# Patient Record
Sex: MALE | Race: WHITE | NOT HISPANIC OR LATINO | Employment: OTHER | URBAN - METROPOLITAN AREA
[De-identification: names, ages, dates, MRNs, and addresses within clinical notes are randomized per-mention and may not be internally consistent; named-entity substitution may affect disease eponyms.]

---

## 2018-02-07 ENCOUNTER — OFFICE VISIT (OUTPATIENT)
Dept: DERMATOLOGY | Facility: CLINIC | Age: 73
End: 2018-02-07
Payer: MEDICARE

## 2018-02-07 DIAGNOSIS — L01.00 IMPETIGO: Primary | ICD-10-CM

## 2018-02-07 DIAGNOSIS — L30.9 ECZEMA, UNSPECIFIED TYPE: ICD-10-CM

## 2018-02-07 PROCEDURE — 99999 PR PBB SHADOW E&M-NEW PATIENT-LVL II: CPT | Mod: PBBFAC,,, | Performed by: DERMATOLOGY

## 2018-02-07 PROCEDURE — 87070 CULTURE OTHR SPECIMN AEROBIC: CPT

## 2018-02-07 PROCEDURE — 1159F MED LIST DOCD IN RCRD: CPT | Mod: ,,, | Performed by: DERMATOLOGY

## 2018-02-07 PROCEDURE — 99202 OFFICE O/P NEW SF 15 MIN: CPT | Mod: PBBFAC | Performed by: DERMATOLOGY

## 2018-02-07 PROCEDURE — 99202 OFFICE O/P NEW SF 15 MIN: CPT | Mod: S$PBB,,, | Performed by: DERMATOLOGY

## 2018-02-07 PROCEDURE — 1126F AMNT PAIN NOTED NONE PRSNT: CPT | Mod: ,,, | Performed by: DERMATOLOGY

## 2018-02-07 RX ORDER — MUPIROCIN 20 MG/G
OINTMENT TOPICAL
Qty: 30 G | Refills: 2 | Status: SHIPPED | OUTPATIENT
Start: 2018-02-07 | End: 2019-09-09

## 2018-02-07 RX ORDER — ATORVASTATIN CALCIUM 10 MG/1
10 TABLET, FILM COATED ORAL DAILY
COMMUNITY
End: 2019-04-08

## 2018-02-07 NOTE — PATIENT INSTRUCTIONS

## 2018-02-07 NOTE — PROGRESS NOTES
Subjective:       Patient ID:  Ravinder Sanz is a 73 y.o. male who presents for   Chief Complaint   Patient presents with    Rash     All over     Rash  - Initial  Affected locations: left lower leg and right buttock  Duration: 2 weeks  Signs / symptoms: itching and redness (Visual Redness)  Severity: severe  Timing: constant  Aggravated by: heat (Hot Water)  Treatments tried: Dial soap, Clotrimazole, and Tolnaftate.  Improvement on treatment: no relief        Review of Systems   Constitutional: Negative for fever, chills, weight loss, weight gain, fatigue, night sweats and malaise.   Skin: Positive for rash. Negative for daily sunscreen use and recent sunburn.   Hematologic/Lymphatic: Does not bruise/bleed easily.        Objective:    Physical Exam   Constitutional: He appears well-developed and well-nourished. No distress.   Neurological: He is alert and oriented to person, place, and time. He is not disoriented.   Psychiatric: He has a normal mood and affect.   Skin:   Areas Examined (abnormalities noted in diagram):   Head / Face Inspection Performed  Neck Inspection Performed  RUE Inspected  LUE Inspection Performed  RLE Inspected  LLE Inspection Performed              Diagram Legend     Erythematous scaling macule/papule c/w actinic keratosis       Vascular papule c/w angioma      Pigmented verrucoid papule/plaque c/w seborrheic keratosis      Yellow umbilicated papule c/w sebaceous hyperplasia      Irregularly shaped tan macule c/w lentigo     1-2 mm smooth white papules consistent with Milia      Movable subcutaneous cyst with punctum c/w epidermal inclusion cyst      Subcutaneous movable cyst c/w pilar cyst      Firm pink to brown papule c/w dermatofibroma      Pedunculated fleshy papule(s) c/w skin tag(s)      Evenly pigmented macule c/w junctional nevus     Mildly variegated pigmented, slightly irregular-bordered macule c/w mildly atypical nevus      Flesh colored to evenly pigmented papule c/w  "intradermal nevus       Pink pearly papule/plaque c/w basal cell carcinoma      Erythematous hyperkeratotic cursted plaque c/w SCC      Surgical scar with no sign of skin cancer recurrence      Open and closed comedones      Inflammatory papules and pustules      Verrucoid papule consistent consistent with wart     Erythematous eczematous patches and plaques     Dystrophic onycholytic nail with subungual debris c/w onychomycosis     Umbilicated papule    Erythematous-base heme-crusted tan verrucoid plaque consistent with inflamed seborrheic keratosis     Erythematous Silvery Scaling Plaque c/w Psoriasis     See annotation      Assessment / Plan:        Impetigo  -     mupirocin (BACTROBAN) 2 % ointment; AAA BID  Dispense: 30 g; Refill: 2  -     Aerobic culture    Eczema, unspecified type  -     fluocinonide (LIDEX) 0.05 % ointment; AAA bid  Dispense: 60 g; Refill: 3  Good skin care regimen discussed including limiting to one bath or shower/day, using lukewarm water with mild soap and moisturizing cream to skin 1 - 2x/day. Brochure was provided and reviewed with patient.  Recommended CeraVe moisturizing cream, Dove sensitive skin soap, and "free and clear" detergents.      Follow-up if symptoms worsen or fail to improve.  "

## 2018-02-09 ENCOUNTER — TELEPHONE (OUTPATIENT)
Dept: DERMATOLOGY | Facility: CLINIC | Age: 73
End: 2018-02-09

## 2018-02-09 LAB — BACTERIA SPEC AEROBE CULT: NORMAL

## 2018-02-09 RX ORDER — FLUOCINONIDE 0.5 MG/G
OINTMENT TOPICAL
Qty: 60 G | Refills: 3 | Status: SHIPPED | OUTPATIENT
Start: 2018-02-09 | End: 2019-11-20

## 2018-02-09 NOTE — TELEPHONE ENCOUNTER
Spoke with patient and went over biopsy results with him  ----- Message from Suzy Washington sent at 2/9/2018  2:23 PM CST -----  Contact: pt   [facility]-pt- pt is returning the nurses phone call can you please call pt at [phone]    KELL

## 2018-02-09 NOTE — TELEPHONE ENCOUNTER
----- Message from Carmel Ruano MD sent at 2/9/2018 12:39 PM CST -----  Please call Mr. Sanz and let him know that the culture did not grow anything infectious, so I have sent a strong steroid cream for him to use instead on all of the affected areas. He can discontinue the antibiotic ointment once he starts the steroid. Let me know if he has any questions.

## 2018-03-20 ENCOUNTER — TELEPHONE (OUTPATIENT)
Dept: DERMATOLOGY | Facility: CLINIC | Age: 73
End: 2018-03-20

## 2018-03-20 NOTE — TELEPHONE ENCOUNTER
Spoke with patient and coming in on Friday 3/23 at 2pm  ----- Message from Maria G Aldridge sent at 3/20/2018 11:35 AM CDT -----  Contact: pt at [phone]  Alden pt -Pt needs appt asap for the same thing that he saw her for back in February.

## 2018-03-23 ENCOUNTER — OFFICE VISIT (OUTPATIENT)
Dept: DERMATOLOGY | Facility: CLINIC | Age: 73
End: 2018-03-23
Payer: MEDICARE

## 2018-03-23 DIAGNOSIS — L30.0 NUMMULAR ECZEMA: Primary | ICD-10-CM

## 2018-03-23 PROCEDURE — 99999 PR PBB SHADOW E&M-EST. PATIENT-LVL II: CPT | Mod: PBBFAC,,, | Performed by: DERMATOLOGY

## 2018-03-23 PROCEDURE — 99212 OFFICE O/P EST SF 10 MIN: CPT | Mod: PBBFAC | Performed by: DERMATOLOGY

## 2018-03-23 PROCEDURE — 99213 OFFICE O/P EST LOW 20 MIN: CPT | Mod: S$PBB,,, | Performed by: DERMATOLOGY

## 2018-03-23 NOTE — PATIENT INSTRUCTIONS
XEROSIS (DRY SKIN)        1. Definition    Xerosis is the term for dry skin.  We all have a natural oil coating over our skin produced by the skin oil glands.  If this oil is removed, the skin becomes dry which can lead to cracking, which can lead to inflammation.  Xerosis is usually a long-term problem that recurs often, especially in the winter.    2. Cause     Long hot baths or showers can remove our natural oil and lead to xerosis.  One should never take more than one bath or shower a day and for no longer than ten minutes.   Use of harsh soaps such as Zest, Dial, and Ivory can worsen and cause xerosis.   Cold winter weather worsens xerosis because the amount of moisture contained in cold air is much less than the amount of moisture in warm air.    3. Treatment     Treatment is intended to restore the natural oil to your skin.  Keep the skin lubricated.     Do not take more than one bath or shower a day.  Use lukewarm water, not hot.  Hot water dries out the skin.     Use a gentle moisturizing soap such as Cetaphil soap, Oil of Olay, Dove, Basis, Ivory moisture care, Restoraderm cleanser.     When toweling dry, dont rub.  Blot the skin so there is still some water left on the skin.  You should apply a moisturizing cream to all of the skin such as Cerave cream, Cetaphil cream, Restoraderm or Eucerin Original Formula cream.   Alpha hydroxyacid lotions, i.e., AmLactin, also work very well for preventing dry skin, but may burn when used on inflamed or reddened skin.     If you like to swim during the winter months, you should not use soap when getting out of the pool.  When you have finished swimming, rinse off the chlorine with cool to warm water.  If this will be the only shower of the day, then you may use Cetaphil or another mild soap to cleanse your skin.  After the shower, apply a moisturizing cream to all of the skin as above.        [address] [place] [address]/ [phone]  [phone] FAX/ [email or web address]

## 2018-03-23 NOTE — PROGRESS NOTES
Subjective:       Patient ID:  Ravinder Sanz is a 73 y.o. male who presents for   Chief Complaint   Patient presents with    Eczema     HPI  Pt here today for f/u eczema. States the lesions were almost all clear after using the lidex x 2 wks but then he stopped it and they have slowly recurred, but not as bad.  Using a shea butter moisturizer and dove soap.    Review of Systems   Constitutional: Negative for fever and chills.   Skin: Positive for itching and rash.        Objective:    Physical Exam   Constitutional: He appears well-developed and well-nourished. No distress.   Neurological: He is alert and oriented to person, place, and time. He is not disoriented.   Psychiatric: He has a normal mood and affect.   Skin:   Areas Examined (abnormalities noted in diagram):   Head / Face Inspection Performed  Neck Inspection Performed  Genitals / Buttocks / Groin Inspection Performed  RUE Inspected  LUE Inspection Performed  RLE Inspected  LLE Inspection Performed              Diagram Legend     Erythematous scaling macule/papule c/w actinic keratosis       Vascular papule c/w angioma      Pigmented verrucoid papule/plaque c/w seborrheic keratosis      Yellow umbilicated papule c/w sebaceous hyperplasia      Irregularly shaped tan macule c/w lentigo     1-2 mm smooth white papules consistent with Milia      Movable subcutaneous cyst with punctum c/w epidermal inclusion cyst      Subcutaneous movable cyst c/w pilar cyst      Firm pink to brown papule c/w dermatofibroma      Pedunculated fleshy papule(s) c/w skin tag(s)      Evenly pigmented macule c/w junctional nevus     Mildly variegated pigmented, slightly irregular-bordered macule c/w mildly atypical nevus      Flesh colored to evenly pigmented papule c/w intradermal nevus       Pink pearly papule/plaque c/w basal cell carcinoma      Erythematous hyperkeratotic cursted plaque c/w SCC      Surgical scar with no sign of skin cancer recurrence      Open and closed  "comedones      Inflammatory papules and pustules      Verrucoid papule consistent consistent with wart     Erythematous eczematous patches and plaques     Dystrophic onycholytic nail with subungual debris c/w onychomycosis     Umbilicated papule    Erythematous-base heme-crusted tan verrucoid plaque consistent with inflamed seborrheic keratosis     Erythematous Silvery Scaling Plaque c/w Psoriasis     See annotation      Assessment / Plan:        Nummular eczema  Recommended continuing lidex BID x 1-2 wks then prn flares. Pt states it does work when he uses it.  Good skin care regimen discussed including limiting to one bath or shower/day, using lukewarm water with mild soap and moisturizing cream to skin 1 - 2x/day. Brochure was provided and reviewed with patient.  Recommended CeraVe moisturizing cream, Dove sensitive skin soap, and "free and clear" detergents.    Follow-up if symptoms worsen or fail to improve.  "

## 2019-02-15 ENCOUNTER — TELEPHONE (OUTPATIENT)
Dept: DERMATOLOGY | Facility: CLINIC | Age: 74
End: 2019-02-15

## 2019-02-15 NOTE — TELEPHONE ENCOUNTER
----- Message from Arielle Diaz MA sent at 2/15/2019 12:44 PM CST -----  Contact: patient  Pt would like for you to recommend a good PCP and a good endorcrinologist for him to see. He would also like referrals put in. Please advise.     Thanks!  ----- Message -----  From: Masha Miles  Sent: 2/15/2019  11:43 AM  To: [facility] Burt S Staff    Please call above patient need to be referred over to a endocrinology doctor  Wants  to give him a name of a doctor call patient at [phone] waiting on a call from the nurse thanks

## 2019-02-15 NOTE — TELEPHONE ENCOUNTER
----- Message from Masha Miles sent at 2/15/2019 11:43 AM CST -----  Contact: patient  Please call above patient need to be referred over to a endocrinology doctor  Wants  to give him a name of a doctor call patient at [phone] waiting on a call from the nurse thanks

## 2019-02-15 NOTE — TELEPHONE ENCOUNTER
Spoke with pt and let him know that I will give the message to Dr. Ruano that he will need recommendations for a PCP and a endocrinologist as well as referrals to those providers.

## 2019-02-15 NOTE — TELEPHONE ENCOUNTER
Please let pt know I recommend Dr. Keshia Eng in Endocrine and Dr. Yrn Govea in internal medicine at [facility]. I tried putting in referrals, but the computer won't let me since I don't have a diagnosis. He should be able to just call them to make the appointment without the referral.

## 2019-02-18 ENCOUNTER — PATIENT MESSAGE (OUTPATIENT)
Dept: DERMATOLOGY | Facility: CLINIC | Age: 74
End: 2019-02-18

## 2019-02-20 ENCOUNTER — TELEPHONE (OUTPATIENT)
Dept: DERMATOLOGY | Facility: CLINIC | Age: 74
End: 2019-02-20

## 2019-02-20 NOTE — TELEPHONE ENCOUNTER
----- Message from Rebeca Hernández sent at 2/20/2019  1:58 PM CST -----  Contact: Pt  Patient Returning Call from [facility]    Who Left Message for Patient: Serafin    Communication Preference: # [phone]  Additional Information:

## 2019-02-25 ENCOUNTER — TELEPHONE (OUTPATIENT)
Dept: ENDOCRINOLOGY | Facility: CLINIC | Age: 74
End: 2019-02-25

## 2019-02-25 NOTE — TELEPHONE ENCOUNTER
Left voicemail message for the to return my call regarding an appointment.  When they call back I will notify them that I have scheduled the visit with Dr Sandra Ochoa our pituitary specialist.

## 2019-02-25 NOTE — TELEPHONE ENCOUNTER
----- Message from Betzy Chaparro sent at 2/25/2019 11:07 AM CST -----  Contact: jermaine  Pt is calling in regards to scheduling an appt. Per pt, he is needing to schedule an appt for pituitary issues.  Books aren't currently open to schedule this appt. Pt would like a call back to do so.    He can be reached at [phone].    Thank you

## 2019-04-08 ENCOUNTER — OFFICE VISIT (OUTPATIENT)
Dept: ENDOCRINOLOGY | Facility: CLINIC | Age: 74
End: 2019-04-08
Payer: MEDICARE

## 2019-04-08 VITALS
BODY MASS INDEX: 28.7 KG/M2 | WEIGHT: 178.56 LBS | SYSTOLIC BLOOD PRESSURE: 124 MMHG | DIASTOLIC BLOOD PRESSURE: 90 MMHG | HEART RATE: 80 BPM | HEIGHT: 66 IN

## 2019-04-08 DIAGNOSIS — E23.0 PANHYPOPITUITARISM: ICD-10-CM

## 2019-04-08 DIAGNOSIS — E29.1 HYPOGONADISM IN MALE: ICD-10-CM

## 2019-04-08 DIAGNOSIS — E03.8 OTHER SPECIFIED HYPOTHYROIDISM: ICD-10-CM

## 2019-04-08 DIAGNOSIS — R25.1 TREMOR: Primary | ICD-10-CM

## 2019-04-08 DIAGNOSIS — E27.40 ADRENAL INSUFFICIENCY: ICD-10-CM

## 2019-04-08 PROCEDURE — 99999 PR PBB SHADOW E&M-EST. PATIENT-LVL V: CPT | Mod: PBBFAC,,, | Performed by: INTERNAL MEDICINE

## 2019-04-08 PROCEDURE — 99204 PR OFFICE/OUTPT VISIT, NEW, LEVL IV, 45-59 MIN: ICD-10-PCS | Mod: S$PBB,,, | Performed by: INTERNAL MEDICINE

## 2019-04-08 PROCEDURE — 99999 PR PBB SHADOW E&M-EST. PATIENT-LVL V: ICD-10-PCS | Mod: PBBFAC,,, | Performed by: INTERNAL MEDICINE

## 2019-04-08 PROCEDURE — 99215 OFFICE O/P EST HI 40 MIN: CPT | Mod: PBBFAC | Performed by: INTERNAL MEDICINE

## 2019-04-08 PROCEDURE — 99204 OFFICE O/P NEW MOD 45 MIN: CPT | Mod: S$PBB,,, | Performed by: INTERNAL MEDICINE

## 2019-04-08 RX ORDER — LEVOTHYROXINE SODIUM 100 UG/1
100 TABLET ORAL DAILY
Qty: 90 TABLET | Refills: 3 | Status: SHIPPED | OUTPATIENT
Start: 2019-04-08 | End: 2020-05-19

## 2019-04-08 RX ORDER — HYDROCORTISONE 10 MG/1
20 TABLET ORAL DAILY
COMMUNITY
End: 2019-04-08

## 2019-04-08 RX ORDER — TESTOSTERONE CYPIONATE 200 MG/ML
150 INJECTION, SOLUTION INTRAMUSCULAR
Qty: 10 ML | Refills: 5 | Status: SHIPPED | OUTPATIENT
Start: 2019-04-08 | End: 2019-12-09 | Stop reason: SDUPTHER

## 2019-04-08 RX ORDER — HYDROCORTISONE 10 MG/1
10 TABLET ORAL 2 TIMES DAILY
Qty: 200 TABLET | Refills: 3 | Status: SHIPPED | OUTPATIENT
Start: 2019-04-08 | End: 2020-05-19

## 2019-04-08 NOTE — PROGRESS NOTES
Subjective:      Patient ID: Ravinder Sanz is a 74 y.o. male.     Chief Complaint:  Pituitary adenoma; Hypopituitarism; Hypothyroidism; and Hypogonadism    History of Present Illness    With regards to the panhypopit  Spinal fusion surgery at age 16; proceeded with pituitary adenoma - nonfunctional. Received radiation treatment at [facility].   Followed by endocrinologist out of [place].     With regards to the Secondary adrenal insufficiency-   current dose: Hydrocortisone 10 mg PO BID  Knows sick day precautions    Does not medical alert tag   Never been hospitalized for AI      With regards to the Secondary hypothyroidism -    Current dose: Levothyroxine 100 mcg PO once daily - decreased from 125 mcg 6 months ago   Takes medication properly    Symptoms:   - weight gain  + hot/cold intolerance  - cp/palpitations/sob  - fatigue  - hair loss  - brittle nails  - skin changes  +anxiety      With regards to theSecondary hypogonadism-   Since he was 20 years old   on Testosterone replacement therapy - 150 mg q 2 weeks   Satisfied with Libido and sexual performance        With regards to theSecondary AGHD - was on GH (for 2 weeks in 2018 )  currently Off of it due to expenses    No dx of DI     Denies polyuria, polydipsia, nocturia     Review of Systems   Constitutional: Negative for unexpected weight change.   Eyes: Negative for visual disturbance.   Respiratory: Negative for shortness of breath.    Cardiovascular: Negative for chest pain.   Gastrointestinal: Negative for abdominal pain.   Musculoskeletal: Negative for myalgias.   Skin: Negative for wound.   Neurological: Negative for headaches.   Hematological: Does not bruise/bleed easily.   Psychiatric/Behavioral: Negative for sleep disturbance.       Objective:   Physical Exam   Constitutional: He appears well-developed.   HENT:   Right Ear: External ear normal.   Left Ear: External ear normal.   Nose: Nose normal.   Hearing normal    Neck: No tracheal deviation  present. No thyromegaly present.   Cardiovascular: Normal rate.   No murmur heard.  Pulmonary/Chest: Effort normal and breath sounds normal.   Abdominal: Soft. He exhibits no mass.   No hernia noted   Musculoskeletal: He exhibits no edema.   Neurological: He is alert. No cranial nerve deficit or sensory deficit. Coordination and gait normal.        Skin: No rash noted.   No nodules   Psychiatric: He has a normal mood and affect. Judgment normal.   Vitals reviewed.  +resting tremor     Body mass index is 28.82 kg/m².    Lab Review:   No results found for: HGBA1C  No results found for: CHOL, HDL, LDLCALC, TRIG, CHOLHDL  Lab Results   Component Value Date     04/08/2019    K 4.3 04/08/2019     04/08/2019    CO2 30 (H) 04/08/2019    GLU 92 04/08/2019    BUN 15 04/08/2019    CREATININE 1.1 04/08/2019    CALCIUM 9.6 04/08/2019    PROT 7.6 04/08/2019    ALBUMIN 4.5 04/08/2019    BILITOT 0.6 04/08/2019    ALKPHOS 48 (L) 04/08/2019    AST 28 04/08/2019    ALT 23 04/08/2019    ANIONGAP 7 (L) 04/08/2019    ESTGFRAFRICA >60.0 04/08/2019    EGFRNONAA >60.0 04/08/2019       Assessment and Plan     Problem List Items Addressed This Visit        Neuro    Tremor - Primary    Current Assessment & Plan     Referral to Neurology          Relevant Orders    Ambulatory consult to Neurology       Endocrine    Panhypopituitarism    Current Assessment & Plan     --Check free t4,t3, IGF, and prolactin today         Relevant Orders    CBC auto differential (Completed)    Comprehensive metabolic panel (Completed)    T4, free (Completed)    T3, free (Completed)    DXA Bone Density Spine And Hip    Ambulatory referral to Optometry    Insulin-like growth factor    Prolactin (Completed)    Ambulatory referral to Internal Medicine    Ambulatory referral to Podiatry    Other specified hypothyroidism    Current Assessment & Plan     -- Clinically and biochemically euthyroid  -- Goal is a normal TSH  -- Check TFTs today and adjust dosage  accordingly   -- Avoid exogenous hyperthyroidism as this can accelerate bone loss and increase risk of CV complications.  -- Advised to take LT4 on an empty stomach with water and to wait 30-45 minutes before eating or taking other medications   -- Reviewed usual  times of thyroid hormone  changes- call if start/ stop ocps, weight changes, attempting conception and during pregnancy   -- Reviewed that symptoms of hypothyroidism may not correlate with tsh, and a normal TSH is the goal of therapy.....  symptoms are not a justification for over treatment          Hypogonadism in male    Current Assessment & Plan     --Continue testosterone replacement therapy         Adrenal insufficiency    Current Assessment & Plan     Secondary adrenal insufficiency    * best time to take medication is 3-4 am to mimic normal diurnal patterns, if not feasible please take before bed. If it causes insomnia- take this medication first thing in the morning     Continue Hydrocortisone regimen  If you have surgery please notify your MD of you adrenal condition.  Will get medical alert tag    Referral to optometry for routine eye exam  Schedule bone density                 I, Keshia Eng MD,  have personally taken the history and examined the patient and agree with the note as stated above.    Follow ft4 levels   Reviewed sick day precautions  Needs medic alert tag   Declined GH   Pleased wit trt therapy

## 2019-04-08 NOTE — LETTER
April 8, 2019      Carmel Ruano MD  [address]  [facility] [address]           [facility] - Endocrinology  [address]  [facility] [address]  Phone: [phone]          Patient: Ravinder Sanz   MR Number: [id number]   YOB: 1945   Date of Visit: 4/8/2019       Dear Dr. Carmel Ruano:    Thank you for referring Ravinder Sanz to me for evaluation. Attached you will find relevant portions of my assessment and plan of care.    If you have questions, please do not hesitate to call me. I look forward to following Ravinder Sanz along with you.    Sincerely,    Keshia Eng MD    Enclosure  CC:  No Recipients    If you would like to receive this communication electronically, please contact [email or web address] or [phone] to request more information on [employer] access.    For providers and/or their staff who would like to refer a patient to [facility], please contact us through our one-stop-shop provider referral line, [facility], at [phone].    If you feel you have received this communication in error or would no longer like to receive these types of communications, please e-mail [email or web address]

## 2019-04-08 NOTE — ASSESSMENT & PLAN NOTE
-- Clinically and biochemically euthyroid  -- Goal is a normal TSH  -- Check TFTs today and adjust dosage accordingly   -- Avoid exogenous hyperthyroidism as this can accelerate bone loss and increase risk of CV complications.  -- Advised to take LT4 on an empty stomach with water and to wait 30-45 minutes before eating or taking other medications   -- Reviewed usual  times of thyroid hormone  changes- call if start/ stop ocps, weight changes, attempting conception and during pregnancy   -- Reviewed that symptoms of hypothyroidism may not correlate with tsh, and a normal TSH is the goal of therapy.....  symptoms are not a justification for over treatment

## 2019-04-09 ENCOUNTER — OFFICE VISIT (OUTPATIENT)
Dept: PODIATRY | Facility: CLINIC | Age: 74
End: 2019-04-09
Payer: MEDICARE

## 2019-04-09 VITALS
DIASTOLIC BLOOD PRESSURE: 90 MMHG | HEIGHT: 66 IN | BODY MASS INDEX: 28.61 KG/M2 | HEART RATE: 80 BPM | WEIGHT: 178 LBS | SYSTOLIC BLOOD PRESSURE: 120 MMHG

## 2019-04-09 DIAGNOSIS — Q82.8 POROKERATOSIS: Primary | ICD-10-CM

## 2019-04-09 DIAGNOSIS — M21.40 PES PLANUS, UNSPECIFIED LATERALITY: ICD-10-CM

## 2019-04-09 PROCEDURE — 17110 DESTRUCTION B9 LES UP TO 14: CPT | Mod: S$PBB,RT,, | Performed by: PODIATRIST

## 2019-04-09 PROCEDURE — 17110 PR DESTRUCTION BENIGN LESIONS UP TO 14: ICD-10-PCS | Mod: S$PBB,RT,, | Performed by: PODIATRIST

## 2019-04-09 PROCEDURE — 99213 OFFICE O/P EST LOW 20 MIN: CPT | Mod: PBBFAC,25 | Performed by: PODIATRIST

## 2019-04-09 PROCEDURE — 99999 PR PBB SHADOW E&M-EST. PATIENT-LVL III: CPT | Mod: PBBFAC,,, | Performed by: PODIATRIST

## 2019-04-09 PROCEDURE — 17110 DESTRUCTION B9 LES UP TO 14: CPT | Mod: PBBFAC,RT | Performed by: PODIATRIST

## 2019-04-09 PROCEDURE — 99999 PR PBB SHADOW E&M-EST. PATIENT-LVL III: ICD-10-PCS | Mod: PBBFAC,,, | Performed by: PODIATRIST

## 2019-04-09 PROCEDURE — 99203 OFFICE O/P NEW LOW 30 MIN: CPT | Mod: 25,S$PBB,, | Performed by: PODIATRIST

## 2019-04-09 PROCEDURE — 99203 PR OFFICE/OUTPT VISIT, NEW, LEVL III, 30-44 MIN: ICD-10-PCS | Mod: 25,S$PBB,, | Performed by: PODIATRIST

## 2019-04-09 NOTE — LETTER
April 10, 2019      Sarah Morales, NP  [address]  [facility] [address]           [facility] - Podiatry  [address]  [facility] [address]  Phone: [phone]          Patient: Ravinder Sanz   MR Number: [id number]   YOB: 1945   Date of Visit: 4/9/2019       Dear Sarah Morales:    Thank you for referring Ravinder Sanz to me for evaluation. Attached you will find relevant portions of my assessment and plan of care.    If you have questions, please do not hesitate to call me. I look forward to following Ravinder Sanz along with you.    Sincerely,    Jorge Dietz, DPDEUCE    Enclosure  CC:  No Recipients    If you would like to receive this communication electronically, please contact [email or web address] or [phone] to request more information on [employer] Link access.    For providers and/or their staff who would like to refer a patient to [facility], please contact us through our one-stop-shop provider referral line, [facility], at [phone].    If you feel you have received this communication in error or would no longer like to receive these types of communications, please e-mail [email or web address]

## 2019-04-10 ENCOUNTER — PATIENT MESSAGE (OUTPATIENT)
Dept: ENDOCRINOLOGY | Facility: CLINIC | Age: 74
End: 2019-04-10

## 2019-04-10 NOTE — PROGRESS NOTES
Subjective:      Patient ID: Ravinder Sanz is a 74 y.o. male.    Chief Complaint: Callouses (right foot)    Raivnder is a 74 y.o. male who presents to the podiatry clinic  with complaint of  right foot pain. Onset of the symptoms was several weeks ago. Precipitating event: increased activity. Current symptoms include: worsening symptoms after a period of activity. Aggravating factors: walking. Symptoms have gradually worsened. Patient has had no prior foot problems. Evaluation to date: none. Treatment to date: none. Patients rates pain 5/10 on pain scale.        Review of Systems   Constitution: Negative for chills and fever.   HENT: Negative for congestion and tinnitus.    Eyes: Negative for double vision and visual disturbance.   Cardiovascular: Negative for chest pain and claudication.   Respiratory: Negative for hemoptysis and shortness of breath.    Endocrine: Negative for cold intolerance and heat intolerance.   Hematologic/Lymphatic: Negative for adenopathy and bleeding problem.   Skin: Positive for suspicious lesions. Negative for color change and nail changes.   Musculoskeletal: Negative for myalgias and stiffness.   Gastrointestinal: Negative for nausea and vomiting.   Genitourinary: Negative for dysuria and hematuria.   Neurological: Negative for numbness and paresthesias.   Psychiatric/Behavioral: Negative for altered mental status and suicidal ideas.   Allergic/Immunologic: Negative for environmental allergies and persistent infections.           Objective:      Physical Exam   Constitutional: He is oriented to person, place, and time. Vital signs are normal. He appears well-developed and well-nourished.   Cardiovascular:   Pulses:       Dorsalis pedis pulses are 2+ on the right side, and 2+ on the left side.        Posterior tibial pulses are 2+ on the right side, and 2+ on the left side.   Musculoskeletal:        Right foot: There is normal range of motion and no deformity.        Left foot: There is  normal range of motion and no deformity.   Inspection and palpation of the muscles joints and bones of both lower extremities reveal that muscle strength for the anterior, lateral, and posterior muscle groups and intrinsic muscle groups of the foot are all 5 over 5 symmetrical. Ankle, subtalar, midtarsal, and digital joint range of motion  are within normal limits, nonpainful, without crepitus or effusion. Patient exhibits a normal angle and base of gait. Palpation of the tendons reveal no defects.     Feet:   Right Foot:   Skin Integrity: Negative for skin breakdown or erythema.   Left Foot:   Skin Integrity: Negative for skin breakdown or erythema.   Neurological: He is oriented to person, place, and time. He has normal strength. No sensory deficit.   Reflex Scores:       Patellar reflexes are 2+ on the right side and 2+ on the left side.       Achilles reflexes are 2+ on the right side and 2+ on the left side.  Sharp, dull, light touch, vibratory, and proprioceptive sensation are intact bilaterally. Deep tendon reflexes to patellar and Achilles tendon are symmetrical, 2/4 bilaterally. No ankle clonus or Babinski reflexes noted bilaterally. Coordination is normal to both feet and lower extremities.   Skin: Skin is warm, dry and intact. No cyanosis. Nails show no clubbing.   Skin turgor is normal bilaterally. Skin texture is well hydrated to both lower extremities. No lesions or rashes or wounds appreciated bilaterally. Nail plates 1 through 5 bilaterally are within normal limits for length and thickness. No nail clubbing or incurvation noted. Porokeratosis noted with central core and tenderness with direct palpation noted to the right sub 2nd metatarsophalangeal joint.             Assessment:       Encounter Diagnoses   Name Primary?    Porokeratosis Yes    Pes planus, unspecified laterality          Plan:       [facility]av[facility]    Diagnoses and all orders for this  visit:    Porokeratosis    Pes planus, unspecified laterality      I counseled the patient on his conditions, their implications and medical management.      Lesion Excision     Performed by: Jorge Dietz DPM  Authorized by: Patient     Consent Done?:  Yes (Verbal)     Care Type:  Debride/Excise  Location(s):  right plantar foot sub 2nd metatarsophalangeal joint  Patient tolerance:  Patient tolerated the procedure well with no immediate complications      With patient's permission, the lesion(s) mentioned above were aggressively reduced and debrided using a 15 blade, tissue nipper, and curette to remove all lesion and associated debris. Topical trichloroacetic acid applied with a sterile cover. The patient will continue to monitor the areas daily, inspect the feet, wear protective shoe gear when ambulatory, and moisturizer to maintain skin integrity.      .  Appropriate shoe gear and inserts were discussed, appropriate treatment for the lesion was discussed in detail.  Follow-up as needed.

## 2019-04-15 ENCOUNTER — PATIENT MESSAGE (OUTPATIENT)
Dept: ENDOCRINOLOGY | Facility: CLINIC | Age: 74
End: 2019-04-15

## 2019-04-30 ENCOUNTER — HOSPITAL ENCOUNTER (OUTPATIENT)
Dept: RADIOLOGY | Facility: CLINIC | Age: 74
Discharge: HOME | End: 2019-04-30
Attending: INTERNAL MEDICINE
Payer: MEDICARE

## 2019-04-30 ENCOUNTER — OFFICE VISIT (OUTPATIENT)
Dept: OPTOMETRY | Facility: CLINIC | Age: 74
End: 2019-04-30
Payer: MEDICARE

## 2019-04-30 DIAGNOSIS — H25.13 NUCLEAR SCLEROSIS OF BOTH EYES: Primary | ICD-10-CM

## 2019-04-30 DIAGNOSIS — H52.203 MYOPIA WITH ASTIGMATISM AND PRESBYOPIA, BILATERAL: ICD-10-CM

## 2019-04-30 DIAGNOSIS — H43.391 VITREOUS FLOATERS OF RIGHT EYE: ICD-10-CM

## 2019-04-30 DIAGNOSIS — E23.0 PANHYPOPITUITARISM: ICD-10-CM

## 2019-04-30 DIAGNOSIS — H25.041 POSTERIOR SUBCAPSULAR POLAR AGE-RELATED CATARACT OF RIGHT EYE: ICD-10-CM

## 2019-04-30 DIAGNOSIS — H52.13 MYOPIA WITH ASTIGMATISM AND PRESBYOPIA, BILATERAL: ICD-10-CM

## 2019-04-30 DIAGNOSIS — H52.4 MYOPIA WITH ASTIGMATISM AND PRESBYOPIA, BILATERAL: ICD-10-CM

## 2019-04-30 PROCEDURE — 99999 PR PBB SHADOW E&M-EST. PATIENT-LVL III: CPT | Mod: PBBFAC,,, | Performed by: OPTOMETRIST

## 2019-04-30 PROCEDURE — 99999 PR PBB SHADOW E&M-EST. PATIENT-LVL III: ICD-10-PCS | Mod: PBBFAC,,, | Performed by: OPTOMETRIST

## 2019-04-30 PROCEDURE — 77080 DXA BONE DENSITY AXIAL: CPT | Mod: 26,,, | Performed by: INTERNAL MEDICINE

## 2019-04-30 PROCEDURE — 77080 DEXA BONE DENSITY SPINE HIP: ICD-10-PCS | Mod: 26,,, | Performed by: INTERNAL MEDICINE

## 2019-04-30 PROCEDURE — 99213 OFFICE O/P EST LOW 20 MIN: CPT | Mod: PBBFAC,25 | Performed by: OPTOMETRIST

## 2019-04-30 PROCEDURE — 92015 DETERMINE REFRACTIVE STATE: CPT | Mod: ,,, | Performed by: OPTOMETRIST

## 2019-04-30 PROCEDURE — 92004 COMPRE OPH EXAM NEW PT 1/>: CPT | Mod: S$PBB,,, | Performed by: OPTOMETRIST

## 2019-04-30 PROCEDURE — 92004 PR EYE EXAM, NEW PATIENT,COMPREHESV: ICD-10-PCS | Mod: S$PBB,,, | Performed by: OPTOMETRIST

## 2019-04-30 PROCEDURE — 77080 DXA BONE DENSITY AXIAL: CPT | Mod: TC

## 2019-04-30 PROCEDURE — 92015 PR REFRACTION: ICD-10-PCS | Mod: ,,, | Performed by: OPTOMETRIST

## 2019-04-30 NOTE — PROGRESS NOTES
HPI     TONJA: 17yrs   Glasses? Yes  Contacts? No  H/o eye surgery, injections or laser: No  H/o eye injury: No  Known eye conditions? No  Family h/o eye conditions?No   Eye gtts? No    (-) Flashes (+) Floaters OD one floater, new about a month    (-) Mucous   (-) Tearing (-) Itching (-) Burning   (-) Headaches (-) Eye Pain/discomfort (-) Irritation   (-) Redness (-) Double vision (+) Blurry vision near va has decreased over   the years   Diabetic? No  A1c n/a       Last edited by Peg Abraham on 4/30/2019 10:23 AM. (History)        ROS     Negative for: Constitutional, Gastrointestinal, Neurological, Skin,   Genitourinary, Musculoskeletal, HENT, Endocrine, Cardiovascular, Eyes,   Respiratory, Psychiatric, Allergic/Imm, Heme/Lymph    Last edited by Sedrick Chavez, OD on 4/30/2019 10:52 AM. (History)        Assessment /Plan     For exam results, see Encounter Report.    Nuclear sclerosis of both eyes  -     Ambulatory consult to Ophthalmology    Posterior subcapsular polar age-related cataract of right eye  -     Ambulatory consult to Ophthalmology    Vitreous floaters of right eye    Myopia with astigmatism and presbyopia, bilateral      1-2. Visually significant OD>OS. Educated pt on potential for better vision w/cataract surgery. Pt shows understanding. Pt going out of town in upcoming weeks and would like to postpone referral until June. Refer to Dr Steinberg.   3. No e/o h/b/t 360 degrees OU. Monitor for worsening of symptoms or S/Sx of RD.   4. Hold SRx.

## 2019-05-13 ENCOUNTER — OFFICE VISIT (OUTPATIENT)
Dept: INTERNAL MEDICINE | Facility: CLINIC | Age: 74
End: 2019-05-13
Payer: MEDICARE

## 2019-05-13 VITALS
HEIGHT: 66 IN | WEIGHT: 179.44 LBS | BODY MASS INDEX: 28.84 KG/M2 | DIASTOLIC BLOOD PRESSURE: 76 MMHG | HEART RATE: 80 BPM | SYSTOLIC BLOOD PRESSURE: 126 MMHG | OXYGEN SATURATION: 98 %

## 2019-05-13 DIAGNOSIS — Z13.6 ENCOUNTER FOR SCREENING FOR CARDIOVASCULAR DISORDERS: ICD-10-CM

## 2019-05-13 DIAGNOSIS — Z13.89 SCREENING FOR MULTIPLE CONDITIONS: Primary | ICD-10-CM

## 2019-05-13 DIAGNOSIS — Z12.11 ENCOUNTER FOR FIT (FECAL IMMUNOCHEMICAL TEST) SCREENING: ICD-10-CM

## 2019-05-13 DIAGNOSIS — Z12.5 ENCOUNTER FOR SCREENING FOR MALIGNANT NEOPLASM OF PROSTATE: ICD-10-CM

## 2019-05-13 DIAGNOSIS — F41.9 ANXIETY: ICD-10-CM

## 2019-05-13 PROCEDURE — 99202 PR OFFICE/OUTPT VISIT, NEW, LEVL II, 15-29 MIN: ICD-10-PCS | Mod: S$PBB,,, | Performed by: INTERNAL MEDICINE

## 2019-05-13 PROCEDURE — 99999 PR PBB SHADOW E&M-EST. PATIENT-LVL IV: CPT | Mod: PBBFAC,,, | Performed by: INTERNAL MEDICINE

## 2019-05-13 PROCEDURE — 99999 PR PBB SHADOW E&M-EST. PATIENT-LVL IV: ICD-10-PCS | Mod: PBBFAC,,, | Performed by: INTERNAL MEDICINE

## 2019-05-13 PROCEDURE — 99202 OFFICE O/P NEW SF 15 MIN: CPT | Mod: S$PBB,,, | Performed by: INTERNAL MEDICINE

## 2019-05-13 PROCEDURE — 99214 OFFICE O/P EST MOD 30 MIN: CPT | Mod: PBBFAC | Performed by: INTERNAL MEDICINE

## 2019-05-13 NOTE — LETTER
May 19, 2019      Sarah Morales, NP  [address]  [place] [facility] [address]           [facility] - Internal Medicine  [address]  [facility] [address]  Phone: [phone]  Fax: [phone]          Patient: Ravinder Sanz   MR Number: [id number]   YOB: 1945   Date of Visit: 5/13/2019       Dear Sarah Morales:    Thank you for referring Ravinder Sanz to me for evaluation. Attached you will find relevant portions of my assessment and plan of care.    If you have questions, please do not hesitate to call me. I look forward to following Ravinder Sanz along with you.    Sincerely,    Brooke Dean MD    Enclosure  CC:  No Recipients    If you would like to receive this communication electronically, please contact [email or web address] or [phone] to request more information on [employer] Link access.    For providers and/or their staff who would like to refer a patient to [facility], please contact us through our one-stop-shop provider referral line, [facility], at [phone].    If you feel you have received this communication in error or would no longer like to receive these types of communications, please e-mail [email or web address]

## 2019-05-17 ENCOUNTER — OFFICE VISIT (OUTPATIENT)
Dept: NEUROLOGY | Facility: CLINIC | Age: 74
End: 2019-05-17
Payer: MEDICARE

## 2019-05-17 VITALS
SYSTOLIC BLOOD PRESSURE: 167 MMHG | BODY MASS INDEX: 29.09 KG/M2 | WEIGHT: 181 LBS | HEART RATE: 78 BPM | HEIGHT: 66 IN | DIASTOLIC BLOOD PRESSURE: 98 MMHG

## 2019-05-17 DIAGNOSIS — F41.9 ANXIETY DISORDER: ICD-10-CM

## 2019-05-17 DIAGNOSIS — E23.0 PANHYPOPITUITARISM: ICD-10-CM

## 2019-05-17 DIAGNOSIS — E83.51 HYPOCALCEMIA: ICD-10-CM

## 2019-05-17 DIAGNOSIS — R25.1 TREMOR: ICD-10-CM

## 2019-05-17 DIAGNOSIS — G20.C PRIMARY PARKINSONISM: Primary | ICD-10-CM

## 2019-05-17 DIAGNOSIS — G25.9 EXTRAPYRAMIDAL AND MOVEMENT DISORDER: ICD-10-CM

## 2019-05-17 DIAGNOSIS — F40.240 CLAUSTROPHOBIA: ICD-10-CM

## 2019-05-17 DIAGNOSIS — F22 PARANOIA: ICD-10-CM

## 2019-05-17 PROCEDURE — 99999 PR PBB SHADOW E&M-EST. PATIENT-LVL III: CPT | Mod: PBBFAC,,, | Performed by: PSYCHIATRY & NEUROLOGY

## 2019-05-17 PROCEDURE — 99213 OFFICE O/P EST LOW 20 MIN: CPT | Mod: PBBFAC | Performed by: PSYCHIATRY & NEUROLOGY

## 2019-05-17 PROCEDURE — 99204 PR OFFICE/OUTPT VISIT, NEW, LEVL IV, 45-59 MIN: ICD-10-PCS | Mod: S$PBB,,, | Performed by: PSYCHIATRY & NEUROLOGY

## 2019-05-17 PROCEDURE — 99999 PR PBB SHADOW E&M-EST. PATIENT-LVL III: ICD-10-PCS | Mod: PBBFAC,,, | Performed by: PSYCHIATRY & NEUROLOGY

## 2019-05-17 PROCEDURE — 99204 OFFICE O/P NEW MOD 45 MIN: CPT | Mod: S$PBB,,, | Performed by: PSYCHIATRY & NEUROLOGY

## 2019-05-17 RX ORDER — CLONAZEPAM 0.5 MG/1
0.5 TABLET ORAL 2 TIMES DAILY PRN
Qty: 60 TABLET | Refills: 4 | Status: SHIPPED | OUTPATIENT
Start: 2019-05-17 | End: 2019-09-09

## 2019-05-17 RX ORDER — BUPROPION HYDROCHLORIDE 75 MG/1
75 TABLET ORAL 2 TIMES DAILY
Qty: 60 TABLET | Refills: 11 | Status: SHIPPED | OUTPATIENT
Start: 2019-05-17 | End: 2019-05-21 | Stop reason: SINTOL

## 2019-05-17 RX ORDER — ASCORBIC ACID 500 MG
500 TABLET ORAL DAILY
COMMUNITY
End: 2019-06-18

## 2019-05-17 RX ORDER — CHOLECALCIFEROL (VITAMIN D3) 25 MCG
1000 TABLET ORAL DAILY
COMMUNITY
End: 2019-06-18

## 2019-05-17 NOTE — LETTER
May 20, 2019      Keshia Eng MD  [address]  [facility] [address]           [facility]  Neurology  [address]  [facility] [address]  Phone: [phone]  Fax: [phone]          Patient: Ravinder Sanz   MR Number: [id number]   YOB: 1945   Date of Visit: 5/17/2019       Dear Dr. Keshia Eng:    Thank you for referring Ravinder Sanz to me for evaluation. Attached you will find relevant portions of my assessment and plan of care.    If you have questions, please do not hesitate to call me. I look forward to following Ravinder Sanz along with you.    Sincerely,    Janice Baker MD    Enclosure  CC:  No Recipients    If you would like to receive this communication electronically, please contact [email or web address] or [phone] to request more information on [employer] Link access.    For providers and/or their staff who would like to refer a patient to [facility], please contact us through our one-stop-shop provider referral line, [facility], at [phone].    If you feel you have received this communication in error or would no longer like to receive these types of communications, please e-mail [email or web address]

## 2019-05-17 NOTE — PATIENT INSTRUCTIONS
PLEASE try WELLBUTRIN.  IF this drug causes MORE paranoia, stop taking IMMEDIATELY and let me know.

## 2019-05-17 NOTE — PROGRESS NOTES
"Ravinder Sanz I. Chief Complaints during this visit:  New Patient visit for tremor    Keshia Eng MD  [address] [place] [address]    Primary Care Physician  Brooke Dean MD  [address]  [place] [address]        History of present illness:   74 y.o.  male seen in consultation at the request of  Dr. Eng for evaluation of tremor.  Accompanied by partner of 9 years, Jong.    Main concerns:  Claustrophobia, insomnia (fear of bedtime), bilateral tremors (x1 year), drool  Balance is unchanged.  Cognition is unchanged.  Had had active dreaming up until about a year ago.  Anxiety, claustrophobia, can no long camp!  Clonazepam made this worse.  Diplopia, [id number], intermittent.    He wonders if his anxiety is due to allowing fantasies, particularly worried about being put in [facility].  Jong says he is not as social, less motivated as in past.    Jong says he had "twiddle" in fingers when driving up to 9 years ago.    Active artist, fine art (oil), but tremor does not effect this.      From alejandro note:  With regards to the panhypopit  Spinal fusion surgery at age 16; proceeded with pituitary adenoma - nonfunctional. Received radiation treatment at [facility].   Followed by endocrinologist out of [place].      With regards to the Secondary adrenal insufficiency-   current dose: Hydrocortisone 10 mg PO BID  Knows sick day precautions    Does not medical alert tag   Never been hospitalized for AI                 With regards to the Secondary hypothyroidism -    Current dose: Levothyroxine 100 mcg PO once daily - decreased from 125 mcg 6 months ago   Takes medication properly      II.  Review of systems:  As in HPI,  otherwise, balance 10 systems reviewed and are negative.    III.  Past Medical History:   Diagnosis Date    Anxiety     Eczema     Hyperlipidemia     Hypogonadism in male     Hypopituitarism     Hypothyroidism      Family History   Problem Relation Age of Onset    " "Heart disease Father     Diabetes Sister     Melanoma Neg Hx      Social history:  Lives with partner, Jong; owns home in [place] and [place].  .      Current Outpatient Medications on File Prior to Visit   Medication Sig Dispense Refill    ascorbic acid, vitamin C, (VITAMIN C) 500 MG tablet Take 500 mg by mouth once daily.      hydrocortisone (CORTEF) 10 MG Tab Take 1 tablet (10 mg total) by mouth 2 (two) times daily. 200 tablet 3    levothyroxine (SYNTHROID) 100 MCG tablet Take 1 tablet (100 mcg total) by mouth once daily. 90 tablet 3    MAGNESIUM CITRATE ORAL Take 750 mg by mouth once daily.      testosterone cypionate (DEPOTESTOTERONE CYPIONATE) 200 mg/mL injection Inject 0.75 mLs (150 mg total) into the muscle every 14 (fourteen) days. 3 ml syringe with 18 g needle  to draw  X 10   25 g 1 inch needle to inject x 10 10 mL 5    vitamin D (VITAMIN D3) 1000 units Tab Take 1,000 Units by mouth once daily.      fluocinonide (LIDEX) 0.05 % ointment AAA bid 60 g 3    mupirocin (BACTROBAN) 2 % ointment AAA BID 30 g 2    needle, disp, 21 G 21 gauge x 1" Ndle To use once weekly with testosterone injections 4 each 11     No current facility-administered medications on file prior to visit.        PRIOR problem-specific medications tried:  none    Review of patient's allergies indicates:  No Known Allergies    IV. Physical Exam    Vitals:    05/17/19 1007   BP: (!) 167/98   Pulse: 78   Weight: 82.1 kg (181 lb)   Height: 5' 6" (1.676 m)     General appearance: Well nourished, well developed, no acute distress.         Cardiovascular:  pedal pulses 2, no edema or cyanosis, heart regular rate and rhythym, no carotid bruits.         -------------------------------------------------------------  Facial Expression: 2: Mild: In addition to decreased eye-blink frequency, Masked facies present in the lower face as well, namely fewer movements around the mouth, such as less spontaneous smiling, but lips not parted.      "   Affect: full       Orientation to time & place:  Oriented to time, place, person and situation       Attention & concentration:  Normal attention span and concentration       Memory:  Recent and remote memory intact  Language: Spontaneous, fluent; able to repeat and name objects        Fund of knowledge:  Aware of current events        Speech:  2: Mild: Loss of modulation, diction, or volume, with a few words unclear, but the overall sentences easy to follow   -------------------------------------------------------  Cranial nerves: wears glasses, visual fields full, optic discs not visualized, pupils equal round and reactive, extraocular movements intact but with slow saccades,       facial sensation intact, face symmetrical, hearing intact to whisper, palate raises midline, shoulder shrug strength normal, tongue protrudes midline.        -------------------------------------------------------  Musculoskeletal  Muscle tone: mild cogwheel all 4 extremities       Muscle Bulk: all 4 extremities normal        Muscle strength:  5/5 in all 4 extremities        No pronator drift  Sensation: Intact to light touch in all extremities        Deep tendon Reflexes: 2+ bilateral biceps, triceps, patella and ankles        --------------------------------------------------------------  Cerebellar and Coordination  Gait:  normal, able to tandem without difficulty        Finger-nose: no dysmetria       Rapid Alternating Movements (pronation/supination):  R slowed; L slowed  --------------------------------------------------------------  Abnormality of movement (bradykinesia, hyperkinesia) present? Yes, 2: Mild: Mild global slowness and poverty of spontaneous movements.   Tremor present?   Yes, left resting tremor > right resting tremor; mild postural tremor; no action tremor   Posture: 1: Slight: Not quite erect, but posture could be normal for older person.   Postural stability:  no Rhomberg    V.  Laboratory/ Radiological Data:           Lab Visit on 04/08/2019   Component Date Value Ref Range Status    WBC 04/08/2019 6.29  3.90 - 12.70 K/uL Final    RBC 04/08/2019 5.20  4.60 - 6.20 M/uL Final    Hemoglobin 04/08/2019 15.4  14.0 - 18.0 g/dL Final    Hematocrit 04/08/2019 46.1  40.0 - 54.0 % Final    Mean Corpuscular Volume 04/08/2019 89  82 - 98 fL Final    Mean Corpuscular Hemoglobin 04/08/2019 29.6  27.0 - 31.0 pg Final    Mean Corpuscular Hemoglobin Conc 04/08/2019 33.4  32.0 - 36.0 g/dL Final    RDW 04/08/2019 14.5  11.5 - 14.5 % Final    Platelets 04/08/2019 181  150 - 350 K/uL Final    MPV 04/08/2019 11.8  9.2 - 12.9 fL Final    Immature Granulocytes 04/08/2019 0.3  0.0 - 0.5 % Final    Gran # (ANC) 04/08/2019 4.8  1.8 - 7.7 K/uL Final    Immature Grans (Abs) 04/08/2019 0.02  0.00 - 0.04 K/uL Final    Comment: Mild elevation in immature granulocytes is non specific and   can be seen in a variety of conditions including stress response,   acute inflammation, trauma and pregnancy. Correlation with other   laboratory and clinical findings is essential.      Lymph # 04/08/2019 1.1  1.0 - 4.8 K/uL Final    Mono # 04/08/2019 0.3  0.3 - 1.0 K/uL Final    Eos # 04/08/2019 0.0  0.0 - 0.5 K/uL Final    Baso # 04/08/2019 0.04  0.00 - 0.20 K/uL Final    nRBC 04/08/2019 0  0 /100 WBC Final    Gran% 04/08/2019 75.6* 38.0 - 73.0 % Final    Lymph% 04/08/2019 18.1  18.0 - 48.0 % Final    Mono% 04/08/2019 5.1  4.0 - 15.0 % Final    Eosinophil% 04/08/2019 0.3  0.0 - 8.0 % Final    Basophil% 04/08/2019 0.6  0.0 - 1.9 % Final    Differential Method 04/08/2019 Automated   Final    Sodium 04/08/2019 139  136 - 145 mmol/L Final    Potassium 04/08/2019 4.3  3.5 - 5.1 mmol/L Final    Chloride 04/08/2019 102  95 - 110 mmol/L Final    CO2 04/08/2019 30* 23 - 29 mmol/L Final    Glucose 04/08/2019 92  70 - 110 mg/dL Final    BUN, Bld 04/08/2019 15  8 - 23 mg/dL Final    Creatinine 04/08/2019 1.1  0.5 - 1.4 mg/dL Final    Calcium  04/08/2019 9.6  8.7 - 10.5 mg/dL Final    Total Protein 04/08/2019 7.6  6.0 - 8.4 g/dL Final    Albumin 04/08/2019 4.5  3.5 - 5.2 g/dL Final    Total Bilirubin 04/08/2019 0.6  0.1 - 1.0 mg/dL Final    Comment: For infants and newborns, interpretation of results should be based  on gestational age, weight and in agreement with clinical  observations.  Premature Infant recommended reference ranges:  Up to 24 hours.............<8.0 mg/dL  Up to 48 hours............<12.0 mg/dL  3-5 days..................<15.0 mg/dL  6-29 days.................<15.0 mg/dL      Alkaline Phosphatase 04/08/2019 48* 55 - 135 U/L Final    AST 04/08/2019 28  10 - 40 U/L Final    ALT 04/08/2019 23  10 - 44 U/L Final    Anion Gap 04/08/2019 7* 8 - 16 mmol/L Final    eGFR if African American 04/08/2019 >60.0  >60 mL/min/1.73 m^2 Final    eGFR if non African American 04/08/2019 >60.0  >60 mL/min/1.73 m^2 Final    Comment: Calculation used to obtain the estimated glomerular filtration  rate (eGFR) is the CKD-EPI equation.       Free T4 04/08/2019 1.21  0.71 - 1.51 ng/dL Final    T3, Free 04/08/2019 2.4  2.3 - 4.2 pg/mL Final    Somatomedin (IGF-I) 04/08/2019 26* 32 - 200 ng/mL Final    Z Score 04/08/2019 -2.33  -2.0 - 2.0 SD Final    Comment: -------------------ADDITIONAL INFORMATION-------------------  This test was developed and its performance characteristics   determined by [facility] in a manner consistent with CLIA   requirements. This test has not been cleared or approved by   the U.S. Food and Drug Administration.  Test Performed by:  [facility]  [address], [place] [address]      Prolactin 04/08/2019 1.1* 3.5 - 19.4 ng/mL Final         VI. Assessment and Plan            Problem List Items Addressed This Visit        1 - High    Primary Parkinsonism - Primary    Overview     Anxiety, active dreaming x 5-9 years, tremor since 2018         Current Assessment & Plan      Phenotypically parkinsonian.  His symmetry along with the anxiety, paranoia suggest a parkinsonism other than idiopathic PD, such as LBD.   -> trial of wellbutrin (can make paranoia worse, but I'm hoping we get more of the anxiolytic effect)   -> labs, imaging to look for mimickers   -> neuropsych test         Relevant Orders    MRI Brain Without Contrast    Vitamin B12    Vitamin B1    TSH    PTH, intact    Calcium    Vitamin D    Ambulatory referral to Neuropsychology    Tremor       2     Paranoia    Relevant Orders    Ambulatory referral to Neuropsychology    Claustrophobia    Relevant Medications    clonazePAM (KLONOPIN) 0.5 MG tablet    buPROPion (WELLBUTRIN) 75 MG tablet    Other Relevant Orders    Ambulatory referral to Neuropsychology       3     Panhypopituitarism    Overview     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             ___________________________________________________________    I appreciate the opportunity to participate in the care of this patient and will communicate my assessment and plan back to the referring physician via copy of this note.

## 2019-05-19 NOTE — PROGRESS NOTES
Subjective:       Patient ID: Ravinder Sanz is a 74 y.o. male.    Chief Complaint: Establish Care    HPI  He is here for an initial visit.  Currently without complaint    Past medical history:  Pituitary adenoma, pan hypopituitarism    Medications:  Cortef, Synthroid 0.1 mg daily, testosterone    No known drug allergies      Review of Systems   Constitutional: Negative for chills, fatigue, fever and unexpected weight change.   Respiratory: Negative for chest tightness and shortness of breath.    Cardiovascular: Negative for chest pain and palpitations.   Gastrointestinal: Negative for abdominal pain and blood in stool.   Neurological: Negative for dizziness, syncope, numbness and headaches.       Objective:      Physical Exam   HENT:   Right Ear: External ear normal.   Left Ear: External ear normal.   Nose: Nose normal.   Mouth/Throat: Oropharynx is clear and moist.   Eyes: Pupils are equal, round, and reactive to light.   Neck: Normal range of motion.   Cardiovascular: Normal rate and regular rhythm.   No murmur heard.  Pulmonary/Chest: Breath sounds normal.   Abdominal: He exhibits no distension. There is no hepatomegaly. There is no tenderness.   Lymphadenopathy:     He has no cervical adenopathy.     He has no axillary adenopathy.   Neurological: He has normal strength and normal reflexes. No cranial nerve deficit or sensory deficit.       Assessment/Plan       Assessment and plan:  Panhypopituitarism:  Follow up with endocrinology.  Check CMP, PSA.  Discussed colonoscopy, he declined.  Fit kit given

## 2019-05-20 ENCOUNTER — PATIENT MESSAGE (OUTPATIENT)
Dept: OPTOMETRY | Facility: CLINIC | Age: 74
End: 2019-05-20

## 2019-05-20 ENCOUNTER — PATIENT MESSAGE (OUTPATIENT)
Dept: NEUROLOGY | Facility: CLINIC | Age: 74
End: 2019-05-20

## 2019-05-20 ENCOUNTER — TELEPHONE (OUTPATIENT)
Dept: OPTOMETRY | Facility: CLINIC | Age: 74
End: 2019-05-20

## 2019-05-20 NOTE — ASSESSMENT & PLAN NOTE
Phenotypically parkinsonian.  His symmetry along with the anxiety, paranoia suggest a parkinsonism other than idiopathic PD, such as LBD.   -> trial of wellbutrin (can make paranoia worse, but I'm hoping we get more of the anxiolytic effect)   -> labs, imaging to look for mimickers   -> neuropsych test

## 2019-05-20 NOTE — TELEPHONE ENCOUNTER
Pt is wanting to hold off on cat sx and getting new glasses. Pt is aware that va will only improve wiith cat sx. He will call back when he is ready to schedule for consultation

## 2019-05-21 ENCOUNTER — PATIENT MESSAGE (OUTPATIENT)
Dept: NEUROLOGY | Facility: CLINIC | Age: 74
End: 2019-05-21

## 2019-05-21 DIAGNOSIS — F22 PARANOIA: Primary | ICD-10-CM

## 2019-05-21 RX ORDER — CITALOPRAM 10 MG/1
10 TABLET ORAL DAILY
Qty: 30 TABLET | Refills: 11 | Status: SHIPPED | OUTPATIENT
Start: 2019-05-21 | End: 2019-05-28 | Stop reason: SINTOL

## 2019-05-21 NOTE — TELEPHONE ENCOUNTER
Extreme slobbering, urinary incontinence, thoughts of suicide, all has ended now.  ----- Message -----  From: Nurse Aaron SANTIAGO  Sent: 5/20/2019  3:06 PM CDT  To: Ravinder Sanz  Subject: RE: Non-Urgent Medical  Can you provide the reaction details? Was it a rash? Difficulty breathing? Change in heart rate? Thank you.     ----- Message -----     From: Ravinder Sanz     Sent: 5/20/2019  9:31 AM CDT       To: Janice Baker MD  Subject: Non-Urgent Medical    Would someone be able to contact us in regard to reaction to the Bupropion that was prescribed recently and stopped using immediately after taking the first pill.  Please call [phone]. Thank you.

## 2019-05-26 ENCOUNTER — PATIENT MESSAGE (OUTPATIENT)
Dept: NEUROLOGY | Facility: CLINIC | Age: 74
End: 2019-05-26

## 2019-06-10 ENCOUNTER — TELEPHONE (OUTPATIENT)
Dept: NEUROLOGY | Facility: CLINIC | Age: 74
End: 2019-06-10

## 2019-06-10 ENCOUNTER — HOSPITAL ENCOUNTER (OUTPATIENT)
Dept: RADIOLOGY | Facility: HOSPITAL | Age: 74
Discharge: HOME | End: 2019-06-10
Attending: PSYCHIATRY & NEUROLOGY
Payer: MEDICARE

## 2019-06-10 DIAGNOSIS — G93.6 CEREBRAL EDEMA: ICD-10-CM

## 2019-06-10 DIAGNOSIS — G20.C PRIMARY PARKINSONISM: ICD-10-CM

## 2019-06-10 DIAGNOSIS — E23.0 PANHYPOPITUITARISM: ICD-10-CM

## 2019-06-10 DIAGNOSIS — D32.9 MENINGIOMA: ICD-10-CM

## 2019-06-10 PROCEDURE — 70553 MRI BRAIN STEM W/O & W/DYE: CPT | Mod: 26,,, | Performed by: RADIOLOGY

## 2019-06-10 PROCEDURE — 70553 MRI BRAIN STEM W/O & W/DYE: CPT | Mod: TC

## 2019-06-10 PROCEDURE — A9585 GADOBUTROL INJECTION: HCPCS | Performed by: PSYCHIATRY & NEUROLOGY

## 2019-06-10 PROCEDURE — 25500020 PHARM REV CODE 255: Performed by: PSYCHIATRY & NEUROLOGY

## 2019-06-10 PROCEDURE — 70553 MRI BRAIN W WO CONTRAST: ICD-10-PCS | Mod: 26,,, | Performed by: RADIOLOGY

## 2019-06-10 RX ORDER — GADOBUTROL 604.72 MG/ML
8 INJECTION INTRAVENOUS
Status: COMPLETED | OUTPATIENT
Start: 2019-06-10 | End: 2019-06-10

## 2019-06-10 RX ADMIN — GADOBUTROL 8 ML: 604.72 INJECTION INTRAVENOUS at 11:06

## 2019-06-10 NOTE — TELEPHONE ENCOUNTER
MRI with large meningioma, mass effect, left temporal lobe.  Possibly contributing to his mood changes, but incidental to his resting tremors (left >right hand).      MRI brain 6/10/19:  3.7 cm homogeneously enhancing extra-axial mass in the left middle cranial fossa with significant mass effect on left temporal lobe as well as moderate amount of subjacent parenchymal edema.  Imaging characteristics highly suggestive of a meningioma.  Neurosurgical consultation is advised.    Limited assessment of sella and pituitary gland on these dedicated whole brain images.  However, pituitary tissue is heterogeneously enhancing and lobulated.  This is likely related to reported history of adenoma in prior radiation therapy.  There is mild suprasellar extension with apparent without apparent mass effect on the optic chiasm.    1.7 cm peripherally enhancing lesion in the squamous portion of left temporal bone.  Etiology is uncertain, comparison with prior studies would be helpful to assess stability.  Consider correlation with CT for further characterization if unavailable.

## 2019-06-10 NOTE — TELEPHONE ENCOUNTER
----- Message from Danielito Spears sent at 6/10/2019  1:41 PM CDT -----  Contact: Dr Bean garza Radiology @ [phone]  Caller requesting a return call, [facility] call

## 2019-06-11 ENCOUNTER — PATIENT MESSAGE (OUTPATIENT)
Dept: NEUROLOGY | Facility: CLINIC | Age: 74
End: 2019-06-11

## 2019-06-13 ENCOUNTER — TELEPHONE (OUTPATIENT)
Dept: NEUROSURGERY | Facility: CLINIC | Age: 74
End: 2019-06-13

## 2019-06-13 ENCOUNTER — TELEPHONE (OUTPATIENT)
Dept: INTERNAL MEDICINE | Facility: CLINIC | Age: 74
End: 2019-06-13

## 2019-06-13 ENCOUNTER — OFFICE VISIT (OUTPATIENT)
Dept: NEUROSURGERY | Facility: CLINIC | Age: 74
End: 2019-06-13
Payer: MEDICARE

## 2019-06-13 VITALS
HEART RATE: 75 BPM | TEMPERATURE: 98 F | WEIGHT: 174.19 LBS | DIASTOLIC BLOOD PRESSURE: 76 MMHG | HEIGHT: 66 IN | BODY MASS INDEX: 27.99 KG/M2 | SYSTOLIC BLOOD PRESSURE: 137 MMHG

## 2019-06-13 DIAGNOSIS — D32.9 MENINGIOMA: Primary | ICD-10-CM

## 2019-06-13 PROCEDURE — 99999 PR PBB SHADOW E&M-EST. PATIENT-LVL III: CPT | Mod: PBBFAC,,, | Performed by: NEUROLOGICAL SURGERY

## 2019-06-13 PROCEDURE — 99204 PR OFFICE/OUTPT VISIT, NEW, LEVL IV, 45-59 MIN: ICD-10-PCS | Mod: S$PBB,,, | Performed by: NEUROLOGICAL SURGERY

## 2019-06-13 PROCEDURE — 99999 PR PBB SHADOW E&M-EST. PATIENT-LVL III: ICD-10-PCS | Mod: PBBFAC,,, | Performed by: NEUROLOGICAL SURGERY

## 2019-06-13 PROCEDURE — 99213 OFFICE O/P EST LOW 20 MIN: CPT | Mod: PBBFAC | Performed by: NEUROLOGICAL SURGERY

## 2019-06-13 PROCEDURE — 99204 OFFICE O/P NEW MOD 45 MIN: CPT | Mod: S$PBB,,, | Performed by: NEUROLOGICAL SURGERY

## 2019-06-13 NOTE — TELEPHONE ENCOUNTER
Spoke with Ravinder's Relative Jong and they confirmed willam for 6/14/19 at 10:20 am. Scheduled by Patrick Woodward. Per Dr. Dean.

## 2019-06-13 NOTE — PROGRESS NOTES
"This office note has been dictated.  Ravinder Sanz (Bill) was seen in neurosurgical consultation at the office this   morning.  He is a 74-year-old man who underwent a spinal fusion at age 16   apparently following  trauma and then was found to have a pituitary adenoma,   which was treated with radiation therapy.  He has been maintained on cortisone,   thyroid and testosterone replacement.  In the past year or two, he has developed   an anxiety disorder.  He used to enjoy going camping, but he has developed   feelings of claustrophobia when he is in a small tent or in a confined space.    About a year ago, he began to note some tremor of the right arm.  This was   primarily noted at rest and it has not interfered with active use of his upper   extremities.  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  [facility] by Dr. Baker to evaluate the tremor and as part of the   workup, MRI of the brain was ordered.  This showed a left frontotemporal   extraaxial brain tumor and he was referred for neurosurgical evaluation.  He has   not experienced headaches.  He does say, however, that sometimes he has double   vision.  He can correct this with concentration.  He is not sure if it is   horizontal or vertical.  He has noted no loss of hearing, no difficulty speaking   or swallowing.  No focal weakness or numbness to the extremities.  No   particular difficulties with gait or balance. Past medical history includes the   hormonal replacement.  He is not diabetic or hypertensive. Medications include   hydrocortisone, Synthroid and Depo-Testosterone.  He is taking Klonopin for   anxiety and tremor. His father apparently developed some tremor at an advanced   age.  Review of systems is otherwise negative.    On physician examination he is a well-developed, well-nourished white male who   is alert and cooperative.  Examination of the head shows no tenderness over the   scalp with attention to the " left frontotemporal area.  There is no prominence of   the bone.  Eyes show full extraocular movements without nystagmus.  Pupils are   equal and reactive to light and fundi show clear disk margins.  Hearing is good   to finger rubbing bilaterally.  He is moving the neck freely.  On neurological   examination, he is speaking clearly, somewhat soft spoken.  Memory seems good   and affect normal.  There is a slow resting tremor bilaterally, but more   apparent on the right.  This was brought out more when he was holding a paper in   his right hand.  Tremor stops when he is using the arm.  Finger-to-nose is   normal.  Tone today seems fairly normal in the extremities.  Gait is   unremarkable.  The remainder of the cranial nerve examination is unremarkable.    He has normal facial sensation and movement.  The tongue protrudes in the   midline.  He shows good strength in extremities, normal sensation.  Quite brisk   and symmetrical deep tendon reflexes.    MRI of the brain was done at [facility] on 06/10/19.  There is an enhancing   dural based mass arising from the lateral left sphenoid wing.  This is   associated with some edema and compression on the temporal horn of the   ventricle.  The tumor is below the sylvian fissure and the middle cerebral   branches are above the tumor.    IMPRESSION:  1.  Left sphenoid wing meningioma.  2.  Possible early Parkinson disease.  3.  Panhypopituitarism, status post radiation therapy for pituitary adenoma.    He would like to proceed with excision of the tumor.  I will arrange this for   him and plan to get this done in the next week or two.      NELSON/BHAVESH  dd: 06/13/2019 09:29:20 (CDT)  td: 06/13/2019 23:03:41 (CDT)  Doc ID   #[id number]  Job ID #[id number]    CC: Ravinder Sanz

## 2019-06-13 NOTE — TELEPHONE ENCOUNTER
----- Message from Denisse Schroeder sent at 6/13/2019  9:19 AM CDT -----  Contact: [phone]/ Jong/ relative  Patient's relative is requesting a call from the office regarding a pre-op clearance appt.asap.     Patient will being having a brain tumor removed.  They are at the doctor's office now making arrangements for his surgery.      Please advise, thank you

## 2019-06-13 NOTE — TELEPHONE ENCOUNTER
Spoke with Jong and he stated that Mr. Castellon  Needs a pre-op willam before 6/19/19 that is the date of his surgery for his Brain Tumor. And look like we have some left for Friday, Monday,  along with Tuesday.     Message from Denisse Schroeder sent at 6/13/2019  9:19 AM CDT -----  Contact: [phone]/ Jong/ relative  Patient's relative is requesting a call from the office regarding a pre-op clearance appt.asap.      Patient will being having a brain tumor removed.  They are at the doctor's office now making arrangements for his surgery.       Please advise, thank you     Please advise!

## 2019-06-14 ENCOUNTER — TELEPHONE (OUTPATIENT)
Dept: INTERNAL MEDICINE | Facility: CLINIC | Age: 74
End: 2019-06-14

## 2019-06-14 ENCOUNTER — HOSPITAL ENCOUNTER (OUTPATIENT)
Dept: RADIOLOGY | Facility: HOSPITAL | Age: 74
Discharge: HOME | End: 2019-06-14
Attending: INTERNAL MEDICINE
Payer: MEDICARE

## 2019-06-14 ENCOUNTER — OFFICE VISIT (OUTPATIENT)
Dept: INTERNAL MEDICINE | Facility: CLINIC | Age: 74
End: 2019-06-14
Payer: MEDICARE

## 2019-06-14 ENCOUNTER — HOSPITAL ENCOUNTER (OUTPATIENT)
Dept: CARDIOLOGY | Facility: CLINIC | Age: 74
Discharge: HOME | End: 2019-06-14
Payer: MEDICARE

## 2019-06-14 ENCOUNTER — TELEPHONE (OUTPATIENT)
Dept: NEUROSURGERY | Facility: CLINIC | Age: 74
End: 2019-06-14

## 2019-06-14 DIAGNOSIS — E23.0 PANHYPOPITUITARISM: ICD-10-CM

## 2019-06-14 DIAGNOSIS — Z12.5 ENCOUNTER FOR SCREENING FOR MALIGNANT NEOPLASM OF PROSTATE: ICD-10-CM

## 2019-06-14 DIAGNOSIS — Z01.818 PREOP EXAMINATION: Primary | ICD-10-CM

## 2019-06-14 DIAGNOSIS — Z86.39 PERSONAL HISTORY OF OTHER ENDOCRINE, NUTRITIONAL AND METABOLIC DISEASE: ICD-10-CM

## 2019-06-14 DIAGNOSIS — Z01.818 PREOPERATIVE EXAMINATION: ICD-10-CM

## 2019-06-14 DIAGNOSIS — Z01.818 PREOPERATIVE EXAMINATION: Primary | ICD-10-CM

## 2019-06-14 DIAGNOSIS — Z53.39 OTHER SPECIFIED PROCEDURE CONVERTED TO OPEN PROCEDURE: ICD-10-CM

## 2019-06-14 PROCEDURE — 99213 PR OFFICE/OUTPT VISIT, EST, LEVL III, 20-29 MIN: ICD-10-PCS | Mod: S$PBB,,, | Performed by: INTERNAL MEDICINE

## 2019-06-14 PROCEDURE — 93010 ELECTROCARDIOGRAM REPORT: CPT | Mod: S$PBB,,, | Performed by: INTERNAL MEDICINE

## 2019-06-14 PROCEDURE — 71046 X-RAY EXAM CHEST 2 VIEWS: CPT | Mod: 26,,, | Performed by: RADIOLOGY

## 2019-06-14 PROCEDURE — 99999 PR PBB SHADOW E&M-EST. PATIENT-LVL V: CPT | Mod: PBBFAC,,, | Performed by: INTERNAL MEDICINE

## 2019-06-14 PROCEDURE — 93005 ELECTROCARDIOGRAM TRACING: CPT | Mod: PBBFAC | Performed by: INTERNAL MEDICINE

## 2019-06-14 PROCEDURE — 99213 OFFICE O/P EST LOW 20 MIN: CPT | Mod: S$PBB,,, | Performed by: INTERNAL MEDICINE

## 2019-06-14 PROCEDURE — 71046 XR CHEST PA AND LATERAL: ICD-10-PCS | Mod: 26,,, | Performed by: RADIOLOGY

## 2019-06-14 PROCEDURE — 99999 PR PBB SHADOW E&M-EST. PATIENT-LVL V: ICD-10-PCS | Mod: PBBFAC,,, | Performed by: INTERNAL MEDICINE

## 2019-06-14 PROCEDURE — 93010 EKG 12-LEAD: ICD-10-PCS | Mod: S$PBB,,, | Performed by: INTERNAL MEDICINE

## 2019-06-14 PROCEDURE — 71046 X-RAY EXAM CHEST 2 VIEWS: CPT | Mod: TC

## 2019-06-14 PROCEDURE — 99215 OFFICE O/P EST HI 40 MIN: CPT | Mod: PBBFAC,25 | Performed by: INTERNAL MEDICINE

## 2019-06-14 NOTE — TELEPHONE ENCOUNTER
----- Message from Sherri Hernandez sent at 6/14/2019  1:33 PM CDT -----  Contact: Jong Roque (friend) @ [phone]  Calling to inform that the patient is have a crainomy next week, was told that it's important that   Dr. Pedraza speak with the patient's Endocrinologist. Please call.

## 2019-06-14 NOTE — TELEPHONE ENCOUNTER
TIFFANY SHAH PT HAS SEEN DR OCHOA AND HAS HAD PRE-OP DONE. GOOD TO GO FROM THAT PERSPECTIVE. ANSWERED ALL QUESTIONS AND REVIEWED NEURO-ICU STAY.

## 2019-06-14 NOTE — TELEPHONE ENCOUNTER
"Please use the "escalation protocol" to schedule his endocrinology appt. He is scheduled for surgery 6-19-19 and his endocrinologist needs to advise the surgeon regarding management of his hypopituitarism during the perioperative period  "

## 2019-06-15 ENCOUNTER — TELEPHONE (OUTPATIENT)
Dept: INTERNAL MEDICINE | Facility: CLINIC | Age: 74
End: 2019-06-15

## 2019-06-15 NOTE — TELEPHONE ENCOUNTER
Please contact patient and inform him that his cholesterol is elevated.  Please advise him to follow a low-fat

## 2019-06-17 ENCOUNTER — TELEPHONE (OUTPATIENT)
Dept: ENDOCRINOLOGY | Facility: CLINIC | Age: 74
End: 2019-06-17

## 2019-06-17 ENCOUNTER — TELEPHONE (OUTPATIENT)
Dept: INTERNAL MEDICINE | Facility: CLINIC | Age: 74
End: 2019-06-17

## 2019-06-17 VITALS
BODY MASS INDEX: 28.16 KG/M2 | SYSTOLIC BLOOD PRESSURE: 136 MMHG | HEIGHT: 66 IN | HEART RATE: 72 BPM | OXYGEN SATURATION: 98 % | DIASTOLIC BLOOD PRESSURE: 88 MMHG | WEIGHT: 175.25 LBS

## 2019-06-17 NOTE — TELEPHONE ENCOUNTER
----- Message from Makenzie Duffy sent at 6/17/2019 10:26 AM CDT -----  Contact: Patient [phone]  Patient is due to have surgery on Wednesday and would like to discuss findings of EKG before patient goes to surgery. Patient would like a call back today.    Please call and advise  Thank you

## 2019-06-17 NOTE — TELEPHONE ENCOUNTER
This is the information I received  back from Maricruz Bowman:     I will send Dr Eng a message regarding his dosing of his hydrocortisone and will notify patient.    On 6/17/19 at 7:14 am.

## 2019-06-17 NOTE — TELEPHONE ENCOUNTER
Received wmail from [facility]. Dr Otis Pedraza doing surgery on Wednesday June 19. Patient needs instructions on dosing of hydrocortisone pre and post surgery.

## 2019-06-17 NOTE — TELEPHONE ENCOUNTER
He will need 100 mg iv hydrocortisone on call to OR and then stress doses after (100 mg iv q 8)   NS to provide initial stress doses zahida-op ... then consult endocrinology for inpt mgmt

## 2019-06-17 NOTE — TELEPHONE ENCOUNTER
----- Message from Jana Hernandez sent at 6/17/2019  9:23 AM CDT -----  Contact: Self [phone]   PT returned call.

## 2019-06-17 NOTE — TELEPHONE ENCOUNTER
Spoke with patient and friend regarding orders for pre and post surgery per Dr Eng,    Pateint and his friend were on speaker, Both informed    Per Dr Eng: patient will need 100mg IV Hydrocortisone on call to OR  And then stress doses after (100mg IV Hydrocortisone every 8 hours and consult Endocrine to manage inpatient.    Informed patient and friend I will let Dr Dean and Dr Pedraza know regarding orders per DR Eng.    Patient's friend stated his EKG was abnormal. Informed them to reach out to Dr Dean regarding EKG.    Patient and friend verbalized understanding.

## 2019-06-18 ENCOUNTER — ANESTHESIA EVENT (OUTPATIENT)
Dept: SURGERY | Facility: HOSPITAL | Age: 74
DRG: 025 | End: 2019-06-18
Payer: MEDICARE

## 2019-06-18 RX ORDER — SENNOSIDES 8.6 MG/1
1 TABLET ORAL DAILY PRN
COMMUNITY

## 2019-06-18 RX ORDER — DOCUSATE SODIUM 100 MG/1
100 CAPSULE, LIQUID FILLED ORAL DAILY PRN
COMMUNITY

## 2019-06-18 NOTE — PROGRESS NOTES
Subjective:       Patient ID: Ravinder Sanz is a 74 y.o. male.    Chief Complaint: Pre-op Exam    HPI  He is scheduled for surgery June 19th and is in need of a preop clearance.  Denies chest pain, no shortness of breath, no palpitations  Review of Systems   Constitutional: Negative for chills, fatigue, fever and unexpected weight change.   Respiratory: Negative for chest tightness and shortness of breath.    Cardiovascular: Negative for chest pain and palpitations.   Gastrointestinal: Negative for abdominal pain and blood in stool.   Neurological: Negative for dizziness, syncope, numbness and headaches.       Objective:      Physical Exam   HENT:   Right Ear: External ear normal.   Left Ear: External ear normal.   Nose: Nose normal.   Mouth/Throat: Oropharynx is clear and moist.   Eyes: Pupils are equal, round, and reactive to light.   Neck: Normal range of motion.   Cardiovascular: Normal rate and regular rhythm.   No murmur heard.  Pulmonary/Chest: Breath sounds normal.   Abdominal: He exhibits no distension. There is no hepatomegaly. There is no tenderness.   Lymphadenopathy:     He has no cervical adenopathy.     He has no axillary adenopathy.   Neurological: He has normal strength and normal reflexes. No cranial nerve deficit or sensory deficit.       Assessment/Plan       Assessment and plan:  Preop clearance:  Check CMP, CBC, PT, PTT, EKG, chest x-ray.  Will request clearance from endocrinology in regard to his hypopituitarism

## 2019-06-18 NOTE — PRE-PROCEDURE INSTRUCTIONS
Preop instructions: NPO solids/ milk products after midnight and clears up to 4am (clear liquids are: water, apple juice, Gatorade & Jell-O, black coffee/no milk, cream or creamer), shower instructions, directions, leave all valuables at home, medication instructions for PM prior & am of procedure explained. Patient stated an understanding.     Patient denies any side effects or issues with anesthesia or sedation.

## 2019-06-19 ENCOUNTER — ANESTHESIA (OUTPATIENT)
Dept: SURGERY | Facility: HOSPITAL | Age: 74
DRG: 025 | End: 2019-06-19
Payer: MEDICARE

## 2019-06-19 ENCOUNTER — HOSPITAL ENCOUNTER (INPATIENT)
Facility: HOSPITAL | Age: 74
LOS: 7 days | Discharge: HOME | DRG: 025 | End: 2019-06-26
Attending: NEUROLOGICAL SURGERY | Admitting: NEUROLOGICAL SURGERY
Payer: MEDICARE

## 2019-06-19 DIAGNOSIS — G93.6 CEREBRAL EDEMA: ICD-10-CM

## 2019-06-19 DIAGNOSIS — D32.0 MENINGIOMA, CEREBRAL: Primary | ICD-10-CM

## 2019-06-19 DIAGNOSIS — Z29.89 SEIZURE PROPHYLAXIS: ICD-10-CM

## 2019-06-19 DIAGNOSIS — E27.40 ADRENAL INSUFFICIENCY: ICD-10-CM

## 2019-06-19 LAB
ABO + RH BLD: NORMAL
BLD GP AB SCN CELLS X3 SERPL QL: NORMAL

## 2019-06-19 PROCEDURE — D9220A PRA ANESTHESIA: ICD-10-PCS | Mod: CRNA,,, | Performed by: NURSE ANESTHETIST, CERTIFIED REGISTERED

## 2019-06-19 PROCEDURE — 27201423 OPTIME MED/SURG SUP & DEVICES STERILE SUPPLY: Performed by: NEUROLOGICAL SURGERY

## 2019-06-19 PROCEDURE — 99222 1ST HOSP IP/OBS MODERATE 55: CPT | Mod: GC,,, | Performed by: INTERNAL MEDICINE

## 2019-06-19 PROCEDURE — 36620 INSERTION CATHETER ARTERY: CPT | Mod: 59,,, | Performed by: ANESTHESIOLOGY

## 2019-06-19 PROCEDURE — D9220A PRA ANESTHESIA: Mod: CRNA,,, | Performed by: NURSE ANESTHETIST, CERTIFIED REGISTERED

## 2019-06-19 PROCEDURE — D9220A PRA ANESTHESIA: Mod: ANES,,, | Performed by: ANESTHESIOLOGY

## 2019-06-19 PROCEDURE — 27100025 HC TUBING, SET FLUID WARMER: Performed by: NURSE ANESTHETIST, CERTIFIED REGISTERED

## 2019-06-19 PROCEDURE — 27800903 OPTIME MED/SURG SUP & DEVICES OTHER IMPLANTS: Performed by: NEUROLOGICAL SURGERY

## 2019-06-19 PROCEDURE — 88305 TISSUE SPECIMEN TO PATHOLOGY - SURGERY: ICD-10-PCS | Mod: 26,,, | Performed by: PATHOLOGY

## 2019-06-19 PROCEDURE — 37000008 HC ANESTHESIA 1ST 15 MINUTES: Performed by: NEUROLOGICAL SURGERY

## 2019-06-19 PROCEDURE — 88305 TISSUE EXAM BY PATHOLOGIST: CPT | Performed by: PATHOLOGY

## 2019-06-19 PROCEDURE — 25000003 PHARM REV CODE 250: Performed by: NEUROLOGICAL SURGERY

## 2019-06-19 PROCEDURE — 27201037 HC PRESSURE MONITORING SET UP

## 2019-06-19 PROCEDURE — C1713 ANCHOR/SCREW BN/BN,TIS/BN: HCPCS | Performed by: NEUROLOGICAL SURGERY

## 2019-06-19 PROCEDURE — 88307 TISSUE SPECIMEN TO PATHOLOGY - SURGERY: ICD-10-PCS | Mod: 26,,, | Performed by: PATHOLOGY

## 2019-06-19 PROCEDURE — 63600175 PHARM REV CODE 636 W HCPCS: Performed by: PHYSICIAN ASSISTANT

## 2019-06-19 PROCEDURE — 63600175 PHARM REV CODE 636 W HCPCS: Performed by: NURSE ANESTHETIST, CERTIFIED REGISTERED

## 2019-06-19 PROCEDURE — 25000003 PHARM REV CODE 250: Performed by: NURSE ANESTHETIST, CERTIFIED REGISTERED

## 2019-06-19 PROCEDURE — 63600175 PHARM REV CODE 636 W HCPCS: Performed by: NEUROLOGICAL SURGERY

## 2019-06-19 PROCEDURE — 61512 PR EXCIS SUPRATENT MENINGIOMA: ICD-10-PCS | Mod: GC,,, | Performed by: NEUROLOGICAL SURGERY

## 2019-06-19 PROCEDURE — 88307 TISSUE EXAM BY PATHOLOGIST: CPT | Mod: 26,,, | Performed by: PATHOLOGY

## 2019-06-19 PROCEDURE — 61781 PR STEREOTACTIC COMP ASSIST PROC,CRANIAL,INTRADURAL: ICD-10-PCS | Mod: GC,,, | Performed by: NEUROLOGICAL SURGERY

## 2019-06-19 PROCEDURE — 61512 CRNEC TREPH EXC MNGIOMA STTL: CPT | Mod: GC,,, | Performed by: NEUROLOGICAL SURGERY

## 2019-06-19 PROCEDURE — 69990 PR MICROSURG TECHNIQUES,REQ OPER MICROSCOPE: ICD-10-PCS | Mod: 59,GC,, | Performed by: NEUROLOGICAL SURGERY

## 2019-06-19 PROCEDURE — 86850 RBC ANTIBODY SCREEN: CPT

## 2019-06-19 PROCEDURE — D9220A PRA ANESTHESIA: ICD-10-PCS | Mod: ANES,,, | Performed by: ANESTHESIOLOGY

## 2019-06-19 PROCEDURE — 99223 1ST HOSP IP/OBS HIGH 75: CPT | Mod: ,,, | Performed by: PHYSICIAN ASSISTANT

## 2019-06-19 PROCEDURE — 88331 TISSUE SPECIMEN TO PATHOLOGY - SURGERY: ICD-10-PCS | Mod: 26,,, | Performed by: PATHOLOGY

## 2019-06-19 PROCEDURE — 88305 TISSUE EXAM BY PATHOLOGIST: CPT | Mod: 26,,, | Performed by: PATHOLOGY

## 2019-06-19 PROCEDURE — 36620 ARTERIAL: ICD-10-PCS | Mod: 59,,, | Performed by: ANESTHESIOLOGY

## 2019-06-19 PROCEDURE — 61781 SCAN PROC CRANIAL INTRA: CPT | Mod: GC,,, | Performed by: NEUROLOGICAL SURGERY

## 2019-06-19 PROCEDURE — 88331 PATH CONSLTJ SURG 1 BLK 1SPC: CPT | Mod: 26,,, | Performed by: PATHOLOGY

## 2019-06-19 PROCEDURE — 86920 COMPATIBILITY TEST SPIN: CPT

## 2019-06-19 PROCEDURE — 36000712 HC OR TIME LEV V 1ST 15 MIN: Performed by: NEUROLOGICAL SURGERY

## 2019-06-19 PROCEDURE — C1729 CATH, DRAINAGE: HCPCS | Performed by: NEUROLOGICAL SURGERY

## 2019-06-19 PROCEDURE — 63600175 PHARM REV CODE 636 W HCPCS: Performed by: STUDENT IN AN ORGANIZED HEALTH CARE EDUCATION/TRAINING PROGRAM

## 2019-06-19 PROCEDURE — 94761 N-INVAS EAR/PLS OXIMETRY MLT: CPT

## 2019-06-19 PROCEDURE — 99222 PR INITIAL HOSPITAL CARE,LEVL II: ICD-10-PCS | Mod: GC,,, | Performed by: INTERNAL MEDICINE

## 2019-06-19 PROCEDURE — 99223 PR INITIAL HOSPITAL CARE,LEVL III: ICD-10-PCS | Mod: ,,, | Performed by: PHYSICIAN ASSISTANT

## 2019-06-19 PROCEDURE — 20000000 HC ICU ROOM

## 2019-06-19 PROCEDURE — 37000009 HC ANESTHESIA EA ADD 15 MINS: Performed by: NEUROLOGICAL SURGERY

## 2019-06-19 PROCEDURE — 25000003 PHARM REV CODE 250: Performed by: STUDENT IN AN ORGANIZED HEALTH CARE EDUCATION/TRAINING PROGRAM

## 2019-06-19 PROCEDURE — 36000713 HC OR TIME LEV V EA ADD 15 MIN: Performed by: NEUROLOGICAL SURGERY

## 2019-06-19 PROCEDURE — 69990 MICROSURGERY ADD-ON: CPT | Mod: 59,GC,, | Performed by: NEUROLOGICAL SURGERY

## 2019-06-19 PROCEDURE — C1751 CATH, INF, PER/CENT/MIDLINE: HCPCS | Performed by: NURSE ANESTHETIST, CERTIFIED REGISTERED

## 2019-06-19 DEVICE — SCREW UN3 AXS SD 1.5X4MM: Type: IMPLANTABLE DEVICE | Site: CRANIAL | Status: FUNCTIONAL

## 2019-06-19 DEVICE — PLATE BONE 2X2 HOLE SM BOX: Type: IMPLANTABLE DEVICE | Site: CRANIAL | Status: FUNCTIONAL

## 2019-06-19 DEVICE — GRAFT IMPLANT DURAFORM 3 X 3: Type: IMPLANTABLE DEVICE | Site: CRANIAL | Status: FUNCTIONAL

## 2019-06-19 RX ORDER — ONDANSETRON 2 MG/ML
INJECTION INTRAMUSCULAR; INTRAVENOUS
Status: DISCONTINUED | OUTPATIENT
Start: 2019-06-19 | End: 2019-06-19

## 2019-06-19 RX ORDER — DOCUSATE SODIUM 100 MG/1
100 CAPSULE, LIQUID FILLED ORAL DAILY
Status: DISCONTINUED | OUTPATIENT
Start: 2019-06-19 | End: 2019-06-26 | Stop reason: HOSPADM

## 2019-06-19 RX ORDER — MIDAZOLAM HYDROCHLORIDE 1 MG/ML
INJECTION, SOLUTION INTRAMUSCULAR; INTRAVENOUS
Status: DISCONTINUED | OUTPATIENT
Start: 2019-06-19 | End: 2019-06-19

## 2019-06-19 RX ORDER — LEVETIRACETAM 500 MG/1
500 TABLET ORAL 2 TIMES DAILY
Status: DISCONTINUED | OUTPATIENT
Start: 2019-06-19 | End: 2019-06-21

## 2019-06-19 RX ORDER — MUPIROCIN 20 MG/G
1 OINTMENT TOPICAL
Status: COMPLETED | OUTPATIENT
Start: 2019-06-19 | End: 2019-06-19

## 2019-06-19 RX ORDER — CLONAZEPAM 0.5 MG/1
0.5 TABLET ORAL 2 TIMES DAILY PRN
Status: DISCONTINUED | OUTPATIENT
Start: 2019-06-19 | End: 2019-06-20

## 2019-06-19 RX ORDER — HYDROCODONE BITARTRATE AND ACETAMINOPHEN 5; 325 MG/1; MG/1
1 TABLET ORAL EVERY 6 HOURS PRN
Status: DISCONTINUED | OUTPATIENT
Start: 2019-06-19 | End: 2019-06-21 | Stop reason: SDUPTHER

## 2019-06-19 RX ORDER — ROCURONIUM BROMIDE 10 MG/ML
INJECTION, SOLUTION INTRAVENOUS
Status: DISCONTINUED | OUTPATIENT
Start: 2019-06-19 | End: 2019-06-19

## 2019-06-19 RX ORDER — PHENYLEPHRINE HYDROCHLORIDE 10 MG/ML
INJECTION INTRAVENOUS
Status: DISCONTINUED | OUTPATIENT
Start: 2019-06-19 | End: 2019-06-19

## 2019-06-19 RX ORDER — LIDOCAINE HYDROCHLORIDE 10 MG/ML
1 INJECTION, SOLUTION EPIDURAL; INFILTRATION; INTRACAUDAL; PERINEURAL ONCE
Status: COMPLETED | OUTPATIENT
Start: 2019-06-19 | End: 2019-06-19

## 2019-06-19 RX ORDER — BACITRACIN 50000 [IU]/1
INJECTION, POWDER, FOR SOLUTION INTRAMUSCULAR
Status: DISCONTINUED | OUTPATIENT
Start: 2019-06-19 | End: 2019-06-19 | Stop reason: HOSPADM

## 2019-06-19 RX ORDER — PHENYTOIN SODIUM 50 MG/ML
INJECTION INTRAMUSCULAR; INTRAVENOUS
Status: DISCONTINUED | OUTPATIENT
Start: 2019-06-19 | End: 2019-06-19

## 2019-06-19 RX ORDER — SODIUM CHLORIDE 9 MG/ML
INJECTION, SOLUTION INTRAVENOUS CONTINUOUS
Status: DISCONTINUED | OUTPATIENT
Start: 2019-06-19 | End: 2019-06-24

## 2019-06-19 RX ORDER — LEVOTHYROXINE SODIUM 100 UG/1
100 TABLET ORAL
Status: DISCONTINUED | OUTPATIENT
Start: 2019-06-20 | End: 2019-06-22

## 2019-06-19 RX ORDER — FAMOTIDINE 20 MG/1
20 TABLET, FILM COATED ORAL DAILY
Status: DISCONTINUED | OUTPATIENT
Start: 2019-06-20 | End: 2019-06-21 | Stop reason: SDUPTHER

## 2019-06-19 RX ORDER — LEVOTHYROXINE SODIUM 25 UG/1
100 TABLET ORAL DAILY
Status: DISCONTINUED | OUTPATIENT
Start: 2019-06-19 | End: 2019-06-19

## 2019-06-19 RX ORDER — NEOSTIGMINE METHYLSULFATE 1 MG/ML
INJECTION, SOLUTION INTRAVENOUS
Status: DISCONTINUED | OUTPATIENT
Start: 2019-06-19 | End: 2019-06-19

## 2019-06-19 RX ORDER — FENTANYL CITRATE 50 UG/ML
INJECTION, SOLUTION INTRAMUSCULAR; INTRAVENOUS
Status: DISCONTINUED | OUTPATIENT
Start: 2019-06-19 | End: 2019-06-19

## 2019-06-19 RX ORDER — BACITRACIN ZINC 500 UNIT/G
OINTMENT (GRAM) TOPICAL
Status: DISCONTINUED | OUTPATIENT
Start: 2019-06-19 | End: 2019-06-19 | Stop reason: HOSPADM

## 2019-06-19 RX ORDER — LIDOCAINE HCL/PF 100 MG/5ML
SYRINGE (ML) INTRAVENOUS
Status: DISCONTINUED | OUTPATIENT
Start: 2019-06-19 | End: 2019-06-19

## 2019-06-19 RX ORDER — PROPOFOL 10 MG/ML
VIAL (ML) INTRAVENOUS
Status: DISCONTINUED | OUTPATIENT
Start: 2019-06-19 | End: 2019-06-19

## 2019-06-19 RX ORDER — GLYCOPYRROLATE 0.2 MG/ML
INJECTION INTRAMUSCULAR; INTRAVENOUS
Status: DISCONTINUED | OUTPATIENT
Start: 2019-06-19 | End: 2019-06-19

## 2019-06-19 RX ORDER — EPHEDRINE SULFATE 50 MG/ML
INJECTION, SOLUTION INTRAVENOUS
Status: DISCONTINUED | OUTPATIENT
Start: 2019-06-19 | End: 2019-06-19

## 2019-06-19 RX ORDER — FENTANYL CITRATE 50 UG/ML
25 INJECTION, SOLUTION INTRAMUSCULAR; INTRAVENOUS
Status: DISCONTINUED | OUTPATIENT
Start: 2019-06-19 | End: 2019-06-21

## 2019-06-19 RX ORDER — LIDOCAINE HYDROCHLORIDE AND EPINEPHRINE 10; 10 MG/ML; UG/ML
INJECTION, SOLUTION INFILTRATION; PERINEURAL
Status: DISCONTINUED | OUTPATIENT
Start: 2019-06-19 | End: 2019-06-19 | Stop reason: HOSPADM

## 2019-06-19 RX ORDER — CEFAZOLIN SODIUM 1 G/3ML
1 INJECTION, POWDER, FOR SOLUTION INTRAMUSCULAR; INTRAVENOUS
Status: DISCONTINUED | OUTPATIENT
Start: 2019-06-19 | End: 2019-06-24

## 2019-06-19 RX ORDER — MUPIROCIN 20 MG/G
OINTMENT TOPICAL
Status: DISCONTINUED | OUTPATIENT
Start: 2019-06-19 | End: 2019-06-19

## 2019-06-19 RX ORDER — HYDROCORTISONE 10 MG/1
10 TABLET ORAL 2 TIMES DAILY
Status: DISCONTINUED | OUTPATIENT
Start: 2019-06-19 | End: 2019-06-19

## 2019-06-19 RX ORDER — HYDRALAZINE HYDROCHLORIDE 20 MG/ML
10 INJECTION INTRAMUSCULAR; INTRAVENOUS EVERY 6 HOURS PRN
Status: DISCONTINUED | OUTPATIENT
Start: 2019-06-19 | End: 2019-06-25

## 2019-06-19 RX ORDER — KETAMINE HCL IN 0.9 % NACL 50 MG/5 ML
SYRINGE (ML) INTRAVENOUS
Status: DISCONTINUED | OUTPATIENT
Start: 2019-06-19 | End: 2019-06-19

## 2019-06-19 RX ADMIN — ROCURONIUM BROMIDE 10 MG: 10 INJECTION, SOLUTION INTRAVENOUS at 11:06

## 2019-06-19 RX ADMIN — ROCURONIUM BROMIDE 10 MG: 10 INJECTION, SOLUTION INTRAVENOUS at 09:06

## 2019-06-19 RX ADMIN — CEFAZOLIN 1 G: 1 INJECTION, POWDER, FOR SOLUTION INTRAMUSCULAR; INTRAVENOUS at 10:06

## 2019-06-19 RX ADMIN — SODIUM CHLORIDE: 0.9 INJECTION, SOLUTION INTRAVENOUS at 07:06

## 2019-06-19 RX ADMIN — CLONAZEPAM 0.5 MG: 0.5 TABLET ORAL at 08:06

## 2019-06-19 RX ADMIN — HYDROCORTISONE SODIUM SUCCINATE 100 MG: 100 INJECTION, POWDER, FOR SOLUTION INTRAMUSCULAR; INTRAVENOUS at 09:06

## 2019-06-19 RX ADMIN — GLYCOPYRROLATE 0.4 MG: 0.2 INJECTION, SOLUTION INTRAMUSCULAR; INTRAVENOUS at 01:06

## 2019-06-19 RX ADMIN — EPHEDRINE SULFATE 5 MG: 50 INJECTION, SOLUTION INTRAMUSCULAR; INTRAVENOUS; SUBCUTANEOUS at 11:06

## 2019-06-19 RX ADMIN — FENTANYL CITRATE 50 MCG: 50 INJECTION, SOLUTION INTRAMUSCULAR; INTRAVENOUS at 08:06

## 2019-06-19 RX ADMIN — HYDROCORTISONE SODIUM SUCCINATE 100 MG: 100 INJECTION, POWDER, FOR SOLUTION INTRAMUSCULAR; INTRAVENOUS at 08:06

## 2019-06-19 RX ADMIN — SODIUM CHLORIDE, SODIUM GLUCONATE, SODIUM ACETATE, POTASSIUM CHLORIDE, MAGNESIUM CHLORIDE, SODIUM PHOSPHATE, DIBASIC, AND POTASSIUM PHOSPHATE: .53; .5; .37; .037; .03; .012; .00082 INJECTION, SOLUTION INTRAVENOUS at 09:06

## 2019-06-19 RX ADMIN — FENTANYL CITRATE 50 MCG: 50 INJECTION, SOLUTION INTRAMUSCULAR; INTRAVENOUS at 12:06

## 2019-06-19 RX ADMIN — PROPOFOL 50 MG: 10 INJECTION, EMULSION INTRAVENOUS at 08:06

## 2019-06-19 RX ADMIN — HYDRALAZINE HYDROCHLORIDE 10 MG: 20 INJECTION INTRAMUSCULAR; INTRAVENOUS at 04:06

## 2019-06-19 RX ADMIN — LIDOCAINE HYDROCHLORIDE 100 MG: 20 INJECTION, SOLUTION INTRAVENOUS at 08:06

## 2019-06-19 RX ADMIN — LEVETIRACETAM 500 MG: 500 TABLET ORAL at 09:06

## 2019-06-19 RX ADMIN — PROPOFOL 50 MG: 10 INJECTION, EMULSION INTRAVENOUS at 10:06

## 2019-06-19 RX ADMIN — LIDOCAINE HYDROCHLORIDE: 10 INJECTION, SOLUTION EPIDURAL; INFILTRATION; INTRACAUDAL; PERINEURAL at 07:06

## 2019-06-19 RX ADMIN — FENTANYL CITRATE 25 MCG: 50 INJECTION INTRAMUSCULAR; INTRAVENOUS at 03:06

## 2019-06-19 RX ADMIN — Medication 10 MG: at 10:06

## 2019-06-19 RX ADMIN — PHENYLEPHRINE HYDROCHLORIDE 0.5 MCG/KG/MIN: 10 INJECTION INTRAVENOUS at 10:06

## 2019-06-19 RX ADMIN — PROPOFOL 150 MG: 10 INJECTION, EMULSION INTRAVENOUS at 08:06

## 2019-06-19 RX ADMIN — ROCURONIUM BROMIDE 10 MG: 10 INJECTION, SOLUTION INTRAVENOUS at 12:06

## 2019-06-19 RX ADMIN — NEOSTIGMINE METHYLSULFATE 5 MG: 1 INJECTION INTRAVENOUS at 01:06

## 2019-06-19 RX ADMIN — FENTANYL CITRATE 25 MCG: 50 INJECTION INTRAMUSCULAR; INTRAVENOUS at 07:06

## 2019-06-19 RX ADMIN — PHENYLEPHRINE HYDROCHLORIDE 100 MCG: 10 INJECTION INTRAVENOUS at 10:06

## 2019-06-19 RX ADMIN — ROCURONIUM BROMIDE 10 MG: 10 INJECTION, SOLUTION INTRAVENOUS at 10:06

## 2019-06-19 RX ADMIN — ROCURONIUM BROMIDE 50 MG: 10 INJECTION, SOLUTION INTRAVENOUS at 08:06

## 2019-06-19 RX ADMIN — CEFAZOLIN 1 G: 1 INJECTION, POWDER, FOR SOLUTION INTRAMUSCULAR; INTRAVENOUS at 03:06

## 2019-06-19 RX ADMIN — Medication 10 MG: at 09:06

## 2019-06-19 RX ADMIN — HYDROCORTISONE SODIUM SUCCINATE 100 MG: 100 INJECTION, POWDER, FOR SOLUTION INTRAMUSCULAR; INTRAVENOUS at 02:06

## 2019-06-19 RX ADMIN — MIDAZOLAM HYDROCHLORIDE 1 MG: 1 INJECTION, SOLUTION INTRAMUSCULAR; INTRAVENOUS at 08:06

## 2019-06-19 RX ADMIN — ONDANSETRON 4 MG: 2 INJECTION INTRAMUSCULAR; INTRAVENOUS at 01:06

## 2019-06-19 RX ADMIN — ROCURONIUM BROMIDE 20 MG: 10 INJECTION, SOLUTION INTRAVENOUS at 08:06

## 2019-06-19 RX ADMIN — EPHEDRINE SULFATE 10 MG: 50 INJECTION, SOLUTION INTRAMUSCULAR; INTRAVENOUS; SUBCUTANEOUS at 08:06

## 2019-06-19 RX ADMIN — PHENYLEPHRINE HYDROCHLORIDE 100 MCG: 10 INJECTION INTRAVENOUS at 08:06

## 2019-06-19 RX ADMIN — CEFTRIAXONE 2 G: 1 INJECTION, SOLUTION INTRAVENOUS at 09:06

## 2019-06-19 RX ADMIN — PROPOFOL 30 MG: 10 INJECTION, EMULSION INTRAVENOUS at 12:06

## 2019-06-19 RX ADMIN — MUPIROCIN 1 G: 20 OINTMENT TOPICAL at 07:06

## 2019-06-19 RX ADMIN — PHENYTOIN SODIUM 500 MG: 50 INJECTION INTRAMUSCULAR; INTRAVENOUS at 09:06

## 2019-06-19 RX ADMIN — HYDROCODONE BITARTRATE AND ACETAMINOPHEN 1 TABLET: 5; 325 TABLET ORAL at 05:06

## 2019-06-19 RX ADMIN — Medication 30 MG: at 08:06

## 2019-06-19 NOTE — H&P
[facility]  Neurocritical Care  History & Physical    Admit Date: 6/19/2019  Service Date: 06/19/2019  Length of Stay: 0    Subjective:     Chief Complaint: Meningioma, cerebral    History of Present Illness: Pt is 74 y.o. Male with PMHx of anxiety and panhypopituitarism who presented for elective L temporal meningioma resection with NSGY. Mass was discovered incidentally during MRI workup for parkinson's. Pt has resting tremor at baseline. 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 Endocrinology is consulted and following patient He is admitted to [facility] for post op monitoring.      Past Medical History:   Diagnosis Date    Anxiety     Eczema     Hyperlipidemia     Hypogonadism in male     Hypopituitarism     Hypothyroidism      Past Surgical History:   Procedure Laterality Date    PITUITARY SURGERY      SPINAL FUSION        No current facility-administered medications on file prior to encounter.      Current Outpatient Medications on File Prior to Encounter   Medication Sig Dispense Refill    clonazePAM (KLONOPIN) 0.5 MG tablet Take 1 tablet (0.5 mg total) by mouth 2 (two) times daily as needed for Anxiety. 60 tablet 4    docusate sodium (COLACE) 100 MG capsule Take 100 mg by mouth once daily.      hydrocortisone (CORTEF) 10 MG Tab Take 1 tablet (10 mg total) by mouth 2 (two) times daily. 200 tablet 3    levothyroxine (SYNTHROID) 100 MCG tablet Take 1 tablet (100 mcg total) by mouth once daily. 90 tablet 3    MAGNESIUM CITRATE ORAL Take 750 mg by mouth once daily.      senna (SENOKOT) 8.6 mg tablet Take 1 tablet by mouth once daily.      testosterone cypionate (DEPOTESTOTERONE CYPIONATE) 200 mg/mL injection Inject 0.75 mLs (150 mg total) into the muscle every 14 (fourteen) days. 3 ml syringe with 18 g needle  to draw  X 10   25 g 1 inch needle to inject x 10 10 mL 5    fluocinonide (LIDEX) 0.05 % ointment AAA bid 60 g 3     "mupirocin (BACTROBAN) 2 % ointment AAA BID 30 g 2    needle, disp, 21 G 21 gauge x 1" Ndle To use once weekly with testosterone injections 4 each 11      Allergies: Bupropion    Family History   Problem Relation Age of Onset    Heart disease Father     Diabetes Sister     Melanoma Neg Hx      Social History     Tobacco Use    Smoking status: Former Smoker    Smokeless tobacco: Never Used   Substance Use Topics    Alcohol use: Yes     Alcohol/week: 8.4 oz     Types: 14 Shots of liquor per week     Frequency: 4 or more times a week     Drinks per session: 1 or 2     Binge frequency: Less than monthly    Drug use: Never     Review of Systems   Constitutional: Positive for chills. Negative for fever.   HENT: Negative for congestion, hearing loss and voice change.    Eyes: Negative for photophobia and visual disturbance.   Respiratory: Negative for chest tightness and shortness of breath.    Cardiovascular: Negative for chest pain, palpitations and leg swelling.   Gastrointestinal: Negative for abdominal distention, abdominal pain, nausea and vomiting.   Musculoskeletal: Negative for back pain and neck pain.   Skin: Negative for pallor and wound.   Neurological: Positive for headaches. Negative for dizziness, seizures and weakness.   Psychiatric/Behavioral: Negative for behavioral problems.     Objective:     Vitals:    Temp: 99 °F (37.2 °C)  Pulse: 103  Rhythm: normal sinus rhythm  BP: 138/83  Resp: 15  SpO2: 96 %  O2 Device (Oxygen Therapy): room air    Temp  Min: 97.9 °F (36.6 °C)  Max: 99 °F (37.2 °C)  Pulse  Min: 69  Max: 111  BP  Min: 138/83  Max: 180/80  Resp  Min: 13  Max: 42  SpO2  Min: 94 %  Max: 99 %    No intake/output data recorded.           Physical Exam   Constitutional: He appears well-developed and well-nourished. No distress.   HENT:   Head: Normocephalic.   L temporal dressing and subgaleal drain in place    Eyes: Pupils are equal, round, and reactive to light. EOM are normal. "   Cardiovascular: Normal rate, regular rhythm and normal heart sounds.   Pulmonary/Chest: Effort normal and breath sounds normal. No respiratory distress.   Abdominal: Soft. Bowel sounds are normal. He exhibits no distension.   Musculoskeletal: He exhibits no edema or deformity.   Skin: Skin is warm and dry.     Neuro:  --sedation: post -surgery, still lethargic  --GCS:  E4 V5 M6  --Mental Status: AAO x 3  --CN II-XII grossly intact, minor L nasolabial flattening   --PERRL - 4 mm  --Motor: 5/5 strength throughout  --sensory: intact to light touch     Today I personally reviewed pertinent medications, lines/drains/airways, imaging, cardiology results, laboratory results, microbiology results,       Assessment/Plan:     Neuro  Tremor  Tremor at baseline   -NSGY following      Endocrine  Adrenal insufficiency  Continue hydrocortisone    Secondary hypothyroidism  Continue home levothyroxine    Panhypopituitarism  Pt with Hx of panhypopituitarism from adenoma as a child  -continue hydrocortisone  -endocrine following    Other  * Meningioma, cerebral  Pt found to have L temporal meningioma on CT workup for parkinsons  - s/p elective resection 6/19  -admit to [facility]  -NSGY following   -post op scan in AM  -subgaleal drain   -q 1 hour neuro checks  -q 1 hour vital signs  -SBP goal <160  -daily mag, phos, CBC, CMP  -PT/OT/SLP            The patient is being Prophylaxed for:  Venous Thromboembolism with: Mechanical  Stress Ulcer with: H2B  Ventilator Pneumonia with: not applicable    Activity Orders          Diet Adult Regular (IDDSI Level 7): Regular starting at 06/19 1320        Full Code    Siri Fan PA-C  Neurocritical Care  [facility]

## 2019-06-19 NOTE — ASSESSMENT & PLAN NOTE
Pt with Hx of panhypopituitarism from adenoma as a child  -continue hydrocortisone  -endocrine following

## 2019-06-19 NOTE — PLAN OF CARE
Problem: Adult Inpatient Plan of Care  Goal: Plan of Care Review  Outcome: Ongoing (interventions implemented as appropriate)  Patient arrived from OR restless and agitated. He was crying and trying to get out of bed. Reoriented patient to surrounds multiple times. Once patient was settled, family arrived at the bedside. Hemovac to full suction. Pain and blood pressure control. Patient is slowly awakening and coming to. Significant other at the bedside attending to patients needs. POC reviewed with patient and family at 1500. Patient and family verbalized understanding. Questions and concerns addressed. No acute events today. Progressing toward goals. Will continue to monitor. See flowsheets for full assessment and VS info.

## 2019-06-19 NOTE — BRIEF OP NOTE
[facility]  Brief Operative Note    SUMMARY     Surgery Date: 6/19/2019     Surgeon(s) and Role:     * Otis Pedraza MD - Primary    Assisting Surgeon: MITCH Finn MD Resident I neurosurgery    Pre-op Diagnosis:  Meningioma [D32.9] Left sphenoid wing meningioma    Post-op Diagnosis:  Post-Op Diagnosis Codes:     * Meningioma [D32.9] left sphenoid wing meningioma    Procedure:  Left frontotemporal craniotomy, excision of meningioma with neuronavigation and microsurgery    Anesthesia: General    Description of Procedure: Standard craniotomy, microsurgical resection of meningioma    Description of the findings of the procedure: Large meningioma--Sonopet ultrasonic aspirator, Aquamantys bipolar, complete gross excision of tumor    Estimated Blood Loss: 400 mL         Specimens:   Specimen (12h ago, onward)    Start     Ordered    06/19/19 1143  Specimen to Pathology - Surgery  Once     Comments:  Pre-op Diagnosis: Meningioma [D32.9]Procedure(s):CRANIOTOMY, USING FRAMELESS STEREOTAXY Number of specimens: 3Name of specimens:1. Left temporal brain tumor - sent for FROZEN at 1050 am with ORA Ledet2. Left temporal brain tumor - Permanent3. Dura - Permanent     Start Status     06/19/19 1143 Collected (06/19/19 1234) Order ID: [id number]       06/19/19 1234

## 2019-06-19 NOTE — NURSING
Patient arrived to [facility] from OR      Type of stroke/diagnosis:  L frontotemporal crani meningioma resection     TPA start and end time n/a    Thrombectomy start and end time n/a    Current symptoms: crying, waking up from anesthesia      Skin assessment done: yes  Wounds noted:L head incision, L subgaleal drain    NCC notified: MARCELA Horner notified and at bedside

## 2019-06-19 NOTE — ASSESSMENT & PLAN NOTE
Mr Sanz, with history of panhypopituitarism and Adrenal insufficiency is having surgery for removal of meningioma.   No signs of acute adrenal crisis - vitals normal    Recommendation  Start hydrocortisone 100 mg IV q8 hours  Will wean tomorrow based on clinical improvement and continue to wean until back to oral dosing (HC 10/10)

## 2019-06-19 NOTE — ANESTHESIA PREPROCEDURE EVALUATION
06/19/2019  Ravinder Sanz is a 74 y.o., male.    Anesthesia Evaluation    I have reviewed the Patient Summary Reports.    I have reviewed the Nursing Notes.   I have reviewed the Medications.     Review of Systems  Anesthesia Hx:  No problems with previous Anesthesia  History of prior surgery of interest to airway management or planning: Previous anesthesia: General  Denies Personal Hx of Anesthesia complications.   Cardiovascular:   hyperlipidemia    Pulmonary:  Pulmonary Normal    Renal/:  Renal/ Normal     Hepatic/GI:  Hepatic/GI Normal    Neurological:  Neurology Normal    Endocrine:   Hypothyroidism Panhypopituitarism  Adrenal insufficiency   Psych:   Psychiatric History anxiety Claustrophobia  paranoia       Patient Active Problem List   Diagnosis    Panhypopituitarism    Tremor    Other specified hypothyroidism    Hypogonadism in male    Adrenal insufficiency    Primary Parkinsonism    Claustrophobia    Paranoia    Meningioma    Cerebral edema    Meningioma, cerebral     Past Medical History:   Diagnosis Date    Anxiety     Eczema     Hyperlipidemia     Hypogonadism in male     Hypopituitarism     Hypothyroidism      Past Surgical History:   Procedure Laterality Date    PITUITARY SURGERY      SPINAL FUSION           Physical Exam  General:  Well nourished    Airway/Jaw/Neck:  Airway Findings: Mouth Opening: Normal Tongue: Normal  General Airway Assessment: Adult  Mallampati: II  TM Distance: Normal, at least 6 cm  Jaw/Neck Findings:  Neck ROM: Normal ROM      Dental:  Dental Findings: In tact   Chest/Lungs:  Chest/Lungs Findings: Clear to auscultation     Heart/Vascular:  Heart Findings: Rate: Normal  Rhythm: Regular Rhythm  Sounds: Normal        Mental Status:  Mental Status Findings:  Cooperative, Alert and Oriented         Anesthesia Plan  Type of Anesthesia, risks & benefits  discussed:  Anesthesia Type:  general  Patient's Preference:   Intra-op Monitoring Plan: standard ASA monitors  Intra-op Monitoring Plan Comments:   Post Op Pain Control Plan: per primary service following discharge from PACU  Post Op Pain Control Plan Comments:   Induction:   IV  Beta Blocker:  Patient is not currently on a Beta-Blocker (No further documentation required).       Informed Consent: Patient understands risks and agrees with Anesthesia plan.  Questions answered. Anesthesia consent signed with patient.  ASA Score: 2     Day of Surgery Review of History & Physical:    H&P update referred to the surgeon.         Ready For Surgery From Anesthesia Perspective.

## 2019-06-19 NOTE — ASSESSMENT & PLAN NOTE
Pt found to have L temporal meningioma on CT workup for parkinsons  - s/p elective resection 6/19  -admit to [facility]  -NSGY following   -post op scan in AM  -subgaleal drain   -q 1 hour neuro checks  -q 1 hour vital signs  -SBP goal <160  -daily mag, phos, CBC, CMP  -PT/OT/SLP

## 2019-06-19 NOTE — H&P
"[facility]  Neurosurgery  History & Physical    Patient Name: Ravinder Sanz  MRN: [id number]  Admission Date: 6/19/2019  Attending Physician: Otis Pedraza MD   Primary Care Provider: Brooke Dean MD      Subjective:     History of Present Illness: Patient with L sided intracranial mass concerning for meningioma presenting for elective craniotomy for resection. It was discovered during brain MRI workup for Parkingson's. He denies any complaints at this time. He denies use of anticoagulation.    PTA Medications   Medication Sig    docusate sodium (COLACE) 100 MG capsule Take 100 mg by mouth once daily.    hydrocortisone (CORTEF) 10 MG Tab Take 1 tablet (10 mg total) by mouth 2 (two) times daily.    levothyroxine (SYNTHROID) 100 MCG tablet Take 1 tablet (100 mcg total) by mouth once daily.    MAGNESIUM CITRATE ORAL Take 750 mg by mouth once daily.    senna (SENOKOT) 8.6 mg tablet Take 1 tablet by mouth once daily.    clonazePAM (KLONOPIN) 0.5 MG tablet Take 1 tablet (0.5 mg total) by mouth 2 (two) times daily as needed for Anxiety.    fluocinonide (LIDEX) 0.05 % ointment AAA bid    mupirocin (BACTROBAN) 2 % ointment AAA BID    needle, disp, 21 G 21 gauge x 1" Ndle To use once weekly with testosterone injections    testosterone cypionate (DEPOTESTOTERONE CYPIONATE) 200 mg/mL injection Inject 0.75 mLs (150 mg total) into the muscle every 14 (fourteen) days. 3 ml syringe with 18 g needle  to draw  X 10   25 g 1 inch needle to inject x 10       Review of patient's allergies indicates:   Allergen Reactions    Bupropion      urinary incontinence, suicidal thoughts,drooling       Past Medical History:   Diagnosis Date    Anxiety     Eczema     Hyperlipidemia     Hypogonadism in male     Hypopituitarism     Hypothyroidism      Past Surgical History:   Procedure Laterality Date    PITUITARY SURGERY      SPINAL FUSION       Family History     Problem Relation (Age of Onset)    Diabetes " Sister    Heart disease Father        Tobacco Use    Smoking status: Former Smoker    Smokeless tobacco: Former User   Substance and Sexual Activity    Alcohol use: Yes     Frequency: 4 or more times a week     Drinks per session: 1 or 2     Binge frequency: Less than monthly    Drug use: Never    Sexual activity: Yes     Partners: Male     Review of Systems  Objective:        There is no height or weight on file to calculate BMI.  Vital Signs (Most Recent):    Vital Signs (24h Range):        Neurosurgery Physical Exam    General: well developed, well nourished, no distress.   Head: normocephalic, atraumatic  Neurologic: Alert and oriented. Thought content appropriate.  GCS: Motor: 6/Verbal: 5/Eyes: 4 GCS Total: 15  Mental Status: Awake, Alert, Oriented x 4  Language: No aphasia  Speech: No dysarthria  Cranial nerves: face symmetric, tongue midline, CN II-XII grossly intact.   Eyes: pupils equal, round, reactive to light with accomodation, EOMI.  Pulmonary: normal respirations, no signs of respiratory distress  Abdomen: soft, non-distended  Sensory: intact to light touch throughout  Motor Strength: Moves all extremities spontaneously with good tone.  Full strength upper and lower extremities. No abnormal movements seen.     Strength  Deltoids Triceps Biceps Wrist Extension Wrist Flexion Hand    Upper: R 5/5 5/5 5/5 5/5 5/5 5/5    L 5/5 5/5 5/5 5/5 5/5 5/5     Iliopsoas Quadriceps Knee  Flexion Tibialis  anterior Gastro- cnemius EHL   Lower: R 5/5 5/5 5/5 5/5 5/5 5/5    L 5/5 5/5 5/5 5/5 5/5 5/5     Pronator Drift: no drift noted  Pulses: 2+ and symmetric radial and dorsalis pedis.  Skin: Skin is warm, dry and intact.        Assessment/Plan:     To OR for surgery as planned.    Ravinder Finn MD  Neurosurgery  [facility]

## 2019-06-19 NOTE — HPI
"Pt is 74 y.o. Male with PMHx of anxiety and panhypopituitarism who presented for elective L temporal meningioma resection with NSGY. Mass was discovered incidentally during MRI workup for parkinson's. Pt has resting tremor at baseline. 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 Endocrinology is consulted and following patient He is admitted to [facility] for post op monitoring.        6/21 pt has been readmitted to [facility] for monitoring and observation due to concerns for AMS and aggressive behavior overnight. Pt became agitated overnight requiring 1x dose Zyprexa. Pt family and partner were at bedside and confirmed pt "was not and his baseline the day prior" and that pt becomes aggressive due to confusion and wanting to get out of bed and out of restraints. Pt was evaluated this morning and re-admitted to [facility] for monitoring and evaluation.   "

## 2019-06-19 NOTE — CONSULTS
[facility]  Endocrinology  Consult Note    Consult Requested by: Otis Pedraza MD   Reason for admit: Meningioma, cerebral    HISTORY OF PRESENT ILLNESS:  Reason for Consult: Panhypopituitarism; history of transphenoidal pituitary adenoma resection    HPI:   Patient is a 74 y.o. 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 Patient is immediate post-surgery and still confused due to effects of anesthesia. History was obtained per chart review. Patient was seen by Dr. Eng in April for treatment of panhypopituitarism.    Per Dr. Eng's note:  With regards to the panhypopit  Spinal fusion surgery at age 16; proceeded with pituitary adenoma - nonfunctional. Received radiation treatment at [facility]. Followed by endocrinologist out of [place].     With regards to the Secondary adrenal insufficiency-   current dose: Hydrocortisone 10 mg PO BID. Knows sick day precautions. Never been hospitalized for AI     With regards to the Secondary hypothyroidism -    Current dose: Levothyroxine 100 mcg PO once daily. Takes medication properly. Symptoms:  Postive for Hot/cold intolerance and Anxiety.    With regards to theSecondary hypogonadism-   Since he was 20 years old on Testosterone replacement therapy - 150 mg q 2 weeks. Satisfied with Libido and sexual performance     With regards to theSecondary AGHD -  was on GH (for 2 weeks in 2018 ) currently off of it due to expenses      Plan was for patient to receive IV hydrocortisone pre-op and intra-op. However, it doesn't appear he's received any steroids up until now.    Medications and/or Treatments Impacting Glycemic Control:  Immunotherapy:    Immunosuppressants     None        Steroids:   Hormones (From admission, onward)    Start     Stop Route  "Frequency Ordered    06/19/19 1430  hydrocortisone sodium succinate injection 100 mg      -- IV Every 8 hours 06/19/19 1319        Pressors:    Autonomic Drugs (From admission, onward)    None          Medications Prior to Admission   Medication Sig Dispense Refill Last Dose    clonazePAM (KLONOPIN) 0.5 MG tablet Take 1 tablet (0.5 mg total) by mouth 2 (two) times daily as needed for Anxiety. 60 tablet 4 6/18/2019 at 2100    docusate sodium (COLACE) 100 MG capsule Take 100 mg by mouth once daily.   6/18/2019 at 0800    hydrocortisone (CORTEF) 10 MG Tab Take 1 tablet (10 mg total) by mouth 2 (two) times daily. 200 tablet 3 6/19/2019 at 0300    levothyroxine (SYNTHROID) 100 MCG tablet Take 1 tablet (100 mcg total) by mouth once daily. 90 tablet 3 6/19/2019 at 0300    MAGNESIUM CITRATE ORAL Take 750 mg by mouth once daily.   6/18/2019 at 0800    senna (SENOKOT) 8.6 mg tablet Take 1 tablet by mouth once daily.   6/18/2019 at 0800    testosterone cypionate (DEPOTESTOTERONE CYPIONATE) 200 mg/mL injection Inject 0.75 mLs (150 mg total) into the muscle every 14 (fourteen) days. 3 ml syringe with 18 g needle  to draw  X 10   25 g 1 inch needle to inject x 10 10 mL 5 Past Month at Unknown time    fluocinonide (LIDEX) 0.05 % ointment AAA bid 60 g 3 More than a month at Unknown time    mupirocin (BACTROBAN) 2 % ointment AAA BID 30 g 2 Unknown at Unknown time    needle, disp, 21 G 21 gauge x 1" Ndle To use once weekly with testosterone injections 4 each 11 Taking       Current Facility-Administered Medications   Medication Dose Route Frequency Provider Last Rate Last Dose    0.9%  NaCl infusion   Intravenous Continuous Otis Pedraza MD   Stopped at 06/19/19 0929    ceFAZolin injection 1 g  1 g Intravenous Q8H Ravindre Finn MD        clonazePAM tablet 0.5 mg  0.5 mg Oral BID PRN Ravinder Finn MD        docusate sodium capsule 100 mg  100 mg Oral Daily Ravinder Finn MD        fentaNYL injection 25 mcg  25 mcg " Intravenous Q1H PRN Ravinder Finn MD        HYDROcodone-acetaminophen 5-325 mg per tablet 1 tablet  1 tablet Oral Q6H PRN Ravinder Finn MD        hydrocortisone sodium succinate injection 100 mg  100 mg Intravenous Q8H Ravinder Finn MD   100 mg at 06/19/19 1418    levETIRAcetam tablet 500 mg  500 mg Oral BID Ravinder Finn MD        [START ON 6/20/2019] levothyroxine tablet 100 mcg  100 mcg Oral Before breakfast Juan Ramon Arriaga MD             PMH, PSH, , SH updated and reviewed     Review of Systems   Unable to perform ROS: Mental status change     Current Medications and/or Treatments Impacting Glycemic Control  Immunotherapy:    Immunosuppressants     None        Steroids:   Hormones (From admission, onward)    None        Pressors:    Autonomic Drugs (From admission, onward)    None        Hyperglycemia/Diabetes Medications:   Antihyperglycemics (From admission, onward)    None             PHYSICAL EXAMINATION:  Vitals:    06/19/19 1415   BP: 138/83   Pulse: 97   Resp: 18   SaO2: 92% room air     Body mass index is 28.25 kg/m².    Physical Exam   Constitutional: He appears well-developed.   Confused, tearful.   HENT:   Right Ear: External ear normal.   Left Ear: External ear normal.   Nose: Nose normal.   Dressing over left craniotomy   Eyes:   Eyes closed, would not open them for exam.   Neck: No tracheal deviation present. No thyromegaly present.   Cardiovascular: Normal rate and regular rhythm.   No murmur heard.  Pulmonary/Chest: Effort normal and breath sounds normal. No respiratory distress.   Abdominal: Soft. He exhibits no mass. There is no tenderness.   Musculoskeletal: He exhibits no edema.   No digital clubbing or extremity cyanosis   Neurological:   Confused.  Moving all extremities   Skin: Skin is warm and dry. No rash noted.   No subcutaneous nodules noted.   Psychiatric:   Confused.  Judgment impaired   Nursing note and vitals reviewed.    .     ASSESSMENT and PLAN:    * Meningioma,  cerebral  S/p craniotomy and resection.  Per primary team.  Stress steroids ordered due to significant surgery.      Adrenal insufficiency  Mr Sanz, with history of panhypopituitarism and Adrenal insufficiency is having surgery for removal of meningioma.   No signs of acute adrenal crisis - vitals normal    Recommendation  Start hydrocortisone 100 mg IV q8 hours  Will wean tomorrow based on clinical improvement and continue to wean until back to oral dosing ( 10/10)    Hypogonadism in male  On testosterone replacement chronically.      Secondary hypothyroidism  Resume home dose of LT4 - 100 mcg daily.      Panhypopituitarism  See individual hormonal assessments.          DISCHARGE NEEDS: will assess daily    Juan Ramon Arriaga MD  Endocrinology  [facility]

## 2019-06-19 NOTE — ANESTHESIA PROCEDURE NOTES
Arterial    Diagnosis: craniotomy  Doctor requesting consult: milan    Patient location during procedure: done in OR  Procedure start time: 6/19/2019 8:32 AM  Timeout: 6/19/2019 8:30 AM  Procedure end time: 6/19/2019 8:35 AM  Staffing  Resident/CRNA: Flory Hernandez CRNA  Performed: resident/CRNA   Anesthesiologist was present at the time of the procedure.  Preanesthetic Checklist  Completed: patient identified, site marked, surgical consent, pre-op evaluation, timeout performed, IV checked, risks and benefits discussed, monitors and equipment checked and anesthesia consent givenArterial  Skin Prep: chlorhexidine gluconate  Local Infiltration: none  Orientation: right  Location: radial  Catheter Size: 20 G  Catheter placement by Anatomical landmarks. Heme positive aspiration all ports.Insertion Attempts: 1  Assessment  Dressing: secured with tape and tegaderm  Patient: Tolerated well

## 2019-06-19 NOTE — HPI
Reason for Consult: Panhypopituitarism; history of transphenoidal pituitary adenoma resection    HPI:   Patient is a 74 y.o. 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 Patient is immediate post-surgery and still confused due to effects of anesthesia. History was obtained per chart review. Patient was seen by Dr. Eng in April for treatment of panhypopituitarism.    Per Dr. Eng's note:  With regards to the panhypopit  Spinal fusion surgery at age 16; proceeded with pituitary adenoma - nonfunctional. Received radiation treatment at [facility]. Followed by endocrinologist out of [place].     With regards to the Secondary adrenal insufficiency-   current dose: Hydrocortisone 10 mg PO BID. Knows sick day precautions. Never been hospitalized for AI     With regards to the Secondary hypothyroidism -    Current dose: Levothyroxine 100 mcg PO once daily. Takes medication properly. Symptoms: Postive for Hot/cold intolerance and Anxiety.    With regards to theSecondary hypogonadism-   Since he was 20 years old on Testosterone replacement therapy - 150 mg q 2 weeks. Satisfied with Libido and sexual performance     With regards to theSecondary AGHD -  was on GH (for 2 weeks in 2018 ) currently off of it due to expenses      Plan was for patient to receive IV hydrocortisone pre-op and intra-op. However, it doesn't appear he's received any steroids up until now.

## 2019-06-19 NOTE — SUBJECTIVE & OBJECTIVE
PMH, PSH, FH, SH updated and reviewed     Review of Systems   Unable to perform ROS: Mental status change     Current Medications and/or Treatments Impacting Glycemic Control  Immunotherapy:    Immunosuppressants     None        Steroids:   Hormones (From admission, onward)    None        Pressors:    Autonomic Drugs (From admission, onward)    None        Hyperglycemia/Diabetes Medications:   Antihyperglycemics (From admission, onward)    None             PHYSICAL EXAMINATION:  Vitals:    06/19/19 1415   BP: 138/83   Pulse: 97   Resp: 18   SaO2: 92% room air     Body mass index is 28.25 kg/m².    Physical Exam   Constitutional: He appears well-developed.   Confused, tearful.   HENT:   Right Ear: External ear normal.   Left Ear: External ear normal.   Nose: Nose normal.   Dressing over left craniotomy   Eyes:   Eyes closed, would not open them for exam.   Neck: No tracheal deviation present. No thyromegaly present.   Cardiovascular: Normal rate and regular rhythm.   No murmur heard.  Pulmonary/Chest: Effort normal and breath sounds normal. No respiratory distress.   Abdominal: Soft. He exhibits no mass. There is no tenderness.   Musculoskeletal: He exhibits no edema.   No digital clubbing or extremity cyanosis   Neurological:   Confused.  Moving all extremities   Skin: Skin is warm and dry. No rash noted.   No subcutaneous nodules noted.   Psychiatric:   Confused.  Judgment impaired   Nursing note and vitals reviewed.

## 2019-06-19 NOTE — PLAN OF CARE
Received consult and reviewed chart.    Patient is in the OR, so I was unable to see him.    Ordered stress dose steroids and levothyroxine. We will follow along to adjust his steroid doses. Full consult to follow after patient is out of surgery.

## 2019-06-19 NOTE — SUBJECTIVE & OBJECTIVE
"Past Medical History:   Diagnosis Date    Anxiety     Eczema     Hyperlipidemia     Hypogonadism in male     Hypopituitarism     Hypothyroidism      Past Surgical History:   Procedure Laterality Date    PITUITARY SURGERY      SPINAL FUSION        No current facility-administered medications on file prior to encounter.      Current Outpatient Medications on File Prior to Encounter   Medication Sig Dispense Refill    clonazePAM (KLONOPIN) 0.5 MG tablet Take 1 tablet (0.5 mg total) by mouth 2 (two) times daily as needed for Anxiety. 60 tablet 4    docusate sodium (COLACE) 100 MG capsule Take 100 mg by mouth once daily.      hydrocortisone (CORTEF) 10 MG Tab Take 1 tablet (10 mg total) by mouth 2 (two) times daily. 200 tablet 3    levothyroxine (SYNTHROID) 100 MCG tablet Take 1 tablet (100 mcg total) by mouth once daily. 90 tablet 3    MAGNESIUM CITRATE ORAL Take 750 mg by mouth once daily.      senna (SENOKOT) 8.6 mg tablet Take 1 tablet by mouth once daily.      testosterone cypionate (DEPOTESTOTERONE CYPIONATE) 200 mg/mL injection Inject 0.75 mLs (150 mg total) into the muscle every 14 (fourteen) days. 3 ml syringe with 18 g needle  to draw  X 10   25 g 1 inch needle to inject x 10 10 mL 5    fluocinonide (LIDEX) 0.05 % ointment AAA bid 60 g 3    mupirocin (BACTROBAN) 2 % ointment AAA BID 30 g 2    needle, disp, 21 G 21 gauge x 1" Ndle To use once weekly with testosterone injections 4 each 11      Allergies: Bupropion    Family History   Problem Relation Age of Onset    Heart disease Father     Diabetes Sister     Melanoma Neg Hx      Social History     Tobacco Use    Smoking status: Former Smoker    Smokeless tobacco: Never Used   Substance Use Topics    Alcohol use: Yes     Alcohol/week: 8.4 oz     Types: 14 Shots of liquor per week     Frequency: 4 or more times a week     Drinks per session: 1 or 2     Binge frequency: Less than monthly    Drug use: Never     Review of Systems "   Constitutional: Positive for chills. Negative for fever.   HENT: Negative for congestion, hearing loss and voice change.    Eyes: Negative for photophobia and visual disturbance.   Respiratory: Negative for chest tightness and shortness of breath.    Cardiovascular: Negative for chest pain, palpitations and leg swelling.   Gastrointestinal: Negative for abdominal distention, abdominal pain, nausea and vomiting.   Musculoskeletal: Negative for back pain and neck pain.   Skin: Negative for pallor and wound.   Neurological: Positive for headaches. Negative for dizziness, seizures and weakness.   Psychiatric/Behavioral: Negative for behavioral problems.     Objective:     Vitals:    Temp: 99 °F (37.2 °C)  Pulse: 103  Rhythm: normal sinus rhythm  BP: 138/83  Resp: 15  SpO2: 96 %  O2 Device (Oxygen Therapy): room air    Temp  Min: 97.9 °F (36.6 °C)  Max: 99 °F (37.2 °C)  Pulse  Min: 69  Max: 111  BP  Min: 138/83  Max: 180/80  Resp  Min: 13  Max: 42  SpO2  Min: 94 %  Max: 99 %    No intake/output data recorded.           Physical Exam   Constitutional: He appears well-developed and well-nourished. No distress.   HENT:   Head: Normocephalic.   L temporal dressing and subgaleal drain in place    Eyes: Pupils are equal, round, and reactive to light. EOM are normal.   Cardiovascular: Normal rate, regular rhythm and normal heart sounds.   Pulmonary/Chest: Effort normal and breath sounds normal. No respiratory distress.   Abdominal: Soft. Bowel sounds are normal. He exhibits no distension.   Musculoskeletal: He exhibits no edema or deformity.   Skin: Skin is warm and dry.     Neuro:  --sedation: post -surgery, still lethargic  --GCS:  E4 V5 M6  --Mental Status: AAO x 3  --CN II-XII grossly intact, minor L nasolabial flattening   --PERRL - 4 mm  --Motor: 5/5 strength throughout  --sensory: intact to light touch     Today I personally reviewed pertinent medications, lines/drains/airways, imaging, cardiology results, laboratory  results, microbiology results,

## 2019-06-19 NOTE — ASSESSMENT & PLAN NOTE
S/p craniotomy and resection.  Per primary team.  Stress steroids ordered due to significant surgery.

## 2019-06-19 NOTE — HOSPITAL COURSE
6/19/2019 Admit to [facility] s/p L temporal meningioma resection  6/19: stable for floor, probably step down to NSGY, Steroid taper on floor  6/20 stepping down pt to NSGY discussed with PAMELA MEDRANO  6/21: pt readmitted to [facility] for monitoring and evaluation   6/22: PT/INR, US BLE  6/23: pending SLP, wean hydrocortisone per endocrine  6/24 Stepdown to NSGY. Discussed with GREGORIA MEDRANO.  SBP > 160 scheduled hydralazine. Drain dcd ABX dcd.  6/25 No significant events overnight. Pending step down to neurosurgery, waiting for bed.

## 2019-06-19 NOTE — TRANSFER OF CARE
"Anesthesia Transfer of Care Note    Patient: Ravinder Sanz    Procedure(s) Performed: Procedure(s) (LRB):  CRANIOTOMY, USING FRAMELESS STEREOTAXY (Left)    Patient location: ICU    Anesthesia Type: general    Transport from OR: Transported from OR on 6-10 L/min O2 by face mask with adequate spontaneous ventilation. Continuous ECG monitoring in transport. Continuous SpO2 monitoring in transport. Continuos invasive BP monitoring in transport    Post pain: adequate analgesia    Post assessment: no apparent anesthetic complications and tolerated procedure well    Post vital signs: stable    Level of consciousness: alert and awake    Nausea/Vomiting: no nausea/vomiting    Complications: none    Transfer of care protocol was followed      Last vitals:   Visit Vitals  BP (!) 180/80   Pulse 69   Temp 36.6 °C (97.9 °F) (Oral)   Resp 19   Ht 5' 6" (1.676 m)   Wt 79.4 kg (175 lb)   SpO2 99%   BMI 28.25 kg/m²     "

## 2019-06-20 LAB
ALBUMIN SERPL BCP-MCNC: 3.9 G/DL (ref 3.5–5.2)
ALP SERPL-CCNC: 42 U/L (ref 55–135)
ALT SERPL W/O P-5'-P-CCNC: 20 U/L (ref 10–44)
ANION GAP SERPL CALC-SCNC: 11 MMOL/L (ref 8–16)
AST SERPL-CCNC: 27 U/L (ref 10–40)
BASOPHILS # BLD AUTO: 0.01 K/UL (ref 0–0.2)
BASOPHILS NFR BLD: 0.1 % (ref 0–1.9)
BILIRUB SERPL-MCNC: 0.7 MG/DL (ref 0.1–1)
BUN SERPL-MCNC: 13 MG/DL (ref 8–23)
CALCIUM SERPL-MCNC: 8.8 MG/DL (ref 8.7–10.5)
CHLORIDE SERPL-SCNC: 106 MMOL/L (ref 95–110)
CO2 SERPL-SCNC: 22 MMOL/L (ref 23–29)
CREAT SERPL-MCNC: 1 MG/DL (ref 0.5–1.4)
DIFFERENTIAL METHOD: ABNORMAL
EOSINOPHIL # BLD AUTO: 0 K/UL (ref 0–0.5)
EOSINOPHIL NFR BLD: 0 % (ref 0–8)
ERYTHROCYTE [DISTWIDTH] IN BLOOD BY AUTOMATED COUNT: 14.6 % (ref 11.5–14.5)
EST. GFR  (AFRICAN AMERICAN): >60 ML/MIN/1.73 M^2
EST. GFR  (NON AFRICAN AMERICAN): >60 ML/MIN/1.73 M^2
GLUCOSE SERPL-MCNC: 137 MG/DL (ref 70–110)
HCT VFR BLD AUTO: 43.5 % (ref 40–54)
HGB BLD-MCNC: 14.8 G/DL (ref 14–18)
IMM GRANULOCYTES # BLD AUTO: 0.06 K/UL (ref 0–0.04)
IMM GRANULOCYTES NFR BLD AUTO: 0.4 % (ref 0–0.5)
LYMPHOCYTES # BLD AUTO: 0.7 K/UL (ref 1–4.8)
LYMPHOCYTES NFR BLD: 4.5 % (ref 18–48)
MAGNESIUM SERPL-MCNC: 2.3 MG/DL (ref 1.6–2.6)
MCH RBC QN AUTO: 30 PG (ref 27–31)
MCHC RBC AUTO-ENTMCNC: 34 G/DL (ref 32–36)
MCV RBC AUTO: 88 FL (ref 82–98)
MONOCYTES # BLD AUTO: 0.7 K/UL (ref 0.3–1)
MONOCYTES NFR BLD: 4.2 % (ref 4–15)
NEUTROPHILS # BLD AUTO: 14.8 K/UL (ref 1.8–7.7)
NEUTROPHILS NFR BLD: 90.8 % (ref 38–73)
NRBC BLD-RTO: 0 /100 WBC
PHOSPHATE SERPL-MCNC: 2 MG/DL (ref 2.7–4.5)
PLATELET # BLD AUTO: 187 K/UL (ref 150–350)
PMV BLD AUTO: 11.6 FL (ref 9.2–12.9)
POTASSIUM SERPL-SCNC: 3.7 MMOL/L (ref 3.5–5.1)
PROT SERPL-MCNC: 7 G/DL (ref 6–8.4)
RBC # BLD AUTO: 4.94 M/UL (ref 4.6–6.2)
SODIUM SERPL-SCNC: 139 MMOL/L (ref 136–145)
WBC # BLD AUTO: 16.32 K/UL (ref 3.9–12.7)

## 2019-06-20 PROCEDURE — 92610 EVALUATE SWALLOWING FUNCTION: CPT

## 2019-06-20 PROCEDURE — 25000003 PHARM REV CODE 250: Performed by: PHYSICIAN ASSISTANT

## 2019-06-20 PROCEDURE — 99232 SBSQ HOSP IP/OBS MODERATE 35: CPT | Mod: GC,,, | Performed by: INTERNAL MEDICINE

## 2019-06-20 PROCEDURE — 63600175 PHARM REV CODE 636 W HCPCS: Performed by: STUDENT IN AN ORGANIZED HEALTH CARE EDUCATION/TRAINING PROGRAM

## 2019-06-20 PROCEDURE — 25000003 PHARM REV CODE 250: Performed by: STUDENT IN AN ORGANIZED HEALTH CARE EDUCATION/TRAINING PROGRAM

## 2019-06-20 PROCEDURE — 85025 COMPLETE CBC W/AUTO DIFF WBC: CPT

## 2019-06-20 PROCEDURE — 83735 ASSAY OF MAGNESIUM: CPT

## 2019-06-20 PROCEDURE — 94761 N-INVAS EAR/PLS OXIMETRY MLT: CPT

## 2019-06-20 PROCEDURE — 20600001 HC STEP DOWN PRIVATE ROOM

## 2019-06-20 PROCEDURE — 25000003 PHARM REV CODE 250: Performed by: INTERNAL MEDICINE

## 2019-06-20 PROCEDURE — 84100 ASSAY OF PHOSPHORUS: CPT

## 2019-06-20 PROCEDURE — 25000003 PHARM REV CODE 250: Performed by: NURSE PRACTITIONER

## 2019-06-20 PROCEDURE — 99232 PR SUBSEQUENT HOSPITAL CARE,LEVL II: ICD-10-PCS | Mod: GC,,, | Performed by: INTERNAL MEDICINE

## 2019-06-20 PROCEDURE — 99233 PR SUBSEQUENT HOSPITAL CARE,LEVL III: ICD-10-PCS | Mod: GC,,, | Performed by: PSYCHIATRY & NEUROLOGY

## 2019-06-20 PROCEDURE — 80053 COMPREHEN METABOLIC PANEL: CPT

## 2019-06-20 PROCEDURE — 99233 SBSQ HOSP IP/OBS HIGH 50: CPT | Mod: GC,,, | Performed by: PSYCHIATRY & NEUROLOGY

## 2019-06-20 RX ORDER — CLONAZEPAM 1 MG/1
1 TABLET ORAL 2 TIMES DAILY PRN
Status: DISCONTINUED | OUTPATIENT
Start: 2019-06-20 | End: 2019-06-21

## 2019-06-20 RX ORDER — CLONAZEPAM 0.5 MG/1
0.5 TABLET ORAL ONCE
Status: COMPLETED | OUTPATIENT
Start: 2019-06-20 | End: 2019-06-20

## 2019-06-20 RX ORDER — CLONAZEPAM 1 MG/1
1 TABLET ORAL 2 TIMES DAILY
Status: DISCONTINUED | OUTPATIENT
Start: 2019-06-20 | End: 2019-06-20

## 2019-06-20 RX ADMIN — LEVETIRACETAM 500 MG: 500 TABLET ORAL at 09:06

## 2019-06-20 RX ADMIN — LEVETIRACETAM 500 MG: 500 TABLET ORAL at 10:06

## 2019-06-20 RX ADMIN — CEFAZOLIN 1 G: 1 INJECTION, POWDER, FOR SOLUTION INTRAMUSCULAR; INTRAVENOUS at 06:06

## 2019-06-20 RX ADMIN — LEVOTHYROXINE SODIUM 100 MCG: 100 TABLET ORAL at 06:06

## 2019-06-20 RX ADMIN — CLONAZEPAM 1 MG: 1 TABLET ORAL at 05:06

## 2019-06-20 RX ADMIN — CLONAZEPAM 0.5 MG: 0.5 TABLET ORAL at 12:06

## 2019-06-20 RX ADMIN — FAMOTIDINE 20 MG: 20 TABLET, FILM COATED ORAL at 10:06

## 2019-06-20 RX ADMIN — HYDROCORTISONE SODIUM SUCCINATE 100 MG: 100 INJECTION, POWDER, FOR SOLUTION INTRAMUSCULAR; INTRAVENOUS at 02:06

## 2019-06-20 RX ADMIN — FENTANYL CITRATE 25 MCG: 50 INJECTION INTRAMUSCULAR; INTRAVENOUS at 06:06

## 2019-06-20 RX ADMIN — CEFAZOLIN 1 G: 1 INJECTION, POWDER, FOR SOLUTION INTRAMUSCULAR; INTRAVENOUS at 10:06

## 2019-06-20 RX ADMIN — HYDROCORTISONE SODIUM SUCCINATE 100 MG: 100 INJECTION, POWDER, FOR SOLUTION INTRAMUSCULAR; INTRAVENOUS at 06:06

## 2019-06-20 RX ADMIN — CEFAZOLIN 1 G: 1 INJECTION, POWDER, FOR SOLUTION INTRAMUSCULAR; INTRAVENOUS at 02:06

## 2019-06-20 RX ADMIN — CLONAZEPAM 0.5 MG: 0.5 TABLET ORAL at 07:06

## 2019-06-20 RX ADMIN — FENTANYL CITRATE 25 MCG: 50 INJECTION INTRAMUSCULAR; INTRAVENOUS at 01:06

## 2019-06-20 RX ADMIN — HYDROCORTISONE SODIUM SUCCINATE 100 MG: 100 INJECTION, POWDER, FOR SOLUTION INTRAMUSCULAR; INTRAVENOUS at 10:06

## 2019-06-20 RX ADMIN — CLONAZEPAM 1 MG: 1 TABLET ORAL at 09:06

## 2019-06-20 NOTE — PROGRESS NOTES
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     Post-Op Info:  Procedure(s) (LRB):  CRANIOTOMY, USING FRAMELESS STEREOTAXY (Left)   1 Day Post-Op     Interval History: 6/20: POD 1. Drinking thin liquids and eating small amounts of food, drain distally was disconnected overnight with low output. Pain is minimal, patient feeling slightly agitated as home dose of clonazepam has been subtherapeutic per family.       Medications:  Continuous Infusions:   sodium chloride 0.9% Stopped (06/19/19 0929)     Scheduled Meds:   ceFAZolin (ANCEF) IVPB  1 g Intravenous Q8H    docusate sodium  100 mg Oral Daily    famotidine  20 mg Oral Daily    hydrocortisone sodium succinate  100 mg Intravenous Q8H    levETIRAcetam  500 mg Oral BID    levothyroxine  100 mcg Oral Before breakfast     PRN Meds:clonazePAM, fentaNYL, hydrALAZINE, HYDROcodone-acetaminophen     Review of Systems  Objective:     Weight: 76.1 kg (167 lb 12.3 oz)  Body mass index is 27.08 kg/m².  Vital Signs (Most Recent):  Temp: 98.2 °F (36.8 °C) (06/20/19 0305)  Pulse: 97 (06/20/19 1300)  Resp: 16 (06/20/19 1300)  BP: (!) 158/89 (06/20/19 1300)  SpO2: 96 % (06/20/19 1300) Vital Signs (24h Range):  Temp:  [97.7 °F (36.5 °C)-98.2 °F (36.8 °C)] 98.2 °F (36.8 °C)  Pulse:  [] 97  Resp:  [15-21] 16  SpO2:  [92 %-100 %] 96 %  BP: (118-158)/(61-96) 158/89  Arterial Line BP: (107-157)/(61-89) 154/82                          Closed/Suction Drain 06/19/19 1240 Left Scalp Accordion 10 Fr. (Active)   Site Description Unable to view 6/20/2019  3:05 AM   Dressing Type Critical access hospital 6/20/2019  3:05 AM   Dressing Status Clean;Dry;Intact 6/20/2019  3:05 AM   Dressing Intervention  Dressing reinforced 6/19/2019  2:00 PM   Drainage Bloody 6/20/2019  3:05 AM   Status Other (Comment) 6/20/2019  3:05 AM       Neurosurgery Physical Exam  E4V4M6, A+Ox2, not to year. Knows name and year. Awake  PERRL, EOMI, no facial droop, tongue midline, shrugs shoulders evenly, Vision present to all visual fields,   Following commands in all four extremities, symmetric, antigravity  Sensation intact to light touch      Significant Labs:  Recent Labs   Lab 06/20/19 0227   *      K 3.7      CO2 22*   BUN 13   CREATININE 1.0   CALCIUM 8.8   MG 2.3     Recent Labs   Lab 06/20/19 0227   WBC 16.32*   HGB 14.8   HCT 43.5        No results for input(s): LABPT, INR, APTT in the last 48 hours.  Microbiology Results (last 7 days)     ** No results found for the last 168 hours. **        CMP:   Recent Labs   Lab 06/20/19 0227   *   CALCIUM 8.8   ALBUMIN 3.9   PROT 7.0      K 3.7   CO2 22*      BUN 13   CREATININE 1.0   ALKPHOS 42*   ALT 20   AST 27   BILITOT 0.7     CRP: No results for input(s): CRP in the last 48 hours.  ESR: No results for input(s): POCESR, ERYTHROCYTES in the last 48 hours.  LFTs:   Recent Labs   Lab 06/20/19 0227   ALT 20   AST 27   ALKPHOS 42*   BILITOT 0.7   PROT 7.0   ALBUMIN 3.9       Significant Diagnostics:  CT: Ct Head Without Contrast    Result Date: 6/20/2019  Findings consistent with recent left temporal meningioma resection, with intracranial appearance consistent with the recent surgical procedure as discussed above. Left temporal bone calvarial lesion again noted for which clinical and historical correlation is needed to determine need for further evaluation, workup or follow-up as discussed above. Electronically signed by: Jorge Ramirez Date:    06/20/2019 Time:    02:47  Echoencephalography: No results found in the last 24 hours.  MRI: No results found in the last 24 hours.    Assessment/Plan:     Left temporal Meningioma  Patient is a 75 yo  male with PMH panhypopituitarism from pituitary adenoma, parkinsonism presenting for elective L temporal meningioma resection (6/20).     POD 1. No acute events overnight    Neuro: Admitted to neuro ICU for q1 hour neurochecks  Exam stable, mild disorientation, remains awake and alert to self and year.  CT head reviewed and appropriate post operatively  Drain with minimal output, will continue to monitor.   Keppra for post surgical seizure ppx   Clonazepam increased home dose for agitation  H/ID: Continue abx ppx while drain in place  CV: SBP < 140. Vitals stable last 24 hours  Pulm: No resp distress, wean O2 to room air  GI: ADAT, speech recs for thin liquids  PT/OT; currently in restraints due to confusion   Dispo: remain in ICU for monitoring        Derick Rodriguez MD  Neurosurgery  [facility]

## 2019-06-20 NOTE — OP NOTE
DATE OF PROCEDURE:  06/19/2019.    PREOPERATIVE DIAGNOSIS:  Left sphenoid wing meningioma.    POSTOPERATIVE DIAGNOSIS:  Left sphenoid wing meningioma.    PROCEDURES:  Left frontotemporal craniotomy, excision of meningioma with   neuronavigation and microsurgery.    SURGEON:  Otis Pedraza M.D.    ASSISTANT:  LEANDER Finn M.D. ([facility] Resident Neurosurgery).    ANESTHESIA:  General endotracheal.    ESTIMATED BLOOD LOSS:  400 mL.    DRAINS:  Hemovac subgaleal.    CONDITION AT THE END OF PROCEDURE:  Satisfactory.    BRIEF HISTORY:  This 74-year-old man has developed a tremor over the past year   affecting his upper extremities.  He was seen in the [facility].    As part of his evaluation, MRI of the brain was obtained showing a large left   sphenoid wing temporal meningioma.  After discussion with him, he wished to   proceed with excision of the tumor.    PROCEDURE IN DETAIL:  The patient had a navigation MRI scan done.  He was   brought to the Operating Room in supine position under premedication, intubated   and induced with general anesthesia.  A cannula was placed in the right radial   artery for continuous blood pressure monitoring, a Frankel catheter inserted,   sequential compression devices applied to legs and various intravenous lines   started.  The head was turned somewhat to the right and immobilized with the   Mccurdy 3-point skeletal fixation device.  The navigation arc was attached to   the headrest and the head registered to the system with surface recognition.    The extent of tumor, sphenoid ridge orbit and other landmarks were marked on the   scalp.  The hair in the left temporal area was shaved and this portion of the   scalp prepped with Betadine and draped in a sterile fashion.  A curvilinear   incision beginning in front of the ear coming somewhat back and then above the   temporal line to the hairline was outlined and this was injected with 1%   Xylocaine and epinephrine.   Incision was carried through the skin and galea,   bleeding controlled in the skin margin and skin flap with Abiodun clips.  The   scalp was dissected in the subgaleal plane and retracted with small fishhook   retractors.  The temporalis muscle and fascia were cut as a unit just along the   temporal line and down to the zygomatic process of the frontal bone and temporal   squama and the muscle retracted inferiorly with fishhook retractors.  [place]   holes were made just above the zygomatic process of the frontal bone and on the   temporal squama and a frontotemporal craniotomy favoring the temporal bone was   cut with a high-speed drill, elevated and removed from the operative field.    Bleeding from the middle meningeal artery was controlled with bone wax and Bovie   coagulation.  Drill holes were made in the bone margin.  The dura tacked up to   these with 4-0 Nurolon sutures.  The Whitmore retractor was affixed to the   headrest, clean towels placed around the craniotomy opening, and the operating   microscope brought into the field.  The dura was opened with a curvilinear   incision, parallel to the craniotomy after about 1 cm of the tumor was   encountered with the dura attached to it.  The dura was gently retracted and the   arachnoid of the temporal lobes  from the tumor so that some exposure   of the tumor could be achieved.  A biopsy was taken of the tumor and consistent   with meningioma.  The Sonopet ultrasonic aspirator was then used to begin   removing the tumor and bleeding controlled with Aquamantys bipolar coagulator.    This proceeded with gradual internal decompression of the tumor and then as it   became more collapsed, gradually  it from the brain and rolling it   inferiorly without needing to retract the brain.  The arachnoid planes were   carefully preserved around this large tumor.  Gradually, the tumor could be   removed away from the temporal lobe and back toward the dura,  partially   piecemeal and some larger pieces of tumor were removed with scissors.  When the   tumor was down close to the dura, the dura was then cut with scissors along the   edge of the temporal bone to remove the remaining mass of tumor; however, some   tumor was still infiltrating in the dura along the floor of the middle fossa and   this was coagulated and incised with a #15 blade and dissected off of the bone   with a curette.  Initially, it seemed a gross total resection of the tumor could   be achieved.  With the tumor completely removed, Surgicel was placed over the   exposed brain and also over the floor of the middle fossa and sphenoid ridge and   Surgiflo used to help with hemostasis.  A 10 mL of thrombin was used to fill   the tumor excision cavity.  As there was no dura to resew, a piece of Duragen   was placed over this and the bone flap returned in place using the Hordspot   microplate system with three four-sided boxes.  The temporalis muscle and fascia   were reapproximated with interrupted 3-0 Vicryl sutures.  A Hemovac drain was   placed in the subgaleal space and brought through a separate stab incision   posterior to the primary incision.  The galea was closed with inverted   interrupted 3-0 Vicryl sutures and the skin closed with skin staples.  A Telfa   bacitracin dressing was placed over the incision and held in place with a tape.    The patient's head was released from 3-point skeletal fixation device.  He was   returned to full supine position, allowed to awaken from anesthesia, extubated   and left the Operating Room in satisfactory condition.  He received 100 mg of   cortisone as he had a history of hypopituitarism, 2 g of Rocephin and 500 mg of   Dilantin at the beginning of the procedure and bacitracin containing antibiotic   irrigating solutions were used throughout.        RDS/HN  dd: 06/20/2019 08:23:28 (CDT)  td: 06/20/2019 09:39:15 (CDT)  Doc ID   #[id number]  Job ID #[id number]    CC:

## 2019-06-20 NOTE — PROGRESS NOTES
[facility]  Neurocritical Care  Progress Note    Admit Date: 6/19/2019  Service Date: 06/20/2019  Length of Stay: 1    Subjective:     Chief Complaint: Meningioma, cerebral    History of Present Illness: Pt is 74 y.o. Male with PMHx of anxiety and panhypopituitarism who presented for elective L temporal meningioma resection with NSGY. Mass was discovered incidentally during MRI workup for parkinson's. Pt has resting tremor at baseline. 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 Endocrinology is consulted and following patient He is admitted to [facility] for post op monitoring.      Hospital Course: 6/19/2019 Admit to [facility] s/p L temporal meningioma resection  6/19: stable for floor, probably step down to NSGY, Steroid taper on floor    Review of Systems  Constitutional: Positive for chills. Negative for fever.   HENT: Negative for congestion, hearing loss and voice change.    Eyes: Negative for photophobia and visual disturbance.   Respiratory: Negative for chest tightness and shortness of breath.    Cardiovascular: Negative for chest pain, palpitations and leg swelling.   Gastrointestinal: Negative for abdominal distention, abdominal pain, nausea and vomiting.   Musculoskeletal: Negative for back pain and neck pain.   Skin: Negative for pallor and wound.   Neurological: Positive for headaches. Negative for dizziness, seizures and weakness.   Psychiatric/Behavioral: Negative for behavioral problems.     Objective:     Vitals:  Temp: 98.2 °F (36.8 °C)  Pulse: 105  Rhythm: normal sinus rhythm  BP: 118/61  MAP (mmHg): 84  Resp: 16  SpO2: (!) 92 %  O2 Device (Oxygen Therapy): room air    Temp  Min: 97.7 °F (36.5 °C)  Max: 99 °F (37.2 °C)  Pulse  Min: 94  Max: 122  BP  Min: 118/61  Max: 153/88  MAP (mmHg)  Min: 84  Max: 108  Resp  Min: 13  Max: 42  SpO2  Min: 92 %  Max: 100 %    06/19 0701 - 06/20 0700  In: 2000 [I.V.:2000]  Out: 1935 [Urine:1735]    Unmeasured Output  Stool Occurrence: 0       Physical Exam  Constitutional: He appears well-developed and well-nourished. No distress.   HENT:   Head: Normocephalic.   L temporal dressing and subgaleal drain in place    Eyes: Pupils are equal, round, and reactive to light. EOM are normal.   Cardiovascular: Normal rate, regular rhythm and normal heart sounds.   Pulmonary/Chest: Effort normal and breath sounds normal. No respiratory distress.   Abdominal: Soft. Bowel sounds are normal. He exhibits no distension.   Musculoskeletal: He exhibits no edema or deformity.   Skin: Skin is warm and dry.      Neuro:  --sedation: post -surgery, still lethargic  --GCS:  E4 V5 M6  --Mental Status: AAO x 3  --CN II-XII grossly intact, minor L nasolabial flattening   --PERRL - 4 mm  --Motor: 5/5 strength throughout  --sensory: intact to light touch      Today I personally reviewed pertinent medications, lines/drains/airways, imaging, cardiology results, laboratory results, microbiology results,     Medications:  Continuous  sodium chloride 0.9% Last Rate: Stopped (06/19/19 0929)   Scheduled  ceFAZolin (ANCEF) IVPB 1 g Q8H   docusate sodium 100 mg Daily   famotidine 20 mg Daily   hydrocortisone sodium succinate 100 mg Q8H   levETIRAcetam 500 mg BID   levothyroxine 100 mcg Before breakfast   PRN  clonazePAM 0.5 mg BID PRN   fentaNYL 25 mcg Q1H PRN   hydrALAZINE 10 mg Q6H PRN   HYDROcodone-acetaminophen 1 tablet Q6H PRN     Today I personally reviewed pertinent medications, lines/drains/airways, imaging, cardiology results, laboratory results, microbiology results, notably:    Diet  Diet NPO  Diet NPO      Assessment/Plan:     Neuro    * Meningioma, cerebral  Pt found to have L temporal meningioma on CT workup for parkinsons  - s/p elective resection 6/19  -NSGY following   -post op scan stable  -subgaleal drain with minimal output  -q 1 hour neuro checks  -q 1 hour vital signs  -SBP goal <160  -daily mag, phos, CBC,  CMP  -PT/OT/SLP    Tremor  Tremor at baseline     Endocrine  Adrenal insufficiency  Continue hydrocortisone  Can start taper on floor     Secondary hypothyroidism  Continue home levothyroxine     Panhypopituitarism  Pt with Hx of panhypopituitarism from adenoma as a child  -continue hydrocortisone  -endocrine following      The patient is being Prophylaxed for:  Venous Thromboembolism with: Mechanical  Stress Ulcer with: H2B  Ventilator Pneumonia with: not applicable    Activity Orders          Diet NPO: NPO starting at 06/20 0918        Full Code    Isauro Minor MD  Neurocritical Care  [facility]

## 2019-06-20 NOTE — PLAN OF CARE
Problem: SLP Goal  Goal: SLP Goal  Swallow eval completed. Pt should remain npo. Meds can be given crushed in pureed bolus.    MATT Roman, CCC-SLP  6/20/2019

## 2019-06-20 NOTE — PLAN OF CARE
06/20/19 1404   Discharge Assessment   Assessment Type Discharge Planning Assessment   Confirmed/corrected address and phone number on facesheet? Yes   Assessment information obtained from? Patient;Caregiver  (sister)   Expected Length of Stay (days) 5   Communicated expected length of stay with patient/caregiver yes   Prior to hospitilization cognitive status: Alert/Oriented   Prior to hospitalization functional status: Independent   Current cognitive status: Alert/Oriented   Current Functional Status: Needs Assistance   Lives With friend(s)  (Jong Roque)   Able to Return to Prior Arrangements yes   Is patient able to care for self after discharge? Unable to determine at this time (comments)   Who are your caregiver(s) and their phone number(s)? Jong Roque (friend)  [phone]   Patient's perception of discharge disposition home or selfcare;rehab facility;home health   Readmission Within the Last 30 Days no previous admission in last 30 days   Patient currently being followed by outpatient case management? No   Patient currently receives any other outside agency services? No   Equipment Currently Used at Home none   Do you have any problems affording any of your prescribed medications? No   Is the patient taking medications as prescribed? yes   Does the patient have transportation home? Yes   Transportation Anticipated family or friend will provide   Does the patient receive services at the Coumadin Clinic? No   Discharge Plan A Home with family;Home Health   Discharge Plan B Rehab   DME Needed Upon Discharge  other (see comments)  (tbd)   Patient/Family in Agreement with Plan yes         Discharge/ My Health Packet Folder Given to patient/family:      yes      PCP:  Brooke Dean MD        Pharmacy:    [id number] [place], [place] - [address] [employer] [facility]  [address] [address]  Phone: [phone] Fax: [phone]        Emergency Contacts:  Extended Emergency  Contact Information  Primary Emergency Contact: SANDRA Roque   [facility]  Home Phone: [phone]  Mobile Phone: [phone]  Relation: Friend    Insurance:  Payor: MEDICARE / Plan: MEDICARE PART A & B / Product Type: Government /     Jana Palafox RN, CCRN-K, [facility]  Neuro-Critical Care   X [phone]

## 2019-06-20 NOTE — PT/OT/SLP EVAL
"Speech Language Pathology Evaluation  Bedside Swallow    Patient Name:  Ravinder Sanz   MRN:  [id number]  Admitting Diagnosis: Meningioma, cerebral    Recommendations:                 General Recommendations:  Dysphagia therapy and Speech language evaluation  Diet recommendations:  NPO, NPO   Aspiration Precautions: Meds crushed in puree and Strict aspiration precautions   General Precautions: Standard, aspiration  Communication strategies:  none    History:     Past Medical History:   Diagnosis Date    Anxiety     Eczema     Hyperlipidemia     Hypogonadism in male     Hypopituitarism     Hypothyroidism        Past Surgical History:   Procedure Laterality Date    CRANIOTOMY, USING FRAMELESS STEREOTAXY Left 6/19/2019    Performed by Otis Pedraza MD at [facility] [place] [address] [place] [address]    PITUITARY SURGERY      SPINAL FUSION         Prior diet: regular/thin      Subjective   "Little rough" when asked about swallowing  Patient goals: to get better    Pain/Comfort:  · Pain Rating 1: 0/10  · Pain Rating Post-Intervention 1: 0/10    Objective:     Oral Musculature Evaluation  · Oral Musculature: WFL  · Dentition: present and adequate  · Mandibular Strength and Mobility: WFL  · Oral Labial Strength and Mobility: WFL  · Buccal Strength and Mobility: WFL  · Volitional Cough: adequate  · Voice Prior to PO Intake: clear    Bedside Swallow Eval:   Consistencies Assessed:  · Thin liquids teaspoon x1  · Nectar thick liquids cup sips x2  · Honey thick liquids cup sips x3  · Puree teaspoon x3     Oral Phase:   · WFL    Pharyngeal Phase:   · coughing/choking  · multiple spontaneous swallows  · throat clearing    Compensatory Strategies  · None    Treatment: Pt reported that he was having difficulty with swallowing and it was " little rough" and " slower". Pt with immediate cough response following thin liquids. With nectar thick liquids, no initial cough response but a delayed cough response noted later. Pt with a delayed throat clear noted " after 1st pudding tsp; however, feel this was delayed throat clear from nectar. No other throat clears noted following pureed bolus. Pt with a delayed throat clear noted following honey thick liquids. No significant change in vocal quality noted. Pt and family reported that pt was drooling with throat clearing prior to admission to the hospital. Pt's sister reported pt with frequent throat clears today. Brother reported that pt's drooling has improved since surgery and no drooling noted at bedside. Pt was extubated after surgery less than 24 hours ago. Discussed that it would be safest for pt to remain npo except meds and re assess swallowing tomorrow. Reviewed s/s of aspiration and role of slp. Pt and family expressed good understanding. White board updated.     Assessment:     Ravinder Sanz is a 74 y.o. male with an SLP diagnosis of Dysphagia.  He presents with pharyngeal dysphagia.    Goals:   Multidisciplinary Problems     SLP Goals        Problem: SLP Goal    Goal Priority Disciplines Outcome   SLP Goal     SLP    Description:  Goals to be met 6/27  1 pt will participate in ongoing swallow assessment to determine safe po diet  2 Pt will participate in sle                    Plan:     · Patient to be seen:  4 x/week   · Plan of Care expires:     · Plan of Care reviewed with:  patient, family   · SLP Follow-Up:  Yes       Discharge recommendations:  other (see comments)(tbd)     Time Tracking:     SLP Treatment Date:   06/20/19  Speech Start Time:  0838  Speech Stop Time:  0852     Speech Total Time (min):  14 min    Billable Minutes: Eval Swallow and Oral Function 14    MATT Roman, CCC-SLP  06/20/2019

## 2019-06-20 NOTE — SUBJECTIVE & OBJECTIVE
"Interval HPI:   Overnight events: No acute event overnight patient. According to the family he is more alert. Patient doesn't seem to be oriented place and time. However he asks appropriate questions and able to understand the situation.   Eating:   NPO  Nausea: No  Hypoglycemia and intervention: No  Fever: No  TPN and/or TF: No      /61 (Patient Position: Lying)   Pulse 105   Temp 98.2 °F (36.8 °C) (Oral)   Resp 16   Ht 5' 6" (1.676 m)   Wt 76.1 kg (167 lb 12.3 oz)   SpO2 (!) 92%   BMI 27.08 kg/m²     Labs Reviewed and Include    Recent Labs   Lab 06/20/19  0227   *   CALCIUM 8.8   ALBUMIN 3.9   PROT 7.0      K 3.7   CO2 22*      BUN 13   CREATININE 1.0   ALKPHOS 42*   ALT 20   AST 27   BILITOT 0.7     Lab Results   Component Value Date    WBC 16.32 (H) 06/20/2019    HGB 14.8 06/20/2019    HCT 43.5 06/20/2019    MCV 88 06/20/2019     06/20/2019     No results for input(s): TSH, FREET4 in the last 168 hours.  No results found for: HGBA1C    Nutritional status:   Body mass index is 27.08 kg/m².  Lab Results   Component Value Date    ALBUMIN 3.9 06/20/2019    ALBUMIN 4.4 06/14/2019    ALBUMIN 4.5 04/08/2019     No results found for: PREALBUMIN    Estimated Creatinine Clearance: 60.3 mL/min (based on SCr of 1 mg/dL).    Accu-Checks  No results for input(s): POCTGLUCOSE in the last 72 hours.    Current Medications and/or Treatments Impacting Glycemic Control  Immunotherapy:    Immunosuppressants     None        Steroids:   Hormones (From admission, onward)    Start     Stop Route Frequency Ordered    06/19/19 1430  hydrocortisone sodium succinate injection 100 mg      -- IV Every 8 hours 06/19/19 1319        Pressors:    Autonomic Drugs (From admission, onward)    None        Hyperglycemia/Diabetes Medications:   Antihyperglycemics (From admission, onward)    None        "

## 2019-06-20 NOTE — PROGRESS NOTES
[facility]  Endocrinology  Progress Note    Admit Date: 6/19/2019     Reason for Consult: Panhypopituitarism; history of transphenoidal pituitary adenoma resection    HPI:   Patient is a 74 y.o. 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 Patient is immediate post-surgery and still confused due to effects of anesthesia. History was obtained per chart review. Patient was seen by Dr. Eng in April for treatment of panhypopituitarism.    Per Dr. Eng's note:  With regards to the panhypopit  Spinal fusion surgery at age 16; proceeded with pituitary adenoma - nonfunctional. Received radiation treatment at [facility]. Followed by endocrinologist out of [place].     With regards to the Secondary adrenal insufficiency-   current dose: Hydrocortisone 10 mg PO BID. Knows sick day precautions. Never been hospitalized for AI     With regards to the Secondary hypothyroidism -    Current dose: Levothyroxine 100 mcg PO once daily. Takes medication properly. Symptoms:  Postive for Hot/cold intolerance and Anxiety.    With regards to theSecondary hypogonadism-   Since he was 20 years old on Testosterone replacement therapy - 150 mg q 2 weeks. Satisfied with Libido and sexual performance     With regards to theSecondary AGHD -  was on GH (for 2 weeks in 2018 ) currently off of it due to expenses      Plan was for patient to receive IV hydrocortisone pre-op and intra-op. However, it doesn't appear he's received any steroids up until now.    Interval HPI:   Overnight events: No acute event overnight patient. According to the family he is more alert. Patient doesn't seem to be oriented place and time. However he asks appropriate questions and able to understand the situation.   Eating:   NPO  Nausea:  "No  Hypoglycemia and intervention: No  Fever: No  TPN and/or TF: No      /61 (Patient Position: Lying)   Pulse 105   Temp 98.2 °F (36.8 °C) (Oral)   Resp 16   Ht 5' 6" (1.676 m)   Wt 76.1 kg (167 lb 12.3 oz)   SpO2 (!) 92%   BMI 27.08 kg/m²      Labs Reviewed and Include    Recent Labs   Lab 06/20/19  0227   *   CALCIUM 8.8   ALBUMIN 3.9   PROT 7.0      K 3.7   CO2 22*      BUN 13   CREATININE 1.0   ALKPHOS 42*   ALT 20   AST 27   BILITOT 0.7     Lab Results   Component Value Date    WBC 16.32 (H) 06/20/2019    HGB 14.8 06/20/2019    HCT 43.5 06/20/2019    MCV 88 06/20/2019     06/20/2019     No results for input(s): TSH, FREET4 in the last 168 hours.  No results found for: HGBA1C    Nutritional status:   Body mass index is 27.08 kg/m².  Lab Results   Component Value Date    ALBUMIN 3.9 06/20/2019    ALBUMIN 4.4 06/14/2019    ALBUMIN 4.5 04/08/2019     No results found for: PREALBUMIN    Estimated Creatinine Clearance: 60.3 mL/min (based on SCr of 1 mg/dL).    Accu-Checks  No results for input(s): POCTGLUCOSE in the last 72 hours.    Current Medications and/or Treatments Impacting Glycemic Control  Immunotherapy:    Immunosuppressants     None        Steroids:   Hormones (From admission, onward)    Start     Stop Route Frequency Ordered    06/19/19 1430  hydrocortisone sodium succinate injection 100 mg      -- IV Every 8 hours 06/19/19 1319        Pressors:    Autonomic Drugs (From admission, onward)    None        Hyperglycemia/Diabetes Medications:   Antihyperglycemics (From admission, onward)    None          ASSESSMENT and PLAN    Adrenal insufficiency  Mr Sanz, with history of panhypopituitarism and Adrenal insufficiency is having surgery for removal of meningioma.   No signs of acute adrenal crisis - vitals normal    Recommendation  - Patient showed clinical improvement. Switch hydrocortisone to 50 mg IV 8q hours.   - Will wean based on clinical improvement and continue " to wean until back to his home oral dosing (HC 10/10)        JOHN Brooke MD  Endocrinology  [facility]

## 2019-06-20 NOTE — HOSPITAL COURSE
6/20: POD 1. Drinking thin liquids and eating small amounts of food, drain distally was disconnected overnight with low output. Pain is minimal, patient feeling slightly agitated as home dose of clonazepam has been subtherapeutic per family.   6/21: POD2. Significant other at bedside reports sleeplessness and agitation overnight. Patient required 1 dose of Zyprexa. His significant other states he was able to sleep for a few hours after Zyprexa given. Awake on exam, reports feeling tired and anxious. Denies headache, weakness, numbness, paresthesias. Drain with 0 output documented overnight. No drainage from incision. SBP < 160.    6/24: Stable for step down today. Drowsy, remains stable. Incision c/d/i.   6/25: Remains stable for step down. C/d/i

## 2019-06-20 NOTE — ASSESSMENT & PLAN NOTE
Mr Sanz, with history of panhypopituitarism and Adrenal insufficiency is having surgery for removal of meningioma.   No signs of acute adrenal crisis - vitals normal    Recommendation  - Patient showed clinical improvement. Switch hydrocortisone to 50 mg IV 8q hours.   - Will wean based on clinical improvement and continue to wean until back to his home oral dosing (HC 10/10)

## 2019-06-20 NOTE — PLAN OF CARE
Problem: Adult Inpatient Plan of Care  Goal: Plan of Care Review  POC reviewed with pt and significant other at 2300. Significant other verbalized understanding. Questions and concerns addressed. No acute events overnight. Pt progressing toward goals. CT head completed at 0230. Frankel discontinued. Significant other helpful and supportive throughout shift. Will continue to monitor. See flowsheets for full assessment and VS info.

## 2019-06-20 NOTE — PLAN OF CARE
06/20/19 1408   Post-Acute Status   Post-Acute Authorization Placement   Post-Acute Placement Status Awaiting Internal Medical Clearance   Discharge Delays None known at this time       Jana Palafox RN, CCRN-K, [facility]  Neuro-Critical Care   X [phone]

## 2019-06-20 NOTE — SUBJECTIVE & OBJECTIVE
Interval History: 6/20: POD 1. Drinking thin liquids and eating small amounts of food, drain distally was disconnected overnight with low output. Pain is minimal, patient feeling slightly agitated as home dose of clonazepam has been subtherapeutic per family.       Medications:  Continuous Infusions:   sodium chloride 0.9% Stopped (06/19/19 0929)     Scheduled Meds:   ceFAZolin (ANCEF) IVPB  1 g Intravenous Q8H    docusate sodium  100 mg Oral Daily    famotidine  20 mg Oral Daily    hydrocortisone sodium succinate  100 mg Intravenous Q8H    levETIRAcetam  500 mg Oral BID    levothyroxine  100 mcg Oral Before breakfast     PRN Meds:clonazePAM, fentaNYL, hydrALAZINE, HYDROcodone-acetaminophen     Review of Systems  Objective:     Weight: 76.1 kg (167 lb 12.3 oz)  Body mass index is 27.08 kg/m².  Vital Signs (Most Recent):  Temp: 98.2 °F (36.8 °C) (06/20/19 0305)  Pulse: 97 (06/20/19 1300)  Resp: 16 (06/20/19 1300)  BP: (!) 158/89 (06/20/19 1300)  SpO2: 96 % (06/20/19 1300) Vital Signs (24h Range):  Temp:  [97.7 °F (36.5 °C)-98.2 °F (36.8 °C)] 98.2 °F (36.8 °C)  Pulse:  [] 97  Resp:  [15-21] 16  SpO2:  [92 %-100 %] 96 %  BP: (118-158)/(61-96) 158/89  Arterial Line BP: (107-157)/(61-89) 154/82                          Closed/Suction Drain 06/19/19 1240 Left Scalp Accordion 10 Fr. (Active)   Site Description Unable to view 6/20/2019  3:05 AM   Dressing Type [place] 6/20/2019  3:05 AM   Dressing Status Clean;Dry;Intact 6/20/2019  3:05 AM   Dressing Intervention Dressing reinforced 6/19/2019  2:00 PM   Drainage Bloody 6/20/2019  3:05 AM   Status Other (Comment) 6/20/2019  3:05 AM       Neurosurgery Physical Exam  E4V4M6, A+Ox2, not to year. Knows name and year. Awake  PERRL, EOMI, no facial droop, tongue midline, shrugs shoulders evenly, Vision present to all visual fields,   Following commands in all four extremities, symmetric, antigravity  Sensation intact to light touch      Significant Labs:  Recent  Labs   Lab 06/20/19 0227   *      K 3.7      CO2 22*   BUN 13   CREATININE 1.0   CALCIUM 8.8   MG 2.3     Recent Labs   Lab 06/20/19 0227   WBC 16.32*   HGB 14.8   HCT 43.5        No results for input(s): LABPT, INR, APTT in the last 48 hours.  Microbiology Results (last 7 days)     ** No results found for the last 168 hours. **        CMP:   Recent Labs   Lab 06/20/19 0227   *   CALCIUM 8.8   ALBUMIN 3.9   PROT 7.0      K 3.7   CO2 22*      BUN 13   CREATININE 1.0   ALKPHOS 42*   ALT 20   AST 27   BILITOT 0.7     CRP: No results for input(s): CRP in the last 48 hours.  ESR: No results for input(s): POCESR, ERYTHROCYTES in the last 48 hours.  LFTs:   Recent Labs   Lab 06/20/19 0227   ALT 20   AST 27   ALKPHOS 42*   BILITOT 0.7   PROT 7.0   ALBUMIN 3.9       Significant Diagnostics:  CT: Ct Head Without Contrast    Result Date: 6/20/2019  Findings consistent with recent left temporal meningioma resection, with intracranial appearance consistent with the recent surgical procedure as discussed above. Left temporal bone calvarial lesion again noted for which clinical and historical correlation is needed to determine need for further evaluation, workup or follow-up as discussed above. Electronically signed by: Jorge Ramirez Date:    06/20/2019 Time:    02:47  Echoencephalography: No results found in the last 24 hours.  MRI: No results found in the last 24 hours.

## 2019-06-20 NOTE — ASSESSMENT & PLAN NOTE
Patient is a 73 yo male with PMH panhypopituitarism from pituitary adenoma, parkinsonism presenting for elective L temporal meningioma resection (6/20).     POD 1. No acute events overnight    Neuro: Admitted to neuro ICU for q1 hour neurochecks  Exam stable, mild disorientation, remains awake and alert to self and year.  CT head reviewed and appropriate post operatively  Drain with minimal output, will continue to monitor.   Keppra for post surgical seizure ppx   H/ID: Continue abx ppx while drain in place  CV: SBP < 140. Vitals stable last 24 hours  Pulm: No resp distress, wean O2 to room air  GI: ADAT, speech recs for thin liquids  PT/OT/OOB  Dispo: remain in ICU for monitoring

## 2019-06-20 NOTE — SUBJECTIVE & OBJECTIVE
Review of Systems  Constitutional: Positive for chills. Negative for fever.   HENT: Negative for congestion, hearing loss and voice change.    Eyes: Negative for photophobia and visual disturbance.   Respiratory: Negative for chest tightness and shortness of breath.    Cardiovascular: Negative for chest pain, palpitations and leg swelling.   Gastrointestinal: Negative for abdominal distention, abdominal pain, nausea and vomiting.   Musculoskeletal: Negative for back pain and neck pain.   Skin: Negative for pallor and wound.   Neurological: Positive for headaches. Negative for dizziness, seizures and weakness.   Psychiatric/Behavioral: Negative for behavioral problems.     Objective:     Vitals:  Temp: 98.2 °F (36.8 °C)  Pulse: 105  Rhythm: normal sinus rhythm  BP: 118/61  MAP (mmHg): 84  Resp: 16  SpO2: (!) 92 %  O2 Device (Oxygen Therapy): room air    Temp  Min: 97.7 °F (36.5 °C)  Max: 99 °F (37.2 °C)  Pulse  Min: 94  Max: 122  BP  Min: 118/61  Max: 153/88  MAP (mmHg)  Min: 84  Max: 108  Resp  Min: 13  Max: 42  SpO2  Min: 92 %  Max: 100 %    06/19 0701 - 06/20 0700  In: 2000 [I.V.:2000]  Out: 1935 [Urine:1735]   Unmeasured Output  Stool Occurrence: 0       Physical Exam  Constitutional: He appears well-developed and well-nourished. No distress.   HENT:   Head: Normocephalic.   L temporal dressing and subgaleal drain in place    Eyes: Pupils are equal, round, and reactive to light. EOM are normal.   Cardiovascular: Normal rate, regular rhythm and normal heart sounds.   Pulmonary/Chest: Effort normal and breath sounds normal. No respiratory distress.   Abdominal: Soft. Bowel sounds are normal. He exhibits no distension.   Musculoskeletal: He exhibits no edema or deformity.   Skin: Skin is warm and dry.      Neuro:  --sedation: post -surgery, still lethargic  --GCS:  E4 V5 M6  --Mental Status: AAO x 3  --CN II-XII grossly intact, minor L nasolabial flattening   --PERRL - 4 mm  --Motor: 5/5 strength  throughout  --sensory: intact to light touch      Today I personally reviewed pertinent medications, lines/drains/airways, imaging, cardiology results, laboratory results, microbiology results,     Medications:  Continuous  sodium chloride 0.9% Last Rate: Stopped (06/19/19 0929)   Scheduled  ceFAZolin (ANCEF) IVPB 1 g Q8H   docusate sodium 100 mg Daily   famotidine 20 mg Daily   hydrocortisone sodium succinate 100 mg Q8H   levETIRAcetam 500 mg BID   levothyroxine 100 mcg Before breakfast   PRN  clonazePAM 0.5 mg BID PRN   fentaNYL 25 mcg Q1H PRN   hydrALAZINE 10 mg Q6H PRN   HYDROcodone-acetaminophen 1 tablet Q6H PRN     Today I personally reviewed pertinent medications, lines/drains/airways, imaging, cardiology results, laboratory results, microbiology results, notably:    Diet  Diet NPO  Diet NPO

## 2019-06-20 NOTE — HPI
Patient is a 74 year old male with a PMH of panhypopituitarism from a previous pituitary adenoma, symptoms concerning for parkinsonism found to have left sided temporal meningioma, presenting for elective resection of mass (6/20).

## 2019-06-21 LAB
ANION GAP SERPL CALC-SCNC: 10 MMOL/L (ref 8–16)
BASOPHILS # BLD AUTO: 0.02 K/UL (ref 0–0.2)
BASOPHILS NFR BLD: 0.1 % (ref 0–1.9)
BUN SERPL-MCNC: 16 MG/DL (ref 8–23)
CALCIUM SERPL-MCNC: 9.2 MG/DL (ref 8.7–10.5)
CHLORIDE SERPL-SCNC: 104 MMOL/L (ref 95–110)
CO2 SERPL-SCNC: 26 MMOL/L (ref 23–29)
CREAT SERPL-MCNC: 0.9 MG/DL (ref 0.5–1.4)
DIFFERENTIAL METHOD: ABNORMAL
EOSINOPHIL # BLD AUTO: 0 K/UL (ref 0–0.5)
EOSINOPHIL NFR BLD: 0.1 % (ref 0–8)
ERYTHROCYTE [DISTWIDTH] IN BLOOD BY AUTOMATED COUNT: 14.8 % (ref 11.5–14.5)
EST. GFR  (AFRICAN AMERICAN): >60 ML/MIN/1.73 M^2
EST. GFR  (NON AFRICAN AMERICAN): >60 ML/MIN/1.73 M^2
GLUCOSE SERPL-MCNC: 100 MG/DL (ref 70–110)
HCT VFR BLD AUTO: 43 % (ref 40–54)
HGB BLD-MCNC: 14.4 G/DL (ref 14–18)
IMM GRANULOCYTES # BLD AUTO: 0.07 K/UL (ref 0–0.04)
IMM GRANULOCYTES NFR BLD AUTO: 0.5 % (ref 0–0.5)
LYMPHOCYTES # BLD AUTO: 0.7 K/UL (ref 1–4.8)
LYMPHOCYTES NFR BLD: 5.2 % (ref 18–48)
MCH RBC QN AUTO: 29.4 PG (ref 27–31)
MCHC RBC AUTO-ENTMCNC: 33.5 G/DL (ref 32–36)
MCV RBC AUTO: 88 FL (ref 82–98)
MONOCYTES # BLD AUTO: 0.6 K/UL (ref 0.3–1)
MONOCYTES NFR BLD: 4.5 % (ref 4–15)
NEUTROPHILS # BLD AUTO: 12.7 K/UL (ref 1.8–7.7)
NEUTROPHILS NFR BLD: 89.6 % (ref 38–73)
NRBC BLD-RTO: 0 /100 WBC
PLATELET # BLD AUTO: 169 K/UL (ref 150–350)
PMV BLD AUTO: 11.8 FL (ref 9.2–12.9)
POCT GLUCOSE: 102 MG/DL (ref 70–110)
POTASSIUM SERPL-SCNC: 3.7 MMOL/L (ref 3.5–5.1)
RBC # BLD AUTO: 4.89 M/UL (ref 4.6–6.2)
SODIUM SERPL-SCNC: 140 MMOL/L (ref 136–145)
T3 SERPL-MCNC: 50 NG/DL (ref 60–180)
T4 FREE SERPL-MCNC: 1.48 NG/DL (ref 0.71–1.51)
TSH SERPL DL<=0.005 MIU/L-ACNC: 0.02 UIU/ML (ref 0.4–4)
WBC # BLD AUTO: 14.15 K/UL (ref 3.9–12.7)

## 2019-06-21 PROCEDURE — 25000003 PHARM REV CODE 250: Performed by: STUDENT IN AN ORGANIZED HEALTH CARE EDUCATION/TRAINING PROGRAM

## 2019-06-21 PROCEDURE — 97535 SELF CARE MNGMENT TRAINING: CPT

## 2019-06-21 PROCEDURE — C9254 INJECTION, LACOSAMIDE: HCPCS | Performed by: PSYCHIATRY & NEUROLOGY

## 2019-06-21 PROCEDURE — 95951 PR EEG MONITORING/VIDEORECORD: ICD-10-PCS | Mod: 26,,, | Performed by: PSYCHIATRY & NEUROLOGY

## 2019-06-21 PROCEDURE — S0028 INJECTION, FAMOTIDINE, 20 MG: HCPCS | Performed by: STUDENT IN AN ORGANIZED HEALTH CARE EDUCATION/TRAINING PROGRAM

## 2019-06-21 PROCEDURE — 63600175 PHARM REV CODE 636 W HCPCS: Performed by: PSYCHIATRY & NEUROLOGY

## 2019-06-21 PROCEDURE — 99024 PR POST-OP FOLLOW-UP VISIT: ICD-10-PCS | Mod: POP,,, | Performed by: PHYSICIAN ASSISTANT

## 2019-06-21 PROCEDURE — 20000000 HC ICU ROOM

## 2019-06-21 PROCEDURE — S0166 INJ OLANZAPINE 2.5MG: HCPCS | Performed by: STUDENT IN AN ORGANIZED HEALTH CARE EDUCATION/TRAINING PROGRAM

## 2019-06-21 PROCEDURE — 99291 CRITICAL CARE FIRST HOUR: CPT | Mod: GC,,, | Performed by: PSYCHIATRY & NEUROLOGY

## 2019-06-21 PROCEDURE — 84439 ASSAY OF FREE THYROXINE: CPT

## 2019-06-21 PROCEDURE — 99024 POSTOP FOLLOW-UP VISIT: CPT | Mod: POP,,, | Performed by: PHYSICIAN ASSISTANT

## 2019-06-21 PROCEDURE — 99232 SBSQ HOSP IP/OBS MODERATE 35: CPT | Mod: GC,,, | Performed by: INTERNAL MEDICINE

## 2019-06-21 PROCEDURE — 63600175 PHARM REV CODE 636 W HCPCS: Performed by: STUDENT IN AN ORGANIZED HEALTH CARE EDUCATION/TRAINING PROGRAM

## 2019-06-21 PROCEDURE — 25000003 PHARM REV CODE 250: Performed by: PSYCHIATRY & NEUROLOGY

## 2019-06-21 PROCEDURE — 99232 PR SUBSEQUENT HOSPITAL CARE,LEVL II: ICD-10-PCS | Mod: GC,,, | Performed by: INTERNAL MEDICINE

## 2019-06-21 PROCEDURE — 95951 PR EEG MONITORING/VIDEORECORD: CPT | Mod: 26,,, | Performed by: PSYCHIATRY & NEUROLOGY

## 2019-06-21 PROCEDURE — 25000003 PHARM REV CODE 250: Performed by: INTERNAL MEDICINE

## 2019-06-21 PROCEDURE — 80048 BASIC METABOLIC PNL TOTAL CA: CPT

## 2019-06-21 PROCEDURE — 84443 ASSAY THYROID STIM HORMONE: CPT

## 2019-06-21 PROCEDURE — 85025 COMPLETE CBC W/AUTO DIFF WBC: CPT

## 2019-06-21 PROCEDURE — 92526 ORAL FUNCTION THERAPY: CPT

## 2019-06-21 PROCEDURE — 99291 PR CRITICAL CARE, E/M 30-74 MINUTES: ICD-10-PCS | Mod: GC,,, | Performed by: PSYCHIATRY & NEUROLOGY

## 2019-06-21 PROCEDURE — 84480 ASSAY TRIIODOTHYRONINE (T3): CPT

## 2019-06-21 PROCEDURE — 94761 N-INVAS EAR/PLS OXIMETRY MLT: CPT

## 2019-06-21 RX ORDER — ACETAMINOPHEN 650 MG/1
650 SUPPOSITORY RECTAL EVERY 6 HOURS PRN
Status: DISCONTINUED | OUTPATIENT
Start: 2019-06-21 | End: 2019-06-26 | Stop reason: HOSPADM

## 2019-06-21 RX ORDER — ACETAMINOPHEN 325 MG/1
650 TABLET ORAL EVERY 6 HOURS PRN
Status: DISCONTINUED | OUTPATIENT
Start: 2019-06-21 | End: 2019-06-22

## 2019-06-21 RX ORDER — PANTOPRAZOLE SODIUM 40 MG/10ML
40 INJECTION, POWDER, LYOPHILIZED, FOR SOLUTION INTRAVENOUS DAILY
Status: DISCONTINUED | OUTPATIENT
Start: 2019-06-21 | End: 2019-06-21

## 2019-06-21 RX ORDER — OLANZAPINE 10 MG/2ML
2 INJECTION, POWDER, FOR SOLUTION INTRAMUSCULAR ONCE AS NEEDED
Status: COMPLETED | OUTPATIENT
Start: 2019-06-21 | End: 2019-06-21

## 2019-06-21 RX ORDER — HYDROCODONE BITARTRATE AND ACETAMINOPHEN 5; 325 MG/1; MG/1
1 TABLET ORAL EVERY 6 HOURS PRN
Status: DISCONTINUED | OUTPATIENT
Start: 2019-06-21 | End: 2019-06-26 | Stop reason: HOSPADM

## 2019-06-21 RX ORDER — HYDROCODONE BITARTRATE AND ACETAMINOPHEN 5; 325 MG/1; MG/1
1 TABLET ORAL EVERY 4 HOURS PRN
Status: DISCONTINUED | OUTPATIENT
Start: 2019-06-21 | End: 2019-06-21

## 2019-06-21 RX ORDER — FAMOTIDINE 10 MG/ML
20 INJECTION INTRAVENOUS 2 TIMES DAILY
Status: DISCONTINUED | OUTPATIENT
Start: 2019-06-21 | End: 2019-06-25

## 2019-06-21 RX ORDER — MUPIROCIN 20 MG/G
1 OINTMENT TOPICAL 2 TIMES DAILY
Status: DISPENSED | OUTPATIENT
Start: 2019-06-21 | End: 2019-06-26

## 2019-06-21 RX ADMIN — LEVOTHYROXINE SODIUM 100 MCG: 100 TABLET ORAL at 06:06

## 2019-06-21 RX ADMIN — SODIUM CHLORIDE 100 MG: 9 INJECTION, SOLUTION INTRAVENOUS at 09:06

## 2019-06-21 RX ADMIN — HYDROCORTISONE SODIUM SUCCINATE 100 MG: 100 INJECTION, POWDER, FOR SOLUTION INTRAMUSCULAR; INTRAVENOUS at 06:06

## 2019-06-21 RX ADMIN — LEVETIRACETAM 500 MG: 500 TABLET ORAL at 11:06

## 2019-06-21 RX ADMIN — HYDROCORTISONE SODIUM SUCCINATE 50 MG: 100 INJECTION, POWDER, FOR SOLUTION INTRAMUSCULAR; INTRAVENOUS at 02:06

## 2019-06-21 RX ADMIN — MUPIROCIN 1 G: 20 OINTMENT TOPICAL at 09:06

## 2019-06-21 RX ADMIN — CEFAZOLIN 1 G: 1 INJECTION, POWDER, FOR SOLUTION INTRAMUSCULAR; INTRAVENOUS at 02:06

## 2019-06-21 RX ADMIN — CEFAZOLIN 1 G: 1 INJECTION, POWDER, FOR SOLUTION INTRAMUSCULAR; INTRAVENOUS at 06:06

## 2019-06-21 RX ADMIN — OLANZAPINE 2 MG: 10 INJECTION, POWDER, FOR SOLUTION INTRAMUSCULAR at 01:06

## 2019-06-21 RX ADMIN — CEFAZOLIN 1 G: 1 INJECTION, POWDER, FOR SOLUTION INTRAMUSCULAR; INTRAVENOUS at 11:06

## 2019-06-21 RX ADMIN — FAMOTIDINE 20 MG: 10 INJECTION INTRAVENOUS at 02:06

## 2019-06-21 RX ADMIN — FAMOTIDINE 20 MG: 10 INJECTION INTRAVENOUS at 09:06

## 2019-06-21 RX ADMIN — HYDROCORTISONE SODIUM SUCCINATE 50 MG: 100 INJECTION, POWDER, FOR SOLUTION INTRAMUSCULAR; INTRAVENOUS at 06:06

## 2019-06-21 NOTE — PROGRESS NOTES
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     Post-Op Info:  Procedure(s) (LRB):  CRANIOTOMY, USING FRAMELESS STEREOTAXY (Left)   2 Days Post-Op     Interval History: POD2. Significant other at bedside reports sleeplessness and agitation overnight. Patient required 1 dose of Zyprexa. His significant other states he was able to sleep for a few hours after Zyprexa given. Awake on exam, reports feeling tired and anxious. Takes clonazepam BID prn at home for anxiety. Denies headache, weakness, numbness, paresthesias. Drain with 0 output documented overnight. No drainage from incision. SBP < 160. Restraints in place.     Medications:  Continuous Infusions:   sodium chloride 0.9% 100 mL/hr at 06/21/19 1107     Scheduled Meds:   ceFAZolin (ANCEF) IVPB  1 g Intravenous Q8H    docusate sodium  100 mg Oral Daily    famotidine (PF)  20 mg Intravenous BID    hydrocortisone sodium succinate  50 mg Intravenous Q6H    levETIRAcetam  500 mg Oral BID    levothyroxine  100 mcg Oral Before breakfast    mupirocin  1 g Nasal BID     PRN Meds:acetaminophen, acetaminophen, hydrALAZINE, HYDROcodone-acetaminophen       Objective:     Weight: 76.1 kg (167 lb 12.3 oz)  Body mass index is 27.08 kg/m².  Vital Signs (Most Recent):  Temp: 97.5 °F (36.4 °C) (06/21/19 1130)  Pulse: 83 (06/21/19 1130)  Resp: 16 (06/21/19 1130)  BP: 119/75 (06/21/19 1130)  SpO2: (!) 94 % (06/21/19 1130) Vital Signs (24h Range):  Temp:  [97.5 °F (36.4 °C)-99 °F (37.2 °C)] 97.5 °F (36.4 °C)  Pulse:  [] 83  Resp:  [13-18] 16  SpO2:  [93 %-97 %] 94 %  BP: (119-181)/(75-91) 119/75  Arterial Line BP: ()/(68-78) 96/71     Date 06/21/19 0700 - 06/22/19 0659   Shift  0236-3259 2161-7954 3520-9304 24 Hour Total   INTAKE   Shift Total(mL/kg)       OUTPUT   Drains 10   10   Shift Total(mL/kg) 10(0.1)   10(0.1)   Weight (kg) 76.1 76.1 76.1 76.1                        Closed/Suction Drain 06/19/19 1240 Left Scalp Accordion 10 Fr. (Active)   Site Description Unable to view 6/20/2019  5:05 PM   Dressing Type [place] 6/20/2019  8:00 PM   Dressing Status Clean;Dry;Intact 6/20/2019  8:00 PM   Dressing Intervention Dressing reinforced 6/20/2019  5:05 PM   Drainage Bloody 6/20/2019  5:05 PM   Status Other (Comment) 6/20/2019  5:05 PM   Output (mL) 10 mL 6/21/2019 11:07 AM       Neurosurgery Physical Exam    General: well developed, well nourished, no distress.   Head: normocephalic, Left crani incision noted  Neurologic: Lethargic, opens eyes to verbal stimuli. Answers all questions appropriately. Thought content appropriate.  GCS: Motor: 6/Verbal: 5/Eyes: 3 GCS Total: 14  Mental Status: Lethargic, Oriented x 4  Language: No aphasia  Speech: Dysarthric  Cranial nerves: face symmetric, tongue midline, CN II-XII grossly intact.   Eyes: pupils equal, round, reactive to light with accomodation, EOMI.  Pulmonary: normal respirations, no signs of respiratory distress  Abdomen: soft, non-distended, not tender to palpation  Sensory: intact to light touch throughout  Motor Strength: Moves all extremities spontaneously with good tone. Full strength upper and lower extremities. No abnormal movements seen.   Pronator Drift: no drift noted  Finger-to-nose: Intact bilaterally  Clonus: absent  Babinski: absent  Pulses: 2+ and symmetric radial and dorsalis pedis.  Skin: Skin is warm, dry and intact.  Incision c/d/i with staples approximating skin edges. No surrounding erythema or edema. No drainage from incision. HV drain in place.       Significant Labs:  Recent Labs   Lab 06/20/19 0227 06/21/19  1026   * 100    140   K 3.7 3.7    104   CO2 22* 26   BUN 13 16   CREATININE 1.0 0.9    CALCIUM 8.8 9.2   MG 2.3  --      Recent Labs   Lab 06/20/19  0227 06/21/19  1026   WBC 16.32* 14.15*   HGB 14.8 14.4   HCT 43.5 43.0    169     No results for input(s): LABPT, INR, APTT in the last 48 hours.  Microbiology Results (last 7 days)     ** No results found for the last 168 hours. **        Recent Lab Results       06/21/19  1026        Anion Gap 10     Baso # 0.02     Basophil% 0.1     BUN, Bld 16     Calcium 9.2     Chloride 104     CO2 26     Creatinine 0.9     Differential Method Automated     eGFR if African American >60.0     eGFR if non  >60.0  Comment:  Calculation used to obtain the estimated glomerular filtration  rate (eGFR) is the CKD-EPI equation.        Eos # 0.0     Eosinophil% 0.1     Glucose 100     Gran # (ANC) 12.7     Gran% 89.6     Hematocrit 43.0     Hemoglobin 14.4     Immature Grans (Abs) 0.07  Comment:  Mild elevation in immature granulocytes is non specific and   can be seen in a variety of conditions including stress response,   acute inflammation, trauma and pregnancy. Correlation with other   laboratory and clinical findings is essential.       Immature Granulocytes 0.5     Lymph # 0.7     Lymph% 5.2     MCH 29.4     MCHC 33.5     MCV 88     Mono # 0.6     Mono% 4.5     MPV 11.8     nRBC 0     Platelets 169     Potassium 3.7     RBC 4.89     RDW 14.8     Sodium 140     WBC 14.15         All pertinent labs from the last 24 hours have been reviewed.    Significant Diagnostics:  All pertinent imaging reviewed.     Assessment/Plan:     * Meningioma, cerebral  Patient is a 73 yo male with PMH panhypopituitarism from pituitary adenoma, parkinsonism presenting for elective L temporal meningioma resection (6/19).     - Increased agitation overnight requiring 1x dose Zyprexa to be given. Stepped down from ICU yesterday, re-admitted for close monitoring and q1h neuro checks.   Neuro exam stable, lethargic, remains alert to self, year, place, and situation.  CT  head reviewed and appropriate post operatively. Repeat CT head today due to increased agitation.  Drain with minimal output, will continue to monitor.   Keppra for post surgical seizure ppx   H/ID: Continue abx ppx while drain in place  CV: SBP < 140. Vitals stable last 24 hours  Pulm: No resp distress, wean O2 to room air  Endocrine: stress steroids ordered due to significant surgery, no signs of acute adrenal crisis, hydrocortisone switched to 50mg IV BID, if continues to be stable recommend starting 25mg IV BID.  GI: ADAT, speech recs for pureed diet, nectar thick liquids  PT/OT/OOB  Dispo: re-admit to ICU for close monitoring.        Olive Shaikh PA-C  Neurosurgery  [facility]

## 2019-06-21 NOTE — PT/OT/SLP PROGRESS
"Speech Language Pathology Treatment    Patient Name:  Ravinder Sanz   MRN:  [id number]  Admitting Diagnosis: Meningioma, cerebral    Recommendations:                 General Recommendations:  Dysphagia therapy and Speech language evaluation  Diet recommendations:  Puree, Liquid Diet Level: Nectar Thick   Aspiration Precautions: Feed only when awake/alert, HOB to 90 degrees, Small bites/sips and Standard aspiration precautions   General Precautions: Standard, aspiration, fall  Communication strategies:  provide increased time to answer    Subjective     Awake yet lethargic  Family at bedside    Pain/Comfort:  · Pain Rating 1: 0/10  · Pain Rating Post-Intervention 1: 0/10    Objective:     Has the patient been evaluated by SLP for swallowing?   Yes  Keep patient NPO? No     Pt lethargic this date per family 2/2 "rough" night. Thin liquids tolerated x4 via cup with wet vocal quality x1, anterior loss and consistent delayed throat clear noted. He tolerated puree x8 and nectar thick liquids via cup x4 with adequate bolus control and timely swallow initiation. No overt clinical signs of airway compromise noted across puree trials. Delayed throat clear x1 noted post final sip of nectar thick liquids. Pt with significant dysarthria throughout session. SLP help SLC eval 2/2 pt with increased lethargy post swallow assessment. SLP provided education to pt and family on OME and dysarthria speech strategies. Both demonstrated and verbalized understanding. Recommend puree diet/nectar thick liquids at this time. Monitor for s/s of aspiration, and crush meds in puree at this time.     Assessment:     Ravinder Sanz is a 74 y.o. male with an SLP diagnosis of Dysphagia and Dysarthria.      Goals:   Multidisciplinary Problems     SLP Goals        Problem: SLP Goal    Goal Priority Disciplines Outcome   SLP Goal     SLP    Description:  Goals to be met 6/27  1 pt will participate in ongoing swallow assessment to determine safe po diet  2 " Pt will participate in sle   3. Pt will tolerate nectar thick liquids/puree diet without overt s/s of airway compromise                    Plan:     · Patient to be seen:  4 x/week   · Plan of Care reviewed with:  patient, family   · SLP Follow-Up:  Yes       Discharge recommendations:  other (see comments)(tbd)       Time Tracking:     SLP Treatment Date:   06/21/19  Speech Start Time:  1024  Speech Stop Time:  1040     Speech Total Time (min):  16 min    Billable Minutes: Treatment Swallowing Dysfunction 8 and Seld Care/Home Management Training 8    Tara Feliz CCC-SLP  06/21/2019

## 2019-06-21 NOTE — NURSING
Patient arrived to [facility] from 749  by bed    Type of stroke/diagnosis: s/p L temporal meningioma resection    TPA start and end time n/a    Thrombectomy start and end time n/a    Current symptoms: slurred speech, drowsy, confused    Skin assessment done: Y  Wounds noted: L temporal incision, BRIDGETTE drain    NCC notified: MARCELA Felder with NCC

## 2019-06-21 NOTE — ASSESSMENT & PLAN NOTE
Mr Sanz, with history of panhypopituitarism and Adrenal insufficiency is having surgery for removal of meningioma.   No signs of acute adrenal crisis - vitals normal    Recommendation  - Patient showed clinical improvement. Switch hydrocortisone to 50 mg IV BID. If continues to be stable tomorrow then will start 25mg IV BID.   - Will wean based on clinical improvement and continue to wean until back to his home oral dosing (HC 10/10)

## 2019-06-21 NOTE — CARE UPDATE
NCC PA at bedside. Pt c/o HA, but unable to stay awake between responses. PA states OK to give PO analgesics. Verbalized concern for administering PRN's due to pt being lethargic and difficult to arouse. PA requests for ERIKA to be performed. Sternal rubbed pt to perform ERIKA. Asked pt to cough. Weak cough noted. Pt unable to clear secretions. Pt failed ERIKA. PA aware. Will continue to monitor.

## 2019-06-21 NOTE — PHYSICIAN QUERY
PT Name: Ravinder Sanz  MR #: [id number]    Physician Query Form - Consultant Diagnosis Clarification     CDS: Florina Nixon RN, CCDS       Contact information: [email or web address]  This form is a permanent document in the medical record.     Query Date: June 21, 2019      By submitting this query, we are merely seeking further clarification of documentation.  Please utilize your independent clinical judgment when addressing the question(s) below.      The Medical record contains the following:   Diagnosis Supporting Clinical Information Location in Medical Record   Pharyngeal dysphagia 74 y.o. male with an SLP diagnosis of Dysphagia. He presents with pharyngeal dysphagia.    Pharyngeal Phase:   · coughing/choking  · multiple spontaneous swallows  · throat clearing     Discussed that it would be safest for pt to remain npo except meds and re assess swallowing tomorrow. 6/20-Speech Language Pathology Evaluation  Bedside Swallow         Do you agree with the Consultants diagnosis of _____Pharyngeal dysphagia______?    [ x ] Yes   [  ] No   [  ] Other/Clarification of findings:   [  ] Clinically undetermined     Patient is being evaluated for Parkinson's disease.  May relate to above.

## 2019-06-21 NOTE — PT/OT/SLP PROGRESS
Occupational Therapy      Patient Name:  Ravinder Sanz   MRN:  [id number]    Patient not seen today secondary to pt going to Stat CT, RN in room stating pt to be t/f to ICU. Will follow-up as able.    Lesly Saavedra OT  6/21/2019

## 2019-06-21 NOTE — ASSESSMENT & PLAN NOTE
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     - Increased agitation overnight requiring 1x dose Zyprexa to be given. Stepped down from ICU yesterday, re-admitted for close monitoring and q1h neuro checks.   Neuro exam stable, lethargic, remains alert to self, year, place, and situation.  CT head reviewed and appropriate post operatively. Repeat CT head today due to increased agitation.  Drain with minimal output, will continue to monitor.   Keppra for post surgical seizure ppx   H/ID: Continue abx ppx while drain in place  CV: SBP < 140. Vitals stable last 24 hours  Pulm: No resp distress, wean O2 to room air  GI: ADAT, speech recs for pureed diet, nectar thick liquids  PT/OT/OOB  Dispo: re-admit to ICU for close monitoring.

## 2019-06-21 NOTE — CARE UPDATE
NCC team aware of pt's transfer to [facility]. Per team, obtain EEG STAT. Attempted to reach EEG tech multiple times. Unable to notify EEG techs of pt needing STAT EEG. Per NCC team, ok to place EEG Cap. Cap placed. Notified epileptologist of cap placement. Will continue to monitor.

## 2019-06-21 NOTE — PLAN OF CARE
Problem: SLP Goal  Goal: SLP Goal  Goals to be met 6/27  1 pt will participate in ongoing swallow assessment to determine safe po diet  2 Pt will participate in sle   3. Pt will tolerate nectar thick liquids/puree diet without overt s/s of airway compromise  Recommend puree diet/nectar thick liquids at this time. Monitor for s/s of airway compromise.   Tara Feliz CCC-SLP  6/21/2019

## 2019-06-21 NOTE — SUBJECTIVE & OBJECTIVE
Interval History: POD2. Significant other at bedside reports sleeplessness and agitation overnight. Patient required 1 dose of Zyprexa. His significant other states he was able to sleep for a few hours after Zyprexa given. Awake on exam, reports feeling tired and anxious. Takes clonazepam BID prn at home for anxiety. Denies headache, weakness, numbness, paresthesias. Drain with 0 output documented overnight. No drainage from incision. SBP < 160. Restraints in place.     Medications:  Continuous Infusions:   sodium chloride 0.9% 100 mL/hr at 06/21/19 1107     Scheduled Meds:   ceFAZolin (ANCEF) IVPB  1 g Intravenous Q8H    docusate sodium  100 mg Oral Daily    famotidine (PF)  20 mg Intravenous BID    hydrocortisone sodium succinate  50 mg Intravenous Q6H    levETIRAcetam  500 mg Oral BID    levothyroxine  100 mcg Oral Before breakfast    mupirocin  1 g Nasal BID     PRN Meds:acetaminophen, acetaminophen, hydrALAZINE, HYDROcodone-acetaminophen       Objective:     Weight: 76.1 kg (167 lb 12.3 oz)  Body mass index is 27.08 kg/m².  Vital Signs (Most Recent):  Temp: 97.5 °F (36.4 °C) (06/21/19 1130)  Pulse: 83 (06/21/19 1130)  Resp: 16 (06/21/19 1130)  BP: 119/75 (06/21/19 1130)  SpO2: (!) 94 % (06/21/19 1130) Vital Signs (24h Range):  Temp:  [97.5 °F (36.4 °C)-99 °F (37.2 °C)] 97.5 °F (36.4 °C)  Pulse:  [] 83  Resp:  [13-18] 16  SpO2:  [93 %-97 %] 94 %  BP: (119-181)/(75-91) 119/75  Arterial Line BP: ()/(68-78) 96/71     Date 06/21/19 0700 - 06/22/19 0659   Shift 7243-8485 2543-2674 0644-4170 24 Hour Total   INTAKE   Shift Total(mL/kg)       OUTPUT   Drains 10   10   Shift Total(mL/kg) 10(0.1)   10(0.1)   Weight (kg) 76.1 76.1 76.1 76.1                        Closed/Suction Drain 06/19/19 1240 Left Scalp Accordion 10 Fr. (Active)   Site Description Unable to view 6/20/2019  5:05 PM   Dressing Type [place] 6/20/2019  8:00 PM   Dressing Status Clean;Dry;Intact 6/20/2019  8:00 PM   Dressing  Intervention Dressing reinforced 6/20/2019  5:05 PM   Drainage Bloody 6/20/2019  5:05 PM   Status Other (Comment) 6/20/2019  5:05 PM   Output (mL) 10 mL 6/21/2019 11:07 AM       Neurosurgery Physical Exam    General: well developed, well nourished, no distress.   Head: normocephalic, Left crani incision noted  Neurologic: Lethargic, opens eyes to verbal stimuli. Answers all questions appropriately. Thought content appropriate.  GCS: Motor: 6/Verbal: 5/Eyes: 3 GCS Total: 14  Mental Status: Lethargic, Oriented x 4  Language: No aphasia  Speech: Dysarthric  Cranial nerves: face symmetric, tongue midline, CN II-XII grossly intact.   Eyes: pupils equal, round, reactive to light with accomodation, EOMI.  Pulmonary: normal respirations, no signs of respiratory distress  Abdomen: soft, non-distended, not tender to palpation  Sensory: intact to light touch throughout  Motor Strength: Moves all extremities spontaneously with good tone. Full strength upper and lower extremities. No abnormal movements seen.   Pronator Drift: no drift noted  Finger-to-nose: Intact bilaterally  Clonus: absent  Babinski: absent  Pulses: 2+ and symmetric radial and dorsalis pedis.  Skin: Skin is warm, dry and intact.  Incision c/d/i with staples approximating skin edges. No surrounding erythema or edema. No drainage from incision. HV drain in place.       Significant Labs:  Recent Labs   Lab 06/20/19 0227 06/21/19  1026   * 100    140   K 3.7 3.7    104   CO2 22* 26   BUN 13 16   CREATININE 1.0 0.9   CALCIUM 8.8 9.2   MG 2.3  --      Recent Labs   Lab 06/20/19 0227 06/21/19  1026   WBC 16.32* 14.15*   HGB 14.8 14.4   HCT 43.5 43.0    169     No results for input(s): LABPT, INR, APTT in the last 48 hours.  Microbiology Results (last 7 days)     ** No results found for the last 168 hours. **        Recent Lab Results       06/21/19  1026        Anion Gap 10     Baso # 0.02     Basophil% 0.1     BUN, Bld 16     Calcium 9.2      Chloride 104     CO2 26     Creatinine 0.9     Differential Method Automated     eGFR if African American >60.0     eGFR if non  >60.0  Comment:  Calculation used to obtain the estimated glomerular filtration  rate (eGFR) is the CKD-EPI equation.        Eos # 0.0     Eosinophil% 0.1     Glucose 100     Gran # (ANC) 12.7     Gran% 89.6     Hematocrit 43.0     Hemoglobin 14.4     Immature Grans (Abs) 0.07  Comment:  Mild elevation in immature granulocytes is non specific and   can be seen in a variety of conditions including stress response,   acute inflammation, trauma and pregnancy. Correlation with other   laboratory and clinical findings is essential.       Immature Granulocytes 0.5     Lymph # 0.7     Lymph% 5.2     MCH 29.4     MCHC 33.5     MCV 88     Mono # 0.6     Mono% 4.5     MPV 11.8     nRBC 0     Platelets 169     Potassium 3.7     RBC 4.89     RDW 14.8     Sodium 140     WBC 14.15         All pertinent labs from the last 24 hours have been reviewed.    Significant Diagnostics:  All pertinent imaging reviewed.

## 2019-06-21 NOTE — PT/OT/SLP PROGRESS
Physical Therapy      Patient Name:  Ravinder Sanz   MRN:  [id number]    Patient not seen today secondary to Unavailable (Comment)(attempted to perform evaluation twice, 1st attempt patient sleeping RN requesting to let patient rest, 2nd attempt patient scheduled for stat CT and possible t/f to neuro ICU). Will follow-up 6/22/2019 as medically appropriate.    Marline Mcneill, PT

## 2019-06-21 NOTE — H&P
"[facility]  Neurocritical Care  History & Physical    Admit Date: 6/19/2019  Service Date: 06/21/2019  Length of Stay: 2    Subjective:     Chief Complaint: Meningioma, cerebral    History of Present Illness: Pt is 74 y.o. Male with PMHx of anxiety and panhypopituitarism who presented for elective L temporal meningioma resection with NSGY. Mass was discovered incidentally during MRI workup for parkinson's. Pt has resting tremor at baseline. 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 Endocrinology is consulted and following patient He is admitted to [facility] for post op monitoring.        6/21 pt has been readmitted to [facility] for monitoring and observation due to concerns for AMS and aggressive behavior overnight. Pt became agitated overnight requiring 1x dose Zyprexa. Pt family and partner were at bedside and confirmed pt "was not and his baseline the day prior" and that pt becomes aggressive due to confusion and wanting to get out of bed and out of restraints. Pt was evaluated this morning and re-admitted to [facility] for monitoring and evaluation.     Past Medical History:   Diagnosis Date    Anxiety     Eczema     Hyperlipidemia     Hypogonadism in male     Hypopituitarism     Hypothyroidism      Past Surgical History:   Procedure Laterality Date    CRANIOTOMY, USING FRAMELESS STEREOTAXY Left 6/19/2019    Performed by Otis Pedraza MD at [facility] OR [address] [place] [address]    PITUITARY SURGERY      SPINAL FUSION        No current facility-administered medications on file prior to encounter.      Current Outpatient Medications on File Prior to Encounter   Medication Sig Dispense Refill    clonazePAM (KLONOPIN) 0.5 MG tablet Take 1 tablet (0.5 mg total) by mouth 2 (two) times daily as needed for Anxiety. 60 tablet 4    docusate sodium (COLACE) 100 MG capsule Take 100 mg by mouth once daily.      hydrocortisone (CORTEF) 10 MG Tab Take 1 tablet (10 mg total) by " "mouth 2 (two) times daily. 200 tablet 3    levothyroxine (SYNTHROID) 100 MCG tablet Take 1 tablet (100 mcg total) by mouth once daily. 90 tablet 3    MAGNESIUM CITRATE ORAL Take 750 mg by mouth once daily.      senna (SENOKOT) 8.6 mg tablet Take 1 tablet by mouth once daily.      testosterone cypionate (DEPOTESTOTERONE CYPIONATE) 200 mg/mL injection Inject 0.75 mLs (150 mg total) into the muscle every 14 (fourteen) days. 3 ml syringe with 18 g needle  to draw  X 10   25 g 1 inch needle to inject x 10 10 mL 5    fluocinonide (LIDEX) 0.05 % ointment AAA bid 60 g 3    mupirocin (BACTROBAN) 2 % ointment AAA BID 30 g 2    needle, disp, 21 G 21 gauge x 1" Ndle To use once weekly with testosterone injections 4 each 11      Allergies: Bupropion    Family History   Problem Relation Age of Onset    Heart disease Father     Diabetes Sister     Melanoma Neg Hx      Social History     Tobacco Use    Smoking status: Former Smoker    Smokeless tobacco: Never Used   Substance Use Topics    Alcohol use: Yes     Alcohol/week: 8.4 oz     Types: 14 Shots of liquor per week     Frequency: 4 or more times a week     Drinks per session: 1 or 2     Binge frequency: Less than monthly    Drug use: Never     Review of Systems   Constitutional: Negative for fever.   HENT: Negative for voice change.    Eyes: Negative for photophobia and visual disturbance.   Respiratory: Negative for chest tightness and shortness of breath.    Cardiovascular: Negative for chest pain.   Gastrointestinal: Negative for diarrhea, nausea and vomiting.   Genitourinary: Negative for difficulty urinating.   Musculoskeletal: Negative for neck pain.   Neurological: Positive for tremors, speech difficulty and headaches. Negative for dizziness, facial asymmetry and weakness.   Psychiatric/Behavioral: Positive for agitation and confusion. Negative for behavioral problems.     Objective:     Vitals:    Temp: 97.8 °F (36.6 °C)  Pulse: 71  Rhythm: normal sinus " rhythm  BP: (!) 148/82  MAP (mmHg): 109  Resp: 16  SpO2: 96 %  O2 Device (Oxygen Therapy): room air    Temp  Min: 97.5 °F (36.4 °C)  Max: 99 °F (37.2 °C)  Pulse  Min: 70  Max: 114  BP  Min: 119/75  Max: 181/81  MAP (mmHg)  Min: 91  Max: 116  Resp  Min: 13  Max: 18  SpO2  Min: 93 %  Max: 97 %    06/20 0701 - 06/21 0700  In: -   Out: 400 [Urine:400]   Unmeasured Output  Urine Occurrence: 3  Stool Occurrence: 0  Emesis Occurrence: 7       Physical Exam   Constitutional: He appears well-developed and well-nourished.   HENT:   Head: Normocephalic.   Surgical incision with stables, approximated, intact, no edema, and no redness   Neck: Normal range of motion.   Cardiovascular: Normal rate and regular rhythm.   Pulmonary/Chest: Effort normal and breath sounds normal.   Abdominal: Soft. Bowel sounds are normal.   Neurological:   Alert and Oriented, follows commands   E4V4M6 GCS (14)   PERRLA   EOMi   Cough, gag, corneals intact   BUE - moves spontaneously and against gravity   BLE - moves spontaneously and against gravity   Sensation intact   No drift observed on exam          Today I personally reviewed pertinent medications, lines/drains/airways, imaging, cardiology results, laboratory results, microbiology results, notably:        Assessment/Plan:     Neuro  Tremor  Tremor at baseline   -NSGY following      Endocrine  Adrenal insufficiency  -Continue hydrocortisone  -Start hydrocortisone wean, 100 mg IV q8hrs   -endocrine following    Hypogonadism in male  -on testosterone placement chronically   -endocrine following     Secondary hypothyroidism  Continue home levothyroxine    Panhypopituitarism  Pt with Hx of panhypopituitarism from adenoma as a child  -continue hydrocortisone  -endocrine following    Other  * Meningioma, cerebral  Pt found to have L temporal meningioma on CT workup for parkinsons  Pt admitted to [facility] for aggression overnight, AMS, and concern for neuro exam change. Pt re-admitted to [facility] for observation  and monitoring.   - s/p elective resection 6/19  -admit to NCC  -NSGY following   -Stat CT scan 6/21  -subgaleal drain   -d/c zyprexa, consider changing AED, Keppra to another agent d/t concerns for agitation  - neuro checks q 1hr   -vital signs q 1hr   -SBP goal <160  -daily mag, phos, CBC, CMP  -Free T3, TSH labs pending  -PT/OT/SLP    Seizures PPX  -consider changing AED, Keppra for another agent to reduce incidence of agitation       The patient is being Prophylaxed for:  Venous Thromboembolism with: Mechanical  Stress Ulcer with: H2B  Ventilator Pneumonia with: not applicable    Activity Orders          Diet NPO: NPO starting at 06/21 0230        Full Code  Level 3  I have spent 35 min with this patient, with over 50% of this time spent coordinating care and speaking with the family    Bradford Ramsey PA-C  Neurocritical Care  [facility]

## 2019-06-21 NOTE — PROGRESS NOTES
[facility]  Endocrinology  Progress Note    Admit Date: 6/19/2019     Reason for Consult: Panhypopituitarism; history of transphenoidal pituitary adenoma resection    HPI:   Patient is a 74 y.o. 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 Patient is immediate post-surgery and still confused due to effects of anesthesia. History was obtained per chart review. Patient was seen by Dr. Eng in April for treatment of panhypopituitarism.    Per Dr. Eng's note:  With regards to the panhypopit  Spinal fusion surgery at age 16; proceeded with pituitary adenoma - nonfunctional. Received radiation treatment at [facility]. Followed by endocrinologist out of [place].     With regards to the Secondary adrenal insufficiency-   current dose: Hydrocortisone 10 mg PO BID. Knows sick day precautions. Never been hospitalized for AI     With regards to the Secondary hypothyroidism -    Current dose: Levothyroxine 100 mcg PO once daily. Takes medication properly. Symptoms:  Postive for Hot/cold intolerance and Anxiety.    With regards to theSecondary hypogonadism-   Since he was 20 years old on Testosterone replacement therapy - 150 mg q 2 weeks. Satisfied with Libido and sexual performance     With regards to theSecondary AGHD -  was on GH (for 2 weeks in 2018 ) currently off of it due to expenses      Plan was for patient to receive IV hydrocortisone pre-op and intra-op. However, it doesn't appear he's received any steroids up until now.    Interval HPI:   Overnight events: Patient was agitated overnight and received 2mg of Zyprexa. Family did not report any significant events. Family was notified that patient will be transfer back to ICU due to swelling in the brain after the surgery.  Patient's recorded vital  "were steady and stable.   Eating:   NPO  Nausea: No  Hypoglycemia and intervention: No  Fever: No  TPN and/or TF: No    BP (!) 142/91 (BP Location: Left arm, Patient Position: Lying)   Pulse 101   Temp 97.7 °F (36.5 °C) (Axillary)   Resp 16   Ht 5' 6" (1.676 m)   Wt 76.1 kg (167 lb 12.3 oz)   SpO2 97%   BMI 27.08 kg/m²      Labs Reviewed and Include    No results for input(s): GLU, CALCIUM, ALBUMIN, PROT, NA, K, CO2, CL, BUN, CREATININE, ALKPHOS, ALT, AST, BILITOT in the last 24 hours.  Lab Results   Component Value Date    WBC 14.15 (H) 06/21/2019    HGB 14.4 06/21/2019    HCT 43.0 06/21/2019    MCV 88 06/21/2019     06/21/2019     No results for input(s): TSH, FREET4 in the last 168 hours.  No results found for: HGBA1C    Nutritional status:   Body mass index is 27.08 kg/m².  Lab Results   Component Value Date    ALBUMIN 3.9 06/20/2019    ALBUMIN 4.4 06/14/2019    ALBUMIN 4.5 04/08/2019     No results found for: PREALBUMIN    Estimated Creatinine Clearance: 60.3 mL/min (based on SCr of 1 mg/dL).    Accu-Checks  No results for input(s): POCTGLUCOSE in the last 72 hours.    Current Medications and/or Treatments Impacting Glycemic Control  Immunotherapy:    Immunosuppressants     None        Steroids:   Hormones (From admission, onward)    Start     Stop Route Frequency Ordered    06/21/19 1200  hydrocortisone sodium succinate injection 50 mg      -- IV Every 6 hours 06/21/19 1059        Pressors:    Autonomic Drugs (From admission, onward)    None        Hyperglycemia/Diabetes Medications:   Antihyperglycemics (From admission, onward)    None          ASSESSMENT and PLAN    * Meningioma, cerebral  S/p craniotomy and resection.  Per primary team.  Stress steroids ordered due to significant surgery.       Adrenal insufficiency  Mr Sanz, with history of panhypopituitarism and Adrenal insufficiency is having surgery for removal of meningioma.   No signs of acute adrenal crisis - vitals " normal    Recommendation  - Patient showed clinical improvement. Switch hydrocortisone to 50 mg IV BID. If continues to be stable tomorrow then will start 25mg IV BID.   - Will wean based on clinical improvement and continue to wean until back to his home oral dosing ( 10/10)    Hypogonadism in male  On testosterone replacement chronically.   According to the family patient will get regular scheduled testosterone replacement tomorrow.   Okay to hold off until discharge.       Secondary hypothyroidism  Resume home dose of LT4 - 100 mcg daily.       Panhypopituitarism  See individual hormonal assessments.           JOHN Brooke MD  Endocrinology  [facility]

## 2019-06-21 NOTE — ASSESSMENT & PLAN NOTE
On testosterone replacement chronically.   According to the family patient will get regular scheduled testosterone replacement tomorrow.   Okay to hold off until discharge.

## 2019-06-21 NOTE — NURSING
Pt anxious and restless at 2130, pt attempted to get out of bed constantly. Pt refused medication with restraints. Nurse agreed to remove restraints if the pt behaves appropriately. Clonazepam 1mg given at 2151 - crushed in pudding. Pt slept for <10 minutes and began restless activity again but became combative, kicking spouse and nurses. Restraints replaced. Pt removed tele and clothing, with several attempts to get out of bed. On call MD notified, verbal order of Zyprexa 2mg. Pt slept for 1 hr and began anxious squirming in the bed. WCTM. Safety precautions maintained, spouse at bedside.

## 2019-06-21 NOTE — NURSING TRANSFER
Nursing Transfer Note      6/21/2019     Transfer To: [id number]    Transfer via bed    Transfer with cardiac monitoring    Transported by RN    Medicines sent: n/a    Chart send with patient: Yes    Notified: sister    Patient reassessed at: 6/21/19 1645 (date, time)    Upon arrival to floor: cardiac monitor applied and call bell in reach receiving RN at bedside

## 2019-06-21 NOTE — SUBJECTIVE & OBJECTIVE
"Past Medical History:   Diagnosis Date    Anxiety     Eczema     Hyperlipidemia     Hypogonadism in male     Hypopituitarism     Hypothyroidism      Past Surgical History:   Procedure Laterality Date    CRANIOTOMY, USING FRAMELESS STEREOTAXY Left 6/19/2019    Performed by Otis Pedraza MD at [facility] OR [address] [place] [address]    PITUITARY SURGERY      SPINAL FUSION        No current facility-administered medications on file prior to encounter.      Current Outpatient Medications on File Prior to Encounter   Medication Sig Dispense Refill    clonazePAM (KLONOPIN) 0.5 MG tablet Take 1 tablet (0.5 mg total) by mouth 2 (two) times daily as needed for Anxiety. 60 tablet 4    docusate sodium (COLACE) 100 MG capsule Take 100 mg by mouth once daily.      hydrocortisone (CORTEF) 10 MG Tab Take 1 tablet (10 mg total) by mouth 2 (two) times daily. 200 tablet 3    levothyroxine (SYNTHROID) 100 MCG tablet Take 1 tablet (100 mcg total) by mouth once daily. 90 tablet 3    MAGNESIUM CITRATE ORAL Take 750 mg by mouth once daily.      senna (SENOKOT) 8.6 mg tablet Take 1 tablet by mouth once daily.      testosterone cypionate (DEPOTESTOTERONE CYPIONATE) 200 mg/mL injection Inject 0.75 mLs (150 mg total) into the muscle every 14 (fourteen) days. 3 ml syringe with 18 g needle  to draw  X 10   25 g 1 inch needle to inject x 10 10 mL 5    fluocinonide (LIDEX) 0.05 % ointment AAA bid 60 g 3    mupirocin (BACTROBAN) 2 % ointment AAA BID 30 g 2    needle, disp, 21 G 21 gauge x 1" Ndle To use once weekly with testosterone injections 4 each 11      Allergies: Bupropion    Family History   Problem Relation Age of Onset    Heart disease Father     Diabetes Sister     Melanoma Neg Hx      Social History     Tobacco Use    Smoking status: Former Smoker    Smokeless tobacco: Never Used   Substance Use Topics    Alcohol use: Yes     Alcohol/week: 8.4 oz     Types: 14 Shots of liquor per week     Frequency: 4 or more times a week     Drinks " per session: 1 or 2     Binge frequency: Less than monthly    Drug use: Never     Review of Systems   Constitutional: Negative for fever.   HENT: Negative for voice change.    Eyes: Negative for photophobia and visual disturbance.   Respiratory: Negative for chest tightness and shortness of breath.    Cardiovascular: Negative for chest pain.   Gastrointestinal: Negative for diarrhea, nausea and vomiting.   Genitourinary: Negative for difficulty urinating.   Musculoskeletal: Negative for neck pain.   Neurological: Positive for tremors, speech difficulty and headaches. Negative for dizziness, facial asymmetry and weakness.   Psychiatric/Behavioral: Positive for agitation and confusion. Negative for behavioral problems.     Objective:     Vitals:    Temp: 97.8 °F (36.6 °C)  Pulse: 71  Rhythm: normal sinus rhythm  BP: (!) 148/82  MAP (mmHg): 109  Resp: 16  SpO2: 96 %  O2 Device (Oxygen Therapy): room air    Temp  Min: 97.5 °F (36.4 °C)  Max: 99 °F (37.2 °C)  Pulse  Min: 70  Max: 114  BP  Min: 119/75  Max: 181/81  MAP (mmHg)  Min: 91  Max: 116  Resp  Min: 13  Max: 18  SpO2  Min: 93 %  Max: 97 %    06/20 0701 - 06/21 0700  In: -   Out: 400 [Urine:400]   Unmeasured Output  Urine Occurrence: 3  Stool Occurrence: 0  Emesis Occurrence: 7       Physical Exam   Constitutional: He appears well-developed and well-nourished.   HENT:   Head: Normocephalic.   Surgical incision with stables, approximated, intact, no edema, and no redness   Neck: Normal range of motion.   Cardiovascular: Normal rate and regular rhythm.   Pulmonary/Chest: Effort normal and breath sounds normal.   Abdominal: Soft. Bowel sounds are normal.   Neurological:   Alert and Oriented, follows commands   E4V4M6 GCS (14)   PERRLA   EOMi   Cough, gag, corneals intact   BUE - moves spontaneously and against gravity   BLE - moves spontaneously and against gravity   Sensation intact   No drift observed on exam          Today I personally reviewed pertinent  medications, lines/drains/airways, imaging, cardiology results, laboratory results, microbiology results, notably:

## 2019-06-21 NOTE — ASSESSMENT & PLAN NOTE
Pt found to have L temporal meningioma on CT workup for parkinsons  Pt admitted to [facility] for aggression overnight, AMS, and concern for neuro exam change. Pt re-admitted to [facility] for observation and monitoring.   - s/p elective resection 6/19  -admit to NCC  -NSGY following   -Stat CT scan 6/21  -subgaleal drain   -d/c zyprexa, consider changing AED, Keppra to another agent d/t concerns for agitation  - neuro checks q 1hr   -vital signs q 1hr   -SBP goal <160  -daily mag, phos, CBC, CMP  -Free T3, TSH labs pending  -PT/OT/SLP

## 2019-06-21 NOTE — SUBJECTIVE & OBJECTIVE
"Interval HPI:   Overnight events: Patient was agitated overnight and received 2mg of Zyprexa. Family did not report any significant events. Family was notified that patient will be transfer back to ICU due to swelling in the brain after the surgery.  Patient's recorded vital were steady and stable.   Eating:   NPO  Nausea: No  Hypoglycemia and intervention: No  Fever: No  TPN and/or TF: No    BP (!) 142/91 (BP Location: Left arm, Patient Position: Lying)   Pulse 101   Temp 97.7 °F (36.5 °C) (Axillary)   Resp 16   Ht 5' 6" (1.676 m)   Wt 76.1 kg (167 lb 12.3 oz)   SpO2 97%   BMI 27.08 kg/m²     Labs Reviewed and Include    No results for input(s): GLU, CALCIUM, ALBUMIN, PROT, NA, K, CO2, CL, BUN, CREATININE, ALKPHOS, ALT, AST, BILITOT in the last 24 hours.  Lab Results   Component Value Date    WBC 14.15 (H) 06/21/2019    HGB 14.4 06/21/2019    HCT 43.0 06/21/2019    MCV 88 06/21/2019     06/21/2019     No results for input(s): TSH, FREET4 in the last 168 hours.  No results found for: HGBA1C    Nutritional status:   Body mass index is 27.08 kg/m².  Lab Results   Component Value Date    ALBUMIN 3.9 06/20/2019    ALBUMIN 4.4 06/14/2019    ALBUMIN 4.5 04/08/2019     No results found for: PREALBUMIN    Estimated Creatinine Clearance: 60.3 mL/min (based on SCr of 1 mg/dL).    Accu-Checks  No results for input(s): POCTGLUCOSE in the last 72 hours.    Current Medications and/or Treatments Impacting Glycemic Control  Immunotherapy:    Immunosuppressants     None        Steroids:   Hormones (From admission, onward)    Start     Stop Route Frequency Ordered    06/21/19 1200  hydrocortisone sodium succinate injection 50 mg      -- IV Every 6 hours 06/21/19 1059        Pressors:    Autonomic Drugs (From admission, onward)    None        Hyperglycemia/Diabetes Medications:   Antihyperglycemics (From admission, onward)    None        "

## 2019-06-21 NOTE — ASSESSMENT & PLAN NOTE
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     POD 1. No acute events overnight    Neuro: Admitted to neuro ICU for q1 hour neurochecks  Exam stable, mild disorientation, remains awake and alert to self and year.  CT head reviewed and appropriate post operatively  Drain with minimal output, will continue to monitor.   Keppra for post surgical seizure ppx   H/ID: Continue abx ppx while drain in place  CV: SBP < 140. Vitals stable last 24 hours  Pulm: No resp distress, wean O2 to room air  GI: ADAT, speech recs for thin liquids  PT/OT/OOB  Dispo: remain in ICU for monitoring

## 2019-06-22 PROBLEM — G93.6 VASOGENIC BRAIN EDEMA: Status: ACTIVE | Noted: 2019-06-22

## 2019-06-22 PROBLEM — G93.5 BRAIN COMPRESSION: Status: ACTIVE | Noted: 2019-06-22

## 2019-06-22 PROBLEM — E87.6 HYPOKALEMIA: Status: ACTIVE | Noted: 2019-06-22

## 2019-06-22 LAB
ANION GAP SERPL CALC-SCNC: 10 MMOL/L (ref 8–16)
BASOPHILS # BLD AUTO: 0.01 K/UL (ref 0–0.2)
BASOPHILS NFR BLD: 0.1 % (ref 0–1.9)
BUN SERPL-MCNC: 18 MG/DL (ref 8–23)
CALCIUM SERPL-MCNC: 7.9 MG/DL (ref 8.7–10.5)
CHLORIDE SERPL-SCNC: 109 MMOL/L (ref 95–110)
CO2 SERPL-SCNC: 21 MMOL/L (ref 23–29)
CREAT SERPL-MCNC: 0.8 MG/DL (ref 0.5–1.4)
DIFFERENTIAL METHOD: ABNORMAL
EOSINOPHIL # BLD AUTO: 0 K/UL (ref 0–0.5)
EOSINOPHIL NFR BLD: 0 % (ref 0–8)
ERYTHROCYTE [DISTWIDTH] IN BLOOD BY AUTOMATED COUNT: 14.7 % (ref 11.5–14.5)
EST. GFR  (AFRICAN AMERICAN): >60 ML/MIN/1.73 M^2
EST. GFR  (NON AFRICAN AMERICAN): >60 ML/MIN/1.73 M^2
GLUCOSE SERPL-MCNC: 96 MG/DL (ref 70–110)
HCT VFR BLD AUTO: 37.7 % (ref 40–54)
HGB BLD-MCNC: 12.9 G/DL (ref 14–18)
IMM GRANULOCYTES # BLD AUTO: 0.05 K/UL (ref 0–0.04)
IMM GRANULOCYTES NFR BLD AUTO: 0.4 % (ref 0–0.5)
INR PPP: 0.9 (ref 0.8–1.2)
LYMPHOCYTES # BLD AUTO: 0.7 K/UL (ref 1–4.8)
LYMPHOCYTES NFR BLD: 6 % (ref 18–48)
MAGNESIUM SERPL-MCNC: 2.1 MG/DL (ref 1.6–2.6)
MCH RBC QN AUTO: 29.7 PG (ref 27–31)
MCHC RBC AUTO-ENTMCNC: 34.2 G/DL (ref 32–36)
MCV RBC AUTO: 87 FL (ref 82–98)
MONOCYTES # BLD AUTO: 0.6 K/UL (ref 0.3–1)
MONOCYTES NFR BLD: 5.5 % (ref 4–15)
NEUTROPHILS # BLD AUTO: 10 K/UL (ref 1.8–7.7)
NEUTROPHILS NFR BLD: 88 % (ref 38–73)
NRBC BLD-RTO: 0 /100 WBC
PHOSPHATE SERPL-MCNC: 1.9 MG/DL (ref 2.7–4.5)
PLATELET # BLD AUTO: 142 K/UL (ref 150–350)
PMV BLD AUTO: 11.7 FL (ref 9.2–12.9)
POCT GLUCOSE: 97 MG/DL (ref 70–110)
POTASSIUM SERPL-SCNC: 3.3 MMOL/L (ref 3.5–5.1)
PROTHROMBIN TIME: 9.9 SEC (ref 9–12.5)
RBC # BLD AUTO: 4.34 M/UL (ref 4.6–6.2)
SODIUM SERPL-SCNC: 140 MMOL/L (ref 136–145)
WBC # BLD AUTO: 11.37 K/UL (ref 3.9–12.7)

## 2019-06-22 PROCEDURE — 63600175 PHARM REV CODE 636 W HCPCS: Performed by: NEUROLOGICAL SURGERY

## 2019-06-22 PROCEDURE — 20000000 HC ICU ROOM

## 2019-06-22 PROCEDURE — C9254 INJECTION, LACOSAMIDE: HCPCS | Performed by: PSYCHIATRY & NEUROLOGY

## 2019-06-22 PROCEDURE — 99233 SBSQ HOSP IP/OBS HIGH 50: CPT | Mod: ,,, | Performed by: NURSE PRACTITIONER

## 2019-06-22 PROCEDURE — 25000003 PHARM REV CODE 250: Performed by: PSYCHIATRY & NEUROLOGY

## 2019-06-22 PROCEDURE — 63600175 PHARM REV CODE 636 W HCPCS: Performed by: PHYSICIAN ASSISTANT

## 2019-06-22 PROCEDURE — 99233 PR SUBSEQUENT HOSPITAL CARE,LEVL III: ICD-10-PCS | Mod: ,,, | Performed by: NURSE PRACTITIONER

## 2019-06-22 PROCEDURE — 85025 COMPLETE CBC W/AUTO DIFF WBC: CPT

## 2019-06-22 PROCEDURE — 94761 N-INVAS EAR/PLS OXIMETRY MLT: CPT

## 2019-06-22 PROCEDURE — S0166 INJ OLANZAPINE 2.5MG: HCPCS | Performed by: STUDENT IN AN ORGANIZED HEALTH CARE EDUCATION/TRAINING PROGRAM

## 2019-06-22 PROCEDURE — 80048 BASIC METABOLIC PNL TOTAL CA: CPT

## 2019-06-22 PROCEDURE — 85610 PROTHROMBIN TIME: CPT

## 2019-06-22 PROCEDURE — 25000003 PHARM REV CODE 250: Performed by: STUDENT IN AN ORGANIZED HEALTH CARE EDUCATION/TRAINING PROGRAM

## 2019-06-22 PROCEDURE — 63600175 PHARM REV CODE 636 W HCPCS: Performed by: STUDENT IN AN ORGANIZED HEALTH CARE EDUCATION/TRAINING PROGRAM

## 2019-06-22 PROCEDURE — 92610 EVALUATE SWALLOWING FUNCTION: CPT

## 2019-06-22 PROCEDURE — 83735 ASSAY OF MAGNESIUM: CPT

## 2019-06-22 PROCEDURE — S0028 INJECTION, FAMOTIDINE, 20 MG: HCPCS | Performed by: STUDENT IN AN ORGANIZED HEALTH CARE EDUCATION/TRAINING PROGRAM

## 2019-06-22 PROCEDURE — 84100 ASSAY OF PHOSPHORUS: CPT

## 2019-06-22 PROCEDURE — 25000003 PHARM REV CODE 250: Performed by: NEUROLOGICAL SURGERY

## 2019-06-22 PROCEDURE — 63600175 PHARM REV CODE 636 W HCPCS: Performed by: PSYCHIATRY & NEUROLOGY

## 2019-06-22 RX ORDER — TESTOSTERONE CYPIONATE 200 MG/ML
150 INJECTION, SOLUTION INTRAMUSCULAR ONCE
Status: COMPLETED | OUTPATIENT
Start: 2019-06-22 | End: 2019-06-22

## 2019-06-22 RX ORDER — LEVOTHYROXINE SODIUM ANHYDROUS 100 UG/5ML
50 INJECTION, POWDER, LYOPHILIZED, FOR SOLUTION INTRAVENOUS DAILY
Status: DISCONTINUED | OUTPATIENT
Start: 2019-06-23 | End: 2019-06-24

## 2019-06-22 RX ORDER — OLANZAPINE 10 MG/2ML
1 INJECTION, POWDER, FOR SOLUTION INTRAMUSCULAR ONCE AS NEEDED
Status: COMPLETED | OUTPATIENT
Start: 2019-06-22 | End: 2019-06-22

## 2019-06-22 RX ADMIN — FAMOTIDINE 20 MG: 10 INJECTION INTRAVENOUS at 10:06

## 2019-06-22 RX ADMIN — FAMOTIDINE 20 MG: 10 INJECTION INTRAVENOUS at 08:06

## 2019-06-22 RX ADMIN — OLANZAPINE 1 MG: 10 INJECTION, POWDER, LYOPHILIZED, FOR SOLUTION INTRAMUSCULAR at 05:06

## 2019-06-22 RX ADMIN — SODIUM CHLORIDE 100 MG: 9 INJECTION, SOLUTION INTRAVENOUS at 10:06

## 2019-06-22 RX ADMIN — HYDROCORTISONE SODIUM SUCCINATE 50 MG: 100 INJECTION, POWDER, FOR SOLUTION INTRAMUSCULAR; INTRAVENOUS at 06:06

## 2019-06-22 RX ADMIN — HYDROCORTISONE SODIUM SUCCINATE 50 MG: 100 INJECTION, POWDER, FOR SOLUTION INTRAMUSCULAR; INTRAVENOUS at 11:06

## 2019-06-22 RX ADMIN — POTASSIUM PHOSPHATE, MONOBASIC AND POTASSIUM PHOSPHATE, DIBASIC 30 MMOL: 224; 236 INJECTION, SOLUTION, CONCENTRATE INTRAVENOUS at 09:06

## 2019-06-22 RX ADMIN — HYDRALAZINE HYDROCHLORIDE 10 MG: 20 INJECTION INTRAMUSCULAR; INTRAVENOUS at 01:06

## 2019-06-22 RX ADMIN — HYDRALAZINE HYDROCHLORIDE 10 MG: 20 INJECTION INTRAMUSCULAR; INTRAVENOUS at 03:06

## 2019-06-22 RX ADMIN — MUPIROCIN 1 G: 20 OINTMENT TOPICAL at 10:06

## 2019-06-22 RX ADMIN — TESTOSTERONE CYPIONATE 150 MG: 200 INJECTION, SOLUTION INTRAMUSCULAR at 11:06

## 2019-06-22 RX ADMIN — MUPIROCIN 1 G: 20 OINTMENT TOPICAL at 08:06

## 2019-06-22 RX ADMIN — CEFAZOLIN 1 G: 1 INJECTION, POWDER, FOR SOLUTION INTRAMUSCULAR; INTRAVENOUS at 06:06

## 2019-06-22 RX ADMIN — SODIUM CHLORIDE 100 MG: 9 INJECTION, SOLUTION INTRAVENOUS at 08:06

## 2019-06-22 RX ADMIN — CEFAZOLIN 1 G: 1 INJECTION, POWDER, FOR SOLUTION INTRAMUSCULAR; INTRAVENOUS at 03:06

## 2019-06-22 RX ADMIN — HYDROCORTISONE SODIUM SUCCINATE 50 MG: 100 INJECTION, POWDER, FOR SOLUTION INTRAMUSCULAR; INTRAVENOUS at 01:06

## 2019-06-22 NOTE — PT/OT/SLP EVAL
"Speech Language Pathology Evaluation  Bedside Swallow    Patient Name:  Ravinder Sanz   MRN:  [id number]  Admitting Diagnosis: Meningioma, cerebral    Recommendations:                 General Recommendations:  Dysphagia therapy, Speech language evaluation and Cognitive-linguistic evaluation  Diet recommendations:  NPO, NPO   Aspiration Precautions: HOB to 90 degrees, Ice chips sparingly and Strict aspiration precautions   General Precautions: Standard, aspiration, fall, NPO, seizure  Communication strategies:  provide increased time to answer and go to room if call light pushed    History:     Past Medical History:   Diagnosis Date    Anxiety     Eczema     Hyperlipidemia     Hypogonadism in male     Hypopituitarism     Hypothyroidism        Past Surgical History:   Procedure Laterality Date    CRANIOTOMY, USING FRAMELESS STEREOTAXY Left 6/19/2019    Performed by Otis Pedraza MD at [facility] OR [address] [place] [address]    PITUITARY SURGERY      SPINAL FUSION         Social History: Patient lives with significant other.  Difficulty with speech and swallow pta 2/2 tumor per significant other.  New orders placed 2/2 pt transferred to higher level of care.  Diet recs for puree with nectar thick liquids 6/21.     Subjective     "Thank you, I have learned so much."       Pain/Comfort:  · Pain Rating 1: 0/10  · Pain Rating Post-Intervention 1: 0/10    Objective:     Oral Musculature Evaluation  · Oral Musculature: WFL  · Dentition: present and adequate  · Secretion Management: (mild oral holding of saliva noted intermittently)  · Mucosal Quality: adequate  · Mandibular Strength and Mobility: WFL  · Oral Labial Strength and Mobility: impaired coordination(mild tremor noted)  · Lingual Strength and Mobility: impaired strength(mild tremor noted)  · Buccal Strength and Mobility: WFL  · Volitional Cough: decreased strength  · Volitional Swallow: delayed, decreased rise, verbal cues to initiate  · Voice Prior to PO Intake: clear, low " intensity, dysarthria     Bedside Swallow Eval:   Consistencies Assessed:  · Nectar thick liquids tsp x1, cup x5  · Puree tsp x3     Oral Phase:   · talking frequently with po in oral cavity despite cues  · Anterior loss  · Slow oral transit time    Pharyngeal Phase:   · Coughing/choking with 2/3 tsp of puree and 2/5 cup sips of nectar   · decreased hyolaryngeal excursion to palpation  · delayed swallow initiation-mild  · multiple spontaneous swallows    Compensatory Strategies  · Max cues for multiple swallows and refraining from talking prior to completion of multiple swallows without success.     Treatment: Education provided re: ongoing SLP POC, re-eval, recs for npo, s/s aspiration, risks of aspiration, and ice chips sparingly for pleasure. Family verbalized understanding.  Education to be ongoing. White board updated.  Pt's nurse alerted re: results and recs.       Assessment:     Ravinder Sanz is a 74 y.o. male with an SLP diagnosis of Dysphagia and Dysarthria.  He presents with overt s/s aspiration with puree and nectar thick liquid trials today.     Goals:   Multidisciplinary Problems     SLP Goals        Problem: SLP Goal    Goal Priority Disciplines Outcome   SLP Goal     SLP Ongoing (interventions implemented as appropriate)   Description:  Goals to be met 6/29  1 pt will participate in ongoing swallow assessment to determine safe po diet  2 Pt will participate in sle                       Plan:     · Patient to be seen:  4 x/week   · Plan of Care expires:  07/22/19  · Plan of Care reviewed with:  patient, sibling, significant other   · SLP Follow-Up:  Yes       Discharge recommendations:  (tbd)   Barriers to Discharge:  Level of Skilled Assistance Needed      Time Tracking:     SLP Treatment Date:   06/22/19  Speech Start Time:  0905  Speech Stop Time:  0925     Speech Total Time (min):  20 min    Billable Minutes: Eval Swallow and Oral Function 20    MATT Prather, CCC-SLP  06/22/2019

## 2019-06-22 NOTE — PLAN OF CARE
Problem: SLP Goal  Goal: SLP Goal  Goals to be met 6/29  1 pt will participate in ongoing swallow assessment to determine safe po diet  2 Pt will participate in sle     Outcome: Ongoing (interventions implemented as appropriate)  Bedside swallow assessment completed 2/2 transfer back to ICU with new orders placed.  Pt with overt s/s aspiration with puree and nectar thick liquids today.  Rec cont npo with strict aspiration precautions.  Okay for ice chips 3-5 an hour for pleasure when alert and upright. MATT Prather, CCC/SLP  6/22/2019

## 2019-06-22 NOTE — ASSESSMENT & PLAN NOTE
Pt found to have L temporal meningioma on CT workup for parkinsons  Pt admitted to [facility] for aggression overnight, AMS, and concern for neuro exam change. Pt re-admitted to [facility] for observation and monitoring.   - s/p elective resection 6/19  -admit to NCC  -NSGY following   -Stat CT scan 6/21  -subgaleal drain   -d/c zyprexa,  - neuro checks q 1hr   -lacosamide  -vital signs q 1hr   -SBP goal <160  -PT/OT/SLP

## 2019-06-22 NOTE — PLAN OF CARE
Problem: Adult Inpatient Plan of Care  Goal: Plan of Care Review  Interventions implemented as appropriate   pt became agitated prn med given   POC reviewed with pt at 0500. Pt verbalized understanding. Questions and concerns addressed. No acute events overnight. Pt progressing toward goals. Will continue to monitor. See flowsheets for full assessment and VS info   .

## 2019-06-22 NOTE — PROCEDURES
[facility]     PRELIMINARY Cap-EEG REPORT:  Review: 6/21/19, 17:58 (start) - 19:29     Background of theta > delta (4-7 Hz) activity noted with a possible posterior dominant rhythm of 7 Hz.  No epileptiform activity or electrographic seizures seen during the period of review.    Full report to follow.  Will continue to monitor.     Thank you for involving us in the care of this patient.    Matilda Vang MD, KEKE(), FACNS, FAVIVI.  Neurology-Epilepsy.  [facility].

## 2019-06-22 NOTE — PROGRESS NOTES
"[facility]  Neurocritical Care  Progress Note    Admit Date: 6/19/2019  Service Date: 06/22/2019  Length of Stay: 3    Subjective:     Chief Complaint: Meningioma, cerebral    History of Present Illness: Pt is 74 y.o. Male with PMHx of anxiety and panhypopituitarism who presented for elective L temporal meningioma resection with NSGY. Mass was discovered incidentally during MRI workup for parkinson's. Pt has resting tremor at baseline. 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 Endocrinology is consulted and following patient He is admitted to [facility] for post op monitoring.        6/21 pt has been readmitted to [facility] for monitoring and observation due to concerns for AMS and aggressive behavior overnight. Pt became agitated overnight requiring 1x dose Zyprexa. Pt family and partner were at bedside and confirmed pt "was not and his baseline the day prior" and that pt becomes aggressive due to confusion and wanting to get out of bed and out of restraints. Pt was evaluated this morning and re-admitted to [facility] for monitoring and evaluation.     Hospital Course: 6/19/2019 Admit to [facility] s/p L temporal meningioma resection  6/19: stable for floor, probably step down to NSGY, Steroid taper on floor  6/20 stepping down pt to NSGY discussed with PAMELA MEDRANO  6/21: pt readmitted to [facility] for monitoring and evaluation   6/22: PT/INR, US BLE    s    Review of Systems  Constitutional: Denies fevers, weight loss, chills, or weakness.  Eyes: Denies changes in vision.  ENT: Denies dysphagia, nasal discharge, ear pain or discharge.  Cardiovascular: Denies chest pain, palpitations, orthopnea, or claudication.  Respiratory: Denies shortness of breath, cough, hemoptysis, or wheezing.  GI: Denies nausea/vomitting, hematochezia, melena, abd pain, or changes in appetite.  : Denies dysuria, incontinence, or hematuria.  Musculoskeletal: Denies joint pain or " myalgias.  Skin/breast: Denies rashes, lumps, lesions, or discharge.  Neurologic: Denies headache, dizziness, vertigo, or paresthesias.  Psychiatric: Denies changes in mood or hallucinations.  Endocrine: Denies polyuria, polydipsia, heat/cold intolerance.  Hematologic/Lymph: Denies lymphadenopathy, easy bruising or easy bleeding.  Allergic/Immunologic: Denies rash, rhinitis.   Objective:     Vitals:  Temp: 97.9 °F (36.6 °C)  Pulse: 83  Rhythm: normal sinus rhythm  BP: (!) 174/96(PRN given )  MAP (mmHg): 129  Resp: (!) 23  SpO2: 100 %  O2 Device (Oxygen Therapy): room air    Temp  Min: 97.8 °F (36.6 °C)  Max: 98.6 °F (37 °C)  Pulse  Min: 55  Max: 90  BP  Min: 119/66  Max: 175/92  MAP (mmHg)  Min: 87  Max: 129  Resp  Min: 14  Max: 25  SpO2  Min: 95 %  Max: 100 %    06/21 0701 - 06/22 0700  In: 2148.3 [P.O.:60; I.V.:1988.3]  Out: 285 [Urine:275; Drains:10]   Unmeasured Output  Urine Occurrence: 1  Stool Occurrence: 0  Emesis Occurrence: 7  Pad Count: 2       Physical Exam  GA: Alert, comfortable, no acute distress.   HEENT: No scleral icterus or JVD.   Pulmonary: Clear to auscultation A/L.   Cardiac: RRR S1 & S2 w/o rubs/murmurs/gallops.   Abdominal: Bowel sounds present x 4. No appreciable hepatosplenomegaly.  Skin: No jaundice, rashes, or visible lesions.  Neuro:  --GCS: E4 V4 M  --Mental Status:  Awake, oriented to person only, delayed responses, follows simple commands  --Pupils 3->2mm, PERRL.   --Corneal reflex, gag, cough intact.  --MESA spont    Medications:  Continuous  sodium chloride 0.9% Last Rate: 100 mL/hr at 06/22/19 1300   Scheduled  ceFAZolin (ANCEF) IVPB 1 g Q8H   docusate sodium 100 mg Daily   famotidine (PF) 20 mg BID   hydrocortisone sodium succinate 50 mg Q6H   lacosamide (VIMPAT) IVPB 100 mg Q12H   levothyroxine 100 mcg Before breakfast   mupirocin 1 g BID   potassium phosphate IVPB 30 mmol Once   PRN  acetaminophen 650 mg Q6H PRN   hydrALAZINE 10 mg Q6H PRN   HYDROcodone-acetaminophen 1 tablet Q6H  PRN     Today I personally reviewed pertinent medications, lines/drains/airways, imaging, laboratory results,     Diet  Diet NPO      Assessment/Plan:     Neuro  Brain compression  --follow neuro exam  --follow brain imaging    Tremor  Tremor at baseline   -NSGY following      Renal/  Hypokalemia  --replace K PRN  -- follow cmp      Endocrine  Adrenal insufficiency     hydrocortisone wean, 50 mg IV q6hrs   -endocrine following-appreciate reccs      Secondary hypothyroidism  Continue home levothyroxine    Panhypopituitarism  Pt with Hx of panhypopituitarism from adenoma as a child  -continue hydrocortisone  -endocrine following and appreciate reccs    Other  * Meningioma, cerebral  Pt found to have L temporal meningioma on CT workup for parkinsons  Pt admitted to [facility] for aggression overnight, AMS, and concern for neuro exam change. Pt re-admitted to [facility] for observation and monitoring.   - s/p elective resection 6/19  -admit to [facility]  -NSGY following   -Stat CT scan 6/21  -subgaleal drain   -d/c zyprexa,  - neuro checks q 1hr   -lacosamide  -vital signs q 1hr   -SBP goal <160  -PT/OT/SLP            The patient is being Prophylaxed for:  Venous Thromboembolism with: None  Stress Ulcer with: H2B  Ventilator Pneumonia with: not applicable    Activity Orders          Diet NPO: NPO starting at 06/21 0230        Full Code    Claire Snyder NP  Neurocritical Care  [facility]

## 2019-06-22 NOTE — ASSESSMENT & PLAN NOTE
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       Neuro: continue neuro ICU for q1 hour neurochecks  CT head reviewed and appropriate post operatively  Drain with minimal output, will continue to monitor.   Keppra for post surgical seizure ppx   H/ID: Continue abx ppx while drain in place  CV: SBP < 140.  Pulm: No resp distress, wean O2 to room air  GI: ADAT, speech recs for thin liquids  PT/OT/OOB  Dispo: remain in ICU for monitoring

## 2019-06-22 NOTE — ASSESSMENT & PLAN NOTE
Pt with Hx of panhypopituitarism from adenoma as a child  -continue hydrocortisone  -endocrine following and appreciate reccs

## 2019-06-22 NOTE — SUBJECTIVE & OBJECTIVE
Interval History: Stepped up to ICU yesterday for agitation and lethargy. Doing much better this AM.    Medications:  Continuous Infusions:   sodium chloride 0.9% 100 mL/hr at 06/22/19 1300     Scheduled Meds:   ceFAZolin (ANCEF) IVPB  1 g Intravenous Q8H    docusate sodium  100 mg Oral Daily    famotidine (PF)  20 mg Intravenous BID    hydrocortisone sodium succinate  50 mg Intravenous Q6H    lacosamide (VIMPAT) IVPB  100 mg Intravenous Q12H    levothyroxine  100 mcg Oral Before breakfast    mupirocin  1 g Nasal BID    potassium phosphate IVPB  30 mmol Intravenous Once     PRN Meds:acetaminophen, hydrALAZINE, HYDROcodone-acetaminophen       Objective:     Weight: 76.1 kg (167 lb 12.3 oz)  Body mass index is 27.08 kg/m².  Vital Signs (Most Recent):  Temp: 97.9 °F (36.6 °C) (06/22/19 1100)  Pulse: 83 (06/22/19 1300)  Resp: (!) 23 (06/22/19 1300)  BP: (!) 174/96(PRN given ) (06/22/19 1300)  SpO2: 100 % (06/22/19 1300) Vital Signs (24h Range):  Temp:  [97.8 °F (36.6 °C)-98.6 °F (37 °C)] 97.9 °F (36.6 °C)  Pulse:  [55-90] 83  Resp:  [14-25] 23  SpO2:  [95 %-100 %] 100 %  BP: (119-175)/() 174/96     Date 06/22/19 0700 - 06/23/19 0659   Shift 2363-0343 1338-0703 7716-5567 24 Hour Total   INTAKE   I.V.(mL/kg) 700(9.2)   700(9.2)   IV Piggyback 600   600   Shift Total(mL/kg) 1300(17.1)   1300(17.1)   OUTPUT   Urine(mL/kg/hr) 485   485   Shift Total(mL/kg) 485(6.4)   485(6.4)   Weight (kg) 76.1 76.1 76.1 76.1              Closed/Suction Drain 06/19/19 1240 Left Scalp Accordion 10 Fr. (Active)   Site Description Unable to view 6/20/2019  5:05 PM   Dressing Type [place] 6/20/2019  8:00 PM   Dressing Status Clean;Dry;Intact 6/20/2019  8:00 PM   Dressing Intervention Dressing reinforced 6/20/2019  5:05 PM   Drainage Bloody 6/20/2019  5:05 PM   Status Other (Comment) 6/20/2019  5:05 PM   Output (mL) 10 mL 6/21/2019 11:07 AM       Neurosurgery Physical Exam      General: well developed, well nourished, no  distress.   Head: normocephalic, Left crani incision noted  Neurologic: Lethargic, opens eyes to verbal stimuli. Answers all questions appropriately. Thought content appropriate.  GCS: Motor: 6/Verbal: 5/Eyes: 4 GCS Total: 15  Mental Status: Oriented x 4  Language: No aphasia  Speech: Dysarthric  Cranial nerves: face symmetric, tongue midline, CN II-XII grossly intact.   Eyes: pupils equal, round, reactive to light with accomodation, EOMI.  Pulmonary: normal respirations, no signs of respiratory distress  Abdomen: soft, non-distended, not tender to palpation  Sensory: intact to light touch throughout  Motor Strength: Moves all extremities spontaneously with good tone. Full strength upper and lower extremities. No abnormal movements seen.   Incision c/d/i with staples approximating skin edges. No surrounding erythema or edema. No drainage from incision. HV drain in place.       Significant Labs:  Recent Labs   Lab 06/21/19  1026 06/22/19  0445    96    140   K 3.7 3.3*    109   CO2 26 21*   BUN 16 18   CREATININE 0.9 0.8   CALCIUM 9.2 7.9*   MG  --  2.1     Recent Labs   Lab 06/21/19  1026 06/22/19  0445   WBC 14.15* 11.37   HGB 14.4 12.9*   HCT 43.0 37.7*    142*     No results for input(s): LABPT, INR, APTT in the last 48 hours.  Microbiology Results (last 7 days)     ** No results found for the last 168 hours. **        Recent Lab Results       06/22/19  0445   06/21/19  1702        Anion Gap 10       Baso # 0.01       Basophil% 0.1       BUN, Bld 18       Calcium 7.9       Chloride 109       CO2 21       Creatinine 0.8       Differential Method Automated       eGFR if  >60.0       eGFR if non  >60.0  Comment:  Calculation used to obtain the estimated glomerular filtration  rate (eGFR) is the CKD-EPI equation.          Eos # 0.0       Eosinophil% 0.0       Glucose 96       Gran # (ANC) 10.0       Gran% 88.0       Hematocrit 37.7       Hemoglobin 12.9        Immature Grans (Abs) 0.05  Comment:  Mild elevation in immature granulocytes is non specific and   can be seen in a variety of conditions including stress response,   acute inflammation, trauma and pregnancy. Correlation with other   laboratory and clinical findings is essential.         Immature Granulocytes 0.4       Lymph # 0.7       Lymph% 6.0       Magnesium 2.1       MCH 29.7       MCHC 34.2       MCV 87       Mono # 0.6       Mono% 5.5       MPV 11.7       nRBC 0       Phosphorus 1.9       Platelets 142       POCT Glucose   102     Potassium 3.3       RBC 4.34       RDW 14.7       Sodium 140       WBC 11.37           All pertinent labs from the last 24 hours have been reviewed.    Significant Diagnostics:  All pertinent imaging reviewed.     Review of Systems

## 2019-06-22 NOTE — PT/OT/SLP PROGRESS
Physical Therapy      Patient Name:  Ravinder Sanz   MRN:  [id number]    Patient not seen today secondary to (transferred to ICU 6/21). PT orders discontinued. Will need new orders when pt appropriate for PT evaluation and treatment.     Bisi Parks, PT, DPT   6/22/2019  [phone]

## 2019-06-22 NOTE — NURSING
N[facility] notified of pt agitation and irrational thinking. NCC to consult psych to eval. Will continue to monitor.

## 2019-06-22 NOTE — PROCEDURES
ICU EEG/VIDEO MONITORING REPORT    Ravinder Sanz  [id number]  1945    DATE OF SERVICE: 6/21-22/19    DATE OF ADMISSION: 6/19/2019  5:56 AM    ADMITTING PROVIDER: Otis Pedraza MD    METHODOLOGY-Cap   Electroencephalographic (EEG) recording is with electrodes placed according to the International 10-20 placement system.  Thirty two (32) channels of digital signal are simultaneously recorded from the scalp and may include EKG, EMG, and/or eye monitors.   Recording band pass was 0.1 to 512 hz.  Digital video recording of the patient is simultaneously recorded with the EEG.  The nursing staff report clinical symptoms and may press an event button when the patient has symptoms of clinical interest to the treating physicians.  EEG and video recording is stored and archived in digital format.  The entire recording is visually reviewed and the times identified by computer analysis as being spikes or seizures are reviewed again.  Activation procedures which include photic stimulation, hyperventilation and instructing patients to perform simple task are done in selected patients.   Compresses spectral analysis (CSA) is also performed on the activity recorded from each individual channel.  This is displayed as a power display of frequencies from 0 to 30 Hz over time.   The CSA analysis is done and displayed continuously.  This is reviewed for asymmetries in power between homologous areas of the scalp and for presence of changes in power which canbe seen when seizures occur.  Sections of suspected abnormalities on the CSA is then compared with the original EEG recording.     [employer] software was also utilized in the review of this study.  This software suite analyzes the EEG recording in multiple domains.  Coherence and rhythmicity is computed to identify EEG sections which may contain organized seizures.  Each channel undergoes analysis to detect presence of spike and sharp waves which have special and morphological  characteristic of epileptic activity.  The routine EEG recording is converted from spacial into frequency domain.  This is then displayed comparing homologous areas to identify areas of significant asymmetry.  Algorithm to identify non-cortically generated artifact is used to separate eye movement, EMG and other artifact from the EEG.      Recording Times  Start on 6/21/19, 17:58  Stop on 6/22/19, 10:30    A total of 16 hours and 32 minutes of EEG was recorded.    EEG FINDINGS  Background activity:   The background rhythm was characterized by theta > delta (4-7 Hz) activity noted with a possible posterior dominant rhythm of 7 Hz.  Symmetry and continuity: the background was continuous and symmetric    Sleep:   Not seen.    Activation procedures:   Not done    Abnormal activity:   No epileptiform discharges, periodic discharges, lateralized rhythmic delta activity or electrographic seizures were seen.    IMPRESSION:   This is an abnormal Cap-EEG due to the moderate generalized slowing seen, suggestive of moderate diffuse or multifocal cerebral dysfunction.  No epileptiform activity or electrographic seizures seen.    CLINICAL CORRELATION IS RECOMMENDED.    Matilda Vang MD, KEKE(), GREG, MEETA.  Neurology-Epilepsy.  [facility].

## 2019-06-22 NOTE — SUBJECTIVE & OBJECTIVE
s    Review of Systems  Constitutional: Denies fevers, weight loss, chills, or weakness.  Eyes: Denies changes in vision.  ENT: Denies dysphagia, nasal discharge, ear pain or discharge.  Cardiovascular: Denies chest pain, palpitations, orthopnea, or claudication.  Respiratory: Denies shortness of breath, cough, hemoptysis, or wheezing.  GI: Denies nausea/vomitting, hematochezia, melena, abd pain, or changes in appetite.  : Denies dysuria, incontinence, or hematuria.  Musculoskeletal: Denies joint pain or myalgias.  Skin/breast: Denies rashes, lumps, lesions, or discharge.  Neurologic: Denies headache, dizziness, vertigo, or paresthesias.  Psychiatric: Denies changes in mood or hallucinations.  Endocrine: Denies polyuria, polydipsia, heat/cold intolerance.  Hematologic/Lymph: Denies lymphadenopathy, easy bruising or easy bleeding.  Allergic/Immunologic: Denies rash, rhinitis.   Objective:     Vitals:  Temp: 97.9 °F (36.6 °C)  Pulse: 83  Rhythm: normal sinus rhythm  BP: (!) 174/96(PRN given )  MAP (mmHg): 129  Resp: (!) 23  SpO2: 100 %  O2 Device (Oxygen Therapy): room air    Temp  Min: 97.8 °F (36.6 °C)  Max: 98.6 °F (37 °C)  Pulse  Min: 55  Max: 90  BP  Min: 119/66  Max: 175/92  MAP (mmHg)  Min: 87  Max: 129  Resp  Min: 14  Max: 25  SpO2  Min: 95 %  Max: 100 %    06/21 0701 - 06/22 0700  In: 2148.3 [P.O.:60; I.V.:1988.3]  Out: 285 [Urine:275; Drains:10]   Unmeasured Output  Urine Occurrence: 1  Stool Occurrence: 0  Emesis Occurrence: 7  Pad Count: 2       Physical Exam  GA: Alert, comfortable, no acute distress.   HEENT: No scleral icterus or JVD.   Pulmonary: Clear to auscultation A/L.   Cardiac: RRR S1 & S2 w/o rubs/murmurs/gallops.   Abdominal: Bowel sounds present x 4. No appreciable hepatosplenomegaly.  Skin: No jaundice, rashes, or visible lesions.  Neuro:  --GCS: E4 V4 M  --Mental Status:  Awake, oriented to person only, delayed responses, follows simple commands  --Pupils 3->2mm, PERRL.   --Corneal reflex,  gag, cough intact.  --MESA spont    Medications:  Continuous  sodium chloride 0.9% Last Rate: 100 mL/hr at 06/22/19 1300   Scheduled  ceFAZolin (ANCEF) IVPB 1 g Q8H   docusate sodium 100 mg Daily   famotidine (PF) 20 mg BID   hydrocortisone sodium succinate 50 mg Q6H   lacosamide (VIMPAT) IVPB 100 mg Q12H   levothyroxine 100 mcg Before breakfast   mupirocin 1 g BID   potassium phosphate IVPB 30 mmol Once   PRN  acetaminophen 650 mg Q6H PRN   hydrALAZINE 10 mg Q6H PRN   HYDROcodone-acetaminophen 1 tablet Q6H PRN     Today I personally reviewed pertinent medications, lines/drains/airways, imaging, laboratory results,     Diet  Diet NPO

## 2019-06-22 NOTE — PROGRESS NOTES
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     Post-Op Info:  Procedure(s) (LRB):  CRANIOTOMY, USING FRAMELESS STEREOTAXY (Left)   3 Days Post-Op     Interval History: Stepped up to ICU yesterday for agitation and lethargy. Doing much better this AM.    Medications:  Continuous Infusions:   sodium chloride 0.9% 100 mL/hr at 06/22/19 1300     Scheduled Meds:   ceFAZolin (ANCEF) IVPB  1 g Intravenous Q8H    docusate sodium  100 mg Oral Daily    famotidine (PF)  20 mg Intravenous BID    hydrocortisone sodium succinate  50 mg Intravenous Q6H    lacosamide (VIMPAT) IVPB  100 mg Intravenous Q12H    levothyroxine  100 mcg Oral Before breakfast    mupirocin  1 g Nasal BID    potassium phosphate IVPB  30 mmol Intravenous Once     PRN Meds:acetaminophen, hydrALAZINE, HYDROcodone-acetaminophen       Objective:     Weight: 76.1 kg (167 lb 12.3 oz)  Body mass index is 27.08 kg/m².  Vital Signs (Most Recent):  Temp: 97.9 °F (36.6 °C) (06/22/19 1100)  Pulse: 83 (06/22/19 1300)  Resp: (!) 23 (06/22/19 1300)  BP: (!) 174/96(PRN given ) (06/22/19 1300)  SpO2: 100 % (06/22/19 1300) Vital Signs (24h Range):  Temp:  [97.8 °F (36.6 °C)-98.6 °F (37 °C)] 97.9 °F (36.6 °C)  Pulse:  [55-90] 83  Resp:  [14-25] 23  SpO2:  [95 %-100 %] 100 %  BP: (119-175)/() 174/96     Date 06/22/19 0700 - 06/23/19 0659   Shift 1701-6864 4363-7595 0266-4911 24 Hour Total   INTAKE   I.V.(mL/kg) 700(9.2)   700(9.2)   IV Piggyback 600   600   Shift Total(mL/kg) 1300(17.1)   1300(17.1)   OUTPUT   Urine(mL/kg/hr) 485   485   Shift Total(mL/kg) 485(6.4)   485(6.4)   Weight (kg) 76.1 76.1 76.1 76.1              Closed/Suction Drain 06/19/19 1240 Left Scalp  Accordion 10 Fr. (Active)   Site Description Unable to view 6/20/2019  5:05 PM   Dressing Type [place] 6/20/2019  8:00 PM   Dressing Status Clean;Dry;Intact 6/20/2019  8:00 PM   Dressing Intervention Dressing reinforced 6/20/2019  5:05 PM   Drainage Bloody 6/20/2019  5:05 PM   Status Other (Comment) 6/20/2019  5:05 PM   Output (mL) 10 mL 6/21/2019 11:07 AM       Neurosurgery Physical Exam      General: well developed, well nourished, no distress.   Head: normocephalic, Left crani incision noted  Neurologic: Lethargic, opens eyes to verbal stimuli. Answers all questions appropriately. Thought content appropriate.  GCS: Motor: 6/Verbal: 5/Eyes: 4 GCS Total: 15  Mental Status: Oriented x 4  Language: No aphasia  Speech: Dysarthric  Cranial nerves: face symmetric, tongue midline, CN II-XII grossly intact.   Eyes: pupils equal, round, reactive to light with accomodation, EOMI.  Pulmonary: normal respirations, no signs of respiratory distress  Abdomen: soft, non-distended, not tender to palpation  Sensory: intact to light touch throughout  Motor Strength: Moves all extremities spontaneously with good tone. Full strength upper and lower extremities. No abnormal movements seen.   Incision c/d/i with staples approximating skin edges. No surrounding erythema or edema. No drainage from incision. HV drain in place.       Significant Labs:  Recent Labs   Lab 06/21/19  1026 06/22/19  0445    96    140   K 3.7 3.3*    109   CO2 26 21*   BUN 16 18   CREATININE 0.9 0.8   CALCIUM 9.2 7.9*   MG  --  2.1     Recent Labs   Lab 06/21/19  1026 06/22/19  0445   WBC 14.15* 11.37   HGB 14.4 12.9*   HCT 43.0 37.7*    142*     No results for input(s): LABPT, INR, APTT in the last 48 hours.  Microbiology Results (last 7 days)     ** No results found for the last 168 hours. **        Recent Lab Results       06/22/19  0445   06/21/19  1702        Anion Gap 10       Baso # 0.01       Basophil% 0.1       BUN, Bld 18        Calcium 7.9       Chloride 109       CO2 21       Creatinine 0.8       Differential Method Automated       eGFR if  >60.0       eGFR if non  >60.0  Comment:  Calculation used to obtain the estimated glomerular filtration  rate (eGFR) is the CKD-EPI equation.          Eos # 0.0       Eosinophil% 0.0       Glucose 96       Gran # (ANC) 10.0       Gran% 88.0       Hematocrit 37.7       Hemoglobin 12.9       Immature Grans (Abs) 0.05  Comment:  Mild elevation in immature granulocytes is non specific and   can be seen in a variety of conditions including stress response,   acute inflammation, trauma and pregnancy. Correlation with other   laboratory and clinical findings is essential.         Immature Granulocytes 0.4       Lymph # 0.7       Lymph% 6.0       Magnesium 2.1       MCH 29.7       MCHC 34.2       MCV 87       Mono # 0.6       Mono% 5.5       MPV 11.7       nRBC 0       Phosphorus 1.9       Platelets 142       POCT Glucose   102     Potassium 3.3       RBC 4.34       RDW 14.7       Sodium 140       WBC 11.37           All pertinent labs from the last 24 hours have been reviewed.    Significant Diagnostics:  All pertinent imaging reviewed.     Review of Systems        Assessment/Plan:     * Meningioma, cerebral  Patient is a 75 yo male with PMH panhypopituitarism from pituitary adenoma, parkinsonism presenting for elective L temporal meningioma resection (6/19).     - Increased agitation overnight requiring 1x dose Zyprexa to be given. Stepped down from ICU yesterday, re-admitted for close monitoring and q1h neuro checks.   Neuro exam stable, lethargic, remains alert to self, year, place, and situation.  CT head reviewed and appropriate post operatively. Repeat CT head today due to increased agitation.  Drain with minimal output, will continue to monitor.   Keppra for post surgical seizure ppx   H/ID: Continue abx ppx while drain in place  CV: SBP < 140. Vitals stable last  24 hours  Pulm: No resp distress, wean O2 to room air  GI: ADAT, speech recs for pureed diet, nectar thick liquids  PT/OT/OOB  Dispo: re-admit to ICU for close monitoring.    Panhypopituitarism  Patient is a 73 yo male with PMH panhypopituitarism from pituitary adenoma, parkinsonism presenting for elective L temporal meningioma resection (6/20).       Neuro: continue neuro ICU for q1 hour neurochecks  CT head reviewed and appropriate post operatively  Drain with minimal output, will continue to monitor.   Keppra for post surgical seizure ppx   H/ID: Continue abx ppx while drain in place  CV: SBP < 140.  Pulm: No resp distress, wean O2 to room air  GI: ADAT, speech recs for thin liquids  PT/OT/OOB  Dispo: remain in ICU for monitoring        Ravinder Finn MD  Neurosurgery  [facility]

## 2019-06-22 NOTE — PLAN OF CARE
Panhypopituitarism s/p transphenoidal pituitary adenoma resection    -would recommend tapering IV hydrocortisone to 50 mg Q12h, and if remains clinically stable would taper to 25 mg Q12h tomorrow.   -Continue Levothyroxine 100 mcg, daily  -Okay to hold testosterone replacement until discharge.    Will continue to follow

## 2019-06-23 PROBLEM — R41.0 DELIRIUM: Status: ACTIVE | Noted: 2019-06-23

## 2019-06-23 LAB
ABO + RH BLD: NORMAL
ANION GAP SERPL CALC-SCNC: 9 MMOL/L (ref 8–16)
BASOPHILS # BLD AUTO: 0.01 K/UL (ref 0–0.2)
BASOPHILS NFR BLD: 0.1 % (ref 0–1.9)
BLD GP AB SCN CELLS X3 SERPL QL: NORMAL
BLD PROD TYP BPU: NORMAL
BLD PROD TYP BPU: NORMAL
BLOOD UNIT EXPIRATION DATE: NORMAL
BLOOD UNIT EXPIRATION DATE: NORMAL
BLOOD UNIT TYPE CODE: 6200
BLOOD UNIT TYPE CODE: 6200
BLOOD UNIT TYPE: NORMAL
BLOOD UNIT TYPE: NORMAL
BUN SERPL-MCNC: 16 MG/DL (ref 8–23)
CALCIUM SERPL-MCNC: 7.6 MG/DL (ref 8.7–10.5)
CHLORIDE SERPL-SCNC: 110 MMOL/L (ref 95–110)
CO2 SERPL-SCNC: 22 MMOL/L (ref 23–29)
CODING SYSTEM: NORMAL
CODING SYSTEM: NORMAL
CREAT SERPL-MCNC: 0.7 MG/DL (ref 0.5–1.4)
DIFFERENTIAL METHOD: ABNORMAL
DISPENSE STATUS: NORMAL
DISPENSE STATUS: NORMAL
EOSINOPHIL # BLD AUTO: 0 K/UL (ref 0–0.5)
EOSINOPHIL NFR BLD: 0 % (ref 0–8)
ERYTHROCYTE [DISTWIDTH] IN BLOOD BY AUTOMATED COUNT: 14.8 % (ref 11.5–14.5)
EST. GFR  (AFRICAN AMERICAN): >60 ML/MIN/1.73 M^2
EST. GFR  (NON AFRICAN AMERICAN): >60 ML/MIN/1.73 M^2
GLUCOSE SERPL-MCNC: 104 MG/DL (ref 70–110)
HCT VFR BLD AUTO: 35.6 % (ref 40–54)
HGB BLD-MCNC: 11.8 G/DL (ref 14–18)
IMM GRANULOCYTES # BLD AUTO: 0.04 K/UL (ref 0–0.04)
IMM GRANULOCYTES NFR BLD AUTO: 0.4 % (ref 0–0.5)
INR PPP: 1 (ref 0.8–1.2)
LYMPHOCYTES # BLD AUTO: 0.7 K/UL (ref 1–4.8)
LYMPHOCYTES NFR BLD: 7.5 % (ref 18–48)
MAGNESIUM SERPL-MCNC: 2.2 MG/DL (ref 1.6–2.6)
MCH RBC QN AUTO: 29 PG (ref 27–31)
MCHC RBC AUTO-ENTMCNC: 33.1 G/DL (ref 32–36)
MCV RBC AUTO: 88 FL (ref 82–98)
MONOCYTES # BLD AUTO: 0.6 K/UL (ref 0.3–1)
MONOCYTES NFR BLD: 5.7 % (ref 4–15)
NEUTROPHILS # BLD AUTO: 8.6 K/UL (ref 1.8–7.7)
NEUTROPHILS NFR BLD: 86.3 % (ref 38–73)
NRBC BLD-RTO: 0 /100 WBC
PHOSPHATE SERPL-MCNC: 3 MG/DL (ref 2.7–4.5)
PLATELET # BLD AUTO: 164 K/UL (ref 150–350)
PMV BLD AUTO: 11.9 FL (ref 9.2–12.9)
POTASSIUM SERPL-SCNC: 3.1 MMOL/L (ref 3.5–5.1)
PROTHROMBIN TIME: 10.4 SEC (ref 9–12.5)
RBC # BLD AUTO: 4.07 M/UL (ref 4.6–6.2)
SODIUM SERPL-SCNC: 141 MMOL/L (ref 136–145)
TRANS ERYTHROCYTES VOL PATIENT: NORMAL ML
TRANS ERYTHROCYTES VOL PATIENT: NORMAL ML
WBC # BLD AUTO: 9.92 K/UL (ref 3.9–12.7)

## 2019-06-23 PROCEDURE — 99233 SBSQ HOSP IP/OBS HIGH 50: CPT | Mod: ,,, | Performed by: NURSE PRACTITIONER

## 2019-06-23 PROCEDURE — 63600175 PHARM REV CODE 636 W HCPCS: Performed by: PSYCHIATRY & NEUROLOGY

## 2019-06-23 PROCEDURE — S0028 INJECTION, FAMOTIDINE, 20 MG: HCPCS | Performed by: STUDENT IN AN ORGANIZED HEALTH CARE EDUCATION/TRAINING PROGRAM

## 2019-06-23 PROCEDURE — 84100 ASSAY OF PHOSPHORUS: CPT

## 2019-06-23 PROCEDURE — 85025 COMPLETE CBC W/AUTO DIFF WBC: CPT

## 2019-06-23 PROCEDURE — 20000000 HC ICU ROOM

## 2019-06-23 PROCEDURE — 25000003 PHARM REV CODE 250: Performed by: PSYCHIATRY & NEUROLOGY

## 2019-06-23 PROCEDURE — 83735 ASSAY OF MAGNESIUM: CPT

## 2019-06-23 PROCEDURE — 94761 N-INVAS EAR/PLS OXIMETRY MLT: CPT

## 2019-06-23 PROCEDURE — 63600175 PHARM REV CODE 636 W HCPCS: Performed by: STUDENT IN AN ORGANIZED HEALTH CARE EDUCATION/TRAINING PROGRAM

## 2019-06-23 PROCEDURE — 99233 PR SUBSEQUENT HOSPITAL CARE,LEVL III: ICD-10-PCS | Mod: ,,, | Performed by: NURSE PRACTITIONER

## 2019-06-23 PROCEDURE — 90792 PR PSYCHIATRIC DIAGNOSTIC EVALUATION W/MEDICAL SERVICES: ICD-10-PCS | Mod: GC,,, | Performed by: PSYCHIATRY & NEUROLOGY

## 2019-06-23 PROCEDURE — 85610 PROTHROMBIN TIME: CPT

## 2019-06-23 PROCEDURE — 63600175 PHARM REV CODE 636 W HCPCS: Performed by: NURSE PRACTITIONER

## 2019-06-23 PROCEDURE — 63600175 PHARM REV CODE 636 W HCPCS: Performed by: PHYSICIAN ASSISTANT

## 2019-06-23 PROCEDURE — 25000003 PHARM REV CODE 250: Performed by: NURSE PRACTITIONER

## 2019-06-23 PROCEDURE — 90792 PSYCH DIAG EVAL W/MED SRVCS: CPT | Mod: GC,,, | Performed by: PSYCHIATRY & NEUROLOGY

## 2019-06-23 PROCEDURE — 80048 BASIC METABOLIC PNL TOTAL CA: CPT

## 2019-06-23 PROCEDURE — 86901 BLOOD TYPING SEROLOGIC RH(D): CPT

## 2019-06-23 PROCEDURE — 25000003 PHARM REV CODE 250: Performed by: STUDENT IN AN ORGANIZED HEALTH CARE EDUCATION/TRAINING PROGRAM

## 2019-06-23 PROCEDURE — C9254 INJECTION, LACOSAMIDE: HCPCS | Performed by: PSYCHIATRY & NEUROLOGY

## 2019-06-23 RX ORDER — MAGNESIUM SULFATE HEPTAHYDRATE 40 MG/ML
2 INJECTION, SOLUTION INTRAVENOUS
Status: DISCONTINUED | OUTPATIENT
Start: 2019-06-23 | End: 2019-06-24

## 2019-06-23 RX ORDER — POTASSIUM CHLORIDE 7.45 MG/ML
60 INJECTION INTRAVENOUS
Status: DISCONTINUED | OUTPATIENT
Start: 2019-06-23 | End: 2019-06-24

## 2019-06-23 RX ORDER — POTASSIUM CHLORIDE 7.45 MG/ML
80 INJECTION INTRAVENOUS
Status: DISCONTINUED | OUTPATIENT
Start: 2019-06-23 | End: 2019-06-24

## 2019-06-23 RX ORDER — MAGNESIUM SULFATE HEPTAHYDRATE 40 MG/ML
4 INJECTION, SOLUTION INTRAVENOUS
Status: DISCONTINUED | OUTPATIENT
Start: 2019-06-23 | End: 2019-06-24

## 2019-06-23 RX ORDER — POTASSIUM CHLORIDE 7.45 MG/ML
40 INJECTION INTRAVENOUS
Status: DISCONTINUED | OUTPATIENT
Start: 2019-06-23 | End: 2019-06-24

## 2019-06-23 RX ADMIN — CEFAZOLIN 1 G: 1 INJECTION, POWDER, FOR SOLUTION INTRAMUSCULAR; INTRAVENOUS at 10:06

## 2019-06-23 RX ADMIN — CEFAZOLIN 1 G: 1 INJECTION, POWDER, FOR SOLUTION INTRAMUSCULAR; INTRAVENOUS at 03:06

## 2019-06-23 RX ADMIN — CEFAZOLIN 1 G: 1 INJECTION, POWDER, FOR SOLUTION INTRAMUSCULAR; INTRAVENOUS at 12:06

## 2019-06-23 RX ADMIN — HYDROCORTISONE SODIUM SUCCINATE 50 MG: 100 INJECTION, POWDER, FOR SOLUTION INTRAMUSCULAR; INTRAVENOUS at 12:06

## 2019-06-23 RX ADMIN — POTASSIUM CHLORIDE 60 MEQ: 10 INJECTION, SOLUTION INTRAVENOUS at 07:06

## 2019-06-23 RX ADMIN — HYDROCORTISONE SODIUM SUCCINATE 50 MG: 100 INJECTION, POWDER, FOR SOLUTION INTRAMUSCULAR; INTRAVENOUS at 06:06

## 2019-06-23 RX ADMIN — HYDRALAZINE HYDROCHLORIDE 10 MG: 20 INJECTION INTRAMUSCULAR; INTRAVENOUS at 08:06

## 2019-06-23 RX ADMIN — HYDROCORTISONE SODIUM SUCCINATE 50 MG: 100 INJECTION, POWDER, FOR SOLUTION INTRAMUSCULAR; INTRAVENOUS at 10:06

## 2019-06-23 RX ADMIN — LEVOTHYROXINE SODIUM ANHYDROUS 50 MCG: 100 INJECTION, POWDER, LYOPHILIZED, FOR SOLUTION INTRAVENOUS at 09:06

## 2019-06-23 RX ADMIN — SODIUM CHLORIDE 100 MG: 9 INJECTION, SOLUTION INTRAVENOUS at 08:06

## 2019-06-23 RX ADMIN — SODIUM CHLORIDE 100 MG: 9 INJECTION, SOLUTION INTRAVENOUS at 10:06

## 2019-06-23 RX ADMIN — MUPIROCIN 1 G: 20 OINTMENT TOPICAL at 09:06

## 2019-06-23 RX ADMIN — MUPIROCIN 1 G: 20 OINTMENT TOPICAL at 10:06

## 2019-06-23 RX ADMIN — CEFAZOLIN 1 G: 1 INJECTION, POWDER, FOR SOLUTION INTRAMUSCULAR; INTRAVENOUS at 06:06

## 2019-06-23 RX ADMIN — FAMOTIDINE 20 MG: 10 INJECTION INTRAVENOUS at 08:06

## 2019-06-23 RX ADMIN — FAMOTIDINE 20 MG: 10 INJECTION INTRAVENOUS at 10:06

## 2019-06-23 NOTE — CONSULTS
"[facility]  Psychiatry  Consult Note    Patient Name: Ravinder Sanz  MRN: [id number]   Code Status: Full Code  Admission Date: 6/19/2019  Hospital Length of Stay: 4 days  Attending Physician: Chandana Aldridge MD  Primary Care Provider: Brooke Dean MD    Current Legal Status: N/A    Patient information was obtained from patient, spouse/SO and past medical records.   Inpatient consult to Psychiatry  Consult performed by: Prashant Azar MD  Consult ordered by: Claire Snyder NP        Subjective:     Principal Problem:Meningioma, cerebral    Chief Complaint:  Agitation, paranoia     HPI: History of Present Illness:   Ravinder Sanz is a 74 y.o. male with no past psychiatric history, currently presenting with Meningioma, cerebral.  Psychiatry was originally consulted to address the patient's symptoms of agitation and paranoia.    Per Primary MD:  Pt is 74 y.o. Male with PMHx of anxiety and panhypopituitarism who presented for elective L temporal meningioma resection with NSGY. Mass was discovered incidentally during MRI workup for parkinson's. Pt has resting tremor at baseline. 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 Endocrinology is consulted and following patient He is admitted to [facility] for post op monitoring.          6/21 pt has been readmitted to [facility] for monitoring and observation due to concerns for AMS and aggressive behavior overnight. Pt became agitated overnight requiring 1x dose Zyprexa. Pt family and partner were at bedside and confirmed pt "was not and his baseline the day prior" and that pt becomes aggressive due to confusion and wanting to get out of bed and out of restraints. Pt was evaluated this morning and re-admitted to [facility] for monitoring and evaluation.     Per C-L MD:  Per chart review and  at bedside, patient initially started exhibiting a hand tremor and excessive drolling about 1 year prior, as well as " "significant new onset claustrophobia. Went to see outpatient neurologist for workup of Parkinson's disease 1 month prior. At that imaging was order, and L temporal mass, concerning for meningoma was found for which he underwent elective craniotomy for resection on 6/19. Was admitted to [facility] for monitoring, prior to step down. After step down, noted to have AMS, resulting in return to [facility] for continued monitoring.    When seen today patient calm and cooperative, mostly linear and appropriate for majority of interview. Oriented to self, but not to location, date or situation. States that he is currently in a library in [place], [place], and that it is July 2020. States that he is currently here to receive treatment for "Cerebral palsy", and asks if that is a sexual illness. When asked for further elaboration, states there is a lot of stigma associated with sexual illnesses. Currently states that he is doing well, recovering from his  "treatment". Denies any current changes to his mood or worsening confusion. When asked what makes him think he is in a library, gestures to wall, stating the books and posters. Endorses intermittent shadows out of the corner of his eye, for the past few days. Denies any specific time where these symptoms are worse. Denies any current or past auditory hallucinations. Denies feeling unsafe in the hospital. States his mood is good, he is optimistic about his future. Denies any past psychiatric history, admission or diagnosis. States he was only recently started on medications for depression (citalopram and wellbutrin) after neurologist started suspecting he had Parkinson, took one dose, but states he did not like it and stopped completely.     Collateral:   Yes -  at bedside provided additional collateral as consolidated above.     Psychiatric Review Of Systems - Is patient experiencing or having changes in:  sleep: yes, 5 hrs a night  appetite: no  weight: no  energy/anergy: " no  interest/pleasure/anhedonia: no  somatic symptoms: no  guilty/hopelessness: no  concentration: no  S.I.B.s/risky behavior: no  SI/SA:  no    anxiety/panic: no  Recurrent nightmares:  no  hyper startle response:  no  Avoidance: no  Recurrent thoughts:  no  Recurrent behaviors:  no    Irritability: no  Racing thoughts: no  Impulsive behaviors: no  Pressured speech:  no    Paranoia:no  Delusions: yes  AVH:yes    Psychiatric History:  Diagnose(s): No  Previous Medication Trials: Yes - citalopram, Wellbutrin and clonazepam   Previous Psychiatric Hospitalizations: No  Previous Suicide Attempts: No  History of Violence: No  Outpatient Psychiatrist: No    Social History:  Marital Status:   Children: 0   Employment Status: artist  Education: college graduate  Special Ed: no   History: no  Housing Status: Yes - with , [facility] and [place]  Developmental History: No  History of Abuse: No  Access to Gun: No    Substance Abuse History:  Recreational Drugs: Denies  Use of Alcohol: 2 drinks a night, last drink 2 weeks prior  Rehab History: No  Tobacco Use: No  Use of Caffeine: No  Use of OTC: No  Legal consequences of chemical use: No  Is the patient aware of the biomedical complications associated with substance abuse and mental illness? No    Legal History:  Past Charges/Incarcerations: No  Pending Charges: No    Family Psychiatric History:   No    Psychosocial Stressors: health.   Functioning Relationships: good support system      Hospital Course: No notes on file         Patient History           Medical as of 6/23/2019     Past Medical History     Diagnosis Date Comments Source    Anxiety -- -- Provider    Eczema -- -- Provider    Hyperlipidemia -- -- Provider    Hypogonadism in male -- -- Provider    Hypopituitarism -- -- Provider    Hypothyroidism -- -- Provider                  Surgical as of 6/23/2019     Past Surgical History     Procedure Laterality Date Comments Source    PITUITARY SURGERY -- -- --  Provider    SPINAL FUSION -- -- -- Provider    CRANIOTOMY USING FRAMELESS STEREOTAXY Left 6/19/2019 Procedure: CRANIOTOMY, USING FRAMELESS STEREOTAXY;  Surgeon: Otis Pedraza MD;  Location: [facility] OR [address] [place] [address];  Service: Neurosurgery;  Laterality: Left;  LEFT FRONTOTEMPORAL CRANIOTOMY , EXCISION OF MENINGIOMA WITH NEURONAVIGATION/ 2 UNITS RBC, TEDS & SCD, DRILL, STEALTH, SONOPET, AQUAMANTYS, MICROSCOPE. Provider                  Family as of 6/23/2019     Problem Relation Name Age of Onset Comments Source    Heart disease Father -- -- -- Provider    Diabetes Sister -- -- -- Provider    Melanoma Neg Hx -- -- -- Provider            Tobacco Use as of 6/23/2019     Smoking Status Smoking Start Date Smoking Quit Date Packs/Day Years Used    Former Smoker -- -- -- --    Types Comments Smokeless Tobacco Status Smokeless Tobacco Quit Date Source    -- -- Never Used -- Provider            Alcohol Use as of 6/23/2019     Alcohol Use Drinks/Week Alcohol/Week Comments Source    Yes 14 Shots of liquor 8.4 oz -- Provider, Patient    Frequency Standard Drinks Binge Drinking        4 or more times a week  1 or 2  Less than monthly               Drug Use as of 6/23/2019     Drug Use Types Frequency Comments Source    Never -- -- -- Provider            Sexual Activity as of 6/23/2019     Sexually Active Birth Control Partners Comments Source    Yes -- Male -- Provider            Activities of Daily Living as of 6/23/2019    None           Social Documentation as of 6/23/2019    None           Occupational as of 6/23/2019    None           Socioeconomic as of 6/23/2019     Marital Status Spouse Name Number of Children Years Education Education Level Preferred Language Ethnicity Race Source    Single -- -- -- -- English /White White --    Financial Resource Strain Food Insecurity: Worry Food Insecurity: Inability Transportation Needs: Medical Transportation Needs: Non-medical    Not hard at all  Never true  Never true  No  No              Pertinent History     Question Response Comments    Lives with -- --    Place in Birth Order -- --    Lives in -- --    Number of Siblings -- --    Raised by -- --    Legal Involvement -- --    Childhood Trauma -- --    Criminal History of -- --    Financial Status -- --    Highest Level of Education -- --    Does patient have access to a firearm? -- --     Service -- --    Primary Leisure Activity -- --    Spirituality -- --        Past Medical History:   Diagnosis Date    Anxiety     Eczema     Hyperlipidemia     Hypogonadism in male     Hypopituitarism     Hypothyroidism      Past Surgical History:   Procedure Laterality Date    CRANIOTOMY, USING FRAMELESS STEREOTAXY Left 6/19/2019    Performed by Otis Pedraza MD at [facility] OR [address] [place] [address]    PITUITARY SURGERY      SPINAL FUSION       Family History     Problem Relation (Age of Onset)    Diabetes Sister    Heart disease Father        Tobacco Use    Smoking status: Former Smoker    Smokeless tobacco: Never Used   Substance and Sexual Activity    Alcohol use: Yes     Alcohol/week: 8.4 oz     Types: 14 Shots of liquor per week     Frequency: 4 or more times a week     Drinks per session: 1 or 2     Binge frequency: Less than monthly    Drug use: Never    Sexual activity: Yes     Partners: Male     Review of patient's allergies indicates:   Allergen Reactions    Bupropion      urinary incontinence, suicidal thoughts,drooling       No current facility-administered medications on file prior to encounter.      Current Outpatient Medications on File Prior to Encounter   Medication Sig    clonazePAM (KLONOPIN) 0.5 MG tablet Take 1 tablet (0.5 mg total) by mouth 2 (two) times daily as needed for Anxiety.    docusate sodium (COLACE) 100 MG capsule Take 100 mg by mouth once daily.    hydrocortisone (CORTEF) 10 MG Tab Take 1 tablet (10 mg total) by mouth 2 (two) times daily.    levothyroxine (SYNTHROID) 100 MCG tablet Take 1 tablet (100 mcg  "total) by mouth once daily.    MAGNESIUM CITRATE ORAL Take 750 mg by mouth once daily.    senna (SENOKOT) 8.6 mg tablet Take 1 tablet by mouth once daily.    testosterone cypionate (DEPOTESTOTERONE CYPIONATE) 200 mg/mL injection Inject 0.75 mLs (150 mg total) into the muscle every 14 (fourteen) days. 3 ml syringe with 18 g needle  to draw  X 10   25 g 1 inch needle to inject x 10    fluocinonide (LIDEX) 0.05 % ointment AAA bid    mupirocin (BACTROBAN) 2 % ointment AAA BID    needle, disp, 21 G 21 gauge x 1" Ndle To use once weekly with testosterone injections     Psychotherapeutics (From admission, onward)    None        Review of Systems   All other systems reviewed and are negative.    Strengths and Liabilities: Strength: Patient is expressive/articulate., Strength: Patient is intelligent., Liability: Patient has poor health.    Objective:     Vital Signs (Most Recent):  Temp: 98.2 °F (36.8 °C) (06/23/19 0700)  Pulse: 76 (06/23/19 0800)  Resp: (!) 28 (06/23/19 0800)  BP: (!) 181/83(PRN given ) (06/23/19 0800)  SpO2: 100 % (06/23/19 0800) Vital Signs (24h Range):  Temp:  [97.8 °F (36.6 °C)-98.8 °F (37.1 °C)] 98.2 °F (36.8 °C)  Pulse:  [] 76  Resp:  [14-31] 28  SpO2:  [95 %-100 %] 100 %  BP: (105-181)/(51-97) 181/83     Height: 5' 6" (167.6 cm)  Weight: 76.1 kg (167 lb 12.3 oz)  Body mass index is 27.08 kg/m².      Intake/Output Summary (Last 24 hours) at 6/23/2019 0848  Last data filed at 6/23/2019 0842  Gross per 24 hour   Intake 3700 ml   Output 1885 ml   Net 1815 ml       Physical Exam   Psychiatric:   Mental Status Exam:  Appearance: unremarkable, age appropriate, lying in bed  Behavior/Cooperation: friendly and cooperative, mild right handed tremor  Speech: normal tone, normal rate, normal pitch, soft  Mood: fine  Affect: constricted  Thought Process: superficially linear  Thought Content: no suicidality, no homicidality, or paranoia, +VH   Orientation: person only  Memory: Registers and recalls " "3/3 objects at 1 and 5 minutes  Attention Span/Concentration: Decreased  Insight: fair  Judgment: fair    Modified CAM-ICU:  1. Acute change and/or fluctuating course of mental status: Yes  2. Inattention (SAVEAHAART): Yes   "Squeeze my hand, only when you hear, the letter 'A'."  3. Altered Level of Consciousness: Yes  4. Disorganized Thinking (Errors >1/6): 0/6  "Will a stone float on water?"  "Are there fish in the sea?"  "Does one pound weigh more than two?"  "Can you use a hammer to pound a nail?"  Command(s):   "Hold up 2 fingers."   "Now do the same thing with the other hand."    CAM-ICU +     Nursing note and vitals reviewed.       Significant Labs:   Last 24 Hours:   Recent Lab Results       06/23/19  0553   06/23/19  0136   06/22/19  1802   06/22/19  1305        Anion Gap   9         Baso #   0.01         Basophil%   0.1         BUN, Bld   16         Calcium   7.6         Chloride   110         CO2   22         Creatinine   0.7         Differential Method   Automated         eGFR if    >60.0         eGFR if non    >60.0  Comment:  Calculation used to obtain the estimated glomerular filtration  rate (eGFR) is the CKD-EPI equation.            Eos #   0.0         Eosinophil%   0.0         Glucose   104         Gran # (ANC)   8.6         Gran%   86.3         Group & Rh A POS           Hematocrit   35.6         Hemoglobin   11.8         Immature Grans (Abs)   0.04  Comment:  Mild elevation in immature granulocytes is non specific and   can be seen in a variety of conditions including stress response,   acute inflammation, trauma and pregnancy. Correlation with other   laboratory and clinical findings is essential.           Immature Granulocytes   0.4         INDIRECT PRABHU NEG           Coumadin Monitoring INR   1.0  Comment:  Coumadin Therapy:  2.0 - 3.0 for INR for all indicators except mechanical heart valves  and antiphospholipid syndromes which should use 2.5 - 3.5.     " 0.9  Comment:  Coumadin Therapy:  2.0 - 3.0 for INR for all indicators except mechanical heart valves  and antiphospholipid syndromes which should use 2.5 - 3.5.       Lymph #   0.7         Lymph%   7.5         Magnesium   2.2         MCH   29.0         MCHC   33.1         MCV   88         Mono #   0.6         Mono%   5.7         MPV   11.9         nRBC   0         Phosphorus   3.0         Platelets   164         POCT Glucose     97       Potassium   3.1         Protime   10.4   9.9     RBC   4.07         RDW   14.8         Sodium   141         WBC   9.92               Significant Imaging: I have reviewed all pertinent imaging results/findings within the past 24 hours.    Assessment/Plan:     Delirium  ASSESSMENT      Ravinder Sanz is a 74 y.o. male with  no past psychiatric history, currently presenting with Meningioma, cerebral.  Psychiatry was originally consulted to address the patient's symptoms of agitation and paranoia. When seen today, patient only oriented to self with multiple errors during SAVEAHAART. Still able to participate relatively well with interview with  at bedside providing additional collateral. Based on current presentation symptoms seem most consistent with delirium, s/p mass resection.      IMPRESSION  Delirium       RECOMMENDATION(S)       1. Scheduled Medication(s):  None at this time     2. PRN Medication(s):  Zyprexa 2.5 mg PO/IM for non redirectable agitation      3.  Monitor:  Please obtain daily EKG to monitor QTc     4. Legal Status/Precaution(s):  N/A     5. Capacity:  Pt continues to have waxing and waning of symptoms and continues to refuse treatment at times. Therefore pt currently does NOT have medical decision making capacity due to Delirium. Please contact next of kin for making medical decisions currently.     6. Other:  CAM ICU- positive  DELIRIUM BEHAVIOR MANAGEMENT  · PLEASE utilize CHEMICAL restraints with PRN meds first for agitation. Minimize use of PHYSICAL  restraints  · Keep window shades open and room lit during day and room dim at night in order to promote normal sleep-wake cycles  · Encourage family at bedside. [place] patient often to situation, location, date.  · Continue to Limit or Discontinue use of Narcotics, Benzos and Anti-cholinergic medications as they may worsen delirium.  · Continue medical workup for causative etiology of Delirium.             Total Time:  60 minutes      Prashant Azar MD   Psychiatry  [facility]

## 2019-06-23 NOTE — ASSESSMENT & PLAN NOTE
Pt found to have L temporal meningioma on CT workup for parkinsons  Pt admitted to [facility] for aggression overnight, AMS, and concern for neuro exam change. Pt re-admitted to [facility] for observation and monitoring.   - s/p elective resection 6/20  -NSGY following    -drains removed today by NSGY  - neuro checks q 1hr   -lacosamide  -vital signs q 1hr   -SBP goal <160  -PT/OT/SLP

## 2019-06-23 NOTE — ASSESSMENT & PLAN NOTE
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

## 2019-06-23 NOTE — ASSESSMENT & PLAN NOTE
ASSESSMENT      Ravinder Sanz is a 74 y.o. male with no past psychiatric history, currently presenting with Meningioma, cerebral.  Psychiatry was originally consulted to address the patient's symptoms of agitation and paranoia. When seen today, patient only oriented to self with multiple errors during SAVEAHAART. Still able to participate relatively well with interview with  at bedside providing additional collateral. Based on current presentation symptoms seem most consistent with delirium, s/p mass resection.      IMPRESSION  Delirium       RECOMMENDATION(S)       1. Scheduled Medication(s):  None at this time     2. PRN Medication(s):  Zyprexa 2.5 mg PO/IM for non redirectable agitation      3.  Monitor:  Please obtain daily EKG to monitor QTc     4. Legal Status/Precaution(s):  N/A     5. Capacity:  Pt continues to have waxing and waning of symptoms and continues to refuse treatment at times. Therefore pt currently does NOT have medical decision making capacity due to Delirium. Please contact next of kin for making medical decisions currently.     6. Other:  CAM ICU- positive  DELIRIUM BEHAVIOR MANAGEMENT  · PLEASE utilize CHEMICAL restraints with PRN meds first for agitation. Minimize use of PHYSICAL restraints  · Keep window shades open and room lit during day and room dim at night in order to promote normal sleep-wake cycles  · Encourage family at bedside. [place] patient often to situation, location, date.  · Continue to Limit or Discontinue use of Narcotics, Benzos and Anti-cholinergic medications as they may worsen delirium.  · Continue medical workup for causative etiology of Delirium.

## 2019-06-23 NOTE — SUBJECTIVE & OBJECTIVE
Review of Systems  Constitutional: Denies fevers, weight loss, chills, or weakness.  Eyes: Denies changes in vision.  ENT: Denies dysphagia, nasal discharge, ear pain or discharge.  Cardiovascular: Denies chest pain, palpitations, orthopnea, or claudication.  Respiratory: Denies shortness of breath, cough, hemoptysis, or wheezing.  GI: Denies nausea/vomitting, hematochezia, melena, abd pain, or changes in appetite.  : Denies dysuria, incontinence, or hematuria.  Musculoskeletal: Denies joint pain or myalgias.  Skin/breast: Denies rashes, lumps, lesions, or discharge.  Neurologic: Denies headache, dizziness, vertigo, or paresthesias.  Psychiatric: Denies changes in mood or hallucinations.  Endocrine: Denies polyuria, polydipsia, heat/cold intolerance.  Hematologic/Lymph: Denies lymphadenopathy, easy bruising or easy bleeding.  Allergic/Immunologic: Denies rash, rhinitis.   Objective:     Vitals:  Temp: 97.8 °F (36.6 °C)  Pulse: 86  Rhythm: normal sinus rhythm  BP: (!) 159/76  MAP (mmHg): 109  Resp: 16  SpO2: 100 %  O2 Device (Oxygen Therapy): room air    Temp  Min: 97.8 °F (36.6 °C)  Max: 98.8 °F (37.1 °C)  Pulse  Min: 49  Max: 97  BP  Min: 105/51  Max: 181/83  MAP (mmHg)  Min: 74  Max: 126  Resp  Min: 14  Max: 28  SpO2  Min: 98 %  Max: 100 %    06/22 0701 - 06/23 0700  In: 3100 [I.V.:2400]  Out: 1885 [Urine:1885]   Unmeasured Output  Urine Occurrence: 1  Stool Occurrence: 0  Emesis Occurrence: 7  Pad Count: 2       Physical Exam  GA: comfortable, no acute distress.   HEENT: No scleral icterus or JVD.   Pulmonary: Clear to auscultation A/L. No wheezing, crackles, or rhonchi.  Cardiac: RRR S1 & S2 w/o rubs/murmurs/gallops.   Abdominal: Bowel sounds present x 4. No appreciable hepatosplenomegaly.  Skin: No jaundice, rashes, or visible lesions.  Neuro:  --GCS: E4 V4 M6  --Mental Status:  Awake, alert, oriented X3 (disoriented to time)  --Pupils 3mm, PERRL.   --Corneal reflex, gag, cough intact.  --MESA spont and  against gravity    Medications:  Continuous  sodium chloride 0.9% Last Rate: 100 mL/hr at 06/23/19 1600   Scheduled  ceFAZolin (ANCEF) IVPB 1 g Q8H   docusate sodium 100 mg Daily   famotidine (PF) 20 mg BID   hydrocortisone sodium succinate 50 mg BID   lacosamide (VIMPAT) IVPB 100 mg Q12H   levothyroxine 50 mcg Daily   mupirocin 1 g BID   PRN  acetaminophen 650 mg Q6H PRN   hydrALAZINE 10 mg Q6H PRN   HYDROcodone-acetaminophen 1 tablet Q6H PRN   magnesium sulfate IVPB 2 g PRN   magnesium sulfate IVPB 4 g PRN   potassium chloride in water 40 mEq PRN   And     potassium chloride in water 60 mEq PRN   And     potassium chloride in water 80 mEq PRN   sodium phosphate IVPB 15 mmol PRN   sodium phosphate IVPB 20.01 mmol PRN   sodium phosphate IVPB 30 mmol PRN     Today I personally reviewed pertinent medications, lines/drains/airways, imaging, laboratory results,

## 2019-06-23 NOTE — SUBJECTIVE & OBJECTIVE
Interval History: NAEON. Improved mental status today.    Medications:  Continuous Infusions:   sodium chloride 0.9% 100 mL/hr at 06/23/19 1800     Scheduled Meds:   ceFAZolin (ANCEF) IVPB  1 g Intravenous Q8H    docusate sodium  100 mg Oral Daily    famotidine (PF)  20 mg Intravenous BID    hydrocortisone sodium succinate  50 mg Intravenous BID    lacosamide (VIMPAT) IVPB  100 mg Intravenous Q12H    levothyroxine  50 mcg Intravenous Daily    mupirocin  1 g Nasal BID     PRN Meds:acetaminophen, hydrALAZINE, HYDROcodone-acetaminophen, magnesium sulfate IVPB, magnesium sulfate IVPB, potassium chloride in water **AND** potassium chloride in water **AND** potassium chloride in water, sodium phosphate IVPB, sodium phosphate IVPB, sodium phosphate IVPB       Objective:     Weight: 76.1 kg (167 lb 12.3 oz)  Body mass index is 27.08 kg/m².  Vital Signs (Most Recent):  Temp: 97.8 °F (36.6 °C) (06/23/19 1500)  Pulse: 73 (06/23/19 1800)  Resp: 15 (06/23/19 1800)  BP: (!) 148/71 (06/23/19 1800)  SpO2: 100 % (06/23/19 1800) Vital Signs (24h Range):  Temp:  [97.8 °F (36.6 °C)-98.8 °F (37.1 °C)] 97.8 °F (36.6 °C)  Pulse:  [49-97] 73  Resp:  [14-28] 15  SpO2:  [98 %-100 %] 100 %  BP: (105-181)/(51-97) 148/71     Date 06/23/19 0700 - 06/24/19 0659   Shift 8563-4045 9273-8550 7406-7841 24 Hour Total   INTAKE   I.V.(mL/kg) 791.7(10.4) 400(5.3)  1191.7(15.7)   IV Piggyback 700   700   Shift Total(mL/kg) 1491.7(19.6) 400(5.3)  1891.7(24.9)   OUTPUT   Urine(mL/kg/hr) 825(1.4) 375  1200   Shift Total(mL/kg) 825(10.8) 375(4.9)  1200(15.8)   Weight (kg) 76.1 76.1 76.1 76.1              Closed/Suction Drain 06/19/19 1240 Left Scalp Accordion 10 Fr. (Active)   Site Description Unable to view 6/20/2019  5:05 PM   Dressing Type [place] 6/20/2019  8:00 PM   Dressing Status Clean;Dry;Intact 6/20/2019  8:00 PM   Dressing Intervention Dressing reinforced 6/20/2019  5:05 PM   Drainage Bloody 6/20/2019  5:05 PM   Status Other (Comment)  6/20/2019  5:05 PM   Output (mL) 10 mL 6/21/2019 11:07 AM       Neurosurgery Physical Exam      General: well developed, well nourished, no distress.   Head: normocephalic, Left crani incision   GCS: Motor: 6/Verbal: 5/Eyes: 4 GCS Total: 15  Mental Status: Oriented x 4  Language: No aphasia  Speech: Dysarthric  Cranial nerves: face symmetric, tongue midline, CN II-XII grossly intact.   Eyes: pupils equal, round, reactive to light with accomodation, EOMI.  Pulmonary: normal respirations, no signs of respiratory distress  Abdomen: soft, non-distended, not tender to palpation  Sensory: intact to light touch throughout  Motor Strength: Moves all extremities spontaneously with good tone. Full strength upper and lower extremities. No abnormal movements seen.   Incision c/d/i with staples approximating skin edges. No surrounding erythema or edema. No drainage from incision. HV drain in place.       Significant Labs:  Recent Labs   Lab 06/22/19  0445 06/23/19  0136   GLU 96 104    141   K 3.3* 3.1*    110   CO2 21* 22*   BUN 18 16   CREATININE 0.8 0.7   CALCIUM 7.9* 7.6*   MG 2.1 2.2     Recent Labs   Lab 06/22/19  0445 06/23/19  0136   WBC 11.37 9.92   HGB 12.9* 11.8*   HCT 37.7* 35.6*   * 164     Recent Labs   Lab 06/22/19  1305 06/23/19  0136   INR 0.9 1.0     Microbiology Results (last 7 days)     ** No results found for the last 168 hours. **        Recent Lab Results       06/23/19  0553   06/23/19  0136        Anion Gap   9     Baso #   0.01     Basophil%   0.1     BUN, Bld   16     Calcium   7.6     Chloride   110     CO2   22     Creatinine   0.7     Differential Method   Automated     eGFR if    >60.0     eGFR if non    >60.0  Comment:  Calculation used to obtain the estimated glomerular filtration  rate (eGFR) is the CKD-EPI equation.        Eos #   0.0     Eosinophil%   0.0     Glucose   104     Gran # (ANC)   8.6     Gran%   86.3     Group & Rh A POS        Hematocrit   35.6     Hemoglobin   11.8     Immature Grans (Abs)   0.04  Comment:  Mild elevation in immature granulocytes is non specific and   can be seen in a variety of conditions including stress response,   acute inflammation, trauma and pregnancy. Correlation with other   laboratory and clinical findings is essential.       Immature Granulocytes   0.4     INDIRECT PRABHU NEG       Coumadin Monitoring INR   1.0  Comment:  Coumadin Therapy:  2.0 - 3.0 for INR for all indicators except mechanical heart valves  and antiphospholipid syndromes which should use 2.5 - 3.5.       Lymph #   0.7     Lymph%   7.5     Magnesium   2.2     MCH   29.0     MCHC   33.1     MCV   88     Mono #   0.6     Mono%   5.7     MPV   11.9     nRBC   0     Phosphorus   3.0     Platelets   164     Potassium   3.1     Protime   10.4     RBC   4.07     RDW   14.8     Sodium   141     WBC   9.92         All pertinent labs from the last 24 hours have been reviewed.    Significant Diagnostics:  All pertinent imaging reviewed.     Review of Systems

## 2019-06-23 NOTE — HPI
"History of Present Illness:   Ravinder Sanz is a 74 y.o. male with no past psychiatric history, currently presenting with Meningioma, cerebral.  Psychiatry was originally consulted to address the patient's symptoms of agitation and paranoia.    Per Primary MD:  Pt is 74 y.o. Male with PMHx of anxiety and panhypopituitarism who presented for elective L temporal meningioma resection with NSGY. Mass was discovered incidentally during MRI workup for parkinson's. Pt has resting tremor at baseline. 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 Endocrinology is consulted and following patient He is admitted to [facility] for post op monitoring.          6/21 pt has been readmitted to [facility] for monitoring and observation due to concerns for AMS and aggressive behavior overnight. Pt became agitated overnight requiring 1x dose Zyprexa. Pt family and partner were at bedside and confirmed pt "was not and his baseline the day prior" and that pt becomes aggressive due to confusion and wanting to get out of bed and out of restraints. Pt was evaluated this morning and re-admitted to [facility] for monitoring and evaluation.     Per C-L MD:  Per chart review and  at bedside, patient initially started exhibiting a hand tremor and excessive drolling about 1 year prior, as well as significant new onset claustrophobia. Went to see outpatient neurologist for workup of Parkinson's disease 1 month prior. At that imaging was order, and L temporal mass, concerning for meningoma was found for which he underwent elective craniotomy for resection on 6/19. Was admitted to [facility] for monitoring, prior to step down. After step down, noted to have AMS, resulting in return to [facility] for continued monitoring.    When seen today patient calm and cooperative, mostly linear and appropriate for majority of interview. Oriented to self, but not to location, date or situation. States that he is currently in a library in " "[place], [place], and that it is July 2020. States that he is currently here to receive treatment for "Cerebral palsy", and asks if that is a sexual illness. When asked for further elaboration, states there is a lot of stigma associated with sexual illnesses. Currently states that he is doing well, recovering from his  "treatment". Denies any current changes to his mood or worsening confusion. When asked what makes him think he is in a library, gestures to wall, stating the books and posters. Endorses intermittent shadows out of the corner of his eye, for the past few days. Denies any specific time where these symptoms are worse. Denies any current or past auditory hallucinations. Denies feeling unsafe in the hospital. States his mood is good, he is optimistic about his future. Denies any past psychiatric history, admission or diagnosis. States he was only recently started on medications for depression (citalopram and wellbutrin) after neurologist started suspecting he had Parkinson, took one dose, but states he did not like it and stopped completely.     Collateral:   Yes -  at bedside provided additional collateral as consolidated above.     Psychiatric Review Of Systems - Is patient experiencing or having changes in:  sleep: yes, 5 hrs a night  appetite: no  weight: no  energy/anergy: no  interest/pleasure/anhedonia: no  somatic symptoms: no  guilty/hopelessness: no  concentration: no  S.I.B.s/risky behavior: no  SI/SA:  no    anxiety/panic: no  Recurrent nightmares:  no  hyper startle response:  no  Avoidance: no  Recurrent thoughts:  no  Recurrent behaviors:  no    Irritability: no  Racing thoughts: no  Impulsive behaviors: no  Pressured speech:  no    Paranoia:no  Delusions: yes  AVH:yes    Psychiatric History:  Diagnose(s): No  Previous Medication Trials: Yes - citalopram, Wellbutrin and clonazepam   Previous Psychiatric Hospitalizations: No  Previous Suicide Attempts: No  History of Violence: " No  Outpatient Psychiatrist: No    Social History:  Marital Status:   Children: 0   Employment Status: artist  Education: college graduate  Special Ed: no   History: no  Housing Status: Yes - with , [facility] and [place]  Developmental History: No  History of Abuse: No  Access to Gun: No    Substance Abuse History:  Recreational Drugs: Denies  Use of Alcohol: 2 drinks a night, last drink 2 weeks prior  Rehab History: No  Tobacco Use: No  Use of Caffeine: No  Use of OTC: No  Legal consequences of chemical use: No  Is the patient aware of the biomedical complications associated with substance abuse and mental illness? No    Legal History:  Past Charges/Incarcerations: No  Pending Charges: No    Family Psychiatric History:   No    Psychosocial Stressors: health.   Functioning Relationships: good support system

## 2019-06-23 NOTE — PROGRESS NOTES
"[facility]  Neurocritical Care  Progress Note    Admit Date: 6/19/2019  Service Date: 06/23/2019  Length of Stay: 4    Subjective:     Chief Complaint: Meningioma, cerebral    History of Present Illness: Pt is 74 y.o. Male with PMHx of anxiety and panhypopituitarism who presented for elective L temporal meningioma resection with NSGY. Mass was discovered incidentally during MRI workup for parkinson's. Pt has resting tremor at baseline. 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 Endocrinology is consulted and following patient He is admitted to [facility] for post op monitoring.        6/21 pt has been readmitted to [facility] for monitoring and observation due to concerns for AMS and aggressive behavior overnight. Pt became agitated overnight requiring 1x dose Zyprexa. Pt family and partner were at bedside and confirmed pt "was not and his baseline the day prior" and that pt becomes aggressive due to confusion and wanting to get out of bed and out of restraints. Pt was evaluated this morning and re-admitted to [facility] for monitoring and evaluation.     Hospital Course: 6/19/2019 Admit to [facility] s/p L temporal meningioma resection  6/19: stable for floor, probably step down to NSGY, Steroid taper on floor  6/20 stepping down pt to NSGY discussed with PAMELA MEDRANO  6/21: pt readmitted to [facility] for monitoring and evaluation   6/22: PT/INR, US BLE  6/23: pending SLP, wean hydrocortisone per endocrine        Review of Systems  Constitutional: Denies fevers, weight loss, chills, or weakness.  Eyes: Denies changes in vision.  ENT: Denies dysphagia, nasal discharge, ear pain or discharge.  Cardiovascular: Denies chest pain, palpitations, orthopnea, or claudication.  Respiratory: Denies shortness of breath, cough, hemoptysis, or wheezing.  GI: Denies nausea/vomitting, hematochezia, melena, abd pain, or changes in appetite.  : Denies dysuria, incontinence, or " hematuria.  Musculoskeletal: Denies joint pain or myalgias.  Skin/breast: Denies rashes, lumps, lesions, or discharge.  Neurologic: Denies headache, dizziness, vertigo, or paresthesias.  Psychiatric: Denies changes in mood or hallucinations.  Endocrine: Denies polyuria, polydipsia, heat/cold intolerance.  Hematologic/Lymph: Denies lymphadenopathy, easy bruising or easy bleeding.  Allergic/Immunologic: Denies rash, rhinitis.   Objective:     Vitals:  Temp: 97.8 °F (36.6 °C)  Pulse: 86  Rhythm: normal sinus rhythm  BP: (!) 159/76  MAP (mmHg): 109  Resp: 16  SpO2: 100 %  O2 Device (Oxygen Therapy): room air    Temp  Min: 97.8 °F (36.6 °C)  Max: 98.8 °F (37.1 °C)  Pulse  Min: 49  Max: 97  BP  Min: 105/51  Max: 181/83  MAP (mmHg)  Min: 74  Max: 126  Resp  Min: 14  Max: 28  SpO2  Min: 98 %  Max: 100 %    06/22 0701 - 06/23 0700  In: 3100 [I.V.:2400]  Out: 1885 [Urine:1885]   Unmeasured Output  Urine Occurrence: 1  Stool Occurrence: 0  Emesis Occurrence: 7  Pad Count: 2       Physical Exam  GA: comfortable, no acute distress.   HEENT: No scleral icterus or JVD.   Pulmonary: Clear to auscultation A/L. No wheezing, crackles, or rhonchi.  Cardiac: RRR S1 & S2 w/o rubs/murmurs/gallops.   Abdominal: Bowel sounds present x 4. No appreciable hepatosplenomegaly.  Skin: No jaundice, rashes, or visible lesions.  Neuro:  --GCS: E4 V4 M6  --Mental Status:  Awake, alert, oriented X3 (disoriented to time)  --Pupils 3mm, PERRL.   --Corneal reflex, gag, cough intact.  --MESA spont and against gravity    Medications:  Continuous  sodium chloride 0.9% Last Rate: 100 mL/hr at 06/23/19 1600   Scheduled  ceFAZolin (ANCEF) IVPB 1 g Q8H   docusate sodium 100 mg Daily   famotidine (PF) 20 mg BID   hydrocortisone sodium succinate 50 mg BID   lacosamide (VIMPAT) IVPB 100 mg Q12H   levothyroxine 50 mcg Daily   mupirocin 1 g BID   PRN  acetaminophen 650 mg Q6H PRN   hydrALAZINE 10 mg Q6H PRN   HYDROcodone-acetaminophen 1 tablet Q6H PRN   magnesium  sulfate IVPB 2 g PRN   magnesium sulfate IVPB 4 g PRN   potassium chloride in water 40 mEq PRN   And     potassium chloride in water 60 mEq PRN   And     potassium chloride in water 80 mEq PRN   sodium phosphate IVPB 15 mmol PRN   sodium phosphate IVPB 20.01 mmol PRN   sodium phosphate IVPB 30 mmol PRN     Today I personally reviewed pertinent medications, lines/drains/airways, imaging, laboratory results,     Assessment/Plan:     Neuro  Brain compression  --follow neuro exam  --follow brain imaging    Tremor  Tremor at baseline   -NSGY following      Renal/  Hypokalemia  --replace K PRN  -- follow cmp      Endocrine  Adrenal insufficiency     hydrocortisone wean, 50 mg IV q12 per endocrine  -endocrine following-appreciate reccs      Secondary hypothyroidism  Continue home levothyroxine    Panhypopituitarism  Pt with Hx of 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 Pt re-admitted to [facility] for observation and monitoring.   - s/p elective resection 6/20  -NSGY following    -drains removed today by NSGY  - neuro checks q 1hr   -lacosamide  -vital signs q 1hr   -SBP goal <160  -PT/OT/SLP            The patient is being Prophylaxed for:  Venous Thromboembolism with: None  Stress Ulcer with: H2B  Ventilator Pneumonia with: not applicable    Activity Orders          Diet NPO: NPO starting at 06/21 0230        Full Code    Claire Snyder NP  Neurocritical Care  [facility]

## 2019-06-23 NOTE — ASSESSMENT & PLAN NOTE
Patient is a 73 yo male with PMH panhypopituitarism from pituitary adenoma, parkinsonism presenting for elective L temporal meningioma resection (6/20).     Neuro: continue neuro ICU for q1 hour neurochecks  Drain with minimal output, remove today  Keppra for post surgical seizure ppx   CV: SBP < 140.  Pulm: No resp distress, wean O2 to room air  GI: ADAT, speech recs for thin liquids  PT/OT/OOB  Dispo: remain in ICU for monitoring, possible TTF tomorrow

## 2019-06-23 NOTE — PLAN OF CARE
Problem: Adult Inpatient Plan of Care  Goal: Plan of Care Review  POC reviewed with pt at 1715. Pt verbalized understanding. Questions and concerns addressed. No acute events this shift. Pt progressing toward goals. Will continue to monitor. See flowsheets for full assessment and VS info

## 2019-06-23 NOTE — NURSING
Pt passed ERIKA but NCC awaiting speech therapy assessment to give pt a diet. Speech therapy paged two times with no response. Pt to be seen in AM by speech.

## 2019-06-23 NOTE — SUBJECTIVE & OBJECTIVE
Patient History           Medical as of 6/23/2019     Past Medical History     Diagnosis Date Comments Source    Anxiety -- -- Provider    Eczema -- -- Provider    Hyperlipidemia -- -- Provider    Hypogonadism in male -- -- Provider    Hypopituitarism -- -- Provider    Hypothyroidism -- -- Provider                  Surgical as of 6/23/2019     Past Surgical History     Procedure Laterality Date Comments Source    PITUITARY SURGERY -- -- -- Provider    SPINAL FUSION -- -- -- Provider    CRANIOTOMY USING FRAMELESS STEREOTAXY Left 6/19/2019 Procedure: CRANIOTOMY, USING FRAMELESS STEREOTAXY;  Surgeon: Otis Pedraza MD;  Location: [facility] OR [address] [place] [address];  Service: Neurosurgery;  Laterality: Left;  LEFT FRONTOTEMPORAL CRANIOTOMY , EXCISION OF MENINGIOMA WITH NEURONAVIGATION/ 2 UNITS RBC, TEDS & SCD, DRILL, STEALTH, SONOPET, AQUAMANTYS, MICROSCOPE. Provider                  Family as of 6/23/2019     Problem Relation Name Age of Onset Comments Source    Heart disease Father -- -- -- Provider    Diabetes Sister -- -- -- Provider    Melanoma Neg Hx -- -- -- Provider            Tobacco Use as of 6/23/2019     Smoking Status Smoking Start Date Smoking Quit Date Packs/Day Years Used    Former Smoker -- -- -- --    Types Comments Smokeless Tobacco Status Smokeless Tobacco Quit Date Source    -- -- Never Used -- Provider            Alcohol Use as of 6/23/2019     Alcohol Use Drinks/Week Alcohol/Week Comments Source    Yes 14 Shots of liquor 8.4 oz -- Provider, Patient    Frequency Standard Drinks Binge Drinking        4 or more times a week  1 or 2  Less than monthly               Drug Use as of 6/23/2019     Drug Use Types Frequency Comments Source    Never -- -- -- Provider            Sexual Activity as of 6/23/2019     Sexually Active Birth Control Partners Comments Source    Yes -- Male -- Provider            Activities of Daily Living as of 6/23/2019    None           Social Documentation as of 6/23/2019    None            Occupational as of 6/23/2019    None           Socioeconomic as of 6/23/2019     Marital Status Spouse Name Number of Children Years Education Education Level Preferred Language Ethnicity Race Source    Single -- -- -- -- English /White White --    Financial Resource Strain Food Insecurity: Worry Food Insecurity: Inability Transportation Needs: Medical Transportation Needs: Non-medical    Not hard at all  Never true  Never true  No  No             Pertinent History     Question Response Comments    Lives with -- --    Place in Birth Order -- --    Lives in -- --    Number of Siblings -- --    Raised by -- --    Legal Involvement -- --    Childhood Trauma -- --    Criminal History of -- --    Financial Status -- --    Highest Level of Education -- --    Does patient have access to a firearm? -- --     Service -- --    Primary Leisure Activity -- --    Spirituality -- --        Past Medical History:   Diagnosis Date    Anxiety     Eczema     Hyperlipidemia     Hypogonadism in male     Hypopituitarism     Hypothyroidism      Past Surgical History:   Procedure Laterality Date    CRANIOTOMY, USING FRAMELESS STEREOTAXY Left 6/19/2019    Performed by Otis Pedraza MD at [facility] OR [address] [place] [address]    PITUITARY SURGERY      SPINAL FUSION       Family History     Problem Relation (Age of Onset)    Diabetes Sister    Heart disease Father        Tobacco Use    Smoking status: Former Smoker    Smokeless tobacco: Never Used   Substance and Sexual Activity    Alcohol use: Yes     Alcohol/week: 8.4 oz     Types: 14 Shots of liquor per week     Frequency: 4 or more times a week     Drinks per session: 1 or 2     Binge frequency: Less than monthly    Drug use: Never    Sexual activity: Yes     Partners: Male     Review of patient's allergies indicates:   Allergen Reactions    Bupropion      urinary incontinence, suicidal thoughts,drooling       No current facility-administered medications on file prior to  "encounter.      Current Outpatient Medications on File Prior to Encounter   Medication Sig    clonazePAM (KLONOPIN) 0.5 MG tablet Take 1 tablet (0.5 mg total) by mouth 2 (two) times daily as needed for Anxiety.    docusate sodium (COLACE) 100 MG capsule Take 100 mg by mouth once daily.    hydrocortisone (CORTEF) 10 MG Tab Take 1 tablet (10 mg total) by mouth 2 (two) times daily.    levothyroxine (SYNTHROID) 100 MCG tablet Take 1 tablet (100 mcg total) by mouth once daily.    MAGNESIUM CITRATE ORAL Take 750 mg by mouth once daily.    senna (SENOKOT) 8.6 mg tablet Take 1 tablet by mouth once daily.    testosterone cypionate (DEPOTESTOTERONE CYPIONATE) 200 mg/mL injection Inject 0.75 mLs (150 mg total) into the muscle every 14 (fourteen) days. 3 ml syringe with 18 g needle  to draw  X 10   25 g 1 inch needle to inject x 10    fluocinonide (LIDEX) 0.05 % ointment AAA bid    mupirocin (BACTROBAN) 2 % ointment AAA BID    needle, disp, 21 G 21 gauge x 1" Ndle To use once weekly with testosterone injections     Psychotherapeutics (From admission, onward)    None        Review of Systems   All other systems reviewed and are negative.    Strengths and Liabilities: Strength: Patient is expressive/articulate., Strength: Patient is intelligent., Liability: Patient has poor health.    Objective:     Vital Signs (Most Recent):  Temp: 98.2 °F (36.8 °C) (06/23/19 0700)  Pulse: 76 (06/23/19 0800)  Resp: (!) 28 (06/23/19 0800)  BP: (!) 181/83(PRN given ) (06/23/19 0800)  SpO2: 100 % (06/23/19 0800) Vital Signs (24h Range):  Temp:  [97.8 °F (36.6 °C)-98.8 °F (37.1 °C)] 98.2 °F (36.8 °C)  Pulse:  [] 76  Resp:  [14-31] 28  SpO2:  [95 %-100 %] 100 %  BP: (105-181)/(51-97) 181/83     Height: 5' 6" (167.6 cm)  Weight: 76.1 kg (167 lb 12.3 oz)  Body mass index is 27.08 kg/m².      Intake/Output Summary (Last 24 hours) at 6/23/2019 0848  Last data filed at 6/23/2019 0842  Gross per 24 hour   Intake 3700 ml   Output 1885 ml " "  Net 1815 ml       Physical Exam   Psychiatric:   Mental Status Exam:  Appearance: unremarkable, age appropriate, lying in bed  Behavior/Cooperation: friendly and cooperative, mild right handed tremor  Speech: normal tone, normal rate, normal pitch, soft  Mood: fine  Affect: constricted  Thought Process: superficially linear  Thought Content: no suicidality, no homicidality, or paranoia, +VH   Orientation: person only  Memory: Registers and recalls 3/3 objects at 1 and 5 minutes  Attention Span/Concentration: Decreased  Insight: fair  Judgment: fair    Modified CAM-ICU:  1. Acute change and/or fluctuating course of mental status: Yes  2. Inattention (SAVEAHAART): Yes   "Squeeze my hand, only when you hear, the letter 'A'."  3. Altered Level of Consciousness: Yes  4. Disorganized Thinking (Errors >1/6): 0/6  "Will a stone float on water?"  "Are there fish in the sea?"  "Does one pound weigh more than two?"  "Can you use a hammer to pound a nail?"  Command(s):   "Hold up 2 fingers."   "Now do the same thing with the other hand."    CAM-ICU +     Nursing note and vitals reviewed.       Significant Labs:   Last 24 Hours:   Recent Lab Results       06/23/19  0553   06/23/19  0136   06/22/19  1802   06/22/19  1305        Anion Gap   9         Baso #   0.01         Basophil%   0.1         BUN, Bld   16         Calcium   7.6         Chloride   110         CO2   22         Creatinine   0.7         Differential Method   Automated         eGFR if    >60.0         eGFR if non    >60.0  Comment:  Calculation used to obtain the estimated glomerular filtration  rate (eGFR) is the CKD-EPI equation.            Eos #   0.0         Eosinophil%   0.0         Glucose   104         Gran # (ANC)   8.6         Gran%   86.3         Group & Rh A POS           Hematocrit   35.6         Hemoglobin   11.8         Immature Grans (Abs)   0.04  Comment:  Mild elevation in immature granulocytes is non specific and "   can be seen in a variety of conditions including stress response,   acute inflammation, trauma and pregnancy. Correlation with other   laboratory and clinical findings is essential.           Immature Granulocytes   0.4         INDIRECT PRABHU NEG           Coumadin Monitoring INR   1.0  Comment:  Coumadin Therapy:  2.0 - 3.0 for INR for all indicators except mechanical heart valves  and antiphospholipid syndromes which should use 2.5 - 3.5.     0.9  Comment:  Coumadin Therapy:  2.0 - 3.0 for INR for all indicators except mechanical heart valves  and antiphospholipid syndromes which should use 2.5 - 3.5.       Lymph #   0.7         Lymph%   7.5         Magnesium   2.2         MCH   29.0         MCHC   33.1         MCV   88         Mono #   0.6         Mono%   5.7         MPV   11.9         nRBC   0         Phosphorus   3.0         Platelets   164         POCT Glucose     97       Potassium   3.1         Protime   10.4   9.9     RBC   4.07         RDW   14.8         Sodium   141         WBC   9.92               Significant Imaging: I have reviewed all pertinent imaging results/findings within the past 24 hours.

## 2019-06-24 LAB
ALBUMIN SERPL BCP-MCNC: 3.1 G/DL (ref 3.5–5.2)
ALP SERPL-CCNC: 38 U/L (ref 55–135)
ALT SERPL W/O P-5'-P-CCNC: 21 U/L (ref 10–44)
ANION GAP SERPL CALC-SCNC: 8 MMOL/L (ref 8–16)
AST SERPL-CCNC: 34 U/L (ref 10–40)
BASOPHILS # BLD AUTO: 0.02 K/UL (ref 0–0.2)
BASOPHILS NFR BLD: 0.2 % (ref 0–1.9)
BILIRUB SERPL-MCNC: 1.1 MG/DL (ref 0.1–1)
BUN SERPL-MCNC: 14 MG/DL (ref 8–23)
CALCIUM SERPL-MCNC: 7.8 MG/DL (ref 8.7–10.5)
CHLORIDE SERPL-SCNC: 108 MMOL/L (ref 95–110)
CO2 SERPL-SCNC: 22 MMOL/L (ref 23–29)
CREAT SERPL-MCNC: 0.8 MG/DL (ref 0.5–1.4)
DIFFERENTIAL METHOD: ABNORMAL
EOSINOPHIL # BLD AUTO: 0.1 K/UL (ref 0–0.5)
EOSINOPHIL NFR BLD: 1.1 % (ref 0–8)
ERYTHROCYTE [DISTWIDTH] IN BLOOD BY AUTOMATED COUNT: 14.8 % (ref 11.5–14.5)
EST. GFR  (AFRICAN AMERICAN): >60 ML/MIN/1.73 M^2
EST. GFR  (NON AFRICAN AMERICAN): >60 ML/MIN/1.73 M^2
GLUCOSE SERPL-MCNC: 88 MG/DL (ref 70–110)
HCT VFR BLD AUTO: 40.1 % (ref 40–54)
HGB BLD-MCNC: 13.2 G/DL (ref 14–18)
IMM GRANULOCYTES # BLD AUTO: 0.04 K/UL (ref 0–0.04)
IMM GRANULOCYTES NFR BLD AUTO: 0.4 % (ref 0–0.5)
INR PPP: 1 (ref 0.8–1.2)
LYMPHOCYTES # BLD AUTO: 1.1 K/UL (ref 1–4.8)
LYMPHOCYTES NFR BLD: 10.4 % (ref 18–48)
MAGNESIUM SERPL-MCNC: 2.1 MG/DL (ref 1.6–2.6)
MCH RBC QN AUTO: 29.8 PG (ref 27–31)
MCHC RBC AUTO-ENTMCNC: 32.9 G/DL (ref 32–36)
MCV RBC AUTO: 91 FL (ref 82–98)
MONOCYTES # BLD AUTO: 0.6 K/UL (ref 0.3–1)
MONOCYTES NFR BLD: 5.6 % (ref 4–15)
NEUTROPHILS # BLD AUTO: 8.7 K/UL (ref 1.8–7.7)
NEUTROPHILS NFR BLD: 82.3 % (ref 38–73)
NRBC BLD-RTO: 0 /100 WBC
PHOSPHATE SERPL-MCNC: 2.3 MG/DL (ref 2.7–4.5)
PLATELET # BLD AUTO: 171 K/UL (ref 150–350)
PMV BLD AUTO: 11.7 FL (ref 9.2–12.9)
POTASSIUM SERPL-SCNC: 3.4 MMOL/L (ref 3.5–5.1)
PROT SERPL-MCNC: 6 G/DL (ref 6–8.4)
PROTHROMBIN TIME: 10.2 SEC (ref 9–12.5)
RBC # BLD AUTO: 4.43 M/UL (ref 4.6–6.2)
SODIUM SERPL-SCNC: 138 MMOL/L (ref 136–145)
WBC # BLD AUTO: 10.53 K/UL (ref 3.9–12.7)

## 2019-06-24 PROCEDURE — 63600175 PHARM REV CODE 636 W HCPCS: Performed by: NURSE PRACTITIONER

## 2019-06-24 PROCEDURE — 97162 PT EVAL MOD COMPLEX 30 MIN: CPT

## 2019-06-24 PROCEDURE — 94761 N-INVAS EAR/PLS OXIMETRY MLT: CPT

## 2019-06-24 PROCEDURE — 85025 COMPLETE CBC W/AUTO DIFF WBC: CPT

## 2019-06-24 PROCEDURE — 97530 THERAPEUTIC ACTIVITIES: CPT

## 2019-06-24 PROCEDURE — C9254 INJECTION, LACOSAMIDE: HCPCS | Performed by: PSYCHIATRY & NEUROLOGY

## 2019-06-24 PROCEDURE — 99232 SBSQ HOSP IP/OBS MODERATE 35: CPT | Mod: ,,, | Performed by: INTERNAL MEDICINE

## 2019-06-24 PROCEDURE — 99233 PR SUBSEQUENT HOSPITAL CARE,LEVL III: ICD-10-PCS | Mod: ,,, | Performed by: NURSE PRACTITIONER

## 2019-06-24 PROCEDURE — 84100 ASSAY OF PHOSPHORUS: CPT

## 2019-06-24 PROCEDURE — 25000003 PHARM REV CODE 250: Performed by: STUDENT IN AN ORGANIZED HEALTH CARE EDUCATION/TRAINING PROGRAM

## 2019-06-24 PROCEDURE — 25000003 PHARM REV CODE 250: Performed by: NURSE PRACTITIONER

## 2019-06-24 PROCEDURE — 83735 ASSAY OF MAGNESIUM: CPT

## 2019-06-24 PROCEDURE — 99233 SBSQ HOSP IP/OBS HIGH 50: CPT | Mod: ,,, | Performed by: NURSE PRACTITIONER

## 2019-06-24 PROCEDURE — 92523 SPEECH SOUND LANG COMPREHEN: CPT

## 2019-06-24 PROCEDURE — 80053 COMPREHEN METABOLIC PANEL: CPT

## 2019-06-24 PROCEDURE — 63600175 PHARM REV CODE 636 W HCPCS: Performed by: PSYCHIATRY & NEUROLOGY

## 2019-06-24 PROCEDURE — 25000003 PHARM REV CODE 250: Performed by: PSYCHIATRY & NEUROLOGY

## 2019-06-24 PROCEDURE — 63600175 PHARM REV CODE 636 W HCPCS: Performed by: STUDENT IN AN ORGANIZED HEALTH CARE EDUCATION/TRAINING PROGRAM

## 2019-06-24 PROCEDURE — S0028 INJECTION, FAMOTIDINE, 20 MG: HCPCS | Performed by: STUDENT IN AN ORGANIZED HEALTH CARE EDUCATION/TRAINING PROGRAM

## 2019-06-24 PROCEDURE — 92526 ORAL FUNCTION THERAPY: CPT

## 2019-06-24 PROCEDURE — 20000000 HC ICU ROOM

## 2019-06-24 PROCEDURE — 85610 PROTHROMBIN TIME: CPT

## 2019-06-24 PROCEDURE — 99232 PR SUBSEQUENT HOSPITAL CARE,LEVL II: ICD-10-PCS | Mod: ,,, | Performed by: INTERNAL MEDICINE

## 2019-06-24 RX ORDER — POTASSIUM CHLORIDE 20 MEQ/15ML
40 SOLUTION ORAL
Status: DISCONTINUED | OUTPATIENT
Start: 2019-06-24 | End: 2019-06-26 | Stop reason: HOSPADM

## 2019-06-24 RX ORDER — POTASSIUM CHLORIDE 20 MEQ/15ML
60 SOLUTION ORAL
Status: DISCONTINUED | OUTPATIENT
Start: 2019-06-24 | End: 2019-06-26 | Stop reason: HOSPADM

## 2019-06-24 RX ORDER — LANOLIN ALCOHOL/MO/W.PET/CERES
800 CREAM (GRAM) TOPICAL
Status: DISCONTINUED | OUTPATIENT
Start: 2019-06-24 | End: 2019-06-26 | Stop reason: HOSPADM

## 2019-06-24 RX ORDER — HYDRALAZINE HYDROCHLORIDE 50 MG/1
50 TABLET, FILM COATED ORAL EVERY 8 HOURS
Status: DISCONTINUED | OUTPATIENT
Start: 2019-06-24 | End: 2019-06-26 | Stop reason: HOSPADM

## 2019-06-24 RX ORDER — LEVOTHYROXINE SODIUM 100 UG/1
100 TABLET ORAL
Status: DISCONTINUED | OUTPATIENT
Start: 2019-06-25 | End: 2019-06-26 | Stop reason: HOSPADM

## 2019-06-24 RX ORDER — HEPARIN SODIUM 5000 [USP'U]/ML
5000 INJECTION, SOLUTION INTRAVENOUS; SUBCUTANEOUS EVERY 8 HOURS
Status: DISCONTINUED | OUTPATIENT
Start: 2019-06-24 | End: 2019-06-26 | Stop reason: HOSPADM

## 2019-06-24 RX ORDER — SODIUM,POTASSIUM PHOSPHATES 280-250MG
2 POWDER IN PACKET (EA) ORAL
Status: DISCONTINUED | OUTPATIENT
Start: 2019-06-24 | End: 2019-06-26 | Stop reason: HOSPADM

## 2019-06-24 RX ADMIN — MUPIROCIN 1 G: 20 OINTMENT TOPICAL at 09:06

## 2019-06-24 RX ADMIN — SODIUM CHLORIDE 100 MG: 9 INJECTION, SOLUTION INTRAVENOUS at 10:06

## 2019-06-24 RX ADMIN — HYDROCORTISONE SODIUM SUCCINATE 50 MG: 100 INJECTION, POWDER, FOR SOLUTION INTRAMUSCULAR; INTRAVENOUS at 10:06

## 2019-06-24 RX ADMIN — LEVOTHYROXINE SODIUM ANHYDROUS 50 MCG: 100 INJECTION, POWDER, LYOPHILIZED, FOR SOLUTION INTRAVENOUS at 09:06

## 2019-06-24 RX ADMIN — CEFAZOLIN 1 G: 1 INJECTION, POWDER, FOR SOLUTION INTRAMUSCULAR; INTRAVENOUS at 06:06

## 2019-06-24 RX ADMIN — SODIUM CHLORIDE 100 MG: 9 INJECTION, SOLUTION INTRAVENOUS at 09:06

## 2019-06-24 RX ADMIN — HYDRALAZINE HYDROCHLORIDE 50 MG: 50 TABLET ORAL at 01:06

## 2019-06-24 RX ADMIN — POTASSIUM CHLORIDE 10 MEQ: 10 INJECTION, SOLUTION INTRAVENOUS at 08:06

## 2019-06-24 RX ADMIN — POTASSIUM CHLORIDE 40 MEQ: 20 SOLUTION ORAL at 12:06

## 2019-06-24 RX ADMIN — FAMOTIDINE 20 MG: 10 INJECTION INTRAVENOUS at 10:06

## 2019-06-24 RX ADMIN — FAMOTIDINE 20 MG: 10 INJECTION INTRAVENOUS at 09:06

## 2019-06-24 RX ADMIN — HYDROCORTISONE SODIUM SUCCINATE 50 MG: 100 INJECTION, POWDER, FOR SOLUTION INTRAMUSCULAR; INTRAVENOUS at 09:06

## 2019-06-24 RX ADMIN — HYDRALAZINE HYDROCHLORIDE 50 MG: 50 TABLET ORAL at 10:06

## 2019-06-24 RX ADMIN — MUPIROCIN 1 G: 20 OINTMENT TOPICAL at 10:06

## 2019-06-24 RX ADMIN — HEPARIN SODIUM 5000 UNITS: 5000 INJECTION, SOLUTION INTRAVENOUS; SUBCUTANEOUS at 03:06

## 2019-06-24 RX ADMIN — POTASSIUM & SODIUM PHOSPHATES POWDER PACK 280-160-250 MG 2 PACKET: 280-160-250 PACK at 12:06

## 2019-06-24 RX ADMIN — HEPARIN SODIUM 5000 UNITS: 5000 INJECTION, SOLUTION INTRAVENOUS; SUBCUTANEOUS at 10:06

## 2019-06-24 NOTE — ASSESSMENT & PLAN NOTE
Mr Sanz, with history of panhypopituitarism and Adrenal insufficiency is having surgery for removal of meningioma.   No signs of acute adrenal crisis - vitals normal    Recommendation  - Currently on hydrocortisone to 50 mg IV BID  - Will wean based on clinical improvement and continue to wean until back to his home oral dosing (HC 10/10)  - anticipate to PO tomorrow

## 2019-06-24 NOTE — PLAN OF CARE
Problem: Physical Therapy Goal  Goal: Physical Therapy Goal  Outcome: Ongoing (interventions implemented as appropriate)  Initial Evaluation performed. Complete evaluation assessment to follow.   Discharge recommendation: IP rehab       Alix Martines PT, DPT  6/24/2019

## 2019-06-24 NOTE — PT/OT/SLP EVAL
Speech Language Pathology Evaluation  Cognitive Communication    Patient Name:  Ravinder Sanz   MRN:  [id number]  Admitting Diagnosis: Meningioma, cerebral    Recommendations:     Recommendations:                General Recommendations:  Dysphagia therapy and Cognitive-linguistic therapy  Diet recommendations:  Mechanical soft, Thin   Aspiration Precautions: 1 bite/sip at a time, Feed only when awake/alert, HOB to 90 degrees, No straws and Small bites/sips   General Precautions: Standard, aspiration, fall, NPO, seizure  Communication strategies:  provide increased time to answer    History:     Past Medical History:   Diagnosis Date    Anxiety     Eczema     Hyperlipidemia     Hypogonadism in male     Hypopituitarism     Hypothyroidism        Past Surgical History:   Procedure Laterality Date    CRANIOTOMY, USING FRAMELESS STEREOTAXY Left 6/19/2019    Performed by Otis Pedraza MD at [facility] OR [address] [place] [address]    PITUITARY SURGERY      SPINAL FUSION         Social History: Patient lives with spouse.     Prior diet: regular/thin.    Occupation/hobbies/homemaking: Pt enjoys painting, cooking, and gardening.      Subjective     Awake/alert    Pain/Comfort:  · Pain Rating 1: 0/10  · Pain Rating Post-Intervention 1: 0/10    Objective:   Cognitive Status:    Attention No obvious deficits observed    Orientation Oriented x4  Problem Solving Categories 3/3 accy, Sequencing 4/4 word set and Compare/contrast 100%      Receptive Language:   Comprehension:   Questions Complex yes/no 100%  Commands  complex/abstract commands 100%    Pragmatics:    WFL    Expressive Language:  Verbal:    Verbal language skills were wfl with no evidence of aphasia.  Pt. Expressed their thoughts coherently in conversation with no evidence of confusion or word finding deficits  Nonverbal:   WFL        Voice:   WFL    Visual-Spatial:  TBA    Reading:   TBA     Written Expression:   TBA    Treatment: Pt repositioned upright in bed for PO trials. He  tolerated thin liquids x6 oz via cup, puree x4 oz, and cracker x4 with adequate oral clearance and timely swallow initiation. Pt with occasional throat clear both pre/during PO trials. Throat clear was dry in nature. Cough noted with initial sip of thin liquids and with liquid wash post solid trial x1. He tolerated all other solid trials with liquids wash without overt clinical signs of airway compromise. Recommend mechanical soft diet/thin liquids at this time. SLP educated pt and spouse on diet recs and swallow precautions.     Assessment:   Ravinder Sanz is a 74 y.o. male with an SLP diagnosis of Dysphagia.     Goals:   Multidisciplinary Problems     SLP Goals        Problem: SLP Goal    Goal Priority Disciplines Outcome   SLP Goal     SLP Ongoing (interventions implemented as appropriate)   Description:  Goals to be met 6/29  1 pt will participate in ongoing swallow assessment to determine safe po diet  2 Pt will participate in sle  MET  3. Pt will participate in ongoing assessment of reading, writing, and visual spatial skills                         Plan:   · Patient to be seen:  4 x/week   · Plan of Care expires:  07/22/19  · Plan of Care reviewed with:  patient, spouse, family   · SLP Follow-Up:  Yes       Discharge recommendations:  Discharge Facility/Level of Care Needs: (tbd)     Time Tracking:   SLP Treatment Date:   06/24/19  Speech Start Time:  0848  Speech Stop Time:  0911     Speech Total Time (min):  23 min    Billable Minutes: Eval 10  and Treatment Swallowing Dysfunction 13    Tara Feliz CCC-SLP  06/24/2019

## 2019-06-24 NOTE — PROGRESS NOTES
"[facility]  Psychiatry  Progress Note    Patient Name: Ravinder Sanz  MRN: [id number]   Code Status: Full Code  Admission Date: 6/19/2019  Hospital Length of Stay: 5 days  Expected Discharge Date:   Attending Physician: Payal Dean MD  Primary Care Provider: Brooke Dean MD    Current Legal Status: N/A    Patient information was obtained from patient, relative(s) and ER records.     Subjective:     Principal Problem:Meningioma, cerebral    Chief Complaint: Delirium     HPI: History of Present Illness:   Ravinder Sanz is a 74 y.o. male with no past psychiatric history, currently presenting with Meningioma, cerebral.  Psychiatry was originally consulted to address the patient's symptoms of agitation and paranoia.    Per Primary MD:  Pt is 74 y.o. Male with PMHx of anxiety and panhypopituitarism who presented for elective L temporal meningioma resection with NSGY. Mass was discovered incidentally during MRI workup for parkinson's. Pt has resting tremor at baseline. 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 Endocrinology is consulted and following patient He is admitted to [facility] for post op monitoring.          6/21 pt has been readmitted to [facility] for monitoring and observation due to concerns for AMS and aggressive behavior overnight. Pt became agitated overnight requiring 1x dose Zyprexa. Pt family and partner were at bedside and confirmed pt "was not and his baseline the day prior" and that pt becomes aggressive due to confusion and wanting to get out of bed and out of restraints. Pt was evaluated this morning and re-admitted to [facility] for monitoring and evaluation.     Per C-L MD:  Per chart review and  at bedside, patient initially started exhibiting a hand tremor and excessive drolling about 1 year prior, as well as significant new onset claustrophobia. Went to see outpatient neurologist for workup of Parkinson's disease 1 month " "prior. At that imaging was order, and L temporal mass, concerning for meningoma was found for which he underwent elective craniotomy for resection on 6/19. Was admitted to [facility] for monitoring, prior to step down. After step down, noted to have AMS, resulting in return to [facility] for continued monitoring.    When seen today patient calm and cooperative, mostly linear and appropriate for majority of interview. Oriented to self, but not to location, date or situation. States that he is currently in a library in [place], [place], and that it is July 2020. States that he is currently here to receive treatment for "Cerebral palsy", and asks if that is a sexual illness. When asked for further elaboration, states there is a lot of stigma associated with sexual illnesses. Currently states that he is doing well, recovering from his  "treatment". Denies any current changes to his mood or worsening confusion. When asked what makes him think he is in a library, gestures to wall, stating the books and posters. Endorses intermittent shadows out of the corner of his eye, for the past few days. Denies any specific time where these symptoms are worse. Denies any current or past auditory hallucinations. Denies feeling unsafe in the hospital. States his mood is good, he is optimistic about his future. Denies any past psychiatric history, admission or diagnosis. States he was only recently started on medications for depression (citalopram and wellbutrin) after neurologist started suspecting he had Parkinson, took one dose, but states he did not like it and stopped completely.     Collateral:   Yes -  at bedside provided additional collateral as consolidated above.     Psychiatric Review Of Systems - Is patient experiencing or having changes in:  sleep: yes, 5 hrs a night  appetite: no  weight: no  energy/anergy: no  interest/pleasure/anhedonia: no  somatic symptoms: no  guilty/hopelessness: no  concentration: no  S.I.B.s/risky " behavior: no  SI/SA:  no    anxiety/panic: no  Recurrent nightmares:  no  hyper startle response:  no  Avoidance: no  Recurrent thoughts:  no  Recurrent behaviors:  no    Irritability: no  Racing thoughts: no  Impulsive behaviors: no  Pressured speech:  no    Paranoia:no  Delusions: yes  AVH:yes    Psychiatric History:  Diagnose(s): No  Previous Medication Trials: Yes - citalopram, Wellbutrin and clonazepam   Previous Psychiatric Hospitalizations: No  Previous Suicide Attempts: No  History of Violence: No  Outpatient Psychiatrist: No    Social History:  Marital Status:   Children: 0   Employment Status: artist  Education: college graduate  Special Ed: no   History: no  Housing Status: Yes - with , [facility] and [place]  Developmental History: No  History of Abuse: No  Access to Gun: No    Substance Abuse History:  Recreational Drugs: Denies  Use of Alcohol: 2 drinks a night, last drink 2 weeks prior  Rehab History: No  Tobacco Use: No  Use of Caffeine: No  Use of OTC: No  Legal consequences of chemical use: No  Is the patient aware of the biomedical complications associated with substance abuse and mental illness? No    Legal History:  Past Charges/Incarcerations: No  Pending Charges: No    Family Psychiatric History:   No    Psychosocial Stressors: health.   Functioning Relationships: good support system      Hospital Course: No notes on file    Interval History:     NAEON. No concerns for hallucinations, delusions, agitations. Improved level of alertness, attention and concentration.     Family History     Problem Relation (Age of Onset)    Diabetes Sister    Heart disease Father        Tobacco Use    Smoking status: Former Smoker    Smokeless tobacco: Never Used   Substance and Sexual Activity    Alcohol use: Yes     Alcohol/week: 8.4 oz     Types: 14 Shots of liquor per week     Frequency: 4 or more times a week     Drinks per session: 1 or 2     Binge frequency: Less than monthly    Drug use:  "Never    Sexual activity: Yes     Partners: Male     Psychotherapeutics (From admission, onward)    None           Review of Systems   Constitutional: Negative for fever.   HENT: Negative for trouble swallowing.    Respiratory: Negative for shortness of breath.    Cardiovascular: Negative for chest pain.   Neurological: Negative for dizziness, seizures, facial asymmetry, light-headedness, numbness and headaches.   Psychiatric/Behavioral: Positive for decreased concentration. Negative for agitation, behavioral problems, confusion, hallucinations and self-injury. The patient is not nervous/anxious and is not hyperactive.    All other systems reviewed and are negative.    Objective:     Vital Signs (Most Recent):  Temp: 98.1 °F (36.7 °C) (06/24/19 1100)  Pulse: 75 (06/24/19 1330)  Resp: (!) 22 (06/24/19 1330)  BP: (!) 157/76 (06/24/19 1330)  SpO2: 100 % (06/24/19 1330) Vital Signs (24h Range):  Temp:  [97.8 °F (36.6 °C)-98.7 °F (37.1 °C)] 98.1 °F (36.7 °C)  Pulse:  [51-86] 75  Resp:  [10-32] 22  SpO2:  [97 %-100 %] 100 %  BP: (118-172)/(58-90) 157/76     Height: 5' 6" (167.6 cm)  Weight: 76.1 kg (167 lb 12.3 oz)  Body mass index is 27.08 kg/m².      Intake/Output Summary (Last 24 hours) at 6/24/2019 1354  Last data filed at 6/24/2019 1300  Gross per 24 hour   Intake 2679.99 ml   Output 1975 ml   Net 704.99 ml       Physical Exam   Psychiatric:   Mental Status Exam:  Appearance: unremarkable, age appropriate, lying in bed  Behavior/Cooperation: friendly and cooperative, mild right handed tremor  Speech: normal tone, normal rate, normal pitch, soft  Mood: fine  Affect: constricted  Thought Process: superficially linear  Thought Content: no suicidality, no homicidality, or paranoia, +VH   Orientation: person only  Memory: Registers and recalls 3/3 objects at 1 and 5 minutes  Attention Span/Concentration: Decreased  Insight: fair   Judgment: fair    Modified CAM-ICU:  1. Acute change and/or fluctuating course of mental " "status: No  2. Inattention (SAVEAHAART): Yes   "Squeeze my hand, only when you hear, the letter 'A'."  3. Altered Level of Consciousness: No  4. Disorganized Thinking (Errors >1/6): No  "Will a stone float on water?"  "Are there fish in the sea?"  "Does one pound weigh more than two?"  "Can you use a hammer to pound a nail?"  Command(s):   "Hold up 2 fingers."   "Now do the same thing with the other hand."    CAM-ICU negative      Nursing note and vitals reviewed.       Significant Labs:   Last 24 Hours:   Recent Lab Results       06/24/19  0429        Albumin 3.1     Alkaline Phosphatase 38     ALT 21     Anion Gap 8     AST 34     Baso # 0.02     Basophil% 0.2     BILIRUBIN TOTAL 1.1  Comment:  For infants and newborns, interpretation of results should be based  on gestational age, weight and in agreement with clinical  observations.  Premature Infant recommended reference ranges:  Up to 24 hours.............<8.0 mg/dL  Up to 48 hours............<12.0 mg/dL  3-5 days..................<15.0 mg/dL  6-29 days.................<15.0 mg/dL       BUN, Bld 14     Calcium 7.8     Chloride 108     CO2 22     Creatinine 0.8     Differential Method Automated     eGFR if African American >60.0     eGFR if non  >60.0  Comment:  Calculation used to obtain the estimated glomerular filtration  rate (eGFR) is the CKD-EPI equation.        Eos # 0.1     Eosinophil% 1.1     Glucose 88     Gran # (ANC) 8.7     Gran% 82.3     Hematocrit 40.1     Hemoglobin 13.2     Immature Grans (Abs) 0.04  Comment:  Mild elevation in immature granulocytes is non specific and   can be seen in a variety of conditions including stress response,   acute inflammation, trauma and pregnancy. Correlation with other   laboratory and clinical findings is essential.       Immature Granulocytes 0.4     Coumadin Monitoring INR 1.0  Comment:  Coumadin Therapy:  2.0 - 3.0 for INR for all indicators except mechanical heart valves  and antiphospholipid " syndromes which should use 2.5 - 3.5.       Lymph # 1.1     Lymph% 10.4     Magnesium 2.1     MCH 29.8     MCHC 32.9     MCV 91     Mono # 0.6     Mono% 5.6     MPV 11.7     nRBC 0     Phosphorus 2.3     Platelets 171     Potassium 3.4     PROTEIN TOTAL 6.0     Protime 10.2     RBC 4.43     RDW 14.8     Sodium 138     WBC 10.53           Significant Imaging: I have reviewed all pertinent imaging results/findings within the past 24 hours.    Assessment/Plan:     Delirium  ASSESSMENT      Ravinder Sanz is a 74 y.o. male with no past psychiatric history, currently presenting with Meningioma, cerebral.  Psychiatry was originally consulted to address the patient's symptoms of agitation and paranoia. When seen today, patient only oriented to self with multiple errors during SAVEAHAART. Still able to participate relatively well with interview with  at bedside providing additional collateral. Based on current presentation symptoms seem most consistent with delirium, s/p mass resection.     Improved level of alertness, attention and concentration over the last 24 hrs .   No concerns for hallucinations, delusions, agitations.     IMPRESSION  Delirium  - Improved      RECOMMENDATION(S)       1. Scheduled Medication(s):  None at this time     2. PRN Medication(s):  Zyprexa 2.5 mg PO/IM for non redirectable agitation      3.  Monitor:  Please obtain daily EKG to monitor QTc when zyprexa      4. Other:  CAM ICU- positive to negative   DELIRIUM BEHAVIOR MANAGEMENT  · PLEASE utilize CHEMICAL restraints with PRN meds first for agitation. Minimize use of PHYSICAL restraints  · Keep window shades open and room lit during day and room dim at night in order to promote normal sleep-wake cycles  · Encourage family at bedside. Foster patient often to situation, location, date.  · Continue to Limit or Discontinue use of Narcotics, Benzos and Anti-cholinergic medications as they may worsen delirium.  · Continue medical workup for  causative etiology of Delirium.     3.   Other:  Control infection, correct lytes as need, avoid hypoxia, hypercapnea, avoid acidosis      Shall sign off. If there are any concerns, please do not hesitate to re-consult us.          Need for Continued Hospitalization:   No need for inpatient psychiatric hospitalization. Continue medical care as per the primary team.    [place] [facility] [place]:  25 with greater than 50% of this time spent in counseling and/or coordination of care.       Deepa Lockett MD   Psychiatry  [facility]

## 2019-06-24 NOTE — PLAN OF CARE
Problem: SLP Goal  Goal: SLP Goal  Goals to be met 6/29  1 pt will participate in ongoing swallow assessment to determine safe po diet  2 Pt will participate in sle  MET  3. Pt will participate in ongoing assessment of reading, writing, and visual spatial skills       Recommend mechanical soft diet/thin liquids at this time. Please see note for details.  Tara Feliz CCC-SLP  6/24/2019

## 2019-06-24 NOTE — SUBJECTIVE & OBJECTIVE
"Interval History:     NAEON. No concerns for hallucinations, delusions, agitations. Improved level of alertness, attention and concentration.     Family History     Problem Relation (Age of Onset)    Diabetes Sister    Heart disease Father        Tobacco Use    Smoking status: Former Smoker    Smokeless tobacco: Never Used   Substance and Sexual Activity    Alcohol use: Yes     Alcohol/week: 8.4 oz     Types: 14 Shots of liquor per week     Frequency: 4 or more times a week     Drinks per session: 1 or 2     Binge frequency: Less than monthly    Drug use: Never    Sexual activity: Yes     Partners: Male     Psychotherapeutics (From admission, onward)    None           Review of Systems   Constitutional: Negative for fever.   HENT: Negative for trouble swallowing.    Respiratory: Negative for shortness of breath.    Cardiovascular: Negative for chest pain.   Neurological: Negative for dizziness, seizures, facial asymmetry, light-headedness, numbness and headaches.   Psychiatric/Behavioral: Positive for decreased concentration. Negative for agitation, behavioral problems, confusion, hallucinations and self-injury. The patient is not nervous/anxious and is not hyperactive.    All other systems reviewed and are negative.    Objective:     Vital Signs (Most Recent):  Temp: 98.1 °F (36.7 °C) (06/24/19 1100)  Pulse: 75 (06/24/19 1330)  Resp: (!) 22 (06/24/19 1330)  BP: (!) 157/76 (06/24/19 1330)  SpO2: 100 % (06/24/19 1330) Vital Signs (24h Range):  Temp:  [97.8 °F (36.6 °C)-98.7 °F (37.1 °C)] 98.1 °F (36.7 °C)  Pulse:  [51-86] 75  Resp:  [10-32] 22  SpO2:  [97 %-100 %] 100 %  BP: (118-172)/(58-90) 157/76     Height: 5' 6" (167.6 cm)  Weight: 76.1 kg (167 lb 12.3 oz)  Body mass index is 27.08 kg/m².      Intake/Output Summary (Last 24 hours) at 6/24/2019 1354  Last data filed at 6/24/2019 1300  Gross per 24 hour   Intake 2679.99 ml   Output 1975 ml   Net 704.99 ml       Physical Exam   Psychiatric:   Mental Status " "Exam:  Appearance: unremarkable, age appropriate, lying in bed  Behavior/Cooperation: friendly and cooperative, mild right handed tremor  Speech: normal tone, normal rate, normal pitch, soft  Mood: fine  Affect: constricted  Thought Process: superficially linear  Thought Content: no suicidality, no homicidality, or paranoia, +VH   Orientation: person only  Memory: Registers and recalls 3/3 objects at 1 and 5 minutes  Attention Span/Concentration: Decreased  Insight: fair   Judgment: fair    Modified CAM-ICU:  1. Acute change and/or fluctuating course of mental status: No  2. Inattention (SAVEAHAART): Yes   "Squeeze my hand, only when you hear, the letter 'A'."  3. Altered Level of Consciousness: No  4. Disorganized Thinking (Errors >1/6): No  "Will a stone float on water?"  "Are there fish in the sea?"  "Does one pound weigh more than two?"  "Can you use a hammer to pound a nail?"  Command(s):   "Hold up 2 fingers."   "Now do the same thing with the other hand."    CAM-ICU negative      Nursing note and vitals reviewed.       Significant Labs:   Last 24 Hours:   Recent Lab Results       06/24/19  0429        Albumin 3.1     Alkaline Phosphatase 38     ALT 21     Anion Gap 8     AST 34     Baso # 0.02     Basophil% 0.2     BILIRUBIN TOTAL 1.1  Comment:  For infants and newborns, interpretation of results should be based  on gestational age, weight and in agreement with clinical  observations.  Premature Infant recommended reference ranges:  Up to 24 hours.............<8.0 mg/dL  Up to 48 hours............<12.0 mg/dL  3-5 days..................<15.0 mg/dL  6-29 days.................<15.0 mg/dL       BUN, Bld 14     Calcium 7.8     Chloride 108     CO2 22     Creatinine 0.8     Differential Method Automated     eGFR if African American >60.0     eGFR if non  >60.0  Comment:  Calculation used to obtain the estimated glomerular filtration  rate (eGFR) is the CKD-EPI equation.        Eos # 0.1     " Eosinophil% 1.1     Glucose 88     Gran # (ANC) 8.7     Gran% 82.3     Hematocrit 40.1     Hemoglobin 13.2     Immature Grans (Abs) 0.04  Comment:  Mild elevation in immature granulocytes is non specific and   can be seen in a variety of conditions including stress response,   acute inflammation, trauma and pregnancy. Correlation with other   laboratory and clinical findings is essential.       Immature Granulocytes 0.4     Coumadin Monitoring INR 1.0  Comment:  Coumadin Therapy:  2.0 - 3.0 for INR for all indicators except mechanical heart valves  and antiphospholipid syndromes which should use 2.5 - 3.5.       Lymph # 1.1     Lymph% 10.4     Magnesium 2.1     MCH 29.8     MCHC 32.9     MCV 91     Mono # 0.6     Mono% 5.6     MPV 11.7     nRBC 0     Phosphorus 2.3     Platelets 171     Potassium 3.4     PROTEIN TOTAL 6.0     Protime 10.2     RBC 4.43     RDW 14.8     Sodium 138     WBC 10.53           Significant Imaging: I have reviewed all pertinent imaging results/findings within the past 24 hours.

## 2019-06-24 NOTE — PLAN OF CARE
Problem: Adult Inpatient Plan of Care  Goal: Plan of Care Review  Outcome: Revised  POC reviewed with pt and family at 1400. Pt and pt family at bedside verbalized understanding. Questions and concerns addressed. No acute events today. Likely transfer to floor today. Pt progressing toward goals. Will continue to monitor. See flowsheets for full assessment and VS info.

## 2019-06-24 NOTE — ASSESSMENT & PLAN NOTE
S/p elective L temporal meningioma resection (6/20).   Stress steroids ordered due to significant surgery, weaning down  Stable at this time, stepping out of NCC  Other neurological issue, manage by NSGY

## 2019-06-24 NOTE — ASSESSMENT & PLAN NOTE
Patient is a 73 yo male with PMH panhypopituitarism from pituitary adenoma, parkinsonism presenting for elective L temporal meningioma resection (6/20).     Neuro: continue neuro ICU for q1 hour neurochecks  Kera for post surgical seizure ppx x7d  CV: SBP < 160.  Pulm: No resp distress, wean O2 to room air  GI: ADAT, speech recs for thin liquids  MIRNA/SCD's, sqh. gi ppx.   PT/OT/OOB    Dispo: TTF today

## 2019-06-24 NOTE — PROGRESS NOTES
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     Post-Op Info:  Procedure(s) (LRB):  CRANIOTOMY, USING FRAMELESS STEREOTAXY (Left)   5 Days Post-Op     Interval History: No AE. AFVSS    Medications:  Continuous Infusions:   sodium chloride 0.9% 100 mL/hr at 06/24/19 1000     Scheduled Meds:   ceFAZolin (ANCEF) IVPB  1 g Intravenous Q8H    docusate sodium  100 mg Oral Daily    famotidine (PF)  20 mg Intravenous BID    hydrocortisone sodium succinate  50 mg Intravenous BID    lacosamide (VIMPAT) IVPB  100 mg Intravenous Q12H    levothyroxine  50 mcg Intravenous Daily    mupirocin  1 g Nasal BID     PRN Meds:acetaminophen, hydrALAZINE, HYDROcodone-acetaminophen, magnesium sulfate IVPB, magnesium sulfate IVPB, potassium chloride in water **AND** potassium chloride in water **AND** potassium chloride in water, sodium phosphate IVPB, sodium phosphate IVPB, sodium phosphate IVPB       Objective:     Weight: 76.1 kg (167 lb 12.3 oz)  Body mass index is 27.08 kg/m².  Vital Signs (Most Recent):  Temp: 98.4 °F (36.9 °C) (06/24/19 0700)  Pulse: (!) 59 (06/24/19 1000)  Resp: 12 (06/24/19 1000)  BP: 130/67 (06/24/19 1000)  SpO2: 100 % (06/24/19 1000) Vital Signs (24h Range):  Temp:  [97.8 °F (36.6 °C)-98.7 °F (37.1 °C)] 98.4 °F (36.9 °C)  Pulse:  [51-86] 59  Resp:  [10-32] 12  SpO2:  [97 %-100 %] 100 %  BP: (118-172)/(58-90) 130/67     Date 06/24/19 0700 - 06/25/19 0659   Shift 2480-3932 9821-1887 6194-9448 24 Hour Total   INTAKE   I.V.(mL/kg) 431.7(5.7)   431.7(5.7)   IV Piggyback 200   200   Shift Total(mL/kg) 631.7(8.3)   631.7(8.3)   OUTPUT   Shift Total(mL/kg)       Weight (kg) 76.1 76.1 76.1 76.1              Closed/Suction  Drain 06/19/19 1240 Left Scalp Accordion 10 Fr. (Active)   Site Description Unable to view 6/20/2019  5:05 PM   Dressing Type [place] 6/20/2019  8:00 PM   Dressing Status Clean;Dry;Intact 6/20/2019  8:00 PM   Dressing Intervention Dressing reinforced 6/20/2019  5:05 PM   Drainage Bloody 6/20/2019  5:05 PM   Status Other (Comment) 6/20/2019  5:05 PM   Output (mL) 10 mL 6/21/2019 11:07 AM       Neurosurgery Physical Exam      General: well developed, well nourished, no distress.   Head: normocephalic, Left crani incision   GCS: Motor: 6/Verbal: 5/Eyes: 4 GCS Total: 15  Mental Status: Oriented x 4  Language: No aphasia  Speech: Dysarthric  Cranial nerves: face symmetric, tongue midline, CN II-XII grossly intact.   Eyes: pupils equal, round, reactive to light with accomodation, EOMI.  Pulmonary: normal respirations, no signs of respiratory distress  Abdomen: soft, non-distended, not tender to palpation  Sensory: intact to light touch throughout  Motor Strength: Moves all extremities spontaneously with good tone. Full strength upper and lower extremities. No abnormal movements seen.   Incision c/d/i with staples approximating skin edges. No surrounding erythema or edema. No drainage from incision. HV drain in place.       Significant Labs:  Recent Labs   Lab 06/23/19 0136 06/24/19 0429    88    138   K 3.1* 3.4*    108   CO2 22* 22*   BUN 16 14   CREATININE 0.7 0.8   CALCIUM 7.6* 7.8*   MG 2.2 2.1     Recent Labs   Lab 06/23/19  0136 06/24/19 0429   WBC 9.92 10.53   HGB 11.8* 13.2*   HCT 35.6* 40.1    171     Recent Labs   Lab 06/22/19  1305 06/23/19  0136 06/24/19 0429   INR 0.9 1.0 1.0     Microbiology Results (last 7 days)     ** No results found for the last 168 hours. **        Recent Lab Results       06/24/19 0429        Albumin 3.1     Alkaline Phosphatase 38     ALT 21     Anion Gap 8     AST 34     Baso # 0.02     Basophil% 0.2     BILIRUBIN TOTAL 1.1  Comment:  For infants  and newborns, interpretation of results should be based  on gestational age, weight and in agreement with clinical  observations.  Premature Infant recommended reference ranges:  Up to 24 hours.............<8.0 mg/dL  Up to 48 hours............<12.0 mg/dL  3-5 days..................<15.0 mg/dL  6-29 days.................<15.0 mg/dL       BUN, Bld 14     Calcium 7.8     Chloride 108     CO2 22     Creatinine 0.8     Differential Method Automated     eGFR if African American >60.0     eGFR if non  >60.0  Comment:  Calculation used to obtain the estimated glomerular filtration  rate (eGFR) is the CKD-EPI equation.        Eos # 0.1     Eosinophil% 1.1     Glucose 88     Gran # (ANC) 8.7     Gran% 82.3     Hematocrit 40.1     Hemoglobin 13.2     Immature Grans (Abs) 0.04  Comment:  Mild elevation in immature granulocytes is non specific and   can be seen in a variety of conditions including stress response,   acute inflammation, trauma and pregnancy. Correlation with other   laboratory and clinical findings is essential.       Immature Granulocytes 0.4     Coumadin Monitoring INR 1.0  Comment:  Coumadin Therapy:  2.0 - 3.0 for INR for all indicators except mechanical heart valves  and antiphospholipid syndromes which should use 2.5 - 3.5.       Lymph # 1.1     Lymph% 10.4     Magnesium 2.1     MCH 29.8     MCHC 32.9     MCV 91     Mono # 0.6     Mono% 5.6     MPV 11.7     nRBC 0     Phosphorus 2.3     Platelets 171     Potassium 3.4     PROTEIN TOTAL 6.0     Protime 10.2     RBC 4.43     RDW 14.8     Sodium 138     WBC 10.53         All pertinent labs from the last 24 hours have been reviewed.    Significant Diagnostics:  All pertinent imaging reviewed.       Assessment/Plan:     * Meningioma, cerebral  Patient is a 75 yo male with PMH panhypopituitarism from pituitary adenoma, parkinsonism presenting for elective L temporal meningioma resection (6/19).     - Increased agitation overnight requiring 1x  dose Zyprexa to be given. Stepped down from ICU yesterday, re-admitted for close monitoring and q1h neuro checks.   Neuro exam stable, lethargic, remains alert to self, year, place, and situation.  CT head reviewed and appropriate post operatively. Repeat CT head today due to increased agitation.  Drain with minimal output, will continue to monitor.   Keppra for post surgical seizure ppx   H/ID: Continue abx ppx while drain in place  CV: SBP < 140. Vitals stable last 24 hours  Pulm: No resp distress, wean O2 to room air  GI: ADAT, speech recs for pureed diet, nectar thick liquids  PT/OT/OOB  Dispo: re-admit to ICU for close monitoring.    Panhypopituitarism  Patient is a 75 yo male with PMH panhypopituitarism from pituitary adenoma, parkinsonism presenting for elective L temporal meningioma resection (6/20).     Neuro: continue neuro ICU for q1 hour neurochecks  Keppra for post surgical seizure ppx x7d  CV: SBP < 160.  Pulm: No resp distress, wean O2 to room air  GI: ADAT, speech recs for thin liquids  MIRNA/SCD's, sqh. gi ppx.   PT/OT/OOB    Dispo: TTF today        Indio Rosales MD  Neurosurgery  [facility]

## 2019-06-24 NOTE — PLAN OF CARE
Problem: Adult Inpatient Plan of Care  Goal: Plan of Care Review  Pt to remain NPO until passed by speech   Possible step down today   POC reviewed with pt at 0500. Pt verbalized understanding. Questions and concerns addressed. No acute events overnight. Pt progressing toward goals. Will continue to monitor. See flowsheets for full assessment and VS info

## 2019-06-24 NOTE — ASSESSMENT & PLAN NOTE
Was on IV synthroid, last dose was 6/24/2019  Resume home dose of LT4 - 100 mcg daily starting 6/25 as he is tolerating diet

## 2019-06-24 NOTE — SUBJECTIVE & OBJECTIVE
Interval History: No AE. AFVSS    Medications:  Continuous Infusions:   sodium chloride 0.9% 100 mL/hr at 06/24/19 1000     Scheduled Meds:   ceFAZolin (ANCEF) IVPB  1 g Intravenous Q8H    docusate sodium  100 mg Oral Daily    famotidine (PF)  20 mg Intravenous BID    hydrocortisone sodium succinate  50 mg Intravenous BID    lacosamide (VIMPAT) IVPB  100 mg Intravenous Q12H    levothyroxine  50 mcg Intravenous Daily    mupirocin  1 g Nasal BID     PRN Meds:acetaminophen, hydrALAZINE, HYDROcodone-acetaminophen, magnesium sulfate IVPB, magnesium sulfate IVPB, potassium chloride in water **AND** potassium chloride in water **AND** potassium chloride in water, sodium phosphate IVPB, sodium phosphate IVPB, sodium phosphate IVPB       Objective:     Weight: 76.1 kg (167 lb 12.3 oz)  Body mass index is 27.08 kg/m².  Vital Signs (Most Recent):  Temp: 98.4 °F (36.9 °C) (06/24/19 0700)  Pulse: (!) 59 (06/24/19 1000)  Resp: 12 (06/24/19 1000)  BP: 130/67 (06/24/19 1000)  SpO2: 100 % (06/24/19 1000) Vital Signs (24h Range):  Temp:  [97.8 °F (36.6 °C)-98.7 °F (37.1 °C)] 98.4 °F (36.9 °C)  Pulse:  [51-86] 59  Resp:  [10-32] 12  SpO2:  [97 %-100 %] 100 %  BP: (118-172)/(58-90) 130/67     Date 06/24/19 0700 - 06/25/19 0659   Shift 5505-8300 0743-3846 1728-2504 24 Hour Total   INTAKE   I.V.(mL/kg) 431.7(5.7)   431.7(5.7)   IV Piggyback 200   200   Shift Total(mL/kg) 631.7(8.3)   631.7(8.3)   OUTPUT   Shift Total(mL/kg)       Weight (kg) 76.1 76.1 76.1 76.1              Closed/Suction Drain 06/19/19 1240 Left Scalp Accordion 10 Fr. (Active)   Site Description Unable to view 6/20/2019  5:05 PM   Dressing Type [place] 6/20/2019  8:00 PM   Dressing Status Clean;Dry;Intact 6/20/2019  8:00 PM   Dressing Intervention Dressing reinforced 6/20/2019  5:05 PM   Drainage Bloody 6/20/2019  5:05 PM   Status Other (Comment) 6/20/2019  5:05 PM   Output (mL) 10 mL 6/21/2019 11:07 AM       Neurosurgery Physical Exam      General: well  developed, well nourished, no distress.   Head: normocephalic, Left crani incision   GCS: Motor: 6/Verbal: 5/Eyes: 4 GCS Total: 15  Mental Status: Oriented x 4  Language: No aphasia  Speech: Dysarthric  Cranial nerves: face symmetric, tongue midline, CN II-XII grossly intact.   Eyes: pupils equal, round, reactive to light with accomodation, EOMI.  Pulmonary: normal respirations, no signs of respiratory distress  Abdomen: soft, non-distended, not tender to palpation  Sensory: intact to light touch throughout  Motor Strength: Moves all extremities spontaneously with good tone. Full strength upper and lower extremities. No abnormal movements seen.   Incision c/d/i with staples approximating skin edges. No surrounding erythema or edema. No drainage from incision. HV drain in place.       Significant Labs:  Recent Labs   Lab 06/23/19 0136 06/24/19 0429    88    138   K 3.1* 3.4*    108   CO2 22* 22*   BUN 16 14   CREATININE 0.7 0.8   CALCIUM 7.6* 7.8*   MG 2.2 2.1     Recent Labs   Lab 06/23/19  0136 06/24/19 0429   WBC 9.92 10.53   HGB 11.8* 13.2*   HCT 35.6* 40.1    171     Recent Labs   Lab 06/22/19  1305 06/23/19 0136 06/24/19 0429   INR 0.9 1.0 1.0     Microbiology Results (last 7 days)     ** No results found for the last 168 hours. **        Recent Lab Results       06/24/19 0429        Albumin 3.1     Alkaline Phosphatase 38     ALT 21     Anion Gap 8     AST 34     Baso # 0.02     Basophil% 0.2     BILIRUBIN TOTAL 1.1  Comment:  For infants and newborns, interpretation of results should be based  on gestational age, weight and in agreement with clinical  observations.  Premature Infant recommended reference ranges:  Up to 24 hours.............<8.0 mg/dL  Up to 48 hours............<12.0 mg/dL  3-5 days..................<15.0 mg/dL  6-29 days.................<15.0 mg/dL       BUN, Bld 14     Calcium 7.8     Chloride 108     CO2 22     Creatinine 0.8     Differential Method Automated      eGFR if African American >60.0     eGFR if non  >60.0  Comment:  Calculation used to obtain the estimated glomerular filtration  rate (eGFR) is the CKD-EPI equation.        Eos # 0.1     Eosinophil% 1.1     Glucose 88     Gran # (ANC) 8.7     Gran% 82.3     Hematocrit 40.1     Hemoglobin 13.2     Immature Grans (Abs) 0.04  Comment:  Mild elevation in immature granulocytes is non specific and   can be seen in a variety of conditions including stress response,   acute inflammation, trauma and pregnancy. Correlation with other   laboratory and clinical findings is essential.       Immature Granulocytes 0.4     Coumadin Monitoring INR 1.0  Comment:  Coumadin Therapy:  2.0 - 3.0 for INR for all indicators except mechanical heart valves  and antiphospholipid syndromes which should use 2.5 - 3.5.       Lymph # 1.1     Lymph% 10.4     Magnesium 2.1     MCH 29.8     MCHC 32.9     MCV 91     Mono # 0.6     Mono% 5.6     MPV 11.7     nRBC 0     Phosphorus 2.3     Platelets 171     Potassium 3.4     PROTEIN TOTAL 6.0     Protime 10.2     RBC 4.43     RDW 14.8     Sodium 138     WBC 10.53         All pertinent labs from the last 24 hours have been reviewed.    Significant Diagnostics:  All pertinent imaging reviewed.

## 2019-06-24 NOTE — PROGRESS NOTES
Lauren NP with [facility] notified of patient's blood pressure remaining over 160 systolic. New orders received for oral hydralyzine. Will implement and continue to monitor. Vital signs and assessment per flowsheet.

## 2019-06-24 NOTE — PROGRESS NOTES
[facility]  Endocrinology  Progress Note    Admit Date: 2019     Reason for Consult: Panhypopituitarism; history of transphenoidal pituitary adenoma resection    HPI:   Patient is a 74 y.o. 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 Patient is immediate post-surgery and still confused due to effects of anesthesia. History was obtained per chart review. Patient was seen by Dr. Eng in April for treatment of panhypopituitarism.    Per Dr. Eng's note:  With regards to the panhypopit  Spinal fusion surgery at age 16; proceeded with pituitary adenoma - nonfunctional. Received radiation treatment at [facility]. Followed by endocrinologist out of [place].     With regards to the Secondary adrenal insufficiency-   current dose: Hydrocortisone 10 mg PO BID. Knows sick day precautions. Never been hospitalized for AI     With regards to the Secondary hypothyroidism -    Current dose: Levothyroxine 100 mcg PO once daily. Takes medication properly. Symptoms:  Postive for Hot/cold intolerance and Anxiety.    With regards to theSecondary hypogonadism-   Since he was 20 years old on Testosterone replacement therapy - 150 mg q 2 weeks. Satisfied with Libido and sexual performance     With regards to theSecondary AGHD -  was on GH (for 2 weeks in 2018 ) currently off of it due to expenses      Plan was for patient to receive IV hydrocortisone pre-op and intra-op. However, it doesn't appear he's received any steroids up until now.    Interval HPI:   Overnight events: Doing well, Diet started this afternoon, eating lunch. Plan to be step out of ICU  Eatin%  Nausea: No  Hypoglycemia and intervention: No  Fever: No  TPN and/or TF: No  If yes, type of TF/TPN and rate: n/a    /68   Pulse 83    "Temp 98.1 °F (36.7 °C) (Oral)   Resp 18   Ht 5' 6" (1.676 m)   Wt 76.1 kg (167 lb 12.3 oz)   SpO2 100%   BMI 27.08 kg/m²      Labs Reviewed and Include    Recent Labs   Lab 06/24/19  0429   GLU 88   CALCIUM 7.8*   ALBUMIN 3.1*   PROT 6.0      K 3.4*   CO2 22*      BUN 14   CREATININE 0.8   ALKPHOS 38*   ALT 21   AST 34   BILITOT 1.1*     Lab Results   Component Value Date    WBC 10.53 06/24/2019    HGB 13.2 (L) 06/24/2019    HCT 40.1 06/24/2019    MCV 91 06/24/2019     06/24/2019     Recent Labs   Lab 06/21/19  1026   TSH 0.022*   FREET4 1.48     No results found for: HGBA1C    Nutritional status:   Body mass index is 27.08 kg/m².  Lab Results   Component Value Date    ALBUMIN 3.1 (L) 06/24/2019    ALBUMIN 3.9 06/20/2019    ALBUMIN 4.4 06/14/2019     No results found for: PREALBUMIN    Estimated Creatinine Clearance: 75.3 mL/min (based on SCr of 0.8 mg/dL).    Accu-Checks  Recent Labs     06/21/19  1702 06/22/19  1802   POCTGLUCOSE 102 97       Current Medications and/or Treatments Impacting Glycemic Control  Immunotherapy:    Immunosuppressants     None        Steroids:   Hormones (From admission, onward)    Start     Stop Route Frequency Ordered    06/23/19 2100  hydrocortisone sodium succinate injection 50 mg      -- IV 2 times daily 06/23/19 1214        Pressors:    Autonomic Drugs (From admission, onward)    None        Hyperglycemia/Diabetes Medications:   Antihyperglycemics (From admission, onward)    None          ASSESSMENT and PLAN    * Meningioma, cerebral  S/p elective L temporal meningioma resection (6/20).   Stress steroids ordered due to significant surgery, weaning down  Stable at this time, stepping out of [facility]  Other neurological issue, manage by NSGY    Delirium  - stable on eval      Adrenal insufficiency  Mr Sanz, with history of panhypopituitarism and Adrenal insufficiency is having surgery for removal of meningioma.   No signs of acute adrenal crisis - vitals " normal    Recommendation  - Currently on hydrocortisone to 50 mg IV BID  - Will wean based on clinical improvement and continue to wean until back to his home oral dosing (HC 10/10)  - anticipate to PO tomorrow    Hypogonadism in male  On testosterone replacement chronically.   According to the family patient will get regular scheduled testosterone replacement tomorrow.   Okay to hold off until discharge.       Secondary hypothyroidism  Was on IV synthroid, last dose was 6/24/2019  Resume home dose of LT4 - 100 mcg daily starting 6/25 as he is tolerating diet      Panhypopituitarism  See individual hormonal assessments.           Riley Quiñones MD  Endocrinology  [facility], Keshia Eng MD,  have personally taken the history and examined the patient and agree with the resident's note as stated above.

## 2019-06-24 NOTE — SUBJECTIVE & OBJECTIVE
"Interval HPI:   Overnight events: Doing well, Diet started this afternoon, eating lunch. Plan to be step out of ICU  Eatin%  Nausea: No  Hypoglycemia and intervention: No  Fever: No  TPN and/or TF: No  If yes, type of TF/TPN and rate: n/a    /68   Pulse 83   Temp 98.1 °F (36.7 °C) (Oral)   Resp 18   Ht 5' 6" (1.676 m)   Wt 76.1 kg (167 lb 12.3 oz)   SpO2 100%   BMI 27.08 kg/m²     Labs Reviewed and Include    Recent Labs   Lab 19  0429   GLU 88   CALCIUM 7.8*   ALBUMIN 3.1*   PROT 6.0      K 3.4*   CO2 22*      BUN 14   CREATININE 0.8   ALKPHOS 38*   ALT 21   AST 34   BILITOT 1.1*     Lab Results   Component Value Date    WBC 10.53 2019    HGB 13.2 (L) 2019    HCT 40.1 2019    MCV 91 2019     2019     Recent Labs   Lab 19  1026   TSH 0.022*   FREET4 1.48     No results found for: HGBA1C    Nutritional status:   Body mass index is 27.08 kg/m².  Lab Results   Component Value Date    ALBUMIN 3.1 (L) 2019    ALBUMIN 3.9 2019    ALBUMIN 4.4 2019     No results found for: PREALBUMIN    Estimated Creatinine Clearance: 75.3 mL/min (based on SCr of 0.8 mg/dL).    Accu-Checks  Recent Labs     19  1702 19  1802   POCTGLUCOSE 102 97       Current Medications and/or Treatments Impacting Glycemic Control  Immunotherapy:    Immunosuppressants     None        Steroids:   Hormones (From admission, onward)    Start     Stop Route Frequency Ordered    19 2100  hydrocortisone sodium succinate injection 50 mg      -- IV 2 times daily 19 1214        Pressors:    Autonomic Drugs (From admission, onward)    None        Hyperglycemia/Diabetes Medications:   Antihyperglycemics (From admission, onward)    None        "

## 2019-06-25 LAB
ALBUMIN SERPL BCP-MCNC: 2.7 G/DL (ref 3.5–5.2)
ALP SERPL-CCNC: 41 U/L (ref 55–135)
ALT SERPL W/O P-5'-P-CCNC: 66 U/L (ref 10–44)
ANION GAP SERPL CALC-SCNC: 6 MMOL/L (ref 8–16)
AST SERPL-CCNC: 73 U/L (ref 10–40)
BASOPHILS # BLD AUTO: 0.03 K/UL (ref 0–0.2)
BASOPHILS NFR BLD: 0.4 % (ref 0–1.9)
BILIRUB SERPL-MCNC: 0.8 MG/DL (ref 0.1–1)
BUN SERPL-MCNC: 15 MG/DL (ref 8–23)
CALCIUM SERPL-MCNC: 7.6 MG/DL (ref 8.7–10.5)
CHLORIDE SERPL-SCNC: 107 MMOL/L (ref 95–110)
CO2 SERPL-SCNC: 23 MMOL/L (ref 23–29)
CREAT SERPL-MCNC: 0.7 MG/DL (ref 0.5–1.4)
DIFFERENTIAL METHOD: ABNORMAL
EOSINOPHIL # BLD AUTO: 0.1 K/UL (ref 0–0.5)
EOSINOPHIL NFR BLD: 1.4 % (ref 0–8)
ERYTHROCYTE [DISTWIDTH] IN BLOOD BY AUTOMATED COUNT: 15 % (ref 11.5–14.5)
EST. GFR  (AFRICAN AMERICAN): >60 ML/MIN/1.73 M^2
EST. GFR  (NON AFRICAN AMERICAN): >60 ML/MIN/1.73 M^2
GLUCOSE SERPL-MCNC: 96 MG/DL (ref 70–110)
HCT VFR BLD AUTO: 39.8 % (ref 40–54)
HGB BLD-MCNC: 13.2 G/DL (ref 14–18)
IMM GRANULOCYTES # BLD AUTO: 0.03 K/UL (ref 0–0.04)
IMM GRANULOCYTES NFR BLD AUTO: 0.4 % (ref 0–0.5)
INR PPP: 1 (ref 0.8–1.2)
LYMPHOCYTES # BLD AUTO: 1.1 K/UL (ref 1–4.8)
LYMPHOCYTES NFR BLD: 13.7 % (ref 18–48)
MAGNESIUM SERPL-MCNC: 2.1 MG/DL (ref 1.6–2.6)
MCH RBC QN AUTO: 29.9 PG (ref 27–31)
MCHC RBC AUTO-ENTMCNC: 33.2 G/DL (ref 32–36)
MCV RBC AUTO: 90 FL (ref 82–98)
MONOCYTES # BLD AUTO: 0.5 K/UL (ref 0.3–1)
MONOCYTES NFR BLD: 6 % (ref 4–15)
NEUTROPHILS # BLD AUTO: 6.2 K/UL (ref 1.8–7.7)
NEUTROPHILS NFR BLD: 78.1 % (ref 38–73)
NRBC BLD-RTO: 0 /100 WBC
PHOSPHATE SERPL-MCNC: 2.8 MG/DL (ref 2.7–4.5)
PLATELET # BLD AUTO: 168 K/UL (ref 150–350)
PMV BLD AUTO: 12.7 FL (ref 9.2–12.9)
POTASSIUM SERPL-SCNC: 5 MMOL/L (ref 3.5–5.1)
PROT SERPL-MCNC: 5.6 G/DL (ref 6–8.4)
PROTHROMBIN TIME: 10.1 SEC (ref 9–12.5)
RBC # BLD AUTO: 4.42 M/UL (ref 4.6–6.2)
SODIUM SERPL-SCNC: 136 MMOL/L (ref 136–145)
WBC # BLD AUTO: 7.87 K/UL (ref 3.9–12.7)

## 2019-06-25 PROCEDURE — 94761 N-INVAS EAR/PLS OXIMETRY MLT: CPT

## 2019-06-25 PROCEDURE — 99233 SBSQ HOSP IP/OBS HIGH 50: CPT | Mod: ,,, | Performed by: NURSE PRACTITIONER

## 2019-06-25 PROCEDURE — 84100 ASSAY OF PHOSPHORUS: CPT

## 2019-06-25 PROCEDURE — 85025 COMPLETE CBC W/AUTO DIFF WBC: CPT

## 2019-06-25 PROCEDURE — 85610 PROTHROMBIN TIME: CPT

## 2019-06-25 PROCEDURE — 25000003 PHARM REV CODE 250: Performed by: HOSPITALIST

## 2019-06-25 PROCEDURE — 63600175 PHARM REV CODE 636 W HCPCS: Performed by: NURSE PRACTITIONER

## 2019-06-25 PROCEDURE — C9254 INJECTION, LACOSAMIDE: HCPCS | Performed by: PSYCHIATRY & NEUROLOGY

## 2019-06-25 PROCEDURE — 20600001 HC STEP DOWN PRIVATE ROOM

## 2019-06-25 PROCEDURE — 97166 OT EVAL MOD COMPLEX 45 MIN: CPT

## 2019-06-25 PROCEDURE — 25000003 PHARM REV CODE 250: Performed by: STUDENT IN AN ORGANIZED HEALTH CARE EDUCATION/TRAINING PROGRAM

## 2019-06-25 PROCEDURE — 25000003 PHARM REV CODE 250: Performed by: PSYCHIATRY & NEUROLOGY

## 2019-06-25 PROCEDURE — 99232 SBSQ HOSP IP/OBS MODERATE 35: CPT | Mod: ,,, | Performed by: INTERNAL MEDICINE

## 2019-06-25 PROCEDURE — 92526 ORAL FUNCTION THERAPY: CPT

## 2019-06-25 PROCEDURE — 99232 PR SUBSEQUENT HOSPITAL CARE,LEVL II: ICD-10-PCS | Mod: ,,, | Performed by: INTERNAL MEDICINE

## 2019-06-25 PROCEDURE — 63600175 PHARM REV CODE 636 W HCPCS: Performed by: PSYCHIATRY & NEUROLOGY

## 2019-06-25 PROCEDURE — 99233 PR SUBSEQUENT HOSPITAL CARE,LEVL III: ICD-10-PCS | Mod: ,,, | Performed by: NURSE PRACTITIONER

## 2019-06-25 PROCEDURE — 83735 ASSAY OF MAGNESIUM: CPT

## 2019-06-25 PROCEDURE — S0028 INJECTION, FAMOTIDINE, 20 MG: HCPCS | Performed by: STUDENT IN AN ORGANIZED HEALTH CARE EDUCATION/TRAINING PROGRAM

## 2019-06-25 PROCEDURE — 80053 COMPREHEN METABOLIC PANEL: CPT

## 2019-06-25 RX ORDER — HYDRALAZINE HYDROCHLORIDE 20 MG/ML
10 INJECTION INTRAMUSCULAR; INTRAVENOUS EVERY 6 HOURS PRN
Status: DISCONTINUED | OUTPATIENT
Start: 2019-06-25 | End: 2019-06-26 | Stop reason: HOSPADM

## 2019-06-25 RX ORDER — FAMOTIDINE 20 MG/1
20 TABLET, FILM COATED ORAL 2 TIMES DAILY
Status: DISCONTINUED | OUTPATIENT
Start: 2019-06-25 | End: 2019-06-26 | Stop reason: HOSPADM

## 2019-06-25 RX ORDER — HYDRALAZINE HYDROCHLORIDE 20 MG/ML
10 INJECTION INTRAMUSCULAR; INTRAVENOUS EVERY 6 HOURS PRN
Status: DISCONTINUED | OUTPATIENT
Start: 2019-06-25 | End: 2019-06-25

## 2019-06-25 RX ORDER — LACOSAMIDE 50 MG/1
100 TABLET ORAL EVERY 12 HOURS
Status: DISCONTINUED | OUTPATIENT
Start: 2019-06-25 | End: 2019-06-26 | Stop reason: HOSPADM

## 2019-06-25 RX ADMIN — HEPARIN SODIUM 5000 UNITS: 5000 INJECTION, SOLUTION INTRAVENOUS; SUBCUTANEOUS at 08:06

## 2019-06-25 RX ADMIN — HYDRALAZINE HYDROCHLORIDE 50 MG: 50 TABLET ORAL at 06:06

## 2019-06-25 RX ADMIN — DOCUSATE SODIUM 100 MG: 100 CAPSULE, LIQUID FILLED ORAL at 08:06

## 2019-06-25 RX ADMIN — HYDROCORTISONE SODIUM SUCCINATE 50 MG: 100 INJECTION, POWDER, FOR SOLUTION INTRAMUSCULAR; INTRAVENOUS at 08:06

## 2019-06-25 RX ADMIN — SODIUM CHLORIDE 100 MG: 9 INJECTION, SOLUTION INTRAVENOUS at 08:06

## 2019-06-25 RX ADMIN — MUPIROCIN 1 G: 20 OINTMENT TOPICAL at 09:06

## 2019-06-25 RX ADMIN — FAMOTIDINE 20 MG: 10 INJECTION INTRAVENOUS at 08:06

## 2019-06-25 RX ADMIN — HYDRALAZINE HYDROCHLORIDE 50 MG: 50 TABLET ORAL at 02:06

## 2019-06-25 RX ADMIN — HEPARIN SODIUM 5000 UNITS: 5000 INJECTION, SOLUTION INTRAVENOUS; SUBCUTANEOUS at 02:06

## 2019-06-25 RX ADMIN — HYDRALAZINE HYDROCHLORIDE 50 MG: 50 TABLET ORAL at 08:06

## 2019-06-25 RX ADMIN — HEPARIN SODIUM 5000 UNITS: 5000 INJECTION, SOLUTION INTRAVENOUS; SUBCUTANEOUS at 06:06

## 2019-06-25 RX ADMIN — MUPIROCIN 1 G: 20 OINTMENT TOPICAL at 08:06

## 2019-06-25 RX ADMIN — LEVOTHYROXINE SODIUM 100 MCG: 100 TABLET ORAL at 06:06

## 2019-06-25 RX ADMIN — LACOSAMIDE 100 MG: 50 TABLET, FILM COATED ORAL at 08:06

## 2019-06-25 RX ADMIN — FAMOTIDINE 20 MG: 20 TABLET, FILM COATED ORAL at 08:06

## 2019-06-25 NOTE — ASSESSMENT & PLAN NOTE
Pt found to have L temporal meningioma on CT workup for parkinsons  Pt admitted to [facility] for aggression overnight, AMS, and concern for neuro exam change. 6/21 Pt re-admitted to [facility] for observation and monitoring.   - s/p elective resection 6/20 POD#4  -NSGY following    -drains removed today by MECHELLE, Ancef dcd  - neuro checks q 1hr   -lacosamide 100mg q12h  -vital signs q 1hr   -SBP goal <160  6/21 Post-op CTH showed scattered pneumocephalus and small vol SAH  -PT/OT/SLP

## 2019-06-25 NOTE — ANESTHESIA POSTPROCEDURE EVALUATION
Anesthesia Post Evaluation    Patient: Ravinder Sanz    Procedure(s) Performed: Procedure(s) (LRB):  CRANIOTOMY, USING FRAMELESS STEREOTAXY (Left)    Final Anesthesia Type: general  Patient location during evaluation: ICU  Patient participation: Yes- Able to Participate  Level of consciousness: awake and alert and oriented  Post-procedure vital signs: reviewed and stable  Pain management: adequate  Airway patency: patent  PONV status at discharge: No PONV  Anesthetic complications: no      Cardiovascular status: hemodynamically stable  Respiratory status: unassisted, spontaneous ventilation and room air  Hydration status: euvolemic  Follow-up not needed.          Vitals Value Taken Time   /87 6/25/2019  4:01 PM   Temp 36.9 °C (98.5 °F) 6/25/2019  7:02 AM   Pulse 87 6/25/2019  4:23 PM   Resp 21 6/25/2019  4:23 PM   SpO2 95 % 6/25/2019  4:23 PM   Vitals shown include unvalidated device data.      No case tracking events are documented in the log.      Pain/Suzi Score: No data recorded

## 2019-06-25 NOTE — ASSESSMENT & PLAN NOTE
See individual hormonal assessments.   From Endocrine standpoint, recommend restarting home dose of Levothyroxine and PO HC steroid  Defer discharge planning to primary team.

## 2019-06-25 NOTE — ASSESSMENT & PLAN NOTE
Pt with Hx of panhypopituitarism from adenoma as a child  -continue hydrocortisone 50mg q12h -weaning  -endocrine following and appreciate reccs

## 2019-06-25 NOTE — ASSESSMENT & PLAN NOTE
-on testosterone replacement chronically post op, per endocrinology, ok to  hold until discharge  -endocrine following

## 2019-06-25 NOTE — NURSING
Pt transferred to room 7084 without complications.  Significant other and sister at bedside and present during transfer. Personal items in transferred with patient. Bedside handoff given to NGUYỄN Laboy.  Pt denies any additional needs.

## 2019-06-25 NOTE — PLAN OF CARE
Problem: Adult Inpatient Plan of Care  Goal: Plan of Care Review  Transfer orders awaiting open bed   Interventions implemented as appropriate   POC reviewed with pt at 0500. Pt verbalized understanding. Questions and concerns addressed. No acute events overnight. Pt progressing toward goals. Will continue to monitor. See flowsheets for full assessment and VS info

## 2019-06-25 NOTE — ASSESSMENT & PLAN NOTE
Pt found to have L temporal meningioma on CT workup for parkinsons  6/21Pt re-admitted to [facility] for aggression overnight, AMS, and concern for neuro exam change.     - s/p elective resection 6/20 POD#5  -NSGY following    -drains removed 6/24 by Shari MIN dcd  - neuro checks q 1hr   -lacosamide 100mg q12h  -vital signs q 1hr   -SBP goal <160  6/21 Post-op CTH showed scattered pneumocephalus and small vol SAH  -PT/OT/SLP   Step down to neurosurgery, waiting for bed

## 2019-06-25 NOTE — PLAN OF CARE
Problem: Occupational Therapy Goal  Goal: Occupational Therapy Goal  Goals to be met by: 7/2     Patient will increase functional independence with ADLs by performing:    UE Dressing with Modified [place] and Set-up Assistance.  LE Dressing (pants, brief) with Set-up Assistance and Contact Guard Assistance.  Grooming while standing at sink with Modified [place] and Set-up Assistance.  Toileting from toilet with Stand-by Assistance for hygiene and clothing management.   Toilet transfer to toilet with Stand-by Assistance.  Pt will complete multi step task with 0 added assistance in timely manner.   Pt will complete functional dynamic task to sim home return roles with SBA and 0 LOB.    Outcome: Ongoing (interventions implemented as appropriate)  OT eval completed. rec rehab pending further progression in care. GUADALUPE Pearson 6/25/2019

## 2019-06-25 NOTE — PT/OT/SLP PROGRESS
Speech Language Pathology Treatment    Patient Name:  Ravinder Sanz   MRN:  [id number]  Admitting Diagnosis: Meningioma, cerebral    Recommendations:                 General Recommendations:  Dysphagia therapy  Diet recommendations:  Regular, Liquid Diet Level: Thin   Aspiration Precautions: Feed only when awake/alert, HOB to 90 degrees and Small bites/sips   General Precautions: Standard, aspiration, fall, seizure  Communication strategies:  none    Subjective     Awake/alert  Sister and brother in law at bedside    Pain/Comfort:  · Pain Rating 1: 0/10  · Pain Rating Post-Intervention 1: 0/10    Objective:     Has the patient been evaluated by SLP for swallowing?   Yes  Keep patient NPO? No   Current Respiratory Status: room air      Pt repositioned upright in bed for PO trials. He tolerated gilson cracker x5 and thin liquids x4 oz via cup with timely swallow initiation and no overt clinical signs of airway compromise. SLP reviewed swallow precautions and diet recs with pt and family who verbalized understanding. Pt completed reading/writing task with 100% accy. Mild confusion noted with clock drawing. Pt began drawing grandfather clock instead of clock face. Once clarified, pt ian clock without difficulty. Continue POC at this time.     Assessment:     Ravinder Sanz is a 74 y.o. male with an SLP diagnosis of Dysphagia.    Goals:   Multidisciplinary Problems     SLP Goals        Problem: SLP Goal    Goal Priority Disciplines Outcome   SLP Goal     SLP Ongoing (interventions implemented as appropriate)   Description:  Goals to be met 6/29  1 pt will participate in ongoing swallow assessment to determine safe po diet  2 Pt will participate in sle  MET  3. Pt will participate in ongoing assessment of reading, writing, and visual spatial skills                         Plan:     · Patient to be seen:  4 x/week   · Plan of Care expires:  07/22/19  · Plan of Care reviewed with:  patient, family   · SLP Follow-Up:  Yes        Discharge recommendations:  (tbd)       Time Tracking:     SLP Treatment Date:   06/25/19  Speech Start Time:  0948  Speech Stop Time:  0959     Speech Total Time (min):  11 min    Billable Minutes: Treatment Swallowing Dysfunction 11    Tara Feliz CCC-SLP  06/25/2019

## 2019-06-25 NOTE — SUBJECTIVE & OBJECTIVE
"Interval HPI:   Overnight events: Doing well, diet advanced per primary. To be out of ICU today  Eatin%-100%  Nausea: No  Hypoglycemia and intervention: No  Fever: No  TPN and/or TF: No  If yes, type of TF/TPN and rate: n/a    BP (!) 155/87   Pulse 104   Temp 98.5 °F (36.9 °C) (Oral)   Resp 17   Ht 5' 6" (1.676 m)   Wt 76.1 kg (167 lb 12.3 oz)   SpO2 100%   BMI 27.08 kg/m²     Labs Reviewed and Include    Recent Labs   Lab 19  0539   GLU 96   CALCIUM 7.6*   ALBUMIN 2.7*   PROT 5.6*      K 5.0   CO2 23      BUN 15   CREATININE 0.7   ALKPHOS 41*   ALT 66*   AST 73*   BILITOT 0.8     Lab Results   Component Value Date    WBC 7.87 2019    HGB 13.2 (L) 2019    HCT 39.8 (L) 2019    MCV 90 2019     2019     Recent Labs   Lab 19  1026   TSH 0.022*   FREET4 1.48     No results found for: HGBA1C    Nutritional status:   Body mass index is 27.08 kg/m².  Lab Results   Component Value Date    ALBUMIN 2.7 (L) 2019    ALBUMIN 3.1 (L) 2019    ALBUMIN 3.9 2019     No results found for: PREALBUMIN    Estimated Creatinine Clearance: 86.1 mL/min (based on SCr of 0.7 mg/dL).    Accu-Checks  Recent Labs     19  1802   POCTGLUCOSE 97       Current Medications and/or Treatments Impacting Glycemic Control  Immunotherapy:    Immunosuppressants     None        Steroids:   Hormones (From admission, onward)    Start     Stop Route Frequency Ordered    19 2100  hydrocortisone sodium succinate injection 50 mg      -- IV 2 times daily 19 1214        Pressors:    Autonomic Drugs (From admission, onward)    None        Hyperglycemia/Diabetes Medications:   Antihyperglycemics (From admission, onward)    None        "

## 2019-06-25 NOTE — PROGRESS NOTES
[facility]  Endocrinology  Progress Note    Admit Date: 2019     Reason for Consult: Panhypopituitarism; history of transphenoidal pituitary adenoma resection    HPI:   Patient is a 74 y.o. 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 Patient is immediate post-surgery and still confused due to effects of anesthesia. History was obtained per chart review. Patient was seen by Dr. Eng in April for treatment of panhypopituitarism.    Per Dr. Eng's note:  With regards to the panhypopit  Spinal fusion surgery at age 16; proceeded with pituitary adenoma - nonfunctional. Received radiation treatment at [facility]. Followed by endocrinologist out of [place].     With regards to the Secondary adrenal insufficiency-   current dose: Hydrocortisone 10 mg PO BID. Knows sick day precautions. Never been hospitalized for AI     With regards to the Secondary hypothyroidism -    Current dose: Levothyroxine 100 mcg PO once daily. Takes medication properly. Symptoms:  Postive for Hot/cold intolerance and Anxiety.    With regards to theSecondary hypogonadism-   Since he was 20 years old on Testosterone replacement therapy - 150 mg q 2 weeks. Satisfied with Libido and sexual performance     With regards to theSecondary AGHD -  was on GH (for 2 weeks in 2018 ) currently off of it due to expenses      Plan was for patient to receive IV hydrocortisone pre-op and intra-op. However, it doesn't appear he's received any steroids up until now.    Interval HPI:   Overnight events: Doing well, diet advanced per primary. To be out of ICU today  Eatin%-100%  Nausea: No  Hypoglycemia and intervention: No  Fever: No  TPN and/or TF: No  If yes, type of TF/TPN and rate: n/a    BP (!) 155/87   Pulse 104   Temp 98.5 °F  "(36.9 °C) (Oral)   Resp 17   Ht 5' 6" (1.676 m)   Wt 76.1 kg (167 lb 12.3 oz)   SpO2 100%   BMI 27.08 kg/m²      Labs Reviewed and Include    Recent Labs   Lab 06/25/19  0539   GLU 96   CALCIUM 7.6*   ALBUMIN 2.7*   PROT 5.6*      K 5.0   CO2 23      BUN 15   CREATININE 0.7   ALKPHOS 41*   ALT 66*   AST 73*   BILITOT 0.8     Lab Results   Component Value Date    WBC 7.87 06/25/2019    HGB 13.2 (L) 06/25/2019    HCT 39.8 (L) 06/25/2019    MCV 90 06/25/2019     06/25/2019     Recent Labs   Lab 06/21/19  1026   TSH 0.022*   FREET4 1.48     No results found for: HGBA1C    Nutritional status:   Body mass index is 27.08 kg/m².  Lab Results   Component Value Date    ALBUMIN 2.7 (L) 06/25/2019    ALBUMIN 3.1 (L) 06/24/2019    ALBUMIN 3.9 06/20/2019     No results found for: PREALBUMIN    Estimated Creatinine Clearance: 86.1 mL/min (based on SCr of 0.7 mg/dL).    Accu-Checks  Recent Labs     06/22/19  1802   POCTGLUCOSE 97       Current Medications and/or Treatments Impacting Glycemic Control  Immunotherapy:    Immunosuppressants     None        Steroids:   Hormones (From admission, onward)    Start     Stop Route Frequency Ordered    06/23/19 2100  hydrocortisone sodium succinate injection 50 mg      -- IV 2 times daily 06/23/19 1214        Pressors:    Autonomic Drugs (From admission, onward)    None        Hyperglycemia/Diabetes Medications:   Antihyperglycemics (From admission, onward)    None          ASSESSMENT and PLAN    * Meningioma, cerebral  S/p elective L temporal meningioma resection (6/20).   Stable at this time, stepping out of NCC  Recommend wean down to PO HC steroid home dose  Other neurological issue, manage by NSGY      Delirium  - stable on eval      Adrenal insufficiency  Mr Sanz, with history of panhypopituitarism and Adrenal insufficiency is having surgery for removal of meningioma.   No signs of acute adrenal crisis - vitals normal    Recommendation  - Currently on " hydrocortisone to 50 mg IV BID per NSGY  - Given clinical improvement, stable VS, eating his meals we recommend restarting home hydrocortisone (CORTEF) 10 MG Tab BID      Hypogonadism in male  On testosterone replacement chronically.   According to the family patient will get regular scheduled testosterone replacement tomorrow.   Okay to hold off until discharge.       Secondary hypothyroidism  Was on IV synthroid, last dose was 6/24/2019  On home dose of LT4 - 100 mcg daily     Panhypopituitarism  See individual hormonal assessments.   From Endocrine standpoint, recommend restarting home dose of Levothyroxine and PO HC steroid  Defer discharge planning to primary team.            Riley Quiñones MD  Endocrinology  [facility]    YURIY, Keshia Eng MD,  have personally taken the history and examined the patient and agree with the resident's note as stated above.

## 2019-06-25 NOTE — PLAN OF CARE
Problem: Adult Inpatient Plan of Care  Goal: Plan of Care Review  Outcome: Ongoing (interventions implemented as appropriate)  POC reviewed with pt and family at 1200.  Neuro intact, up to chair with min assist; voiding without complications.  Tolerating regular diet well.  Slight tremors seen throughout the day but markedly improved according to pt and s/o.  Denies pain.  Desires to go home vs rehab facility.  Plans to transfer to stepdown unit.  Pt and family questions and concerns addressed.

## 2019-06-25 NOTE — ASSESSMENT & PLAN NOTE
S/p elective L temporal meningioma resection (6/20).   Stable at this time, stepping out of NCC  Recommend wean down to PO HC steroid home dose  Other neurological issue, manage by NSGY

## 2019-06-25 NOTE — PLAN OF CARE
06/24/19 2014   Discharge Reassessment   Assessment Type Discharge Planning Reassessment   Provided patient/caregiver education on the expected discharge date and the discharge plan Yes   Do you have any problems affording any of your prescribed medications? No   Discharge Plan A Rehab   Discharge Plan B Home Health   DME Needed Upon Discharge  other (see comments)  (tbd)   Patient choice form signed by patient/caregiver N/A   Anticipated Discharge Disposition Rehab   Can the patient answer the patient profile reliably? Yes, cognitively intact   How does the patient rate their overall health at the present time? Good   Describe the patient's ability to walk at the present time. No restrictions   How often would a person be available to care for the patient? Whenever needed   Number of comorbid conditions (as recorded on the chart) None   During the past month, has the patient often been bothered by feeling down, depressed or hopeless? No   During the past month, has the patient often been bothered by little interest or pleasure in doing things? No   Post-Acute Status   Post-Acute Authorization Placement   Post-Acute Placement Status Awaiting Therapy Documentation   Discharge Delays None known at this time       Jana Palafox RN, CCRN-K, [facility]  Neuro-Critical Care   X [phone]

## 2019-06-25 NOTE — PT/OT/SLP EVAL
"Physical Therapy Evaluation    Patient Name:  Ravinder Sanz   MRN:  [id number]    Recommendations:     Discharge Recommendations:  rehabilitation facility   Discharge Equipment Recommendations: other (see comments)(TBD)   Barriers to discharge: Inaccessible home and Decreased caregiver support    Assessment:     Ravinder Sanz is a 74 y.o. male admitted with a medical diagnosis of Meningioma, cerebral.  He presents with the following impairments/functional limitations:  weakness, gait instability, impaired cardiopulmonary response to activity, impaired endurance, impaired balance, decreased lower extremity function, impaired coordination, decreased safety awareness, impaired cognition, impaired functional mobilty Patient tolerated initial evaluation well, but did require frequent reinforcement to increase safety during gait. He would benefit from continued PT services during his LOS to improve functional mobility as well as increasing safety. He was pleasant throughout the session and completed all assessment tasks.     Rehab Prognosis: Good; patient would benefit from acute skilled PT services to address these deficits and reach maximum level of function.    Recent Surgery: Procedure(s) (LRB):  CRANIOTOMY, USING FRAMELESS STEREOTAXY (Left) 6 Days Post-Op    Plan:     During this hospitalization, patient to be seen 4 x/week to address the identified rehab impairments via gait training, therapeutic activities, therapeutic exercises, neuromuscular re-education and progress toward the following goals:    · Plan of Care Expires:  07/23/19    Subjective     Chief Complaint: gait instability, impaired coordination and decreased safety awareness  Patient/Family Comments/goals: "I am going good today, thank you for coming in to see me."  Pain/Comfort:  · Pain Rating 1: 0/10  · Pain Rating Post-Intervention 1: 0/10    Patients cultural, spiritual, Mu-ism conflicts given the current situation:      Living " Environment:  Patient lives in 1 story home with 0 VICKI with , who is home all of the time. Patient has a tub/shower. He was not using any DME PTA, and currently does not own any. He is still driving and is retired, but enjoys painting as a hobby.   Prior to admission, patients level of function was independent with all functional tasks and ADL's Per sister, he was off-balance prior to the surgery secondary to the meningioma, but was able to do everything for himself.  Equipment used at home: none.  DME owned (not currently used): none.  Upon discharge, patient will have assistance from , Jong, and neighbors who he reports is a retired nurse.    Objective:     Communicated with RN prior to session.  Patient found supine with peripheral IV, Condom Catheter, telemetry  upon PT entry to room. Sister present in the room upon entry. Agreeable to participate in initial evaluation.     General Precautions: Standard, fall, aspiration, NPO, seizure   Orthopedic Precautions:N/A   Braces: N/A     Exams:  · Cognitive Exam:  Patient is oriented to Person, Place, Time and Situation  · Fine Motor Coordination:    · -       Intact  Left hand thumb/finger opposition skills and Right hand thumb/finger opposition skills  · Postural Exam:  Patient presented with the following abnormalities:    · -       Rounded shoulders  · -       Forward head  · Sensation:    · -       Intact  light/touch BLE  · RLE ROM: WFL  · RLE Strength: WFL  · LLE ROM: WFL  · LLE Strength: WFL    Functional Mobility:  · Bed Mobility:     · Rolling Left:  contact guard assistance  · Scooting: contact guard assistance  · Supine to Sit: minimum assistance  · Transfers:     · Sit to Stand:  minimum assistance with no AD  · Gait: 2 trials: 10ft - 5 ft forward and 5ft backward stepping /c Moderate Assistance each trial due to occ LOB and decreased safety awareness; forward flexed head posture   · Balance: poor+ dynamic standing balance; good balance  sitting EOB      Therapeutic Activities and Exercises:  - Educated patient and sister about current discharge recommendation and the benefits of IP rehab based on the patient's current level of function.   -Questions and concerns addressed   -Patient educated on the importance of calling for assistance when returning to the bed secondary to increasing safety; explained the benefit of safe transfers for PT/OT or nsg staff at this time     AM-PAC 6 CLICK MOBILITY  Total Score:16     Patient left up in chair with all lines intact, call button in reach, chair alarm on, RN notified and sister present.    GOALS:   Multidisciplinary Problems     Physical Therapy Goals        Problem: Physical Therapy Goal    Goal Priority Disciplines Outcome Goal Variances Interventions   Physical Therapy Goal     PT, PT/OT Ongoing (interventions implemented as appropriate)     Description:  Goals to be met by: 7/24/19    Patient will increase functional independence with mobility by performing:    Supine to sit with Supervision.  Sit to supine with Supervision.  Sit to stand transfer with Stand-by Assistance  Bed to chair transfer with Stand-by Assistance using LRD.  Gait  x 150 feet with Contact Guard Assistance using LRD.                       History:     Past Medical History:   Diagnosis Date    Anxiety     Eczema     Hyperlipidemia     Hypogonadism in male     Hypopituitarism     Hypothyroidism        Past Surgical History:   Procedure Laterality Date    CRANIOTOMY, USING FRAMELESS STEREOTAXY Left 6/19/2019    Performed by Otis [facility] at [facility] OR [address] [place] [address]    PITUITARY SURGERY      SPINAL FUSION         Time Tracking:     PT Received On: 06/24/19  PT Start Time: 1423     PT Stop Time: 1508  PT Total Time (min): 45 min     Billable Minutes: Evaluation 30 and Therapeutic Activity 15      Alix Martines, PT  06/25/2019

## 2019-06-25 NOTE — PT/OT/SLP EVAL
Occupational Therapy   Evaluation    Name: Ravinder Sanz  MRN: [id number]  Admitting Diagnosis:  Meningioma, cerebral 6 Days Post-Op L crani for resection     Recommendations:     Discharge Recommendations: rehabilitation facility  Discharge Equipment Recommendations:  shower chair  Barriers to discharge:  Other (Comment)(remains in ICU)    Assessment:     Ravinder Sanz is a 74 y.o. male with a medical diagnosis of Meningioma, cerebral.  He presents with a decline in his functional safety and participation in daily tasks/activities at this time. He demo good motivation and participation this session. He remains in ICU at this time and cont to have noted edema on scans. Rec Rehab at this time pending further progression in care. Performance deficits affecting function: impaired endurance, impaired self care skills, impaired functional mobilty, gait instability, decreased coordination, impaired cognition, decreased safety awareness.      Rehab Prognosis: Good; patient would benefit from acute skilled OT services to address these deficits and reach maximum level of function.       Plan:     Patient to be seen 4 x/week to address the above listed problems via self-care/home management, therapeutic activities, therapeutic exercises, neuromuscular re-education, cognitive retraining  · Plan of Care Expires: 07/23/19  · Plan of Care Reviewed with: patient, family    Subjective     Chief Complaint: none reported   Patient/Family Comments/goals: whatever I need to get better    Occupational Profile:  Living Environment: Pt lives with partner in [facility] with 0 VICKI. Tub/shower combo.   Previous level of function: wears glasses. Driving. indep with ADLs/mobility. Enjoys gardening and painting (oil, watercolor)-- use to teach painting. Enjoys home mgmt task/indep- cooking. B resting hand tremor.   Roles and Routines: , care taker to self, home and community dweller  Equipment Used at Home:  none  Assistance upon Discharge: yes,  good support and assist as needed from     Pain/Comfort:  · Pain Rating 1: 0/10  · Pain Rating Post-Intervention 1: 0/10    Patients cultural, spiritual, Tenriism conflicts given the current situation: no    Objective:     Communicated with: RN prior to session. Pt's sister at bedside.   Patient found up in chair with Condom Catheter, telemetry, peripheral IV, blood pressure cuff upon OT entry to room.    General Precautions: Standard, aspiration, fall, seizure   Braces: N/A     Occupational Performance:  Functional Mobility/Transfers:  · Patient completed Sit <> Stand Transfer with contact guard assistance  with  no assistive device   · Patient completed Bed <> Chair Transfer using Step Transfer technique with contact guard assistance with hand-held assist  · Functional Mobility: household distances x3 trials with CGA and unilateral hand held assistance     Activities of Daily Living:  · Feeding:  modified independence with set up  · Grooming: contact guard assistance standing at sink for oral care x4 min duration; increased difficulty with matching caps to containers following  · Upper Body Dressing: minimum assistance standing to don gown as jacket    Cognitive/Visual Perceptual:  Cognitive/Psychosocial Skills:     -       Oriented to: Person, Place, Situation and Month   -       Follows Commands/attention:Follows two-step commands  -       Communication: clear/fluent  -       Memory: Impaired STM and Poor immediate recall  -       Safety awareness/insight to disability: intact   -       Mood/Affect/Coping skills/emotional control: Cooperative and Pleasant  Visual/Perceptual:      -Intact functional tracking/scanning; fatigue and increased mistakes with reading tasks; mod cues for telling time on analog clock in room    Physical Exam:  Postural examination/scapula alignment:    -       Rounded shoulders  -       Forward head  Skin integrity: Visible skin intact  Edema:  None noted  Sensation:    -        Intact  B UE  Motor Planning:    -       Intact B UE; B UE resting tremor noted  Dominant hand:    -       R  Upper Extremity Range of Motion:     -       Right Upper Extremity: WFL  -       Left Upper Extremity: WFL  Upper Extremity Strength:    -       Right Upper Extremity: WFL  -       Left Upper Extremity: WFL   Strength:    -       Right Upper Extremity: WFL  -       Left Upper Extremity: WFL  Fine Motor Coordination:    -       Intact  Left hand, finger to nose, Right hand, finger to nose, Left hand thumb/finger opposition skills, Right hand thumb/finger opposition skills, Left hand, manipulation of objects and Right hand, manipulation of objects  **noted increased difficulty with following commands for fine motor assessment for L UE  Gross motor coordination:   WFL B UE    AMPAC 6 Click ADL:  AMPAC Total Score: 19   Body mass index is 27.08 kg/m².  Vitals:    06/25/19 1002   BP: (!) 155/87   Pulse: 104   Resp: 17   Temp:        Treatment & Education:  -Pt alert and agreeable to therapy session; edu on OT role in care and discussed plan of care for acute setting  -Pt able to sequence 7/7 days of week in descending order in timely manner; Pt requiring family assist to call home set up   -Communication board updated; questions/concerns addressed within OT scope of practice    Education:    Patient left up in chair with all lines intact, call button in reach, RN notified and family member present    GOALS:   Multidisciplinary Problems     Occupational Therapy Goals        Problem: Occupational Therapy Goal    Goal Priority Disciplines Outcome Interventions   Occupational Therapy Goal     OT, PT/OT Ongoing (interventions implemented as appropriate)    Description:  Goals to be met by: 7/2     Patient will increase functional independence with ADLs by performing:    UE Dressing with Modified [place] and Set-up Assistance.  LE Dressing (pants, brief) with Set-up Assistance and Contact Guard  Assistance.  Grooming while standing at sink with Modified [place] and Set-up Assistance.  Toileting from toilet with Stand-by Assistance for hygiene and clothing management.   Toilet transfer to toilet with Stand-by Assistance.  Pt will complete multi step task with 0 added assistance in timely manner.   Pt will complete functional dynamic task to sim home return roles with SBA and 0 LOB.                      History:     Past Medical History:   Diagnosis Date    Anxiety     Eczema     Hyperlipidemia     Hypogonadism in male     Hypopituitarism     Hypothyroidism        Past Surgical History:   Procedure Laterality Date    CRANIOTOMY, USING FRAMELESS STEREOTAXY Left 6/19/2019    Performed by Otis [facility]ra[facility] at [facility] OR [address] [place] [address]    PITUITARY SURGERY      SPINAL FUSION         Time Tracking:     OT Date of Treatment: 06/25/19  OT Start Time: 1245  OT Stop Time: 1312  OT Total Time (min): 27 min    Billable Minutes:Evaluation 25    GUADALUPE Pearson  6/25/2019

## 2019-06-25 NOTE — PLAN OF CARE
Problem: SLP Goal  Goal: SLP Goal  Goals to be met 6/29  1 pt will participate in ongoing swallow assessment to determine safe po diet  2 Pt will participate in sle  MET  3. Pt will participate in ongoing assessment of reading, writing, and visual spatial skills        Recommend regular diet/thin liquids at this time. Pt progressing with goals.   Tara Feliz CCC-SLP  6/25/2019

## 2019-06-25 NOTE — ASSESSMENT & PLAN NOTE
Mr Sanz, with history of panhypopituitarism and Adrenal insufficiency is having surgery for removal of meningioma.   No signs of acute adrenal crisis - vitals normal    Recommendation  - Currently on hydrocortisone to 50 mg IV BID per NSGY  - Given clinical improvement, stable VS, eating his meals we recommend restarting home hydrocortisone (CORTEF) 10 MG Tab BID

## 2019-06-25 NOTE — PROGRESS NOTES
"[facility]  Neurocritical Care  Progress Note    Admit Date: 6/19/2019  Service Date: 06/25/2019  Length of Stay: 6    Subjective:     Chief Complaint: Meningioma, cerebral    History of Present Illness: Pt is 74 y.o. Male with PMHx of anxiety and panhypopituitarism who presented for elective L temporal meningioma resection with NSGY. Mass was discovered incidentally during MRI workup for parkinson's. Pt has resting tremor at baseline. 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 Endocrinology is consulted and following patient He is admitted to [facility] for post op monitoring.        6/21 pt has been readmitted to [facility] for monitoring and observation due to concerns for AMS and aggressive behavior overnight. Pt became agitated overnight requiring 1x dose Zyprexa. Pt family and partner were at bedside and confirmed pt "was not and his baseline the day prior" and that pt becomes aggressive due to confusion and wanting to get out of bed and out of restraints. Pt was evaluated this morning and re-admitted to [facility] for monitoring and evaluation.     Hospital Course: 6/19/2019 Admit to [facility] s/p L temporal meningioma resection  6/19: stable for floor, probably step down to NSGY, Steroid taper on floor  6/20 stepping down pt to NSGY discussed with PAMELA MEDRANO  6/21: pt readmitted to [facility] for monitoring and evaluation   6/22: PT/INR, US BLE  6/23: pending SLP, wean hydrocortisone per endocrine  6/24 Stepdown to NSGY. Discussed with GREGORIA MEDRANO.  SBP > 160 scheduled hydralazine. Drain dcd ABX dcd.  6/25 No significant events overnight. Pending step down to neurosurgery, waiting for bed.    Interval History: No significant events over night    Review of Systems:   Constitutional: Denies fevers, weight loss, chills, or weakness.  Eyes: Denies changes in vision.  ENT: Denies dysphagia, nasal discharge, ear pain or discharge.  Cardiovascular: Denies chest pain, " palpitations, orthopnea, or claudication.  Respiratory: Denies shortness of breath, cough, hemoptysis, or wheezing.  GI: Denies nausea/vomitting, hematochezia, melena, abd pain, or changes in appetite.  : Denies dysuria, incontinence, or hematuria.  Musculoskeletal: Denies joint pain or myalgias.  Skin/breast: Denies rashes, lumps, lesions, or discharge.  Neurologic: Denies headache, dizziness, vertigo, or paresthesias.  Psychiatric: Denies changes in mood or hallucinations.  Endocrine: Denies polyuria, polydipsia, heat/cold intolerance.  Hematologic/Lymph: Denies lymphadenopathy, easy bruising or easy bleeding.  Allergic/Immunologic: Denies rash, rhinitis.       Vitals:   Temp: 98.5 °F (36.9 °C)  Pulse: 104  Rhythm: normal sinus rhythm  BP: (!) 155/87  MAP (mmHg): 114  Resp: 17  SpO2: 100 %  O2 Device (Oxygen Therapy): room air    Temp  Min: 98.2 °F (36.8 °C)  Max: 98.7 °F (37.1 °C)  Pulse  Min: 55  Max: 104  BP  Min: 97/53  Max: 163/94  MAP (mmHg)  Min: 72  Max: 123  Resp  Min: 11  Max: 22  SpO2  Min: 99 %  Max: 100 %    06/24 0701 - 06/25 0700  In: 1611.7 [P.O.:480; I.V.:831.7]  Out: 1945 [Urine:1945]   Unmeasured Output  Urine Occurrence: 1  Stool Occurrence: 0  Emesis Occurrence: 7  Pad Count: 2     Examination:   Constitutional: Well-nourished and -developed. No apparent distress.   Eyes: Conjunctiva clear, anicteric. Lids no lesions.  Head/Ears/Nose/Mouth/Throat/Neck: Moist mucous membranes. External ears, nose atraumatic.   Cardiovascular: Regular rhythm. No murmurs. No leg edema.  Respiratory: Comfortable respirations. Clear to auscultation.  Gastrointestinal: No hernia. Soft, nondistended, nontender. + bowel sounds.     Neurologic:  -GCS E4V5M6  -Alert. Oriented to person, place, and time. Speech fluent. Follows commands.  -Cranial nerves II-XII grossly intact, PERRL EOMI. + cough. Gag,  -Motor MESA spon and antigravity   -Sensation bilat intact      Medications:   Continuous Scheduled  docusate sodium  100 mg Daily   famotidine 20 mg BID   heparin (porcine) 5,000 Units Q8H   hydrALAZINE 50 mg Q8H   hydrocortisone sodium succinate 50 mg BID   lacosamide 100 mg Q12H   levothyroxine 100 mcg Before breakfast   mupirocin 1 g BID   PRN  acetaminophen 650 mg Q6H PRN   hydrALAZINE 10 mg Q6H PRN   HYDROcodone-acetaminophen 1 tablet Q6H PRN   magnesium oxide 800 mg PRN   magnesium oxide 800 mg PRN   potassium chloride 10% 40 mEq PRN   potassium chloride 10% 40 mEq PRN   potassium chloride 10% 60 mEq PRN   potassium, sodium phosphates 2 packet PRN   potassium, sodium phosphates 2 packet PRN   potassium, sodium phosphates 2 packet PRN      Today I independently reviewed pertinent medications, lines/drains/airways, imaging, laboratory results, microbiology results, notably:     ISTAT: No results for input(s): PH, PCO2, PO2, POCSATURATED, HCO3, BE, POCNA, POCK, POCTCO2, POCGLU, POCICA, POCLAC, SAMPLE in the last 24 hours.   Chem: Recent Labs   Lab 06/25/19  0539      K 5.0      CO2 23   GLU 96   BUN 15   CREATININE 0.7   ESTGFRAFRICA >60.0   EGFRNONAA >60.0   CALCIUM 7.6*   MG 2.1   PHOS 2.8   ANIONGAP 6*   PROT 5.6*   ALBUMIN 2.7*   BILITOT 0.8   ALKPHOS 41*   AST 73*   ALT 66*     Heme: Recent Labs   Lab 06/25/19  0402   WBC 7.87   HGB 13.2*   HCT 39.8*      INR 1.0     Endo: No results for input(s): POCTGLUCOSE in the last 24 hours.   Assessment/Plan:     Neuro  Brain compression  --follow neuro exam  --follow brain imaging    Tremor  Tremor at baseline   Pt states decreased since surgery  -NSGY following      Renal/  Hypokalemia  --replace K PRN  -- follow cmp      Endocrine  Hypogonadism in male  -on testosterone replacement chronically post op, per endocrinology, ok to  hold until discharge  -endocrine following     Secondary hypothyroidism  Continue - Levothyroxine 100mcg daily    Panhypopituitarism  Pt with Hx of panhypopituitarism from adenoma as a child  -continue hydrocortisone 50mg q12h  -weaning  -endocrine following and appreciate reccs      Other  * Meningioma, cerebral  Pt found to have L temporal meningioma on CT workup for parkinsons  6/21Pt re-admitted to [facility] for aggression overnight, AMS, and concern for neuro exam change.     - s/p elective resection 6/20 POD#5  -NSGY following    -drains removed 6/24 by Shari MIN dcd  - neuro checks q 1hr   -lacosamide 100mg q12h  -vital signs q 1hr   -SBP goal <160  6/21 Post-op CTH showed scattered pneumocephalus and small vol SAH  -PT/OT/SLP   Step down to neurosurgery, waiting for bed            The patient is being Prophylaxed for:  Venous Thromboembolism with: Mechanical or Chemical  Stress Ulcer with: H2B  Ventilator Pneumonia with: none    Activity Orders          Diet Adult Regular (IDDSI Level 7): Regular starting at 06/25 1010        Full Code     I have spent 35 min with this patient, with over 50% of this time spent coordinating care and speaking with the family    Lauren Rosales NP  Neurocritical Care  [facility]

## 2019-06-25 NOTE — PROGRESS NOTES
"[facility]  Neurocritical Care  Progress Note    Admit Date: 6/19/2019  Service Date: 06/24/2019  Length of Stay: 5    Subjective:     Chief Complaint: Meningioma, cerebral    History of Present Illness: Pt is 74 y.o. Male with PMHx of anxiety and panhypopituitarism who presented for elective L temporal meningioma resection with NSGY. Mass was discovered incidentally during MRI workup for parkinson's. Pt has resting tremor at baseline. 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 Endocrinology is consulted and following patient He is admitted to [facility] for post op monitoring.        6/21 pt has been readmitted to [facility] for monitoring and observation due to concerns for AMS and aggressive behavior overnight. Pt became agitated overnight requiring 1x dose Zyprexa. Pt family and partner were at bedside and confirmed pt "was not and his baseline the day prior" and that pt becomes aggressive due to confusion and wanting to get out of bed and out of restraints. Pt was evaluated this morning and re-admitted to [facility] for monitoring and evaluation.     Hospital Course: 6/19/2019 Admit to [facility] s/p L temporal meningioma resection  6/19: stable for floor, probably step down to NSGY, Steroid taper on floor  6/20 stepping down pt to NSGY discussed with PAMELA MEDRANO  6/21: pt readmitted to [facility] for monitoring and evaluation   6/22: PT/INR, US BLE  6/23: pending SLP, wean hydrocortisone per endocrine  6/24 Stepdown to NSGY. Discussed with GREGORIA MEDRANO.  SBP > 160 scheduled hydralazine. Drain dcd ABX dcd.    Interval History:Stepdown to NSGY. Discussed with GREGORIA MEDRANO.  SBP > 160 scheduled hydralazine. Drain dcd ABX dcd.    Review of Systems:  Constitutional: Denies fevers, weight loss, chills, or weakness.  Eyes: Denies changes in vision.  ENT: Denies dysphagia, nasal discharge, ear pain or discharge.  Cardiovascular: Denies chest pain, palpitations, orthopnea, " or claudication.  Respiratory: Denies shortness of breath, cough, hemoptysis, or wheezing.  GI: Denies nausea/vomitting, hematochezia, melena, abd pain, or changes in appetite.  : Denies dysuria, incontinence, or hematuria.  Musculoskeletal: Denies joint pain or myalgias.  Skin/breast: Denies rashes, lumps, lesions, or discharge.  Neurologic: Denies headache, dizziness, vertigo, or paresthesias.  Psychiatric: Denies changes in mood or hallucinations.  Endocrine: Denies polyuria, polydipsia, heat/cold intolerance.  Hematologic/Lymph: Denies lymphadenopathy, easy bruising or easy bleeding.  Allergic/Immunologic: Denies rash, rhinitis.   Vitals:   Temp: 98.2 °F (36.8 °C)  Pulse: 73  Rhythm: normal sinus rhythm  BP: 129/67  MAP (mmHg): 88  Resp: 11  SpO2: 100 %  O2 Device (Oxygen Therapy): room air    Temp  Min: 98.1 °F (36.7 °C)  Max: 98.7 °F (37.1 °C)  Pulse  Min: 51  Max: 83  BP  Min: 112/59  Max: 172/79  MAP (mmHg)  Min: 77  Max: 120  Resp  Min: 10  Max: 32  SpO2  Min: 97 %  Max: 100 %    06/23 0701 - 06/24 0700  In: 3108.3 [I.V.:2308.3]  Out: 1975 [Urine:1975]   Unmeasured Output  Urine Occurrence: 1  Stool Occurrence: 0  Emesis Occurrence: 7  Pad Count: 2     Examination:   Constitutional: Well-nourished and -developed. No apparent distress.   Eyes: Conjunctiva clear, anicteric. Lids no lesions.  Head/Ears/Nose/Mouth/Throat/Neck: Moist mucous membranes. External ears, nose atraumatic.   Cardiovascular: Regular rhythm. No murmurs. No leg edema.  Respiratory: Comfortable respirations. Clear to auscultation.  Gastrointestinal: No hernia. Soft, nondistended, nontender. + bowel sounds.    Neurologic:  -GCS E4V5M6  -Alert. Oriented to person, place, and time. Speech fluent. Follows commands.  -Cranial nerves II-XII grossly intact, PERRL EOMI. + cough. Gag,  -Motor MESA spon and antigravity   -Sensation bilat intact      Medications:   Continuous Scheduled  docusate sodium 100 mg Daily   famotidine (PF) 20 mg BID    heparin (porcine) 5,000 Units Q8H   hydrALAZINE 50 mg Q8H   hydrocortisone sodium succinate 50 mg BID   lacosamide (VIMPAT) IVPB 100 mg Q12H   [START ON 6/25/2019] levothyroxine 100 mcg Before breakfast   mupirocin 1 g BID   PRN  acetaminophen 650 mg Q6H PRN   hydrALAZINE 10 mg Q6H PRN   HYDROcodone-acetaminophen 1 tablet Q6H PRN   magnesium oxide 800 mg PRN   magnesium oxide 800 mg PRN   potassium chloride 10% 40 mEq PRN   potassium chloride 10% 40 mEq PRN   potassium chloride 10% 60 mEq PRN   potassium, sodium phosphates 2 packet PRN   potassium, sodium phosphates 2 packet PRN   potassium, sodium phosphates 2 packet PRN      Today I independently reviewed pertinent medications, lines/drains/airways, laboratory results, microbiology results, notably:     ISTAT: No results for input(s): PH, PCO2, PO2, POCSATURATED, HCO3, BE, POCNA, POCK, POCTCO2, POCGLU, POCICA, POCLAC, SAMPLE in the last 24 hours.   Chem: Recent Labs   Lab 06/24/19  0429      K 3.4*      CO2 22*   GLU 88   BUN 14   CREATININE 0.8   ESTGFRAFRICA >60.0   EGFRNONAA >60.0   CALCIUM 7.8*   MG 2.1   PHOS 2.3*   ANIONGAP 8   PROT 6.0   ALBUMIN 3.1*   BILITOT 1.1*   ALKPHOS 38*   AST 34   ALT 21     Heme: Recent Labs   Lab 06/24/19  0429   WBC 10.53   HGB 13.2*   HCT 40.1      INR 1.0     Endo: No results for input(s): POCTGLUCOSE in the last 24 hours.   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 6/21 Pt re-admitted to [facility] for observation and monitoring.   - s/p elective resection 6/20 POD#4  -NSGY following    -drains removed today by MECHELLE, Shari dcd  - neuro checks q 1hr   -lacosamide 100mg q12h  -vital signs q 1hr   -SBP goal <160  6/21 Post-op CTH showed scattered pneumocephalus and small vol SAH  -PT/OT/SLP            The patient is being Prophylaxed for:  Venous Thromboembolism with: Mechanical or Chemical  Stress Ulcer with: H2B  Ventilator Pneumonia with: none    Activity Orders          Diet Dysphagia Mechanical Soft (IDDSI Level 5): Dysphagia 2 (Mechanical Soft Ground) starting at 06/24 1151        Full Code     I have spent 35 min with this patient, with over 50% of this time spent coordinating care and speaking with the family    Lauren Rosales NP  Neurocritical Care  [facility]

## 2019-06-26 VITALS
HEART RATE: 80 BPM | HEIGHT: 66 IN | OXYGEN SATURATION: 93 % | WEIGHT: 167.75 LBS | RESPIRATION RATE: 18 BRPM | TEMPERATURE: 98 F | DIASTOLIC BLOOD PRESSURE: 78 MMHG | BODY MASS INDEX: 26.96 KG/M2 | SYSTOLIC BLOOD PRESSURE: 134 MMHG

## 2019-06-26 LAB
ANION GAP SERPL CALC-SCNC: 7 MMOL/L (ref 8–16)
BASOPHILS # BLD AUTO: 0.02 K/UL (ref 0–0.2)
BASOPHILS NFR BLD: 0.3 % (ref 0–1.9)
BUN SERPL-MCNC: 13 MG/DL (ref 8–23)
CALCIUM SERPL-MCNC: 8 MG/DL (ref 8.7–10.5)
CHLORIDE SERPL-SCNC: 106 MMOL/L (ref 95–110)
CO2 SERPL-SCNC: 26 MMOL/L (ref 23–29)
CREAT SERPL-MCNC: 0.7 MG/DL (ref 0.5–1.4)
DIFFERENTIAL METHOD: ABNORMAL
EOSINOPHIL # BLD AUTO: 0.1 K/UL (ref 0–0.5)
EOSINOPHIL NFR BLD: 0.6 % (ref 0–8)
ERYTHROCYTE [DISTWIDTH] IN BLOOD BY AUTOMATED COUNT: 14.6 % (ref 11.5–14.5)
EST. GFR  (AFRICAN AMERICAN): >60 ML/MIN/1.73 M^2
EST. GFR  (NON AFRICAN AMERICAN): >60 ML/MIN/1.73 M^2
GLUCOSE SERPL-MCNC: 94 MG/DL (ref 70–110)
HCT VFR BLD AUTO: 34 % (ref 40–54)
HGB BLD-MCNC: 11.8 G/DL (ref 14–18)
IMM GRANULOCYTES # BLD AUTO: 0.03 K/UL (ref 0–0.04)
IMM GRANULOCYTES NFR BLD AUTO: 0.4 % (ref 0–0.5)
INR PPP: 0.9 (ref 0.8–1.2)
LYMPHOCYTES # BLD AUTO: 1.2 K/UL (ref 1–4.8)
LYMPHOCYTES NFR BLD: 15 % (ref 18–48)
MAGNESIUM SERPL-MCNC: 2.1 MG/DL (ref 1.6–2.6)
MCH RBC QN AUTO: 29.9 PG (ref 27–31)
MCHC RBC AUTO-ENTMCNC: 34.7 G/DL (ref 32–36)
MCV RBC AUTO: 86 FL (ref 82–98)
MONOCYTES # BLD AUTO: 0.5 K/UL (ref 0.3–1)
MONOCYTES NFR BLD: 6.6 % (ref 4–15)
NEUTROPHILS # BLD AUTO: 6 K/UL (ref 1.8–7.7)
NEUTROPHILS NFR BLD: 77.1 % (ref 38–73)
NRBC BLD-RTO: 0 /100 WBC
PHOSPHATE SERPL-MCNC: 3.1 MG/DL (ref 2.7–4.5)
PLATELET # BLD AUTO: 167 K/UL (ref 150–350)
PMV BLD AUTO: 11.8 FL (ref 9.2–12.9)
POTASSIUM SERPL-SCNC: 3.5 MMOL/L (ref 3.5–5.1)
PROTHROMBIN TIME: 9.9 SEC (ref 9–12.5)
RBC # BLD AUTO: 3.94 M/UL (ref 4.6–6.2)
SODIUM SERPL-SCNC: 139 MMOL/L (ref 136–145)
WBC # BLD AUTO: 7.78 K/UL (ref 3.9–12.7)

## 2019-06-26 PROCEDURE — 36415 COLL VENOUS BLD VENIPUNCTURE: CPT

## 2019-06-26 PROCEDURE — 63600175 PHARM REV CODE 636 W HCPCS: Performed by: NURSE PRACTITIONER

## 2019-06-26 PROCEDURE — 25000003 PHARM REV CODE 250: Performed by: STUDENT IN AN ORGANIZED HEALTH CARE EDUCATION/TRAINING PROGRAM

## 2019-06-26 PROCEDURE — 85610 PROTHROMBIN TIME: CPT

## 2019-06-26 PROCEDURE — 25000003 PHARM REV CODE 250: Performed by: PSYCHIATRY & NEUROLOGY

## 2019-06-26 PROCEDURE — 84100 ASSAY OF PHOSPHORUS: CPT

## 2019-06-26 PROCEDURE — 97535 SELF CARE MNGMENT TRAINING: CPT

## 2019-06-26 PROCEDURE — 85025 COMPLETE CBC W/AUTO DIFF WBC: CPT

## 2019-06-26 PROCEDURE — 97110 THERAPEUTIC EXERCISES: CPT

## 2019-06-26 PROCEDURE — 83735 ASSAY OF MAGNESIUM: CPT

## 2019-06-26 PROCEDURE — 25000003 PHARM REV CODE 250: Performed by: HOSPITALIST

## 2019-06-26 PROCEDURE — 80048 BASIC METABOLIC PNL TOTAL CA: CPT

## 2019-06-26 PROCEDURE — 94761 N-INVAS EAR/PLS OXIMETRY MLT: CPT

## 2019-06-26 PROCEDURE — 25000003 PHARM REV CODE 250: Performed by: NEUROLOGICAL SURGERY

## 2019-06-26 RX ORDER — OXYCODONE AND ACETAMINOPHEN 5; 325 MG/1; MG/1
1 TABLET ORAL EVERY 4 HOURS PRN
Qty: 30 TABLET | Refills: 0 | Status: SHIPPED | OUTPATIENT
Start: 2019-06-26 | End: 2019-07-01

## 2019-06-26 RX ORDER — LACOSAMIDE 100 MG/1
100 TABLET ORAL EVERY 12 HOURS
Qty: 60 TABLET | Refills: 0 | Status: SHIPPED | OUTPATIENT
Start: 2019-06-26 | End: 2019-09-09

## 2019-06-26 RX ORDER — POLYETHYLENE GLYCOL 3350 17 G/17G
17 POWDER, FOR SOLUTION ORAL DAILY
Status: DISCONTINUED | OUTPATIENT
Start: 2019-06-26 | End: 2019-06-26 | Stop reason: HOSPADM

## 2019-06-26 RX ORDER — BACITRACIN 500 [USP'U]/G
OINTMENT TOPICAL 2 TIMES DAILY
Qty: 1 TUBE | Refills: 0 | Status: SHIPPED | OUTPATIENT
Start: 2019-06-26 | End: 2019-11-20

## 2019-06-26 RX ORDER — HYDROCORTISONE 10 MG/1
10 TABLET ORAL 2 TIMES DAILY
Status: DISCONTINUED | OUTPATIENT
Start: 2019-06-26 | End: 2019-06-26 | Stop reason: HOSPADM

## 2019-06-26 RX ADMIN — HYDROCORTISONE SODIUM SUCCINATE 50 MG: 100 INJECTION, POWDER, FOR SOLUTION INTRAMUSCULAR; INTRAVENOUS at 08:06

## 2019-06-26 RX ADMIN — HEPARIN SODIUM 5000 UNITS: 5000 INJECTION, SOLUTION INTRAVENOUS; SUBCUTANEOUS at 02:06

## 2019-06-26 RX ADMIN — LEVOTHYROXINE SODIUM 100 MCG: 100 TABLET ORAL at 05:06

## 2019-06-26 RX ADMIN — LACOSAMIDE 100 MG: 50 TABLET, FILM COATED ORAL at 08:06

## 2019-06-26 RX ADMIN — POLYETHYLENE GLYCOL 3350 17 G: 17 POWDER, FOR SOLUTION ORAL at 09:06

## 2019-06-26 RX ADMIN — DOCUSATE SODIUM 100 MG: 100 CAPSULE, LIQUID FILLED ORAL at 08:06

## 2019-06-26 RX ADMIN — FAMOTIDINE 20 MG: 20 TABLET, FILM COATED ORAL at 08:06

## 2019-06-26 RX ADMIN — HYDRALAZINE HYDROCHLORIDE 50 MG: 50 TABLET ORAL at 05:06

## 2019-06-26 RX ADMIN — HEPARIN SODIUM 5000 UNITS: 5000 INJECTION, SOLUTION INTRAVENOUS; SUBCUTANEOUS at 05:06

## 2019-06-26 NOTE — PROGRESS NOTES
"Charge nurse spoke with pt's sister this morning. Also had a conversation with pt's sister yesterday. Pt's sister is tearful, and concerned because she states that in her opinion, pt should be transferred to inpatient rehab upon discharge. However, in her opinion, pt's partner is "overbearing" and "influencing" pt to be discharged to partner's home, and to partner's care upon discharge. Pt's sister disagrees with partner's suggestions, and, upon further investigation, has had several conversations, on several occasions, with both OT and PT regarding the same issue. Pt has been in a relationship with partner for greater than 9 years. Pt is alert, and oriented X 4. He follows all commands. He is capable of dictating his own care and making his own informed decisions. Pt's sister is returning to [place] today at noon.  "

## 2019-06-26 NOTE — PLAN OF CARE
Problem: Adult Inpatient Plan of Care  Goal: Plan of Care Review  Outcome: Ongoing (interventions implemented as appropriate)  POC reviewed with patient and significant other.  Both verbalized understanding. No acute distress noted and patient denies pain.  Patient remains A&O x4 with no changes in Neuro assessment.  Surgical staples remain clean, dry, and intact. Please see flowsheet for full assessment.   Patient is resting in low positioned bed, siderails x3, with call light within reach.  [facility]

## 2019-06-26 NOTE — PT/OT/SLP PROGRESS
Physical Therapy Treatment    Patient Name:  Ravinder Sanz   MRN:  [id number]    Recommendations:     Discharge Recommendations:  outpatient PT   Discharge Equipment Recommendations: shower chair   Barriers to discharge: None    Assessment:     Ravinder Sanz is a 74 y.o. male admitted with a medical diagnosis of Meningioma, cerebral.  He presents with the following impairments/functional limitations:  impaired endurance, weakness, impaired self care skills, impaired functional mobilty, gait instability, impaired balance    Pt zara session well w/ good participation and motivation. He is making great progress w/ mobility and met 2 original goals- goals were revised. Pt is at a CGA/SBA level for transfers and long distance ambulation w/o AD. He is most limited by instability and decreased endurance. Once medically stable he is recommended to D/C home w/ light family assist/SPV for safety along w/ OP PT for continued progress    Rehab Prognosis: Good; patient would benefit from acute skilled PT services to address these deficits and reach maximum level of function.    Recent Surgery: Procedure(s) (LRB):  CRANIOTOMY, USING FRAMELESS STEREOTAXY (Left) 7 Days Post-Op    Plan:     During this hospitalization, patient to be seen 4 x/week to address the identified rehab impairments via gait training, therapeutic activities, therapeutic exercises and progress toward the following goals:    · Plan of Care Expires:  07/23/19    Subjective     Chief Complaint: instability  Patient/Family Comments/goals: return to PLOF  Pain/Comfort:  · Pain Rating 1: 0/10      Objective:     Communicated with nursing prior to session.  Patient found up in chair with (no lines) upon PT entry to room.     General Precautions: Standard, aspiration, fall, seizure   Orthopedic Precautions:N/A   Braces: N/A     Functional Mobility:  · Transfers:   · Sit<>stand to/from bedside chair (multiple trials) w/o AD w/ SBA/SPV for safety  · Gait:   · 230ft w/o AD  w/ CGA for safety  · Swing through gait pattern  · Cueing to inc step width for stability  · Mild instability noted t/o trial but no overt LOB  · Balance:   · Static stand w/o AD w/ SBA for safety      AM-PAC 6 CLICK MOBILITY  Turning over in bed (including adjusting bedclothes, sheets and blankets)?: 3  Sitting down on and standing up from a chair with arms (e.g., wheelchair, bedside commode, etc.): 3  Moving from lying on back to sitting on the side of the bed?: 3  Moving to and from a bed to a chair (including a wheelchair)?: 3  Need to walk in hospital room?: 3  Climbing 3-5 steps with a railing?: 3  Basic Mobility Total Score: 18       Therapeutic Activities and Exercises:  Standing BLE therex 2x10 reps (Heel Rises, Hip ABd, HF) w/ SBA for safety, elevated bed rail for UE support  -cueing for form    Patient left up in chair with call button in reach and significant other present..    GOALS:   Multidisciplinary Problems     Physical Therapy Goals        Problem: Physical Therapy Goal    Goal Priority Disciplines Outcome Goal Variances Interventions   Physical Therapy Goal     PT, PT/OT Ongoing (interventions implemented as appropriate)     Description:  Goals to be met by: 19    Patient will increase functional independence with mobility by performin.Supine to sit with Supervision.  2.Sit to supine with Supervision.  3.Sit to stand transfer with Stand-by Assistance- MET 2019   Revised 2019- Sit<>stand w/o AD Brittani  4.Bed to chair transfer with Stand-by Assistance using LRD.- MET 2019   Revised 2019- Stand pivot w/o AD Brittani  5.Gait  x 150 feet with Contact Guard Assistance using LRD. - MET 2019  Revised 2019- Gait x200ft w/o AD w/ SPV                       Time Tracking:     PT Received On: 19  PT Start Time:      PT Stop Time: 1434  PT Total Time (min): 18 min     Billable Minutes: Therapeutic Exercise 18 and Total Time 18    Treatment Type: Treatment  PT/PTA:  PT     PTA Visit Number: 0     Lilliam Wilson, PT  06/26/2019

## 2019-06-26 NOTE — PLAN OF CARE
Problem: Adult Inpatient Plan of Care  Goal: Plan of Care Review  Outcome: Ongoing (interventions implemented as appropriate)  POC reviewed with patient and family members.  Patient is AAOx4, ambulatory and following commands properly.   Denies pain at the moment. Condom cath removed and patient is now voiding per urinal and also getting up to the bathroom. Family staying over night with patient. No questions or concerns at the moment, call light within reach, [facility].

## 2019-06-26 NOTE — DISCHARGE SUMMARY
[facility]  Neurosurgery  Discharge Summary      Patient Name: Regina Sanz  MRN: [id number]  Admission Date: 6/19/2019  Hospital Length of Stay: 7 days  Discharge Date and Time: 6/26/19, 5PM.   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        Procedure(s) (LRB):  CRANIOTOMY, USING FRAMELESS STEREOTAXY (Left)     Indwelling Lines/Drains at time of discharge:  Lines/Drains/Airways          None          Hospital Course (synopsis of major diagnoses, care, treatment, and services provided during the course of the hospital stay):    6/19/2019 Admit to [facility] s/p L temporal meningioma resection  6/20: stable for floor, probably step down to NSGY, Steroid taper on floor  6/21: pt readmitted to [facility] for monitoring and evaluation   6/22: PT/INR, US BLE  6/23: Improved mental status today.  6/24: WARREN ON. AF VSS.  6/25: Remains stable for step down. C/d/i. Pending rehab  6/26: recommendation changed to home with outpatient physical therapy and occupational therapy.     Consults:   Consults (From admission, onward)        Status Ordering Provider     Inpatient consult to Endocrinology  Once     Provider:  (Not yet assigned)    Completed REGINA MOYER     Inpatient consult to Psychiatry  Once     Provider:  (Not yet assigned)    Completed DOROTHY RICCI          Significant Diagnostic Studies: Labs:   BMP:   Recent Labs   Lab 06/25/19  0539 06/26/19  0531   GLU 96 94    139   K 5.0 3.5    106   CO2 23 26   BUN 15 13   CREATININE 0.7 0.7   CALCIUM 7.6* 8.0*   MG 2.1 2.1   , CMP   Recent Labs   Lab 06/25/19  0539 06/26/19  0531    139   K 5.0 3.5    106   CO2 23 26   GLU 96  94   BUN 15 13   CREATININE 0.7 0.7   CALCIUM 7.6* 8.0*   PROT 5.6*  --    ALBUMIN 2.7*  --    BILITOT 0.8  --    ALKPHOS 41*  --    AST 73*  --    ALT 66*  --    ANIONGAP 6* 7*   ESTGFRAFRICA >60.0 >60.0   EGFRNONAA >60.0 >60.0   , CBC   Recent Labs   Lab 06/25/19  0402 06/26/19  0531   WBC 7.87 7.78   HGB 13.2* 11.8*   HCT 39.8* 34.0*    167    and INR   Lab Results   Component Value Date    INR 0.9 06/26/2019    INR 1.0 06/25/2019    INR 1.0 06/24/2019       Pending Diagnostic Studies:     Procedure Component Value Units Date/Time    Basic metabolic panel [[id number]] Collected:  06/21/19 1026    Order Status:  Sent Lab Status:  In process Updated:  06/21/19 1026    Specimen:  Blood     CBC auto differential [[id number]] Collected:  06/21/19 1026    Order Status:  Sent Lab Status:  In process Updated:  06/21/19 1026    Specimen:  Blood           Final Active Diagnoses:    Diagnosis Date Noted POA    PRINCIPAL PROBLEM:  Meningioma, cerebral [D32.0] 06/19/2019 Yes    Delirium [R41.0] 06/23/2019 Yes    Brain compression [G93.5] 06/22/2019 Yes    Vasogenic brain edema [G93.6] 06/22/2019 Yes    Hypokalemia [E87.6] 06/22/2019 Yes    Seizure prophylaxis [Z29.8]  Not Applicable    Cerebral edema [G93.6] 06/10/2019 Yes    Panhypopituitarism [E23.0] 04/08/2019 Yes    Hypogonadism in male [E29.1] 04/08/2019 Yes    Adrenal insufficiency [E27.40] 04/08/2019 Yes    Secondary hypothyroidism [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.8] 04/08/2019 Yes    Tremor [R25.1] 04/08/2019 Yes      Problems Resolved During this Admission:       Discharged Condition: good    Follow Up:    Patient Instructions:      Referral to OutPatient  Physical therapy   Referral Priority: Routine Referral Type: Physical Medicine   Referral Reason: Specialty Services Required   Requested Specialty: Physical Therapy   Number of Visits Requested: 1     Referral to Outpatient Occupational therapy   Referral Priority: Routine Referral Type: Occupational Therapy   Referral  Reason: Specialty Services Required   Requested Specialty: Occupational Therapy   Number of Visits Requested: 1     Notify your health care provider if you experience any of the following:  increased confusion or weakness     Notify your health care provider if you experience any of the following:  persistent dizziness, light-headedness, or visual disturbances     Notify your health care provider if you experience any of the following:  worsening rash     Notify your health care provider if you experience any of the following:  severe persistent headache     Notify your health care provider if you experience any of the following:  difficulty breathing or increased cough     Notify your health care provider if you experience any of the following:  redness, tenderness, or signs of infection (pain, swelling, redness, odor or green/yellow discharge around incision site)     Notify your health care provider if you experience any of the following:  severe uncontrolled pain     Notify your health care provider if you experience any of the following:  temperature >100.4     Notify your health care provider if you experience any of the following:  persistent nausea and vomiting or diarrhea     No dressing needed     Activity as tolerated     Medications:  Reconciled Home Medications:      Medication List      START taking these medications    bacitracin 500 unit/gram ointment  Apply topically 2 (two) times daily.     lacosamide 100 mg Tab  Commonly known as:  VIMPAT  Take 1 tablet (100 mg total) by mouth every 12 (twelve) hours.     oxyCODONE-acetaminophen 5-325 mg per tablet  Commonly known as:  PERCOCET  Take 1 tablet by mouth every 4 (four) hours as needed for Pain (Post operative pain 7-10/10).        CONTINUE taking these medications    clonazePAM 0.5 MG tablet  Commonly known as:  KLONOPIN  Take 1 tablet (0.5 mg total) by mouth 2 (two) times daily as needed for Anxiety.     docusate sodium 100 MG capsule  Commonly  "known as:  COLACE  Take 100 mg by mouth once daily.     fluocinonide 0.05 % ointment  Commonly known as:  LIDEX  AAA bid     hydrocortisone 10 MG Tab  Commonly known as:  CORTEF  Take 1 tablet (10 mg total) by mouth 2 (two) times daily.     levothyroxine 100 MCG tablet  Commonly known as:  SYNTHROID  Take 1 tablet (100 mcg total) by mouth once daily.     MAGNESIUM CITRATE ORAL  Take 750 mg by mouth once daily.     mupirocin 2 % ointment  Commonly known as:  BACTROBAN  AAA BID     needle (disp) 21 G 21 gauge x 1" Ndle  To use once weekly with testosterone injections     senna 8.6 mg tablet  Commonly known as:  SENOKOT  Take 1 tablet by mouth once daily.     testosterone cypionate 200 mg/mL injection  Commonly known as:  DEPOTESTOTERONE CYPIONATE  Inject 0.75 mLs (150 mg total) into the muscle every 14 (fourteen) days. 3 ml syringe with 18 g needle  to draw  X 10   25 g 1 inch needle to inject x 10            Time spent on the discharge of patient: 45 minutes    Derick Rodriguez MD  Neurosurgery  [facility]  "

## 2019-06-26 NOTE — SUBJECTIVE & OBJECTIVE
Interval History: 6/25: Remains stable for step down. C/d/i. Pending rehab    Medications:  Continuous Infusions:  Scheduled Meds:   docusate sodium  100 mg Oral Daily    famotidine  20 mg Oral BID    heparin (porcine)  5,000 Units Subcutaneous Q8H    hydrALAZINE  50 mg Oral Q8H    hydrocortisone  10 mg Oral BID    lacosamide  100 mg Oral Q12H    levothyroxine  100 mcg Oral Before breakfast    polyethylene glycol  17 g Oral Daily     PRN Meds:acetaminophen, hydrALAZINE, HYDROcodone-acetaminophen, magnesium oxide, magnesium oxide, potassium chloride 10%, potassium chloride 10%, potassium chloride 10%, potassium, sodium phosphates, potassium, sodium phosphates, potassium, sodium phosphates     Review of Systems  Objective:     Weight: 76.1 kg (167 lb 12.3 oz)  Body mass index is 27.08 kg/m².  Vital Signs (Most Recent):  Temp: 97.4 °F (36.3 °C) (06/26/19 0830)  Pulse: 79 (06/26/19 0830)  Resp: 17 (06/26/19 0830)  BP: 123/69 (06/26/19 0830)  SpO2: 96 % (06/26/19 0830) Vital Signs (24h Range):  Temp:  [96.9 °F (36.1 °C)-98.6 °F (37 °C)] 97.4 °F (36.3 °C)  Pulse:  [71-89] 79  Resp:  [16-20] 17  SpO2:  [93 %-99 %] 96 %  BP: (123-150)/(69-94) 123/69                          Neurosurgery Physical Exam  General: well developed, well nourished, no distress.   Head: normocephalic, Left crani incision   GCS: Motor: 6/Verbal: 5/Eyes: 4 GCS Total: 15  Mental Status: Oriented x 2  Language: No aphasia  Speech: Dysarthric  Cranial nerves: face symmetric, tongue midline, CN II-XII grossly intact.   Eyes: pupils equal, round, reactive to light with accomodation, EOMI.  Pulmonary: normal respirations, no signs of respiratory distress  Abdomen: soft, non-distended, not tender to palpation  Sensory: intact to light touch throughout  Motor Strength: Moves all extremities spontaneously with good tone. Full strength upper and lower extremities. No abnormal movements seen.   Incision c/d/i with staples approximating skin edges. No  surrounding erythema or edema. No drainage from incision. HV drain in place.        Significant Labs:  Recent Labs   Lab 06/25/19  0539 06/26/19  0531   GLU 96 94    139   K 5.0 3.5    106   CO2 23 26   BUN 15 13   CREATININE 0.7 0.7   CALCIUM 7.6* 8.0*   MG 2.1 2.1     Recent Labs   Lab 06/25/19  0402 06/26/19  0531   WBC 7.87 7.78   HGB 13.2* 11.8*   HCT 39.8* 34.0*    167     Recent Labs   Lab 06/25/19  0402 06/26/19  0531   INR 1.0 0.9     Microbiology Results (last 7 days)     ** No results found for the last 168 hours. **        CMP:   Recent Labs   Lab 06/25/19  0539 06/26/19  0531   GLU 96 94   CALCIUM 7.6* 8.0*   ALBUMIN 2.7*  --    PROT 5.6*  --     139   K 5.0 3.5   CO2 23 26    106   BUN 15 13   CREATININE 0.7 0.7   ALKPHOS 41*  --    ALT 66*  --    AST 73*  --    BILITOT 0.8  --      CRP: No results for input(s): CRP in the last 48 hours.  ESR: No results for input(s): POCESR, ERYTHROCYTES in the last 48 hours.  LFTs:   Recent Labs   Lab 06/25/19  0539   ALT 66*   AST 73*   ALKPHOS 41*   BILITOT 0.8   PROT 5.6*   ALBUMIN 2.7*       Significant Diagnostics:  CT: No results found in the last 24 hours.  Echoencephalography: No results found in the last 24 hours.  MRI: No results found in the last 24 hours.

## 2019-06-26 NOTE — PLAN OF CARE
Problem: Occupational Therapy Goal  Goal: Occupational Therapy Goal  Goals to be met by: 7/2     Patient will increase functional independence with ADLs by performing:    UE Dressing with Modified Love and Set-up Assistance. Progressing  LE Dressing (pants, brief) with Set-up Assistance and Contact Guard Assistance. MET  Grooming while standing at sink with Modified Love and Set-up Assistance.  Toileting from toilet with Stand-by Assistance for hygiene and clothing management.   Toilet transfer to toilet with Stand-by Assistance.  Pt will complete multi step task with 0 added assistance in timely manner.   Pt will complete functional dynamic task to sim home return roles with SBA and 0 LOB.     Outcome: Ongoing (interventions implemented as appropriate)  Goals remain appropriate. GUADALUPE Pearson 6/26/2019

## 2019-06-26 NOTE — PLAN OF CARE
CM met with patient and S/O to discuss discharge plan.  Rec per PT/OT is inpatient rehab.  S/O adamant about patient going home and patient is agreeable.  S/O stated that patient could go to OP Therapy.  CM advised that HH would also be an option and both patient and S/O seemed agreeable to this.  S/O states that patient is walking to bathroom and even did squats in the bathroom.  CM explained that at this time the squats is probably not advisable due to the potential for hitting his head and that he should clear any exercise with the therapist.  Dr. Rodriguez, Resident on service advised of patient and S/O wishes.

## 2019-06-26 NOTE — DISCHARGE INSTRUCTIONS
--Patient stable for discharge to Home with outpatient physical therapy and occupational therapy    --Please take prescriptions as detailed in medication list    --All questions/concerns addressed and answered    --Please followup with neurosurgery clinic in 2 weeks for wound check, appointment already made, to be arranged by Neurosurgery Clinic     --Continued therapy in outpatient setting with physical and occupational therapy.     --Please call immediately for any new onset nausea/vomiting/fever/chills, wound breakdown, numbness/tingling/weakness    --Continue to take medicine as prescribed     Wound Care Instructions:  -If you have any dressings at discharge, please remove 48 hours after the surgery.  -If you have steri strips, do not remove, as they will fall off.  -If you have staples, do not removed, as they will be removed at clinic follow up.  -You may shower daily but do not soak or submerge wound in water.  -Scalp/head wound, wash hair daily with baby shampoo and do not use hair products. Pat incision dry, do not rub.  -Head wounds, do not wear scarfs or hats.  -Keep all wounds clean, dry, and open to air.  -Do not apply creams or ointments to the wound.  -No driving while on narcotic pain medications  -Call Neurosurgery if the wound opens, drains, or becomes red.

## 2019-06-26 NOTE — PLAN OF CARE
06/26/19 1559   Post-Acute Status   Post-Acute Authorization Other   Other Status No Post-Acute Service Needs     No reported needs at this time.     Wilmer Clark LMSW

## 2019-06-26 NOTE — PLAN OF CARE
Problem: Physical Therapy Goal  Goal: Physical Therapy Goal  Goals to be met by: 19    Patient will increase functional independence with mobility by performin.Supine to sit with Supervision.  2.Sit to supine with Supervision.  3.Sit to stand transfer with Stand-by Assistance- MET 2019   Revised 2019- Sit<>stand w/o AD Brittani  4.Bed to chair transfer with Stand-by Assistance using LRD.- MET 2019   Revised 2019- Stand pivot w/o AD Brittani  5.Gait  x 150 feet with Contact Guard Assistance using LRD. - MET 2019  Revised 2019- Gait x200ft w/o AD w/ SPV     Outcome: Ongoing (interventions implemented as appropriate)  LTGs remain appropriate. Pt will continue PT POC.    Lilliam Wilson, PT  2019

## 2019-06-26 NOTE — NURSING
Patient and significant other educated on discharge instructions, medications, and future appointments.  Both verbalized understanding.  IV's removed and tele box removed and returned.  No distress noted and patient denies pain.  Patient is bedside waiting for patient transport.  FE

## 2019-06-26 NOTE — PT/OT/SLP PROGRESS
Occupational Therapy   Treatment    Name: Ravinder Sanz  MRN: [id number]  Admitting Diagnosis:  Meningioma, cerebral  7 Days Post-Op    Recommendations:     Discharge Recommendations: rehabilitation facility-- would also benefit from outpatient OT/PT/SLP if 24 hour care at home provided for best safety  Discharge Equipment Recommendations:  shower chair  Barriers to discharge:  Other (Comment)(remains in ICU)    Assessment:     Ravinder Sanz is a 74 y.o. male with a medical diagnosis of Meningioma, cerebral.  He presents with delayed processing and mild dynamic balance impairments. He cont to demo gains towards OT goals. Performance deficits affecting function are impaired endurance, impaired self care skills, impaired functional mobilty, gait instability, decreased coordination, impaired cognition, decreased safety awareness.     Rehab Prognosis:  Good; patient would benefit from acute skilled OT services to address these deficits and reach maximum level of function.       Plan:     Patient to be seen 4 x/week to address the above listed problems via self-care/home management, therapeutic activities, therapeutic exercises, neuromuscular re-education, cognitive retraining  · Plan of Care Expires: 07/23/19  · Plan of Care Reviewed with: patient, significant other    Subjective     Pain/Comfort:  · Pain Rating 1: 0/10  · Pain Rating Post-Intervention 1: 0/10    Objective:     Communicated with: RN prior to session. Significant other at bedside throughout..  Patient found HOB elevated with peripheral IV upon OT entry to room.    General Precautions: Standard, aspiration, fall, seizure   Braces: N/A     Occupational Performance:   Bed Mobility:    · Patient completed Supine to Sit with contact guard assistance     Functional Mobility/Transfers:  · Patient completed Sit <> Stand Transfer with stand by assistance  with  no assistive device   · Patient completed Bed <> Chair Transfer using Step Transfer technique with  contact guard assistance with no assistive device  · Functional Mobility: household and short community distance with CGA and unilateral UE support  · Pt with one LOB to R noted with narrow pathway    Activities of Daily Living:  · Upper Body Dressing: minimum assistance to don pull over t-shirt  · Lower Body Dressing: contact guard assistance and set up; to don pull up brief, pants, B socks, B slip on boots    AMPAC 6 Click ADL: 19    Treatment & Education:  -Pt alert and oriented x4; agreeable to therapy session  -Edu on safety and modification for LB dressing  -edu on return home and modifications/safety with bathing   -edu significant other on modifications and safety with mobility and assist/encouragement of self completion with ADLs/self care  -Communication board updated; questions/concerns addressed within OT scope of practice      Patient left up in chair with all lines intact, call button in reach and PT, PTS and significant other presentEducation:    *discussed with case mgmt following    GOALS:   Multidisciplinary Problems     Occupational Therapy Goals        Problem: Occupational Therapy Goal    Goal Priority Disciplines Outcome Interventions   Occupational Therapy Goal     OT, PT/OT Ongoing (interventions implemented as appropriate)    Description:  Goals to be met by: 7/2     Patient will increase functional independence with ADLs by performing:    UE Dressing with Modified [place] and Set-up Assistance. Progressing  LE Dressing (pants, brief) with Set-up Assistance and Contact Guard Assistance. MET  Grooming while standing at sink with Modified [place] and Set-up Assistance.  Toileting from toilet with Stand-by Assistance for hygiene and clothing management.   Toilet transfer to toilet with Stand-by Assistance.  Pt will complete multi step task with 0 added assistance in timely manner.   Pt will complete functional dynamic task to sim home return roles with SBA and 0 LOB.                        Time Tracking:     OT Date of Treatment: 06/26/19  OT Start Time: 1352  OT Stop Time: 1418  OT Total Time (min): 26 min    Billable Minutes:Self Care/Home Management 26    GUADALUPE Pearson  6/26/2019

## 2019-06-26 NOTE — PLAN OF CARE
Patient to be discharged home.  The patient will have outpatient rehab services upon discharge.  Family will provide transportation home.  Neurosurgery clinic will schedule follow up appointments.    Future Appointments   Date Time Provider Department Center   7/8/2019 12:15 PM Otis Pedraza MD [facility] NEUROS7 [facility]   9/9/2019 10:40 AM Janice Baker MD [facility] NEURO [facility]   10/3/2019 12:30 PM Tennille Mojica, PhD [facility] NEURPSY [facility]        06/26/19 1603   Final Note   Assessment Type Final Discharge Note   Anticipated Discharge Disposition Home  (with outpatient therapy)   Hospital Follow Up  Appt(s) scheduled?   (Neurosurgery to schedule follow up appointments.)   Discharge plans and expectations educations in teach back method with documentation complete? Yes

## 2019-06-26 NOTE — CARE UPDATE
Chart reviewed, from the endocrinology standpoint    We recommend stopping IV steroid, restart Oral hydrocortisone (CORTEF) 10 MG Tab BID (Home dose)  Continue home dose of LT4 - 100 mcg daily   According to the family patient will get regular scheduled testosterone. Okay to hold off until patient is at home.    No further work up from inpatient endocrine stand point  Call if there are questions    Riley Quiñones MD  Endocrine Fellow

## 2019-06-28 ENCOUNTER — PATIENT OUTREACH (OUTPATIENT)
Dept: ADMINISTRATIVE | Facility: CLINIC | Age: 74
End: 2019-06-28

## 2019-06-28 NOTE — PATIENT INSTRUCTIONS
After Your Hospital Stay for Craniotomy  You may be able to go home as soon as you can walk, eat, and drink normally. Back home, family and friends may offer help and support. Accept help when you need it. But its important to strike a balance. Keep in mind that youre striving to become independent again.  Keep follow-up visits  You may have an office visit 7 to 10 days after the craniotomy. At this time, any remaining stitches or staples may be removed. You can expect to meet with your surgeon about every 4 weeks for the first few months. You may also have follow-up imaging tests to ensure your condition is stable.  Coping after surgery  Accepting what has happened can be hard for you and your loved ones. Your recovery will take time. You may feel more tired than normal for a few months or even a year. Coming to terms with your emotions can help ease the process:  · Its harder to cope some days than others. So be patient with yourself. If you feel sad or depressed, talk with a member of your health care team. Depression is common and can be treated.  · Its normal to have fears or to feel angry. Counseling or a support group may help you cope with your feelings and the demands of any ongoing treatment. Sharing information with your family can also help.  Start by walking  Walking is a great way to rebuild your strength. If directed by your surgeon, start out with short, frequent walks. Even if its just to get a glass of water or to change the TV channel, get up and walk each day. Gradually try walking greater distances, such as to the corner mailbox.  Returning to daily life  The following hints might help in your recovery:  · Increase your level of activity little by little.  · Accept help from those who offer to do household tasks like cooking and yard work.  · Arrange for rides if youre told not to drive for a while. A  or discharge planner can help with this.  · Ask your employer about  returning to work for fewer hours or working at home.  When to call your doctor  Call your surgeon at once if you have any of the following:  · Increased drowsiness  · Ongoing nausea or vomiting  · Extreme headaches  · Fever of 100.4°F (38°C) or greater  · Increased muscle weakness  · Involuntary movements, seizures, or personality changes  · Shortness of breath  · Pain or swelling in a leg  · Redness or drainage from the incision or an IV site  · Burning during urination  · Pain and stiffness in the neck  · Fainting or loss of consciousness  · A fall  · Altered mental status  · Before stopping any medication   Date Last Reviewed: 9/19/2015  © 4038-9609 [employer]. [address] [place] [address], [place] [address]. All rights reserved. This information is not intended as a substitute for professional medical care. Always follow your healthcare professional's instructions.

## 2019-07-05 ENCOUNTER — TELEPHONE (OUTPATIENT)
Dept: NEUROSURGERY | Facility: CLINIC | Age: 74
End: 2019-07-05

## 2019-07-05 ENCOUNTER — NURSE TRIAGE (OUTPATIENT)
Dept: ADMINISTRATIVE | Facility: CLINIC | Age: 74
End: 2019-07-05

## 2019-07-05 NOTE — TELEPHONE ENCOUNTER
"    Reason for Disposition   Nursing judgment    Protocols used: ST NO PROTOCOL CALL: SICK ADULT-A-[place]    Ravinder and his partner, Jong, called to say that he woke up with tremor this morning, is also experiencing nervousness.  They state that he has been doing "very well" since discharge 06/29 following craniotomy by Dr Otis Pedraza, sleeping well, eating well, but last night noticed a change.  They had company and he did not get much sleep, and symptoms began.  These symptoms warrant a call to surgeon right away, per discharge instructions.  Called Neurosurgery clinic, and staff reports Dr Pedraza is not there today. LIMA, nurse, could not be reached. Staff is reaching out to supervisor, Dk.  I spoke with Dk, reported patient's  Symptoms. Dk recommends ED evaluation now. Explained this to Jong and Ravinder.  Jong states patient will not want to go to the ED.  Strongly recommended it, but he states he thinks it is the Vimpat, and wants to stop it.  Recommended against stopping this medication, and again encouraged ED.  Message to Dr Pedraza and pcp, Brooke Dean MD.  Please contact patient and Jong directly with any additional care advice.      "

## 2019-07-05 NOTE — TELEPHONE ENCOUNTER
Did not reach out to patient as I was reviewing patient history it was noted that the patient had been contacted and given instructions to go to ED.  ----- Message from Danielito Spears sent at 7/5/2019 10:03 AM CDT -----  Contact: Jnog ( partner ) @ [phone]  Caller is requesting a return phone call regarding medication (lacosamide (VIMPAT) 100 mg Tab ) , pls call

## 2019-07-08 ENCOUNTER — OFFICE VISIT (OUTPATIENT)
Dept: NEUROSURGERY | Facility: CLINIC | Age: 74
End: 2019-07-08
Payer: MEDICARE

## 2019-07-08 VITALS
DIASTOLIC BLOOD PRESSURE: 73 MMHG | HEIGHT: 66 IN | BODY MASS INDEX: 27.7 KG/M2 | WEIGHT: 172.38 LBS | TEMPERATURE: 97 F | SYSTOLIC BLOOD PRESSURE: 124 MMHG | HEART RATE: 75 BPM

## 2019-07-08 DIAGNOSIS — D32.9 MENINGIOMA: Primary | ICD-10-CM

## 2019-07-08 PROCEDURE — 99024 PR POST-OP FOLLOW-UP VISIT: ICD-10-PCS | Mod: POP,,, | Performed by: NEUROLOGICAL SURGERY

## 2019-07-08 PROCEDURE — 99213 OFFICE O/P EST LOW 20 MIN: CPT | Mod: PBBFAC | Performed by: NEUROLOGICAL SURGERY

## 2019-07-08 PROCEDURE — 99024 POSTOP FOLLOW-UP VISIT: CPT | Mod: POP,,, | Performed by: NEUROLOGICAL SURGERY

## 2019-07-08 PROCEDURE — 99999 PR PBB SHADOW E&M-EST. PATIENT-LVL III: ICD-10-PCS | Mod: PBBFAC,,, | Performed by: NEUROLOGICAL SURGERY

## 2019-07-08 PROCEDURE — 99999 PR PBB SHADOW E&M-EST. PATIENT-LVL III: CPT | Mod: PBBFAC,,, | Performed by: NEUROLOGICAL SURGERY

## 2019-07-08 NOTE — PROGRESS NOTES
"This office note has been dictated.  Ravinder "Belle aSnz returned for neurosurgical followup to the office today.  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  This went well   and it seemed possible to get a good resection of the tumor.  He did well in the   postoperative period and was discharged to home.  For seizure prophylaxis, he   was given Vimpat.  He apparently developed considerably increased shaking more   than his known tremor, and he stopped the Vimpat with resolution of the   symptoms.  He has had no seizure episodes.  He returns today for staple removal.    On brief examination today, he is alert and oriented, speaking clearly.  His   incision is well healed and the staples were removed.  He shows no focal   neurologic deficit and seems generally neurologically intact.    Pathology shows the lesion to be a grade I meningioma.  We will plan a baseline   MRI of the brain in about three months and I will plan to see him back then.      NELSON/BHAVESH  dd: 07/08/2019 13:56:18 (CDT)  td: 07/09/2019 05:44:42 (CDT)  Doc ID   #[id number]  Job ID #[id number]    CC: Ravinder Sanz       "

## 2019-08-01 ENCOUNTER — TELEPHONE (OUTPATIENT)
Dept: NEUROSURGERY | Facility: CLINIC | Age: 74
End: 2019-08-01

## 2019-08-01 DIAGNOSIS — D32.9 MENINGIOMA: Primary | ICD-10-CM

## 2019-08-23 ENCOUNTER — PATIENT MESSAGE (OUTPATIENT)
Dept: ADMINISTRATIVE | Facility: OTHER | Age: 74
End: 2019-08-23

## 2019-09-09 ENCOUNTER — OFFICE VISIT (OUTPATIENT)
Dept: NEUROLOGY | Facility: CLINIC | Age: 74
End: 2019-09-09
Payer: MEDICARE

## 2019-09-09 VITALS
BODY MASS INDEX: 28.77 KG/M2 | HEART RATE: 72 BPM | DIASTOLIC BLOOD PRESSURE: 95 MMHG | SYSTOLIC BLOOD PRESSURE: 166 MMHG | WEIGHT: 179 LBS | HEIGHT: 66 IN

## 2019-09-09 DIAGNOSIS — G20.C PRIMARY PARKINSONISM: Primary | ICD-10-CM

## 2019-09-09 DIAGNOSIS — Z86.018 S/P RESECTION OF MENINGIOMA: ICD-10-CM

## 2019-09-09 DIAGNOSIS — Z98.890 S/P RESECTION OF MENINGIOMA: ICD-10-CM

## 2019-09-09 PROBLEM — R41.0 DELIRIUM: Status: RESOLVED | Noted: 2019-06-23 | Resolved: 2019-09-09

## 2019-09-09 PROBLEM — D32.9 MENINGIOMA: Status: RESOLVED | Noted: 2019-06-10 | Resolved: 2019-09-09

## 2019-09-09 PROBLEM — G93.6 VASOGENIC BRAIN EDEMA: Status: RESOLVED | Noted: 2019-06-22 | Resolved: 2019-09-09

## 2019-09-09 PROBLEM — G93.5 BRAIN COMPRESSION: Status: RESOLVED | Noted: 2019-06-22 | Resolved: 2019-09-09

## 2019-09-09 PROBLEM — G93.6 CEREBRAL EDEMA: Status: RESOLVED | Noted: 2019-06-10 | Resolved: 2019-09-09

## 2019-09-09 PROBLEM — E87.6 HYPOKALEMIA: Status: RESOLVED | Noted: 2019-06-22 | Resolved: 2019-09-09

## 2019-09-09 PROBLEM — D32.0 MENINGIOMA, CEREBRAL: Status: RESOLVED | Noted: 2019-06-19 | Resolved: 2019-09-09

## 2019-09-09 PROBLEM — F22 PARANOIA: Status: RESOLVED | Noted: 2019-05-17 | Resolved: 2019-09-09

## 2019-09-09 PROCEDURE — 99213 OFFICE O/P EST LOW 20 MIN: CPT | Mod: S$PBB,,, | Performed by: PSYCHIATRY & NEUROLOGY

## 2019-09-09 PROCEDURE — 99999 PR PBB SHADOW E&M-EST. PATIENT-LVL III: ICD-10-PCS | Mod: PBBFAC,,, | Performed by: PSYCHIATRY & NEUROLOGY

## 2019-09-09 PROCEDURE — 99213 PR OFFICE/OUTPT VISIT, EST, LEVL III, 20-29 MIN: ICD-10-PCS | Mod: S$PBB,,, | Performed by: PSYCHIATRY & NEUROLOGY

## 2019-09-09 PROCEDURE — 99213 OFFICE O/P EST LOW 20 MIN: CPT | Mod: PBBFAC | Performed by: PSYCHIATRY & NEUROLOGY

## 2019-09-09 PROCEDURE — 99999 PR PBB SHADOW E&M-EST. PATIENT-LVL III: CPT | Mod: PBBFAC,,, | Performed by: PSYCHIATRY & NEUROLOGY

## 2019-09-09 NOTE — PROGRESS NOTES
"Ravinder Sanz I. Chief Complaints during this visit:  New Patient visit for tremor    Aaareferral Self  No address on file    Primary Care Physician  Brooke Dean MD  [address]  [facility] [address]        History of present illness:   74 y.o.  M seen in f/u for tremor and meningioma.  Accompanied by Jong.    Since resection of the meningioma, he no longer drools.  Tremor is less and energy is better.    Personality, and clarity much better.  Anxiety has been better, no delusions.  No perseverative thought.  Still has some double.      5/17/19  male seen in consultation at the request of  Dr. Eng for evaluation of tremor.  Accompanied by partner of 9 years, Jong.  Main concerns:  Claustrophobia, insomnia (fear of bedtime), bilateral tremors (x1 year), drool  Balance is unchanged.  Cognition is unchanged.  Had had active dreaming up until about a year ago.  Anxiety, claustrophobia, can no long camp!  Clonazepam made this worse.  Diplopia, [id number], intermittent.  He wonders if his anxiety is due to allowing fantasies, particularly worried about being put in longterm.  Jong says he is not as social, less motivated as in past.  Jong says he had "twiddle" in fingers when driving up to 9 years ago.  Active artist, fine art (oil), but tremor does not effect this.      From alejandro note:  With regards to the panhypopit  Spinal fusion surgery at age 16; proceeded with pituitary adenoma - nonfunctional. Received radiation treatment at [facility].   Followed by endocrinologist out of [place].      With regards to the Secondary adrenal insufficiency-   current dose: Hydrocortisone 10 mg PO BID  Knows sick day precautions    Does not medical alert tag   Never been hospitalized for AI                 With regards to the Secondary hypothyroidism -    Current dose: Levothyroxine 100 mcg PO once daily - decreased from 125 mcg 6 months ago   Takes medication properly      II.  Review of systems:  As in HPI,  " "otherwise, balance 10 systems reviewed and are negative.    III.  Past Medical History:   Diagnosis Date    Anxiety     Eczema     Hyperlipidemia     Hypogonadism in male     Hypopituitarism     Hypothyroidism     Meningioma 6/10/2019    Mri 6/10/19: 3.7 cm homogeneously enhancing extra-axial mass in the left middle cranial fossa with significant mass effect on left temporal lobe     Family History   Problem Relation Age of Onset    Heart disease Father     Diabetes Sister     Melanoma Neg Hx      Social history:  Lives with partner, Jong; owns home in [place] and [place].  .      Current Outpatient Medications on File Prior to Visit   Medication Sig Dispense Refill    bacitracin 500 unit/gram ointment Apply topically 2 (two) times daily. 1 Tube 0    docusate sodium (COLACE) 100 MG capsule Take 100 mg by mouth once daily.      fluocinonide (LIDEX) 0.05 % ointment AAA bid 60 g 3    hydrocortisone (CORTEF) 10 MG Tab Take 1 tablet (10 mg total) by mouth 2 (two) times daily. 200 tablet 3    levothyroxine (SYNTHROID) 100 MCG tablet Take 1 tablet (100 mcg total) by mouth once daily. 90 tablet 3    MAGNESIUM CITRATE ORAL Take 750 mg by mouth once daily.      needle, disp, 21 G 21 gauge x 1" Ndle To use once weekly with testosterone injections 4 each 11    senna (SENOKOT) 8.6 mg tablet Take 1 tablet by mouth once daily.      [DISCONTINUED] clonazePAM (KLONOPIN) 0.5 MG tablet Take 1 tablet (0.5 mg total) by mouth 2 (two) times daily as needed for Anxiety. 60 tablet 4    [DISCONTINUED] mupirocin (BACTROBAN) 2 % ointment AAA BID 30 g 2    testosterone cypionate (DEPOTESTOTERONE CYPIONATE) 200 mg/mL injection Inject 0.75 mLs (150 mg total) into the muscle every 14 (fourteen) days. 3 ml syringe with 18 g needle  to draw  X 10   25 g 1 inch needle to inject x 10 10 mL 5    [DISCONTINUED] lacosamide (VIMPAT) 100 mg Tab Take 1 tablet (100 mg total) by mouth every 12 (twelve) hours. 60 tablet 0     No current " "facility-administered medications on file prior to visit.        PRIOR problem-specific medications tried:  none    Review of patient's allergies indicates:   Allergen Reactions    Bupropion      urinary incontinence, suicidal thoughts,drooling       IV. Physical Exam    Vitals:    09/09/19 1028   BP: (!) 166/95   Pulse: 72   Weight: 81.2 kg (179 lb)   Height: 5' 6" (1.676 m)     General appearance: Well nourished, well developed, no acute distress.         Cardiovascular:  pedal pulses 2, no edema or cyanosis, heart regular rate and rhythym, no carotid bruits.         -------------------------------------------------------------  Facial Expression: 2: Mild: In addition to decreased eye-blink frequency, Masked facies present in the lower face as well, namely fewer movements around the mouth, such as less spontaneous smiling, but lips not parted.        Affect: full       Orientation to time & place:  Oriented to time, place, person and situation       Attention & concentration:  Normal attention span and concentration       Memory:  Recent and remote memory intact  Language: Spontaneous, fluent; able to repeat and name objects        Fund of knowledge:  Aware of current events        Speech:  2: Mild: Loss of modulation, diction, or volume, with a few words unclear, but the overall sentences easy to follow   -------------------------------------------------------  Cranial nerves: wears glasses, visual fields full, optic discs not visualized, pupils equal round and reactive, extraocular movements intact but with slow saccades,       facial sensation intact, face symmetrical, hearing intact to whisper, palate raises midline, shoulder shrug strength normal, tongue protrudes midline.        -------------------------------------------------------  Musculoskeletal  Muscle tone: mild cogwheel all 4 extremities       Muscle Bulk: all 4 extremities normal        Muscle strength:  5/5 in all 4 extremities        No pronator " drift  Sensation: Intact to light touch in all extremities        Deep tendon Reflexes: 2+ bilateral biceps, triceps, patella and ankles        --------------------------------------------------------------  Cerebellar and Coordination  Gait:  normal, able to tandem without difficulty        Finger-nose: no dysmetria       Rapid Alternating Movements (pronation/supination):  R slowed; L slowed  --------------------------------------------------------------  Abnormality of movement (bradykinesia, hyperkinesia) present? Yes, 2: Mild: Mild global slowness and poverty of spontaneous movements.   Tremor present?   Yes, left resting tremor > right resting tremor; mild postural tremor; no action tremor   Posture: 1: Slight: Not quite erect, but posture could be normal for older person.   Postural stability:  no Rhomberg    V.  Laboratory/ Radiological Data:          MRI brain 6/10/19:  3.7 cm homogeneously enhancing extra-axial mass in the left middle cranial fossa with significant mass effect on left temporal lobe as well as moderate amount of subjacent parenchymal edema.  Imaging characteristics highly suggestive of a meningioma.  Neurosurgical consultation is advised.    Limited assessment of sella and pituitary gland on these dedicated whole brain images.  However, pituitary tissue is heterogeneously enhancing and lobulated.  This is likely related to reported history of adenoma in prior radiation therapy.  There is mild suprasellar extension with apparent without apparent mass effect on the optic chiasm.    1.7 cm peripherally enhancing lesion in the squamous portion of left temporal bone.  Etiology is uncertain, comparison with prior studies would be helpful to assess stability.  Consider correlation with CT for further characterization if unavailable.                VI. Assessment and Plan            Problem List Items Addressed This Visit        1 - High    Primary Parkinsonism - Primary    Overview     Anxiety,  active dreaming x 5-9 years, tremor since 2018         Current Assessment & Plan     His tremor is a little less since meningioma resection and energy, personality much better.     -> follow            2     S/P resection of meningioma    Overview     6/2019 left crani to remove 1.7cm meningioma in left temporal bone.         Current Assessment & Plan     Improvement in those symptoms (energy, paranoia, personality change) that were due to the meningioma and, as expected, not much change in the tremor, bradykinesia.  However, with the improvement of the other symptoms, he is now walking daily and, over-all, no longer disabled.   -> neurosurgery to follow                     Follow up in about 4 months (around 1/9/2020).

## 2019-09-09 NOTE — ASSESSMENT & PLAN NOTE
His tremor is a little less since meningioma resection and energy, personality much better.     -> follow

## 2019-09-09 NOTE — ASSESSMENT & PLAN NOTE
Improvement in those symptoms (energy, paranoia, personality change) that were due to the meningioma and, as expected, not much change in the tremor, bradykinesia.  However, with the improvement of the other symptoms, he is now walking daily and, over-all, no longer disabled.   -> neurosurgery to follow

## 2019-10-10 ENCOUNTER — PATIENT OUTREACH (OUTPATIENT)
Dept: ADMINISTRATIVE | Facility: OTHER | Age: 74
End: 2019-10-10

## 2019-10-14 ENCOUNTER — OFFICE VISIT (OUTPATIENT)
Dept: NEUROSURGERY | Facility: CLINIC | Age: 74
End: 2019-10-14
Payer: MEDICARE

## 2019-10-14 ENCOUNTER — HOSPITAL ENCOUNTER (OUTPATIENT)
Dept: RADIOLOGY | Facility: HOSPITAL | Age: 74
Discharge: HOME | End: 2019-10-14
Attending: NEUROLOGICAL SURGERY
Payer: MEDICARE

## 2019-10-14 VITALS
DIASTOLIC BLOOD PRESSURE: 86 MMHG | HEIGHT: 66 IN | SYSTOLIC BLOOD PRESSURE: 131 MMHG | TEMPERATURE: 97 F | HEART RATE: 76 BPM | BODY MASS INDEX: 28.77 KG/M2 | WEIGHT: 179 LBS

## 2019-10-14 DIAGNOSIS — D32.9 MENINGIOMA: Primary | ICD-10-CM

## 2019-10-14 DIAGNOSIS — D32.9 MENINGIOMA: ICD-10-CM

## 2019-10-14 PROCEDURE — 99213 PR OFFICE/OUTPT VISIT, EST, LEVL III, 20-29 MIN: ICD-10-PCS | Mod: S$PBB,,, | Performed by: NEUROLOGICAL SURGERY

## 2019-10-14 PROCEDURE — 70553 MRI BRAIN STEM W/O & W/DYE: CPT | Mod: 26,,, | Performed by: RADIOLOGY

## 2019-10-14 PROCEDURE — 99213 OFFICE O/P EST LOW 20 MIN: CPT | Mod: S$PBB,,, | Performed by: NEUROLOGICAL SURGERY

## 2019-10-14 PROCEDURE — 99214 OFFICE O/P EST MOD 30 MIN: CPT | Mod: PBBFAC,25 | Performed by: NEUROLOGICAL SURGERY

## 2019-10-14 PROCEDURE — 99999 PR PBB SHADOW E&M-EST. PATIENT-LVL IV: ICD-10-PCS | Mod: PBBFAC,,, | Performed by: NEUROLOGICAL SURGERY

## 2019-10-14 PROCEDURE — A9585 GADOBUTROL INJECTION: HCPCS | Performed by: NEUROLOGICAL SURGERY

## 2019-10-14 PROCEDURE — 25500020 PHARM REV CODE 255: Performed by: NEUROLOGICAL SURGERY

## 2019-10-14 PROCEDURE — 70553 MRI BRAIN STEM W/O & W/DYE: CPT | Mod: TC

## 2019-10-14 PROCEDURE — 99999 PR PBB SHADOW E&M-EST. PATIENT-LVL IV: CPT | Mod: PBBFAC,,, | Performed by: NEUROLOGICAL SURGERY

## 2019-10-14 PROCEDURE — 70553 MRI BRAIN W WO CONTRAST: ICD-10-PCS | Mod: 26,,, | Performed by: RADIOLOGY

## 2019-10-14 RX ORDER — GADOBUTROL 604.72 MG/ML
8 INJECTION INTRAVENOUS
Status: COMPLETED | OUTPATIENT
Start: 2019-10-14 | End: 2019-10-14

## 2019-10-14 RX ADMIN — GADOBUTROL 8 ML: 604.72 INJECTION INTRAVENOUS at 11:10

## 2019-10-14 NOTE — PROGRESS NOTES
Ravinder Sanz (Bill) was seen in neurosurgical follow-up at the office today.  He has been doing very well over the last 3 months.  He has had no seizures.  He feels his tremor may be a little less following the tumor he has also doing much better with respect to claustrophobia and panic attacks.  At this point he is pretty much back to normal and getting around well.    On examination today he is speaking in answering questions appropriately.  His incision remains well-healed.  He has good extraocular movements and equal pupils, facial function and sensation are intact and he shows no focal deficits.     MRI of the brain done at [facility] today was compared to his preop study.  There has been complete resection of the tumor.   The temporal lobe has resumed a more normal position.  There are no complicating findings.    I am happy to see him doing well I will have him return in about 9 months, which will be 1 year from the time of his surgery, with a follow-up MRI to monitor the tumor.

## 2019-10-15 LAB
CREAT SERPL-MCNC: 1.2 MG/DL (ref 0.5–1.4)
SAMPLE: NORMAL

## 2019-11-13 ENCOUNTER — PATIENT OUTREACH (OUTPATIENT)
Dept: ADMINISTRATIVE | Facility: OTHER | Age: 74
End: 2019-11-13

## 2019-11-15 ENCOUNTER — OFFICE VISIT (OUTPATIENT)
Dept: OPHTHALMOLOGY | Facility: CLINIC | Age: 74
End: 2019-11-15
Payer: MEDICARE

## 2019-11-15 DIAGNOSIS — H25.11 NUCLEAR SCLEROTIC CATARACT OF RIGHT EYE: Primary | ICD-10-CM

## 2019-11-15 DIAGNOSIS — H25.12 NUCLEAR SCLEROTIC CATARACT OF LEFT EYE: ICD-10-CM

## 2019-11-15 PROCEDURE — 92136 IOL MASTER - OD - RIGHT EYE: ICD-10-PCS | Mod: 26,S$PBB,RT, | Performed by: OPHTHALMOLOGY

## 2019-11-15 PROCEDURE — 92014 COMPRE OPH EXAM EST PT 1/>: CPT | Mod: S$PBB,,, | Performed by: OPHTHALMOLOGY

## 2019-11-15 PROCEDURE — 92136 OPHTHALMIC BIOMETRY: CPT | Mod: PBBFAC,RT | Performed by: OPHTHALMOLOGY

## 2019-11-15 PROCEDURE — 99999 PR PBB SHADOW E&M-EST. PATIENT-LVL II: CPT | Mod: PBBFAC,,, | Performed by: OPHTHALMOLOGY

## 2019-11-15 PROCEDURE — 99212 OFFICE O/P EST SF 10 MIN: CPT | Mod: PBBFAC | Performed by: OPHTHALMOLOGY

## 2019-11-15 PROCEDURE — 92014 PR EYE EXAM, EST PATIENT,COMPREHESV: ICD-10-PCS | Mod: S$PBB,,, | Performed by: OPHTHALMOLOGY

## 2019-11-15 PROCEDURE — 99999 PR PBB SHADOW E&M-EST. PATIENT-LVL II: ICD-10-PCS | Mod: PBBFAC,,, | Performed by: OPHTHALMOLOGY

## 2019-11-15 NOTE — LETTER
November 15, 2019      Sedrick Chavez, OD  [address]  [facility] [address]           [facility]  [address]  [facility] [address]  Phone: [phone]  Fax: [phone]          Patient: Ravinder Sanz   MR Number: [id number]   YOB: 1945   Date of Visit: 11/15/2019       Dear Dr. Sedrick Chavez:    Thank you for referring Ravinder Sanz to me for evaluation. Attached you will find relevant portions of my assessment and plan of care.    If you have questions, please do not hesitate to call me. I look forward to following Ravinder Sanz along with you.    Sincerely,    Shonna Steinberg MD    Enclosure  CC:  No Recipients    If you would like to receive this communication electronically, please contact [email or web address] or [phone] to request more information on [facility] access.    For providers and/or their staff who would like to refer a patient to [facility], please contact us through our one-stop-shop provider referral line, [facility], at [phone].    If you feel you have received this communication in error or would no longer like to receive these types of communications, please e-mail [email or web address]

## 2019-11-15 NOTE — PROGRESS NOTES
HPI     Concerns About Ocular Health      Additional comments: cat eval              Comments     Ref by Dr. Chavez     NS OU   Posterior Subcapsular polar age-related cataract OD   Vitreous Floaters OD   Myopia w/ astigmatism presbyopia, Bilateral     Pt here for cat eval. Denies glares and halos are bothersome. Pt states   reading fine print is difficult. Recently had a cranial sx on 06/19/2019.   Hence reason for delay in getting cat eval. No other ocular complaints.           Last edited by Shonna Steinberg MD on 11/15/2019 10:15 AM. (History)            Assessment /Plan     For exam results, see Encounter Report.    Nuclear sclerotic cataract of right eye  -     IOL Master - OD - Right Eye    Nuclear sclerotic cataract of left eye      Visually significant nuclear sclerotic cataract   - Interfering with activities of daily living.  Pt desires cataract surgery for [facility] rehabilitation.   - R/B/A discussed and pt agrees to proceed with surgery.   - IOL options discussed according to patient's goals and concomitant ocular pathology; and pt content with monofocal lens.    - Target: NEAR    PCBOO 21.5 OD    (PCBOO 21.5 OR 22.0 OS)    Has cyl - defers toric. May need rx for near and will need rx for distance FT.

## 2019-11-18 ENCOUNTER — TELEPHONE (OUTPATIENT)
Dept: OPHTHALMOLOGY | Facility: CLINIC | Age: 74
End: 2019-11-18

## 2019-11-18 DIAGNOSIS — H25.11 NUCLEAR SCLEROTIC CATARACT OF RIGHT EYE: Primary | ICD-10-CM

## 2019-11-18 RX ORDER — PREDNISOLONE ACETATE-GATIFLOXACIN-BROMFENAC .75; 5; 1 MG/ML; MG/ML; MG/ML
1 SUSPENSION/ DROPS OPHTHALMIC 3 TIMES DAILY
Qty: 5 ML | Refills: 3 | Status: SHIPPED | OUTPATIENT
Start: 2019-11-18 | End: 2020-06-10

## 2019-11-21 ENCOUNTER — TELEPHONE (OUTPATIENT)
Dept: OPTOMETRY | Facility: CLINIC | Age: 74
End: 2019-11-21

## 2019-11-21 ENCOUNTER — TELEPHONE (OUTPATIENT)
Dept: OPHTHALMOLOGY | Facility: CLINIC | Age: 74
End: 2019-11-21

## 2019-11-21 NOTE — TELEPHONE ENCOUNTER
Tried to call and confirm surgery with Dr Steinberg on 11/25/19.  No answer, LM for pt to call to confirm.

## 2019-11-21 NOTE — H&P
"History    Chief complaint:  Painless progressive vision loss    Present Ilness/Diagnosis: Nuclear sclerotic Cataract    Past Medical History:  has a past medical history of Anxiety, Eczema, Hyperlipidemia, Hypogonadism in male, Hypopituitarism, Hypothyroidism, and Meningioma (6/10/2019).    Family History/Social History: refer to chart    Allergies:   Review of patient's allergies indicates:   Allergen Reactions    Bupropion      urinary incontinence, suicidal thoughts,drooling       Current Medications: No current facility-administered medications for this encounter.     Current Outpatient Medications:     docusate sodium (COLACE) 100 MG capsule, Take 100 mg by mouth daily as needed for Constipation. , Disp: , Rfl:     hydrocortisone (CORTEF) 10 MG Tab, Take 1 tablet (10 mg total) by mouth 2 (two) times daily., Disp: 200 tablet, Rfl: 3    levothyroxine (SYNTHROID) 100 MCG tablet, Take 1 tablet (100 mcg total) by mouth once daily., Disp: 90 tablet, Rfl: 3    MAGNESIUM CITRATE ORAL, Take 750 mg by mouth once daily., Disp: , Rfl:     senna (SENOKOT) 8.6 mg tablet, Take 1 tablet by mouth daily as needed for Constipation. , Disp: , Rfl:     testosterone cypionate (DEPOTESTOTERONE CYPIONATE) 200 mg/mL injection, Inject 0.75 mLs (150 mg total) into the muscle every 14 (fourteen) days. 3 ml syringe with 18 g needle  to draw  X 10  25 g 1 inch needle to inject x 10, Disp: 10 mL, Rfl: 5    needle, disp, 21 G 21 gauge x 1" Ndle, To use once weekly with testosterone injections, Disp: 4 each, Rfl: 11    prednisolon/gatiflox/bromfenac (PREDNISOL ACE-GATIFLOX-BROMFEN) 1-0.5-0.075 % DrpS, Apply 1 drop to eye 3 (three) times daily., Disp: 5 mL, Rfl: 3    Physical Exam    BP: Vital signs stable  General: No apparent distress  HEENT: nuclear sclerotic cataract  Lungs: adequate respirations  Heart: + pulses  Abdomen: soft  Rectal/pelvic: deferred    Impression: Visually significant Cataract    Plan: Phacoemulsification with " implantation of Intraocular lens

## 2019-11-25 ENCOUNTER — ANESTHESIA (OUTPATIENT)
Dept: SURGERY | Facility: OTHER | Age: 74
End: 2019-11-25
Payer: MEDICARE

## 2019-11-25 ENCOUNTER — ANESTHESIA EVENT (OUTPATIENT)
Dept: SURGERY | Facility: OTHER | Age: 74
End: 2019-11-25
Payer: MEDICARE

## 2019-11-25 ENCOUNTER — HOSPITAL ENCOUNTER (OUTPATIENT)
Facility: OTHER | Age: 74
Discharge: HOME | End: 2019-11-25
Attending: OPHTHALMOLOGY | Admitting: OPHTHALMOLOGY
Payer: MEDICARE

## 2019-11-25 VITALS
TEMPERATURE: 98 F | HEIGHT: 66 IN | SYSTOLIC BLOOD PRESSURE: 115 MMHG | HEART RATE: 71 BPM | DIASTOLIC BLOOD PRESSURE: 78 MMHG | BODY MASS INDEX: 28.12 KG/M2 | OXYGEN SATURATION: 95 % | WEIGHT: 175 LBS | RESPIRATION RATE: 16 BRPM

## 2019-11-25 DIAGNOSIS — H25.10 AGE-RELATED NUCLEAR CATARACT: ICD-10-CM

## 2019-11-25 DIAGNOSIS — H25.11 NUCLEAR SCLEROTIC CATARACT OF RIGHT EYE: Primary | ICD-10-CM

## 2019-11-25 PROCEDURE — V2632 POST CHMBR INTRAOCULAR LENS: HCPCS | Performed by: OPHTHALMOLOGY

## 2019-11-25 PROCEDURE — 37000008 HC ANESTHESIA 1ST 15 MINUTES: Performed by: OPHTHALMOLOGY

## 2019-11-25 PROCEDURE — 66984 XCAPSL CTRC RMVL W/O ECP: CPT | Mod: RT,,, | Performed by: OPHTHALMOLOGY

## 2019-11-25 PROCEDURE — 36000707: Performed by: OPHTHALMOLOGY

## 2019-11-25 PROCEDURE — 37000009 HC ANESTHESIA EA ADD 15 MINS: Performed by: OPHTHALMOLOGY

## 2019-11-25 PROCEDURE — 25000003 PHARM REV CODE 250: Performed by: OPHTHALMOLOGY

## 2019-11-25 PROCEDURE — 36000706: Performed by: OPHTHALMOLOGY

## 2019-11-25 PROCEDURE — 63600175 PHARM REV CODE 636 W HCPCS: Performed by: NURSE ANESTHETIST, CERTIFIED REGISTERED

## 2019-11-25 PROCEDURE — 66984 PR REMOVAL, CATARACT, W/INSRT INTRAOC LENS, W/O ENDO CYCLO: ICD-10-PCS | Mod: RT,,, | Performed by: OPHTHALMOLOGY

## 2019-11-25 PROCEDURE — 71000015 HC POSTOP RECOV 1ST HR: Performed by: OPHTHALMOLOGY

## 2019-11-25 PROCEDURE — 25000003 PHARM REV CODE 250

## 2019-11-25 DEVICE — LENS IOL ITEC PRELOAD 21.5D: Type: IMPLANTABLE DEVICE | Site: EYE | Status: FUNCTIONAL

## 2019-11-25 RX ORDER — LIDOCAINE HYDROCHLORIDE 40 MG/ML
INJECTION, SOLUTION RETROBULBAR
Status: DISCONTINUED | OUTPATIENT
Start: 2019-11-25 | End: 2019-11-25 | Stop reason: HOSPADM

## 2019-11-25 RX ORDER — MOXIFLOXACIN 5 MG/ML
1 SOLUTION/ DROPS OPHTHALMIC
Status: COMPLETED | OUTPATIENT
Start: 2019-11-25 | End: 2019-11-25

## 2019-11-25 RX ORDER — LIDOCAINE HYDROCHLORIDE 10 MG/ML
INJECTION, SOLUTION EPIDURAL; INFILTRATION; INTRACAUDAL; PERINEURAL
Status: DISCONTINUED | OUTPATIENT
Start: 2019-11-25 | End: 2019-11-25 | Stop reason: HOSPADM

## 2019-11-25 RX ORDER — PHENYLEPHRINE HYDROCHLORIDE 25 MG/ML
1 SOLUTION/ DROPS OPHTHALMIC
Status: COMPLETED | OUTPATIENT
Start: 2019-11-25 | End: 2019-11-25

## 2019-11-25 RX ORDER — ACETAMINOPHEN 325 MG/1
650 TABLET ORAL EVERY 4 HOURS PRN
Status: DISCONTINUED | OUTPATIENT
Start: 2019-11-25 | End: 2019-11-25 | Stop reason: HOSPADM

## 2019-11-25 RX ORDER — PROPARACAINE HYDROCHLORIDE 5 MG/ML
1 SOLUTION/ DROPS OPHTHALMIC
Status: DISCONTINUED | OUTPATIENT
Start: 2019-11-25 | End: 2019-11-25 | Stop reason: HOSPADM

## 2019-11-25 RX ORDER — MOXIFLOXACIN 5 MG/ML
SOLUTION/ DROPS OPHTHALMIC
Status: DISCONTINUED | OUTPATIENT
Start: 2019-11-25 | End: 2019-11-25 | Stop reason: HOSPADM

## 2019-11-25 RX ORDER — TETRACAINE HYDROCHLORIDE 5 MG/ML
1 SOLUTION OPHTHALMIC
Status: COMPLETED | OUTPATIENT
Start: 2019-11-25 | End: 2019-11-25

## 2019-11-25 RX ORDER — TETRACAINE HYDROCHLORIDE 5 MG/ML
SOLUTION OPHTHALMIC
Status: DISCONTINUED | OUTPATIENT
Start: 2019-11-25 | End: 2019-11-25 | Stop reason: HOSPADM

## 2019-11-25 RX ORDER — TROPICAMIDE 10 MG/ML
1 SOLUTION/ DROPS OPHTHALMIC
Status: COMPLETED | OUTPATIENT
Start: 2019-11-25 | End: 2019-11-25

## 2019-11-25 RX ORDER — SODIUM CHLORIDE 9 MG/ML
INJECTION, SOLUTION INTRAVENOUS CONTINUOUS PRN
Status: DISCONTINUED | OUTPATIENT
Start: 2019-11-25 | End: 2019-11-25

## 2019-11-25 RX ORDER — MIDAZOLAM HYDROCHLORIDE 1 MG/ML
INJECTION INTRAMUSCULAR; INTRAVENOUS
Status: DISCONTINUED | OUTPATIENT
Start: 2019-11-25 | End: 2019-11-25

## 2019-11-25 RX ORDER — PREDNISOLONE ACETATE 10 MG/ML
SUSPENSION/ DROPS OPHTHALMIC
Status: DISCONTINUED | OUTPATIENT
Start: 2019-11-25 | End: 2019-11-25 | Stop reason: HOSPADM

## 2019-11-25 RX ADMIN — SODIUM CHLORIDE: 9 INJECTION, SOLUTION INTRAVENOUS at 06:11

## 2019-11-25 RX ADMIN — MOXIFLOXACIN HYDROCHLORIDE 1 DROP: 5 SOLUTION/ DROPS OPHTHALMIC at 05:11

## 2019-11-25 RX ADMIN — MOXIFLOXACIN 1 DROP: 5 SOLUTION/ DROPS OPHTHALMIC at 08:11

## 2019-11-25 RX ADMIN — MIDAZOLAM HYDROCHLORIDE 1 MG: 1 INJECTION, SOLUTION INTRAMUSCULAR; INTRAVENOUS at 07:11

## 2019-11-25 RX ADMIN — PHENYLEPHRINE HYDROCHLORIDE 1 DROP: 25 SOLUTION/ DROPS OPHTHALMIC at 05:11

## 2019-11-25 RX ADMIN — TROPICAMIDE 1 DROP: 10 SOLUTION/ DROPS OPHTHALMIC at 05:11

## 2019-11-25 RX ADMIN — MOXIFLOXACIN HYDROCHLORIDE 1 DROP: 5 SOLUTION/ DROPS OPHTHALMIC at 06:11

## 2019-11-25 RX ADMIN — PHENYLEPHRINE HYDROCHLORIDE 1 DROP: 25 SOLUTION/ DROPS OPHTHALMIC at 06:11

## 2019-11-25 RX ADMIN — TETRACAINE HYDROCHLORIDE 1 DROP: 5 SOLUTION OPHTHALMIC at 06:11

## 2019-11-25 RX ADMIN — TROPICAMIDE 1 DROP: 10 SOLUTION/ DROPS OPHTHALMIC at 06:11

## 2019-11-25 RX ADMIN — MIDAZOLAM HYDROCHLORIDE 2 MG: 1 INJECTION, SOLUTION INTRAMUSCULAR; INTRAVENOUS at 07:11

## 2019-11-25 RX ADMIN — TETRACAINE HYDROCHLORIDE 1 DROP: 5 SOLUTION OPHTHALMIC at 05:11

## 2019-11-25 RX ADMIN — MOXIFLOXACIN 1 DROP: 5 SOLUTION/ DROPS OPHTHALMIC at 07:11

## 2019-11-25 NOTE — ANESTHESIA POSTPROCEDURE EVALUATION
Anesthesia Post Evaluation    Patient: Ravinder Sanz    Procedure(s) Performed: Procedure(s) (LRB):  EXTRACTION, CATARACT, WITH IOL INSERTION (Right)    Final Anesthesia Type: MAC    Patient location during evaluation: OPS  Patient participation: Yes- Able to Participate  Level of consciousness: awake and alert and oriented  Post-procedure vital signs: reviewed and stable  Pain management: adequate  Airway patency: patent    PONV status at discharge: No PONV  Anesthetic complications: no      Cardiovascular status: stable  Respiratory status: unassisted, room air and spontaneous ventilation  Hydration status: euvolemic  Follow-up not needed.          Vitals Value Taken Time   /104 11/25/2019  6:13 AM   Temp 36.6 °C (97.8 °F) 11/25/2019  5:51 AM   Pulse 85 11/25/2019  5:51 AM   Resp 18 11/25/2019  5:51 AM   SpO2 100 % 11/25/2019  5:51 AM         No case tracking events are documented in the log.      Pain/Suzi Score: No data recorded

## 2019-11-25 NOTE — PLAN OF CARE
Ravinder Sanz has met all discharge criteria from Phase II. Vital Signs are stable, ambulating  without difficulty. Discharge instructions given, patient verbalized understanding. Discharged from facility via wheelchair in stable condition.

## 2019-11-25 NOTE — ANESTHESIA PREPROCEDURE EVALUATION
11/25/2019  Ravinder Sanz is a 74 y.o., male.    Anesthesia Evaluation    I have reviewed the Patient Summary Reports.    I have reviewed the Nursing Notes.   I have reviewed the Medications.     Review of Systems  Anesthesia Hx:  No problems with previous Anesthesia    Social:  Non-Smoker    Cardiovascular:   Exercise tolerance: good    Pulmonary:  Pulmonary Normal    Hepatic/GI:  Hepatic/GI Normal    Neurological:   Craniotomy for meningioma this year   Endocrine:   Hypothyroidism        Physical Exam  General:  Well nourished    Airway/Jaw/Neck:  Airway Findings: Mouth Opening: Normal Tongue: Normal  General Airway Assessment: Adult  Mallampati: II  TM Distance: Normal, at least 6 cm  Jaw/Neck Findings:     Neck ROM: Normal ROM      Dental:  Dental Findings: In tact             Anesthesia Plan  Type of Anesthesia, risks & benefits discussed:  Anesthesia Type:  MAC  Patient's Preference:   Intra-op Monitoring Plan:   Intra-op Monitoring Plan Comments:   Post Op Pain Control Plan:   Post Op Pain Control Plan Comments:   Induction:   IV  Beta Blocker:         Informed Consent: Patient understands risks and agrees with Anesthesia plan.  Questions answered. Anesthesia consent signed with patient.  ASA Score: 3     Day of Surgery Review of History & Physical:    H&P update referred to the surgeon.         Ready For Surgery From Anesthesia Perspective.

## 2019-11-25 NOTE — DISCHARGE INSTRUCTIONS
Shonna Steinberg MD  [facility]  Department of Opthamology          AFTER: Cataract Surgery:  Relax at home and DO NOT exert yourself for the rest of the day.  Plan to see Dr. Steinberg tomorrow at the eye clinic:   [facility] [address]  [place] [address]    Refer to attached eye drop instruction sheet     Precautions:  DO NOT rub your eye.  You may resume moderate activity the day after surgery.  Wear protective sunglasses during the day and a shield at night for 1(one) week.  If you have pain, redness and decreased vision, call Dr. Steinberg (or the on-call doctor after hours) @[phone].            Home Care Instructions for Eye Surgeries    1. ACTIVITY:  Limit your activity today. Relax at home and DO NOT exert yourself for the rest of the day. Increase activity gradually. You may return to work or school as directed by your physician.    2. DIET:  Drink plenty of fluids. Resume your normal diet unless instructed otherwise.    3. PAIN:  Expect a moderate amount of pain. If a prescription for pain is not sent home with you, you may take your commonly used pain reliever as directed. If this is not sufficient, call your physician. You may resume any other prescription medication unless otherwise directed by your physician.     Discuss any problem with your physician as soon as it arises. Do not Delay.      EMERGENCY- If you are unable to contact your physician, please go to the nearest Emergency Room.       Anesthesia: Monitored Anesthesia Care (MAC)    Anesthesia Safety  · Have an adult family member or friend drive you home after the procedure.  · For the first 24 hours after your surgery:  · Do not drive or use heavy equipment.  · Do not make important decisions or sign documents.  · Avoid alcohol.  · Have someone stay with you, if possible. They can watch for problems and help keep you safe.

## 2019-11-25 NOTE — OP NOTE
DATE OF PROCEDURE: 11/25/2019    SURGEON: ADENIKE BROWNING MD    PREOPERATIVE DIAGNOSIS:  Senile nuclear sclerotic cataract right eye.     POSTOPERATIVE DIAGNOSIS: Senile nuclear sclerotic cataract right eye.     PROCEDURE PERFORMED:  Phacoemulsification with placement of intraocular lens, right eye.    IMPLANT:  PCBOO 21.5    ANESTHESIA:  Topical and MAC    COMPLICATIONS: none    ESTIMATED BLOOD LOSS: <1cc    SPECIMENS: none    INDICATIONS FOR PROCEDURE:  This patient presented to the clinic with decreased vision in the right eye and was found to have a cataract.  The risks, benefits, and alternatives were discussed and the patient agreed to proceed with phacoemulsification and implantation of a lens in the right eye.     PROCEDURE IN DETAIL:  The patient was met in the preop holding area.  Consent was confirmed to be signed.  The operative site was marked.  The patient was brought into the operating room by the anesthesia team and placed under monitored anesthesia care.  The right eye was prepped and draped in a sterile ophthalmic fashion.  A Dom speculum was placed into the right eye.   A paracentesis site was made and 1% preservative-free lidocaine was injected into the anterior chamber.  Viscoelastic  material was injected into the anterior chamber.  A keratome blade was used to make a clear corneal incision.  A cystotome was used to initiate the continuous curvilinear capsulorrhexis which was completed with Utrata forceps.  BSS on a lester cannula was used to perform hydrodissection.  The phacoemulsification tip was introduced into the eye and the nucleus was removed in a standard divide-and-conquer fashion.  Remaining cortical material was removed from the eye using irrigation-aspiration.  The capsular bag was filled with viscoelastic material and the intraocular lens was injected and positioned into place. Remaining viscoelastic material was removed from the eye using irrigation and aspiration.  The  corneal wounds were hydrated.  The eye was filled to physiologic pressure. The wounds were found to be watertight. Drops of Vigamox and prednisilone were placed into the eye.  The eye was washed, dried, and shielded.  The patient tolerated the procedure well and knows to follow up with me tomorrow morning, sooner if needed.

## 2019-11-25 NOTE — DISCHARGE SUMMARY
BRIEF DISCHARGE NOTE:    Reason for hospitalization -  Cataract surgery     Final Diagnosis - Visually significant Cataract    Procedures and treatment provided - Status post phacoemulsification with placement of intraocular lens     Diet - Advance to regular as tolerated    Activity - as tolerated    Disposition at the end of the case - Good.    Discharge: to home    The patient tolerated the procedure well and knows to follow up with me tomorrow morning in the eye clinic, sooner if needed.    Patient and family instructions (as appropriate) - Given to patient on discharge    Shonna Steinberg MD

## 2019-11-26 ENCOUNTER — OFFICE VISIT (OUTPATIENT)
Dept: OPHTHALMOLOGY | Facility: CLINIC | Age: 74
End: 2019-11-26
Payer: MEDICARE

## 2019-11-26 DIAGNOSIS — Z98.41 STATUS POST CATARACT EXTRACTION AND INSERTION OF INTRAOCULAR LENS, RIGHT: Primary | ICD-10-CM

## 2019-11-26 DIAGNOSIS — Z96.1 STATUS POST CATARACT EXTRACTION AND INSERTION OF INTRAOCULAR LENS, RIGHT: Primary | ICD-10-CM

## 2019-11-26 DIAGNOSIS — H25.12 NUCLEAR SCLEROTIC CATARACT OF LEFT EYE: ICD-10-CM

## 2019-11-26 PROCEDURE — 99024 PR POST-OP FOLLOW-UP VISIT: ICD-10-PCS | Mod: POP,,, | Performed by: OPHTHALMOLOGY

## 2019-11-26 PROCEDURE — 99024 POSTOP FOLLOW-UP VISIT: CPT | Mod: POP,,, | Performed by: OPHTHALMOLOGY

## 2019-11-26 PROCEDURE — 99999 PR PBB SHADOW E&M-EST. PATIENT-LVL II: CPT | Mod: PBBFAC,,, | Performed by: OPHTHALMOLOGY

## 2019-11-26 PROCEDURE — 99999 PR PBB SHADOW E&M-EST. PATIENT-LVL II: ICD-10-PCS | Mod: PBBFAC,,, | Performed by: OPHTHALMOLOGY

## 2019-11-26 PROCEDURE — 99212 OFFICE O/P EST SF 10 MIN: CPT | Mod: PBBFAC | Performed by: OPHTHALMOLOGY

## 2019-11-26 NOTE — PROGRESS NOTES
HPI     Ref by Dr. Chavez     NS OS  S/p phaco w/IOL OD 11/25/2019 - NEAR  Vitreous Floaters OD   Myopia w/ astigmatism presbyopia, Bilateral     Combo gtts tid OD    Patient has no ocular complaints at this time.    Last edited by Shonna Steinberg MD on 11/26/2019  1:30 PM. (History)            Assessment /Plan     For exam results, see Encounter Report.    Status post cataract extraction and insertion of intraocular lens, right    Nuclear sclerotic cataract of left eye      Slit Lamp Exam  L/L - normal  C/s - quiet  Cornea - clear  A/C - 1+ cell  Lens - PCIOL    POD #1 s/p phaco/IOL  - doing well  - continue the following drops:    vigamox or ocuflox TID x 1 wk then stop  Pred forte or durezol or dexamethasone TID x  4 wks  Ketorolac TID until runs out    Versus:    Combination drop - 1 drop TID x total of 1 month    Appropriate precautions and post op medications reviewed.  Patient instructed to call or come in if symptoms of redness, decreased vision, or pain are experienced.    -f/up 1-2wks, sooner PRN.     NEAR     Cat OS - can sign consent next if ready to proceed.

## 2019-11-29 ENCOUNTER — TELEPHONE (OUTPATIENT)
Dept: OPHTHALMOLOGY | Facility: CLINIC | Age: 74
End: 2019-11-29

## 2019-11-29 DIAGNOSIS — H25.12 NUCLEAR SCLEROTIC CATARACT OF LEFT EYE: Primary | ICD-10-CM

## 2019-12-06 ENCOUNTER — OFFICE VISIT (OUTPATIENT)
Dept: OPHTHALMOLOGY | Facility: CLINIC | Age: 74
End: 2019-12-06
Payer: MEDICARE

## 2019-12-06 DIAGNOSIS — H25.12 NUCLEAR SCLEROTIC CATARACT OF LEFT EYE: ICD-10-CM

## 2019-12-06 DIAGNOSIS — Z98.41 STATUS POST CATARACT EXTRACTION AND INSERTION OF INTRAOCULAR LENS, RIGHT: Primary | ICD-10-CM

## 2019-12-06 DIAGNOSIS — Z96.1 STATUS POST CATARACT EXTRACTION AND INSERTION OF INTRAOCULAR LENS, RIGHT: Primary | ICD-10-CM

## 2019-12-06 PROCEDURE — 99024 PR POST-OP FOLLOW-UP VISIT: ICD-10-PCS | Mod: POP,,, | Performed by: OPHTHALMOLOGY

## 2019-12-06 PROCEDURE — 99024 POSTOP FOLLOW-UP VISIT: CPT | Mod: POP,,, | Performed by: OPHTHALMOLOGY

## 2019-12-06 PROCEDURE — 99213 OFFICE O/P EST LOW 20 MIN: CPT | Mod: PBBFAC,25 | Performed by: OPHTHALMOLOGY

## 2019-12-06 PROCEDURE — 99999 PR PBB SHADOW E&M-EST. PATIENT-LVL III: CPT | Mod: PBBFAC,,, | Performed by: OPHTHALMOLOGY

## 2019-12-06 PROCEDURE — 99999 PR PBB SHADOW E&M-EST. PATIENT-LVL III: ICD-10-PCS | Mod: PBBFAC,,, | Performed by: OPHTHALMOLOGY

## 2019-12-06 PROCEDURE — 92136 OPHTHALMIC BIOMETRY: CPT | Mod: PBBFAC | Performed by: OPHTHALMOLOGY

## 2019-12-06 PROCEDURE — 92136 IOL MASTER - OU - BOTH EYES: ICD-10-PCS | Mod: 26,S$PBB,LT, | Performed by: OPHTHALMOLOGY

## 2019-12-06 NOTE — PROGRESS NOTES
HPI     Ref by Dr. Chavez     NS OS  S/p phaco w/IOL OD 11/25/2019 - NEAR  Vitreous Floaters OD   Myopia w/ astigmatism presbyopia, Bilateral     Combo gtts tid OD    Pt here for post op check OD.  Pt states doing well. Pt denies eye pain   OD.     Last edited by Selin Samaniego MA on 12/6/2019  1:48 PM.   (History)            Assessment /Plan     For exam results, see Encounter Report.    Status post cataract extraction and insertion of intraocular lens, right    Nuclear sclerotic cataract of left eye  -     IOL Master - OU - Both Eyes      PO week #1 s/p phaco/IOL -    - doing well, no issues    Continue combo drops for a total of 1 month versus: d/c abx gtt, continue PF/ketorolocTID for total of 1 month    - f/up 3-4 wks for MRx, DFE    Visually significant nuclear sclerotic cataract   - Interfering with activities of daily living.  Pt desires cataract surgery for [facility].   - R/B/A discussed and pt agrees to proceed with surgery.   - IOL options discussed according to patient's goals and concomitant ocular pathology; and pt content with monofocal lens.    - Target: NEAR    (PCBOO 21.5 OR 22.0 OS)    Has cyl - defers toric. May need rx for near and will need rx for distance FT.  .

## 2019-12-09 ENCOUNTER — PATIENT OUTREACH (OUTPATIENT)
Dept: ADMINISTRATIVE | Facility: OTHER | Age: 74
End: 2019-12-09

## 2019-12-09 DIAGNOSIS — E29.1 HYPOGONADISM IN MALE: Primary | ICD-10-CM

## 2019-12-09 RX ORDER — TESTOSTERONE CYPIONATE 200 MG/ML
INJECTION, SOLUTION INTRAMUSCULAR
Qty: 2 ML | Refills: 3 | Status: SHIPPED | OUTPATIENT
Start: 2019-12-09 | End: 2019-12-09 | Stop reason: SDUPTHER

## 2019-12-09 RX ORDER — TESTOSTERONE CYPIONATE 200 MG/ML
INJECTION, SOLUTION INTRAMUSCULAR
Qty: 10 ML | Refills: 1 | Status: SHIPPED | OUTPATIENT
Start: 2019-12-09 | End: 2020-05-19

## 2019-12-09 NOTE — TELEPHONE ENCOUNTER
----- Message from Anny Kenny sent at 12/9/2019 11:01 AM CST -----  Contact: self [phone]  Pt states he woul;d like a new prescription for testosterone cypionate (DEPOTESTOTERONE CYPIONATE) 200 mg/mL injection  States he also would like to speak with nurse please call back to discuss

## 2019-12-10 ENCOUNTER — OFFICE VISIT (OUTPATIENT)
Dept: PODIATRY | Facility: CLINIC | Age: 74
End: 2019-12-10
Payer: MEDICARE

## 2019-12-10 VITALS
HEIGHT: 66 IN | BODY MASS INDEX: 28.14 KG/M2 | WEIGHT: 175.06 LBS | SYSTOLIC BLOOD PRESSURE: 132 MMHG | DIASTOLIC BLOOD PRESSURE: 84 MMHG

## 2019-12-10 DIAGNOSIS — M79.671 PAIN IN RIGHT FOOT: Primary | ICD-10-CM

## 2019-12-10 DIAGNOSIS — L84 CORN OR CALLUS: ICD-10-CM

## 2019-12-10 PROCEDURE — 99999 PR PBB SHADOW E&M-EST. PATIENT-LVL III: ICD-10-PCS | Mod: PBBFAC,,, | Performed by: PODIATRIST

## 2019-12-10 PROCEDURE — 99213 OFFICE O/P EST LOW 20 MIN: CPT | Mod: PBBFAC,PO | Performed by: PODIATRIST

## 2019-12-10 PROCEDURE — 1159F PR MEDICATION LIST DOCUMENTED IN MEDICAL RECORD: ICD-10-PCS | Mod: ,,, | Performed by: PODIATRIST

## 2019-12-10 PROCEDURE — 1159F MED LIST DOCD IN RCRD: CPT | Mod: ,,, | Performed by: PODIATRIST

## 2019-12-10 PROCEDURE — 99213 PR OFFICE/OUTPT VISIT, EST, LEVL III, 20-29 MIN: ICD-10-PCS | Mod: S$PBB,,, | Performed by: PODIATRIST

## 2019-12-10 PROCEDURE — 1125F PR PAIN SEVERITY QUANTIFIED, PAIN PRESENT: ICD-10-PCS | Mod: ,,, | Performed by: PODIATRIST

## 2019-12-10 PROCEDURE — 99213 OFFICE O/P EST LOW 20 MIN: CPT | Mod: S$PBB,,, | Performed by: PODIATRIST

## 2019-12-10 PROCEDURE — 1125F AMNT PAIN NOTED PAIN PRSNT: CPT | Mod: ,,, | Performed by: PODIATRIST

## 2019-12-10 PROCEDURE — 99999 PR PBB SHADOW E&M-EST. PATIENT-LVL III: CPT | Mod: PBBFAC,,, | Performed by: PODIATRIST

## 2019-12-16 NOTE — PROGRESS NOTES
Subjective:      Patient ID: Ravinder Sanz is a 74 y.o. male.    Chief Complaint: Foot Pain (right foot 4th and 5th toe) and Callouses (right foot, ov 6/14/19 dr Dean PCP)    Ravinder is a 74 y.o. male who presents to the podiatry clinic  with complaint of  right foot pain. Onset of the symptoms was several weeks ago. Precipitating event: increased activity. Current symptoms include: worsening symptoms after a period of activity. Aggravating factors: walking. Symptoms have gradually worsened. Patient has had no prior foot problems. Evaluation to date: none. Treatment to date: none. Patients rates pain 5/10 on pain scale.    12/10/19: Reports painful callus right 5th toe. Denies trauma to area.     Review of Systems   Constitution: Negative for chills and fever.   HENT: Negative for congestion and tinnitus.    Eyes: Negative for double vision and visual disturbance.   Cardiovascular: Negative for chest pain and claudication.   Respiratory: Negative for hemoptysis and shortness of breath.    Endocrine: Negative for cold intolerance and heat intolerance.   Hematologic/Lymphatic: Negative for adenopathy and bleeding problem.   Skin: Positive for suspicious lesions. Negative for color change and nail changes.   Musculoskeletal: Negative for myalgias and stiffness.   Gastrointestinal: Negative for nausea and vomiting.   Genitourinary: Negative for dysuria and hematuria.   Neurological: Negative for numbness and paresthesias.   Psychiatric/Behavioral: Negative for altered mental status and suicidal ideas.   Allergic/Immunologic: Negative for environmental allergies and persistent infections.           Objective:      Physical Exam   Constitutional: He is oriented to person, place, and time. Vital signs are normal. He appears well-developed and well-nourished.   Cardiovascular:   Pulses:       Dorsalis pedis pulses are 2+ on the right side, and 2+ on the left side.        Posterior tibial pulses are 2+ on the right  side, and 2+ on the left side.   Musculoskeletal:        Right foot: There is normal range of motion and no deformity.        Left foot: There is normal range of motion and no deformity.   Inspection and palpation of the muscles joints and bones of both lower extremities reveal that muscle strength for the anterior, lateral, and posterior muscle groups and intrinsic muscle groups of the foot are all 5 over 5 symmetrical. Ankle, subtalar, midtarsal, and digital joint range of motion  are within normal limits, nonpainful, without crepitus or effusion. Patient exhibits a normal angle and base of gait. Palpation of the tendons reveal no defects.     Feet:   Right Foot:   Skin Integrity: Negative for skin breakdown or erythema.   Left Foot:   Skin Integrity: Negative for skin breakdown or erythema.   Neurological: He is oriented to person, place, and time. He has normal strength. No sensory deficit.   Reflex Scores:       Patellar reflexes are 2+ on the right side and 2+ on the left side.       Achilles reflexes are 2+ on the right side and 2+ on the left side.  Sharp, dull, light touch, vibratory, and proprioceptive sensation are intact bilaterally. Deep tendon reflexes to patellar and Achilles tendon are symmetrical, 2/4 bilaterally. No ankle clonus or Babinski reflexes noted bilaterally. Coordination is normal to both feet and lower extremities.   Skin: Skin is warm, dry and intact. No cyanosis. Nails show no clubbing.   Skin turgor is normal bilaterally. Skin texture is well hydrated to both lower extremities. No lesions or rashes or wounds appreciated bilaterally. Nail plates 1 through 5 bilaterally are within normal limits for length and thickness.     Focal hyperkeratotic lesion consisting entirely of hyperkeratotic tissue without open skin, drainage, pus, fluctuance, malodor, or signs of infection: Right 5th toe.                Assessment:       Encounter Diagnoses   Name Primary?    Pain in right foot Yes     Corn or callus          Plan:       Ravinder was seen today for foot pain and callouses.    Diagnoses and all orders for this visit:    Pain in right foot    Corn or callus      I counseled the patient on his conditions, their implications and medical management.    Discussed conservative treatment with shoes of adequate dimensions, material, and style to alleviate symptoms and delay or prevent surgical intervention.    With the patient's verbal consent a sterile #15 scalpel was used to trim the hyperkeratotic lesion described above. He tolerated the procedure well without complication. Silicone toe spacer dispensed.     . Patient is aware that routine trimming of nails or calluses will not be covered service per insurance and he will fall under Proc B if this service is desired. Patient verbalized understanding of this. RTC as needed for Proc B or any other pedal complaint.

## 2020-01-02 ENCOUNTER — TELEPHONE (OUTPATIENT)
Dept: OPHTHALMOLOGY | Facility: CLINIC | Age: 75
End: 2020-01-02

## 2020-01-07 ENCOUNTER — TELEPHONE (OUTPATIENT)
Dept: OPTOMETRY | Facility: CLINIC | Age: 75
End: 2020-01-07

## 2020-01-07 NOTE — H&P
"History    Chief complaint:  Painless progressive vision loss    Present Ilness/Diagnosis: Nuclear sclerotic Cataract    Past Medical History:  has a past medical history of Anxiety, Eczema, Hyperlipidemia, Hypogonadism in male, Hypopituitarism, Hypothyroidism, and Meningioma (6/10/2019).    Family History/Social History: refer to chart    Allergies:   Review of patient's allergies indicates:   Allergen Reactions    Bupropion      urinary incontinence, suicidal thoughts,drooling       Current Medications: No current facility-administered medications for this encounter.     Current Outpatient Medications:     hydrocortisone (CORTEF) 10 MG Tab, Take 1 tablet (10 mg total) by mouth 2 (two) times daily., Disp: 200 tablet, Rfl: 3    levothyroxine (SYNTHROID) 100 MCG tablet, Take 1 tablet (100 mcg total) by mouth once daily., Disp: 90 tablet, Rfl: 3    MAGNESIUM CITRATE ORAL, Take 750 mg by mouth once daily., Disp: , Rfl:     testosterone cypionate (DEPOTESTOTERONE CYPIONATE) 200 mg/mL injection, INJECT 0.75MLS INTO MUSCLE EVERY 14 DAYS ADD 3ML SYRINGES WITH 18G NEEDLE TO DRAW X 10 AND 21G ONE AND HALF INCH NEEDLE TO INJECT X 10, Disp: 10 mL, Rfl: 1    docusate sodium (COLACE) 100 MG capsule, Take 100 mg by mouth daily as needed for Constipation. , Disp: , Rfl:     needle, disp, 21 G 21 gauge x 1" Ndle, To use once weekly with testosterone injections, Disp: 4 each, Rfl: 11    prednisolon/gatiflox/bromfenac (PREDNISOL ACE-GATIFLOX-BROMFEN) 1-0.5-0.075 % DrpS, Apply 1 drop to eye 3 (three) times daily., Disp: 5 mL, Rfl: 3    senna (SENOKOT) 8.6 mg tablet, Take 1 tablet by mouth daily as needed for Constipation. , Disp: , Rfl:     Physical Exam    BP: Vital signs stable  General: No apparent distress  HEENT: nuclear sclerotic cataract  Lungs: adequate respirations  Heart: + pulses  Abdomen: soft  Rectal/pelvic: deferred    Impression: Visually significant Cataract    Plan: Phacoemulsification with implantation of " Intraocular lens

## 2020-01-08 ENCOUNTER — HOSPITAL ENCOUNTER (OUTPATIENT)
Facility: OTHER | Age: 75
Discharge: HOME | End: 2020-01-08
Attending: OPHTHALMOLOGY | Admitting: OPHTHALMOLOGY
Payer: MEDICARE

## 2020-01-08 ENCOUNTER — ANESTHESIA (OUTPATIENT)
Dept: SURGERY | Facility: OTHER | Age: 75
End: 2020-01-08
Payer: MEDICARE

## 2020-01-08 ENCOUNTER — ANESTHESIA EVENT (OUTPATIENT)
Dept: SURGERY | Facility: OTHER | Age: 75
End: 2020-01-08
Payer: MEDICARE

## 2020-01-08 VITALS
HEART RATE: 78 BPM | DIASTOLIC BLOOD PRESSURE: 89 MMHG | OXYGEN SATURATION: 95 % | RESPIRATION RATE: 18 BRPM | HEIGHT: 66 IN | TEMPERATURE: 98 F | BODY MASS INDEX: 28.12 KG/M2 | SYSTOLIC BLOOD PRESSURE: 128 MMHG | WEIGHT: 175 LBS

## 2020-01-08 DIAGNOSIS — H25.12 AGE-RELATED NUCLEAR CATARACT OF LEFT EYE: Primary | ICD-10-CM

## 2020-01-08 DIAGNOSIS — H25.12 AGE-RELATED NUCLEAR CATARACT, LEFT: ICD-10-CM

## 2020-01-08 PROCEDURE — 66984 XCAPSL CTRC RMVL W/O ECP: CPT | Mod: 79,LT,, | Performed by: OPHTHALMOLOGY

## 2020-01-08 PROCEDURE — 25000003 PHARM REV CODE 250: Performed by: OPHTHALMOLOGY

## 2020-01-08 PROCEDURE — 36000707: Performed by: OPHTHALMOLOGY

## 2020-01-08 PROCEDURE — 71000015 HC POSTOP RECOV 1ST HR: Performed by: OPHTHALMOLOGY

## 2020-01-08 PROCEDURE — 37000009 HC ANESTHESIA EA ADD 15 MINS: Performed by: OPHTHALMOLOGY

## 2020-01-08 PROCEDURE — 66984 PR REMOVAL, CATARACT, W/INSRT INTRAOC LENS, W/O ENDO CYCLO: ICD-10-PCS | Mod: 79,LT,, | Performed by: OPHTHALMOLOGY

## 2020-01-08 PROCEDURE — 37000008 HC ANESTHESIA 1ST 15 MINUTES: Performed by: OPHTHALMOLOGY

## 2020-01-08 PROCEDURE — V2632 POST CHMBR INTRAOCULAR LENS: HCPCS | Performed by: OPHTHALMOLOGY

## 2020-01-08 PROCEDURE — 36000706: Performed by: OPHTHALMOLOGY

## 2020-01-08 PROCEDURE — 63600175 PHARM REV CODE 636 W HCPCS: Performed by: NURSE ANESTHETIST, CERTIFIED REGISTERED

## 2020-01-08 DEVICE — LENS IOL ITEC PRELOAD 22.0D: Type: IMPLANTABLE DEVICE | Site: EYE | Status: FUNCTIONAL

## 2020-01-08 RX ORDER — MOXIFLOXACIN 5 MG/ML
1 SOLUTION/ DROPS OPHTHALMIC
Status: COMPLETED | OUTPATIENT
Start: 2020-01-08 | End: 2020-01-08

## 2020-01-08 RX ORDER — TETRACAINE HYDROCHLORIDE 5 MG/ML
SOLUTION OPHTHALMIC
Status: DISCONTINUED | OUTPATIENT
Start: 2020-01-08 | End: 2020-01-08 | Stop reason: HOSPADM

## 2020-01-08 RX ORDER — PHENYLEPHRINE HYDROCHLORIDE 25 MG/ML
1 SOLUTION/ DROPS OPHTHALMIC
Status: COMPLETED | OUTPATIENT
Start: 2020-01-08 | End: 2020-01-08

## 2020-01-08 RX ORDER — LIDOCAINE HYDROCHLORIDE 10 MG/ML
INJECTION, SOLUTION EPIDURAL; INFILTRATION; INTRACAUDAL; PERINEURAL
Status: DISCONTINUED | OUTPATIENT
Start: 2020-01-08 | End: 2020-01-08 | Stop reason: HOSPADM

## 2020-01-08 RX ORDER — PREDNISOLONE ACETATE 10 MG/ML
SUSPENSION/ DROPS OPHTHALMIC
Status: DISCONTINUED | OUTPATIENT
Start: 2020-01-08 | End: 2020-01-08 | Stop reason: HOSPADM

## 2020-01-08 RX ORDER — ACETAMINOPHEN 325 MG/1
650 TABLET ORAL EVERY 4 HOURS PRN
Status: DISCONTINUED | OUTPATIENT
Start: 2020-01-08 | End: 2020-01-08 | Stop reason: HOSPADM

## 2020-01-08 RX ORDER — MIDAZOLAM HYDROCHLORIDE 1 MG/ML
INJECTION INTRAMUSCULAR; INTRAVENOUS
Status: DISCONTINUED | OUTPATIENT
Start: 2020-01-08 | End: 2020-01-08

## 2020-01-08 RX ORDER — TETRACAINE HYDROCHLORIDE 5 MG/ML
1 SOLUTION OPHTHALMIC
Status: COMPLETED | OUTPATIENT
Start: 2020-01-08 | End: 2020-01-08

## 2020-01-08 RX ORDER — LIDOCAINE HYDROCHLORIDE 40 MG/ML
INJECTION, SOLUTION RETROBULBAR
Status: DISCONTINUED | OUTPATIENT
Start: 2020-01-08 | End: 2020-01-08 | Stop reason: HOSPADM

## 2020-01-08 RX ORDER — TROPICAMIDE 10 MG/ML
1 SOLUTION/ DROPS OPHTHALMIC
Status: COMPLETED | OUTPATIENT
Start: 2020-01-08 | End: 2020-01-08

## 2020-01-08 RX ORDER — MOXIFLOXACIN 5 MG/ML
SOLUTION/ DROPS OPHTHALMIC
Status: DISCONTINUED | OUTPATIENT
Start: 2020-01-08 | End: 2020-01-08 | Stop reason: HOSPADM

## 2020-01-08 RX ORDER — PROPARACAINE HYDROCHLORIDE 5 MG/ML
1 SOLUTION/ DROPS OPHTHALMIC
Status: DISCONTINUED | OUTPATIENT
Start: 2020-01-08 | End: 2020-01-08 | Stop reason: HOSPADM

## 2020-01-08 RX ADMIN — PHENYLEPHRINE HYDROCHLORIDE 1 DROP: 25 SOLUTION/ DROPS OPHTHALMIC at 10:01

## 2020-01-08 RX ADMIN — MIDAZOLAM HYDROCHLORIDE 1 MG: 1 INJECTION, SOLUTION INTRAMUSCULAR; INTRAVENOUS at 12:01

## 2020-01-08 RX ADMIN — TETRACAINE HYDROCHLORIDE 1 DROP: 5 SOLUTION OPHTHALMIC at 10:01

## 2020-01-08 RX ADMIN — TROPICAMIDE 1 DROP: 10 SOLUTION/ DROPS OPHTHALMIC at 10:01

## 2020-01-08 RX ADMIN — MOXIFLOXACIN 1 DROP: 5 SOLUTION/ DROPS OPHTHALMIC at 12:01

## 2020-01-08 RX ADMIN — MOXIFLOXACIN HYDROCHLORIDE 1 DROP: 5 SOLUTION/ DROPS OPHTHALMIC at 10:01

## 2020-01-08 RX ADMIN — MIDAZOLAM HYDROCHLORIDE 2 MG: 1 INJECTION, SOLUTION INTRAMUSCULAR; INTRAVENOUS at 12:01

## 2020-01-08 NOTE — PROGRESS NOTES
Dr. Ni notified of patient's elevated BP. No new orders given. MD states, will assess and treat when in holding.   
These vs done at 1227  
Last TTE 5/2016 w/ EF 75-80% and Stage 1 Diastolic dysfunction  - Euvolemic on exam today. No evidence of JVD or b/l LE edema  - c/w home Amiodarone 100mg PO QD, Furosemide 20mg PO and Spironolactone 12.5mg PO every Mon, Thurs, Sun per home regimen  - Monitor i's/o's. Daily weights

## 2020-01-08 NOTE — OP NOTE
DATE OF PROCEDURE: 01/08/2020    SURGEON: ADENIKE BROWNING MD    PREOPERATIVE DIAGNOSIS:  Senile nuclear sclerotic cataract left eye.     POSTOPERATIVE DIAGNOSIS: Senile nuclear sclerotic cataract left eye.     PROCEDURE PERFORMED:  Phacoemulsification with placement of intraocular lens, left eye.    IMPLANT:  PCBOO 22.0    ANESTHESIA:  Topical and MAC    COMPLICATIONS: none    ESTIMATED BLOOD LOSS: <1cc    SPECIMENS: none    INDICATIONS FOR PROCEDURE:  This patient presented to the clinic with decreased vision in the left eye and was found to have a cataract.  The risks, benefits, and alternatives were discussed and the patient agreed to proceed with phacoemulsification and implantation of a lens in the left eye.     PROCEDURE IN DETAIL:  The patient was met in the preop holding area.  Consent was confirmed to be signed.  The operative site was marked.  The patient was brought into the operating room by the anesthesia team and placed under monitored anesthesia care.  The left eye was prepped and draped in a sterile ophthalmic fashion.  A Dom speculum was placed into the left eye.   A paracentesis site was made and 1% preservative-free lidocaine was injected into the anterior chamber.  Viscoelastic  material was injected into the anterior chamber.  A keratome blade was used to make a clear corneal incision.  A cystotome was used to initiate the continuous curvilinear capsulorrhexis which was completed with Utrata forceps.  BSS on a lester cannula was used to perform hydrodissection.  The phacoemulsification tip was introduced into the eye and the nucleus was removed in a standard divide-and-conquer fashion.  Remaining cortical material was removed from the eye using irrigation-aspiration.  The capsular bag was filled with viscoelastic material and the intraocular lens was injected and positioned into place. Remaining viscoelastic material was removed from the eye using irrigation and aspiration.  The corneal  wounds were hydrated.  The eye was filled to physiologic pressure. The wounds were found to be watertight. Drops of Vigamox and prednisilone were placed into the eye.  The eye was washed, dried, and shielded.  The patient tolerated the procedure well and knows to follow up with me tomorrow morning, sooner if needed.

## 2020-01-08 NOTE — ANESTHESIA POSTPROCEDURE EVALUATION
Anesthesia Post Evaluation    Patient: Ravinder Sanz    Procedure(s) Performed: Procedure(s) (LRB):  EXTRACTION, CATARACT, WITH IOL INSERTION (Left)    Final Anesthesia Type: MAC    Patient location during evaluation: [facility]  Patient participation: Yes- Able to Participate  Level of consciousness: awake and alert  Post-procedure vital signs: reviewed and stable  Pain management: adequate  Airway patency: patent    PONV status at discharge: No PONV  Anesthetic complications: no      Cardiovascular status: blood pressure returned to baseline  Respiratory status: unassisted, spontaneous ventilation and room air  Hydration status: euvolemic  Follow-up not needed.          Vitals Value Taken Time   /101 1/8/2020 10:45 AM   Temp 36.5 °C (97.7 °F) 1/8/2020 10:45 AM   Pulse 83 1/8/2020 10:45 AM   Resp 18 1/8/2020 10:45 AM   SpO2 100 % 1/8/2020 10:45 AM         No case tracking events are documented in the log.      Pain/Suzi Score: No data recorded

## 2020-01-08 NOTE — ANESTHESIA PREPROCEDURE EVALUATION
01/08/2020  Ravinder Sanz is a 74 y.o., male.    Pre-op Assessment    I have reviewed the Patient Summary Reports.     I have reviewed the Nursing Notes.   I have reviewed the Medications.     Review of Systems  Anesthesia Hx:  No problems with previous Anesthesia    Social:  Non-Smoker    Cardiovascular:   Exercise tolerance: good    Pulmonary:  Pulmonary Normal    Hepatic/GI:  Hepatic/GI Normal    Neurological:   Craniotomy for meningioma this year   Endocrine:   Hypothyroidism        Physical Exam  General:  Well nourished    Airway/Jaw/Neck:  Airway Findings: Mouth Opening: Normal Tongue: Normal  General Airway Assessment: Adult  Mallampati: II  TM Distance: Normal, at least 6 cm  Jaw/Neck Findings:     Neck ROM: Normal ROM      Dental:  Dental Findings: In tact             Anesthesia Plan  Type of Anesthesia, risks & benefits discussed:  Anesthesia Type:  MAC  Patient's Preference:   Intra-op Monitoring Plan: standard ASA monitors  Intra-op Monitoring Plan Comments:   Post Op Pain Control Plan: per primary service following discharge from PACU  Post Op Pain Control Plan Comments:   Induction:   IV  Beta Blocker:         Informed Consent: Patient understands risks and agrees with Anesthesia plan.  Questions answered. Anesthesia consent signed with patient.  ASA Score: 3     Day of Surgery Review of History & Physical:    H&P update referred to the surgeon.     Anesthesia Plan Notes: Returns for L cataract extraction.        Ready For Surgery From Anesthesia Perspective.

## 2020-01-08 NOTE — DISCHARGE INSTRUCTIONS
Shonna Steinberg MD  [facility]  Department of Opthamology          AFTER: Cataract Surgery:  Relax at home and DO NOT exert yourself for the rest of the day.  Plan to see Dr. Steinberg tomorrow at the eye clinic:   [facility] [address]  [place] [address]    Refer to attached eye drop instruction sheet     Precautions:  DO NOT rub your eye.  You may resume moderate activity the day after surgery.  Wear protective sunglasses during the day and a shield at night for 1(one) week.  If you have pain, redness and decreased vision, call Dr. Steinberg (or the on-call doctor after hours) @[phone].            Home Care Instructions for Eye Surgeries    1. ACTIVITY:  Limit your activity today. Relax at home and DO NOT exert yourself for the rest of the day. Increase activity gradually. You may return to work or school as directed by your physician.    2. DIET:  Drink plenty of fluids. Resume your normal diet unless instructed otherwise.    3. PAIN:  Expect a moderate amount of pain. If a prescription for pain is not sent home with you, you may take your commonly used pain reliever as directed. If this is not sufficient, call your physician. You may resume any other prescription medication unless otherwise directed by your physician.     Discuss any problem with your physician as soon as it arises. Do not Delay.      EMERGENCY- If you are unable to contact your physician, please go to the nearest Emergency Room.       Anesthesia: Monitored Anesthesia Care (MAC)    Anesthesia Safety  · Have an adult family member or friend drive you home after the procedure.  · For the first 24 hours after your surgery:  · Do not drive or use heavy equipment.  · Do not make important decisions or sign documents.  · Avoid alcohol.  · Have someone stay with you, if possible. They can watch for problems and help keep you safe.      Anesthesia: Monitored Anesthesia Care (MAC)    Anesthesia Safety  · Have an adult family  member or friend drive you home after the procedure.  · For the first 24 hours after your surgery:  ¨ Do not drive or use heavy equipment.  ¨ Do not make important decisions or sign documents.  ¨ Avoid alcohol.  ¨ Have someone stay with you, if possible. They can watch for problems and help keep you safe.    PLEASE FOLLOW ANY OTHER INSTRUCTIONS PROVIDED TO YOU BY DR. BROWNING!

## 2020-01-09 ENCOUNTER — OFFICE VISIT (OUTPATIENT)
Dept: OPHTHALMOLOGY | Facility: CLINIC | Age: 75
End: 2020-01-09
Payer: MEDICARE

## 2020-01-09 DIAGNOSIS — Z96.1 STATUS POST CATARACT EXTRACTION AND INSERTION OF INTRAOCULAR LENS, LEFT: Primary | ICD-10-CM

## 2020-01-09 DIAGNOSIS — Z98.42 STATUS POST CATARACT EXTRACTION AND INSERTION OF INTRAOCULAR LENS, LEFT: Primary | ICD-10-CM

## 2020-01-09 DIAGNOSIS — Z96.1 STATUS POST CATARACT EXTRACTION AND INSERTION OF INTRAOCULAR LENS, RIGHT: ICD-10-CM

## 2020-01-09 DIAGNOSIS — Z98.41 STATUS POST CATARACT EXTRACTION AND INSERTION OF INTRAOCULAR LENS, RIGHT: ICD-10-CM

## 2020-01-09 PROCEDURE — 99024 POSTOP FOLLOW-UP VISIT: CPT | Mod: POP,,, | Performed by: OPHTHALMOLOGY

## 2020-01-09 PROCEDURE — 99024 PR POST-OP FOLLOW-UP VISIT: ICD-10-PCS | Mod: POP,,, | Performed by: OPHTHALMOLOGY

## 2020-01-09 PROCEDURE — 99999 PR PBB SHADOW E&M-EST. PATIENT-LVL II: CPT | Mod: PBBFAC,,, | Performed by: OPHTHALMOLOGY

## 2020-01-09 PROCEDURE — 99999 PR PBB SHADOW E&M-EST. PATIENT-LVL II: ICD-10-PCS | Mod: PBBFAC,,, | Performed by: OPHTHALMOLOGY

## 2020-01-09 PROCEDURE — 99212 OFFICE O/P EST SF 10 MIN: CPT | Mod: PBBFAC | Performed by: OPHTHALMOLOGY

## 2020-01-09 NOTE — PROGRESS NOTES
HPI     Ref by Dr. Chavez     S/p phaco w/IOL OS 01/08/2020 - NEAR  S/p phaco w/IOL OD 11/25/2019 - NEAR   Vitreous Floaters OD   Myopia w/ astigmatism presbyopia, Bilateral    Combo gtts tid OS    Pt here for 1 day post op.doing okay. Denies eye pain, flashes, floaters.   No other ocular complaints.     Last edited by Shonna Steinberg MD on 1/9/2020  3:01 PM. (History)            Assessment /Plan     For exam results, see Encounter Report.    Status post cataract extraction and insertion of intraocular lens, left    Status post cataract extraction and insertion of intraocular lens, right    Slit Lamp Exam  L/L - normal  C/s - quiet  Cornea - clear  A/C - 1+ cell  Lens - PCIOL    POD #1 s/p phaco/IOL  - doing well  - continue the following drops:    vigamox or ocuflox TID x 1 wk then stop  Pred forte or durezol or dexamethasone TID x  4 wks  Ketorolac TID until runs out    Versus:    Combination drop - 1 drop TID x total of 1 month    Appropriate precautions and post op medications reviewed.  Patient instructed to call or come in if symptoms of redness, decreased vision, or pain are experienced.    -f/up 4 wks, sooner PRN. Arx/ mrx OU. [facility]/IOP OU

## 2020-01-10 ENCOUNTER — IMMUNIZATION (OUTPATIENT)
Dept: INTERNAL MEDICINE | Facility: CLINIC | Age: 75
End: 2020-01-10
Payer: MEDICARE

## 2020-01-10 ENCOUNTER — OFFICE VISIT (OUTPATIENT)
Dept: INTERNAL MEDICINE | Facility: CLINIC | Age: 75
End: 2020-01-10
Payer: MEDICARE

## 2020-01-10 VITALS
DIASTOLIC BLOOD PRESSURE: 96 MMHG | WEIGHT: 178.56 LBS | BODY MASS INDEX: 28.7 KG/M2 | OXYGEN SATURATION: 98 % | HEIGHT: 66 IN | SYSTOLIC BLOOD PRESSURE: 156 MMHG | HEART RATE: 78 BPM

## 2020-01-10 DIAGNOSIS — I10 HYPERTENSION, UNSPECIFIED TYPE: Primary | ICD-10-CM

## 2020-01-10 PROCEDURE — 99999 PR PBB SHADOW E&M-EST. PATIENT-LVL IV: ICD-10-PCS | Mod: PBBFAC,,, | Performed by: INTERNAL MEDICINE

## 2020-01-10 PROCEDURE — 1126F PR PAIN SEVERITY QUANTIFIED, NO PAIN PRESENT: ICD-10-PCS | Mod: ,,, | Performed by: INTERNAL MEDICINE

## 2020-01-10 PROCEDURE — 99214 OFFICE O/P EST MOD 30 MIN: CPT | Mod: PBBFAC,25 | Performed by: INTERNAL MEDICINE

## 2020-01-10 PROCEDURE — 1159F MED LIST DOCD IN RCRD: CPT | Mod: ,,, | Performed by: INTERNAL MEDICINE

## 2020-01-10 PROCEDURE — 1159F PR MEDICATION LIST DOCUMENTED IN MEDICAL RECORD: ICD-10-PCS | Mod: ,,, | Performed by: INTERNAL MEDICINE

## 2020-01-10 PROCEDURE — 90662 IIV NO PRSV INCREASED AG IM: CPT | Mod: PBBFAC

## 2020-01-10 PROCEDURE — 99214 PR OFFICE/OUTPT VISIT, EST, LEVL IV, 30-39 MIN: ICD-10-PCS | Mod: S$PBB,,, | Performed by: INTERNAL MEDICINE

## 2020-01-10 PROCEDURE — 99214 OFFICE O/P EST MOD 30 MIN: CPT | Mod: S$PBB,,, | Performed by: INTERNAL MEDICINE

## 2020-01-10 PROCEDURE — 99999 PR PBB SHADOW E&M-EST. PATIENT-LVL IV: CPT | Mod: PBBFAC,,, | Performed by: INTERNAL MEDICINE

## 2020-01-10 PROCEDURE — 1126F AMNT PAIN NOTED NONE PRSNT: CPT | Mod: ,,, | Performed by: INTERNAL MEDICINE

## 2020-01-10 RX ORDER — AMLODIPINE BESYLATE 5 MG/1
5 TABLET ORAL DAILY
Qty: 30 TABLET | Refills: 1 | Status: SHIPPED | OUTPATIENT
Start: 2020-01-10 | End: 2020-02-21 | Stop reason: SDUPTHER

## 2020-01-13 ENCOUNTER — LAB VISIT (OUTPATIENT)
Dept: LAB | Facility: HOSPITAL | Age: 75
End: 2020-01-13
Attending: INTERNAL MEDICINE
Payer: MEDICARE

## 2020-01-13 DIAGNOSIS — I10 HYPERTENSION, UNSPECIFIED TYPE: ICD-10-CM

## 2020-01-13 LAB
ALBUMIN SERPL BCP-MCNC: 4.3 G/DL (ref 3.5–5.2)
ALP SERPL-CCNC: 44 U/L (ref 55–135)
ALT SERPL W/O P-5'-P-CCNC: 31 U/L (ref 10–44)
ANION GAP SERPL CALC-SCNC: 10 MMOL/L (ref 8–16)
AST SERPL-CCNC: 31 U/L (ref 10–40)
BILIRUB SERPL-MCNC: 0.8 MG/DL (ref 0.1–1)
BUN SERPL-MCNC: 15 MG/DL (ref 8–23)
CALCIUM SERPL-MCNC: 8.8 MG/DL (ref 8.7–10.5)
CHLORIDE SERPL-SCNC: 101 MMOL/L (ref 95–110)
CHOLEST SERPL-MCNC: 275 MG/DL (ref 120–199)
CHOLEST/HDLC SERPL: 3.8 {RATIO} (ref 2–5)
CO2 SERPL-SCNC: 29 MMOL/L (ref 23–29)
CREAT SERPL-MCNC: 1.2 MG/DL (ref 0.5–1.4)
EST. GFR  (AFRICAN AMERICAN): >60 ML/MIN/1.73 M^2
EST. GFR  (NON AFRICAN AMERICAN): >60 ML/MIN/1.73 M^2
GLUCOSE SERPL-MCNC: 86 MG/DL (ref 70–110)
HDLC SERPL-MCNC: 73 MG/DL (ref 40–75)
HDLC SERPL: 26.5 % (ref 20–50)
LDLC SERPL CALC-MCNC: 182.4 MG/DL (ref 63–159)
NONHDLC SERPL-MCNC: 202 MG/DL
POTASSIUM SERPL-SCNC: 3.7 MMOL/L (ref 3.5–5.1)
PROT SERPL-MCNC: 7.6 G/DL (ref 6–8.4)
SODIUM SERPL-SCNC: 140 MMOL/L (ref 136–145)
TRIGL SERPL-MCNC: 98 MG/DL (ref 30–150)

## 2020-01-13 PROCEDURE — 36415 COLL VENOUS BLD VENIPUNCTURE: CPT

## 2020-01-13 PROCEDURE — 80061 LIPID PANEL: CPT

## 2020-01-13 PROCEDURE — 80053 COMPREHEN METABOLIC PANEL: CPT

## 2020-01-13 NOTE — PROGRESS NOTES
Subjective:       Patient ID: Ravinder Sanz is a 74 y.o. male.    Chief Complaint: Hypertension    HPI  He returns for management of hypertension.  He has had hypertension for over a year.  Current treatment has included medications outlined in medication list.  He denies chest pain or shortness of breath.  No palpitations.  Denies left arm or neck pain.    Past medical history:  Pituitary adenoma, pan hypopituitarism, meningioma status post resection, Parkinson's    Medications:  Cortef, Synthroid, testosterone    Allergies:  Bupropion      Review of Systems   Constitutional: Negative for chills, fatigue, fever and unexpected weight change.   Respiratory: Negative for chest tightness and shortness of breath.    Cardiovascular: Negative for chest pain and palpitations.   Gastrointestinal: Negative for abdominal pain and blood in stool.   Neurological: Negative for dizziness, syncope, numbness and headaches.       Objective:      Physical Exam   HENT:   Right Ear: External ear normal.   Left Ear: External ear normal.   Nose: Nose normal.   Mouth/Throat: Oropharynx is clear and moist.   Eyes: Pupils are equal, round, and reactive to light.   Neck: Normal range of motion.   Cardiovascular: Normal rate and regular rhythm.   No murmur heard.  Pulmonary/Chest: Breath sounds normal.   Abdominal: He exhibits no distension. There is no hepatomegaly. There is no tenderness.   Lymphadenopathy:     He has no cervical adenopathy.     He has no axillary adenopathy.   Neurological: He has normal strength and normal reflexes. No cranial nerve deficit or sensory deficit.       Assessment/Plan       Assessment and plan:  Hypertension:  Start Norvasc 5 mg daily.  Return to clinic in 1 month blood pressure check.  Check CMP and lipid panel

## 2020-01-18 ENCOUNTER — TELEPHONE (OUTPATIENT)
Dept: INTERNAL MEDICINE | Facility: CLINIC | Age: 75
End: 2020-01-18

## 2020-01-19 NOTE — TELEPHONE ENCOUNTER
Please contact patient and inform him his cholesterol is elevated.  I will need to give him a medication for this.  Please ask him what pharmacy he would like that sent to

## 2020-01-21 RX ORDER — ATORVASTATIN CALCIUM 10 MG/1
10 TABLET, FILM COATED ORAL DAILY
Qty: 30 TABLET | Refills: 3 | Status: SHIPPED | OUTPATIENT
Start: 2020-01-21 | End: 2020-02-21 | Stop reason: SDUPTHER

## 2020-01-21 NOTE — TELEPHONE ENCOUNTER
Spoke with pt. Pt stated he goes back and forth, but you can send the rx to the [facility] in [place], [place].

## 2020-02-18 ENCOUNTER — OFFICE VISIT (OUTPATIENT)
Dept: OPHTHALMOLOGY | Facility: CLINIC | Age: 75
End: 2020-02-18
Payer: MEDICARE

## 2020-02-18 DIAGNOSIS — Z96.1 STATUS POST CATARACT EXTRACTION AND INSERTION OF INTRAOCULAR LENS, LEFT: Primary | ICD-10-CM

## 2020-02-18 DIAGNOSIS — Z98.42 STATUS POST CATARACT EXTRACTION AND INSERTION OF INTRAOCULAR LENS, LEFT: Primary | ICD-10-CM

## 2020-02-18 DIAGNOSIS — Z98.41 STATUS POST CATARACT EXTRACTION AND INSERTION OF INTRAOCULAR LENS, RIGHT: ICD-10-CM

## 2020-02-18 DIAGNOSIS — Z96.1 STATUS POST CATARACT EXTRACTION AND INSERTION OF INTRAOCULAR LENS, RIGHT: ICD-10-CM

## 2020-02-18 PROCEDURE — 99213 OFFICE O/P EST LOW 20 MIN: CPT | Mod: PBBFAC | Performed by: OPHTHALMOLOGY

## 2020-02-18 PROCEDURE — 99999 PR PBB SHADOW E&M-EST. PATIENT-LVL III: CPT | Mod: PBBFAC,,, | Performed by: OPHTHALMOLOGY

## 2020-02-18 PROCEDURE — 99024 PR POST-OP FOLLOW-UP VISIT: ICD-10-PCS | Mod: POP,,, | Performed by: OPHTHALMOLOGY

## 2020-02-18 PROCEDURE — 99999 PR PBB SHADOW E&M-EST. PATIENT-LVL III: ICD-10-PCS | Mod: PBBFAC,,, | Performed by: OPHTHALMOLOGY

## 2020-02-18 PROCEDURE — 99024 POSTOP FOLLOW-UP VISIT: CPT | Mod: POP,,, | Performed by: OPHTHALMOLOGY

## 2020-02-18 NOTE — PROGRESS NOTES
HPI     Ref by Dr. Chavez     S/p phaco w/IOL OS 01/08/2020 - NEAR   S/p phaco w/IOL OD 11/25/2019 - NEAR   Vitreous Floaters OD   Myopia w/ astigmatism presbyopia, Bilateral     Pt here for 1 mo post op. Vision is great. Denies eye pain, flashes and   floaters.     Last edited by Alix Quiñones on 2/18/2020 10:03 AM. (History)            Assessment /Plan     For exam results, see Encounter Report.    Status post cataract extraction and insertion of intraocular lens, left    Status post cataract extraction and insertion of intraocular lens, right      S/p phaco w/IOL OS 01/08/2020 - NEAR   S/p phaco w/IOL OD 11/25/2019 - NEAR     Doing well, content with [facility], Rx for distance dispensed today    F/up dr. Chavez 4 mo - REE, me PRN    Vitreous Floaters OD     H/o Myopia w/ astigmatism presbyopia, Bilateral

## 2020-02-21 ENCOUNTER — OFFICE VISIT (OUTPATIENT)
Dept: INTERNAL MEDICINE | Facility: CLINIC | Age: 75
End: 2020-02-21
Payer: MEDICARE

## 2020-02-21 ENCOUNTER — LAB VISIT (OUTPATIENT)
Dept: LAB | Facility: HOSPITAL | Age: 75
End: 2020-02-21
Attending: INTERNAL MEDICINE
Payer: MEDICARE

## 2020-02-21 VITALS
OXYGEN SATURATION: 99 % | BODY MASS INDEX: 30.25 KG/M2 | DIASTOLIC BLOOD PRESSURE: 86 MMHG | HEIGHT: 66 IN | HEART RATE: 80 BPM | SYSTOLIC BLOOD PRESSURE: 120 MMHG | WEIGHT: 188.25 LBS

## 2020-02-21 DIAGNOSIS — I10 HYPERTENSION, UNSPECIFIED TYPE: Primary | ICD-10-CM

## 2020-02-21 DIAGNOSIS — I10 HYPERTENSION, UNSPECIFIED TYPE: ICD-10-CM

## 2020-02-21 LAB
ALBUMIN SERPL BCP-MCNC: 4.3 G/DL (ref 3.5–5.2)
ALP SERPL-CCNC: 47 U/L (ref 55–135)
ALT SERPL W/O P-5'-P-CCNC: 24 U/L (ref 10–44)
ANION GAP SERPL CALC-SCNC: 9 MMOL/L (ref 8–16)
AST SERPL-CCNC: 26 U/L (ref 10–40)
BILIRUB SERPL-MCNC: 0.5 MG/DL (ref 0.1–1)
BUN SERPL-MCNC: 11 MG/DL (ref 8–23)
CALCIUM SERPL-MCNC: 8.8 MG/DL (ref 8.7–10.5)
CHLORIDE SERPL-SCNC: 103 MMOL/L (ref 95–110)
CO2 SERPL-SCNC: 30 MMOL/L (ref 23–29)
CREAT SERPL-MCNC: 1.1 MG/DL (ref 0.5–1.4)
EST. GFR  (AFRICAN AMERICAN): >60 ML/MIN/1.73 M^2
EST. GFR  (NON AFRICAN AMERICAN): >60 ML/MIN/1.73 M^2
GLUCOSE SERPL-MCNC: 80 MG/DL (ref 70–110)
POTASSIUM SERPL-SCNC: 4.3 MMOL/L (ref 3.5–5.1)
PROT SERPL-MCNC: 7.6 G/DL (ref 6–8.4)
SODIUM SERPL-SCNC: 142 MMOL/L (ref 136–145)

## 2020-02-21 PROCEDURE — 80053 COMPREHEN METABOLIC PANEL: CPT

## 2020-02-21 PROCEDURE — 99214 OFFICE O/P EST MOD 30 MIN: CPT | Mod: PBBFAC | Performed by: INTERNAL MEDICINE

## 2020-02-21 PROCEDURE — 99999 PR PBB SHADOW E&M-EST. PATIENT-LVL IV: ICD-10-PCS | Mod: PBBFAC,,, | Performed by: INTERNAL MEDICINE

## 2020-02-21 PROCEDURE — 99214 OFFICE O/P EST MOD 30 MIN: CPT | Mod: S$PBB,,, | Performed by: INTERNAL MEDICINE

## 2020-02-21 PROCEDURE — 99999 PR PBB SHADOW E&M-EST. PATIENT-LVL IV: CPT | Mod: PBBFAC,,, | Performed by: INTERNAL MEDICINE

## 2020-02-21 PROCEDURE — 99214 PR OFFICE/OUTPT VISIT, EST, LEVL IV, 30-39 MIN: ICD-10-PCS | Mod: S$PBB,,, | Performed by: INTERNAL MEDICINE

## 2020-02-21 PROCEDURE — 36415 COLL VENOUS BLD VENIPUNCTURE: CPT

## 2020-02-21 RX ORDER — ATORVASTATIN CALCIUM 10 MG/1
10 TABLET, FILM COATED ORAL DAILY
Qty: 90 TABLET | Refills: 1 | Status: SHIPPED | OUTPATIENT
Start: 2020-02-21 | End: 2020-06-03 | Stop reason: SDUPTHER

## 2020-02-21 RX ORDER — AMLODIPINE BESYLATE 5 MG/1
5 TABLET ORAL DAILY
Qty: 90 TABLET | Refills: 1 | Status: SHIPPED | OUTPATIENT
Start: 2020-02-21 | End: 2020-06-03 | Stop reason: SDUPTHER

## 2020-02-24 NOTE — PROGRESS NOTES
Subjective:       Patient ID: Ravinder Sanz is a 75 y.o. male.    Chief Complaint: Hypertension    HPI  He returns for management of hypertension.  He has had hypertension for over a year.  Current treatment has included medications outlined in medication list.  He denies chest pain or shortness of breath.  No palpitations.  Denies left arm or neck pain.    Medications:  See med list    Social history:  Does not smoke, does not drink alcohol      Review of Systems   Constitutional: Negative for chills, fatigue, fever and unexpected weight change.   Respiratory: Negative for chest tightness and shortness of breath.    Cardiovascular: Negative for chest pain and palpitations.   Gastrointestinal: Negative for abdominal pain and blood in stool.   Neurological: Negative for dizziness, syncope, numbness and headaches.       Objective:      Physical Exam   HENT:   Right Ear: External ear normal.   Left Ear: External ear normal.   Nose: Nose normal.   Mouth/Throat: Oropharynx is clear and moist.   Eyes: Pupils are equal, round, and reactive to light.   Neck: Normal range of motion.   Cardiovascular: Normal rate and regular rhythm.   No murmur heard.  Pulmonary/Chest: Breath sounds normal.   Abdominal: He exhibits no distension. There is no hepatomegaly. There is no tenderness.   Lymphadenopathy:     He has no cervical adenopathy.     He has no axillary adenopathy.   Neurological: He has normal strength and normal reflexes. No cranial nerve deficit or sensory deficit.       Assessment/Plan       Assessment and plan:  Hypertension:  Check CMP

## 2020-05-19 ENCOUNTER — PATIENT MESSAGE (OUTPATIENT)
Dept: ENDOCRINOLOGY | Facility: CLINIC | Age: 75
End: 2020-05-19

## 2020-05-19 DIAGNOSIS — E23.0 PANHYPOPITUITARISM: Primary | ICD-10-CM

## 2020-05-19 DIAGNOSIS — E27.40 ADRENAL INSUFFICIENCY: ICD-10-CM

## 2020-05-19 DIAGNOSIS — E29.1 HYPOGONADISM IN MALE: ICD-10-CM

## 2020-05-19 DIAGNOSIS — E03.8 SECONDARY HYPOTHYROIDISM: ICD-10-CM

## 2020-05-19 DIAGNOSIS — R79.9 ABNORMAL FINDING OF BLOOD CHEMISTRY, UNSPECIFIED: ICD-10-CM

## 2020-05-19 RX ORDER — TESTOSTERONE CYPIONATE 200 MG/ML
INJECTION, SOLUTION INTRAMUSCULAR
Qty: 10 ML | Refills: 0 | Status: SHIPPED | OUTPATIENT
Start: 2020-05-19 | End: 2020-06-10 | Stop reason: SDUPTHER

## 2020-05-19 RX ORDER — HYDROCORTISONE 10 MG/1
TABLET ORAL
Qty: 180 TABLET | Refills: 0 | Status: SHIPPED | OUTPATIENT
Start: 2020-05-19 | End: 2020-06-10 | Stop reason: SDUPTHER

## 2020-05-19 RX ORDER — LEVOTHYROXINE SODIUM 100 UG/1
TABLET ORAL
Qty: 90 TABLET | Refills: 1 | Status: SHIPPED | OUTPATIENT
Start: 2020-05-19 | End: 2020-06-10 | Stop reason: SDUPTHER

## 2020-06-03 ENCOUNTER — TELEPHONE (OUTPATIENT)
Dept: INTERNAL MEDICINE | Facility: CLINIC | Age: 75
End: 2020-06-03

## 2020-06-03 ENCOUNTER — OFFICE VISIT (OUTPATIENT)
Dept: INTERNAL MEDICINE | Facility: CLINIC | Age: 75
End: 2020-06-03
Payer: MEDICARE

## 2020-06-03 ENCOUNTER — LAB VISIT (OUTPATIENT)
Dept: LAB | Facility: HOSPITAL | Age: 75
End: 2020-06-03
Attending: INTERNAL MEDICINE
Payer: MEDICARE

## 2020-06-03 ENCOUNTER — IMMUNIZATION (OUTPATIENT)
Dept: PHARMACY | Facility: CLINIC | Age: 75
End: 2020-06-03
Payer: MEDICARE

## 2020-06-03 DIAGNOSIS — Z12.5 ENCOUNTER FOR SCREENING FOR MALIGNANT NEOPLASM OF PROSTATE: ICD-10-CM

## 2020-06-03 DIAGNOSIS — Z12.11 ENCOUNTER FOR FIT (FECAL IMMUNOCHEMICAL TEST) SCREENING: ICD-10-CM

## 2020-06-03 DIAGNOSIS — R79.9 ABNORMAL FINDING OF BLOOD CHEMISTRY, UNSPECIFIED: ICD-10-CM

## 2020-06-03 DIAGNOSIS — N40.0 BENIGN PROSTATIC HYPERPLASIA, UNSPECIFIED WHETHER LOWER URINARY TRACT SYMPTOMS PRESENT: ICD-10-CM

## 2020-06-03 DIAGNOSIS — E03.8 SECONDARY HYPOTHYROIDISM: ICD-10-CM

## 2020-06-03 DIAGNOSIS — E23.0 PANHYPOPITUITARISM: ICD-10-CM

## 2020-06-03 DIAGNOSIS — E27.40 ADRENAL INSUFFICIENCY: ICD-10-CM

## 2020-06-03 DIAGNOSIS — E29.1 HYPOGONADISM IN MALE: ICD-10-CM

## 2020-06-03 DIAGNOSIS — I10 HYPERTENSION, UNSPECIFIED TYPE: Primary | ICD-10-CM

## 2020-06-03 LAB
ALBUMIN SERPL BCP-MCNC: 4.6 G/DL (ref 3.5–5.2)
ALP SERPL-CCNC: 50 U/L (ref 55–135)
ALT SERPL W/O P-5'-P-CCNC: 31 U/L (ref 10–44)
ANION GAP SERPL CALC-SCNC: 10 MMOL/L (ref 8–16)
AST SERPL-CCNC: 32 U/L (ref 10–40)
BILIRUB SERPL-MCNC: 0.6 MG/DL (ref 0.1–1)
BUN SERPL-MCNC: 16 MG/DL (ref 8–23)
CALCIUM SERPL-MCNC: 9.7 MG/DL (ref 8.7–10.5)
CHLORIDE SERPL-SCNC: 103 MMOL/L (ref 95–110)
CO2 SERPL-SCNC: 28 MMOL/L (ref 23–29)
CREAT SERPL-MCNC: 1.3 MG/DL (ref 0.5–1.4)
ERYTHROCYTE [DISTWIDTH] IN BLOOD BY AUTOMATED COUNT: 13.9 % (ref 11.5–14.5)
EST. GFR  (AFRICAN AMERICAN): >60 ML/MIN/1.73 M^2
EST. GFR  (NON AFRICAN AMERICAN): 54.1 ML/MIN/1.73 M^2
ESTIMATED AVG GLUCOSE: 114 MG/DL (ref 68–131)
GLUCOSE SERPL-MCNC: 84 MG/DL (ref 70–110)
HBA1C MFR BLD HPLC: 5.6 % (ref 4–5.6)
HCT VFR BLD AUTO: 47 % (ref 40–54)
HGB BLD-MCNC: 15.2 G/DL (ref 14–18)
MCH RBC QN AUTO: 29.7 PG (ref 27–31)
MCHC RBC AUTO-ENTMCNC: 32.3 G/DL (ref 32–36)
MCV RBC AUTO: 92 FL (ref 82–98)
PLATELET # BLD AUTO: 195 K/UL (ref 150–350)
PMV BLD AUTO: 12.3 FL (ref 9.2–12.9)
POTASSIUM SERPL-SCNC: 4.1 MMOL/L (ref 3.5–5.1)
PROT SERPL-MCNC: 7.8 G/DL (ref 6–8.4)
RBC # BLD AUTO: 5.12 M/UL (ref 4.6–6.2)
SODIUM SERPL-SCNC: 141 MMOL/L (ref 136–145)
T4 FREE SERPL-MCNC: 1.31 NG/DL (ref 0.71–1.51)
WBC # BLD AUTO: 7.94 K/UL (ref 3.9–12.7)

## 2020-06-03 PROCEDURE — 99214 OFFICE O/P EST MOD 30 MIN: CPT | Mod: PBBFAC | Performed by: INTERNAL MEDICINE

## 2020-06-03 PROCEDURE — 84439 ASSAY OF FREE THYROXINE: CPT

## 2020-06-03 PROCEDURE — 85027 COMPLETE CBC AUTOMATED: CPT

## 2020-06-03 PROCEDURE — 83036 HEMOGLOBIN GLYCOSYLATED A1C: CPT

## 2020-06-03 PROCEDURE — 99214 OFFICE O/P EST MOD 30 MIN: CPT | Mod: S$PBB,,, | Performed by: INTERNAL MEDICINE

## 2020-06-03 PROCEDURE — 36415 COLL VENOUS BLD VENIPUNCTURE: CPT

## 2020-06-03 PROCEDURE — 80053 COMPREHEN METABOLIC PANEL: CPT

## 2020-06-03 PROCEDURE — 99214 PR OFFICE/OUTPT VISIT, EST, LEVL IV, 30-39 MIN: ICD-10-PCS | Mod: S$PBB,,, | Performed by: INTERNAL MEDICINE

## 2020-06-03 PROCEDURE — 99999 PR PBB SHADOW E&M-EST. PATIENT-LVL IV: CPT | Mod: PBBFAC,,, | Performed by: INTERNAL MEDICINE

## 2020-06-03 PROCEDURE — 99999 PR PBB SHADOW E&M-EST. PATIENT-LVL IV: ICD-10-PCS | Mod: PBBFAC,,, | Performed by: INTERNAL MEDICINE

## 2020-06-03 RX ORDER — ATORVASTATIN CALCIUM 10 MG/1
10 TABLET, FILM COATED ORAL DAILY
Qty: 90 TABLET | Refills: 1 | Status: SHIPPED | OUTPATIENT
Start: 2020-06-03 | End: 2020-10-10

## 2020-06-03 RX ORDER — AMLODIPINE BESYLATE 5 MG/1
5 TABLET ORAL DAILY
Qty: 90 TABLET | Refills: 1 | Status: SHIPPED | OUTPATIENT
Start: 2020-06-03 | End: 2020-10-10

## 2020-06-05 ENCOUNTER — PATIENT OUTREACH (OUTPATIENT)
Dept: ADMINISTRATIVE | Facility: OTHER | Age: 75
End: 2020-06-05

## 2020-06-07 VITALS
HEART RATE: 66 BPM | OXYGEN SATURATION: 99 % | TEMPERATURE: 99 F | WEIGHT: 183.19 LBS | BODY MASS INDEX: 29.44 KG/M2 | HEIGHT: 66 IN | SYSTOLIC BLOOD PRESSURE: 136 MMHG | DIASTOLIC BLOOD PRESSURE: 84 MMHG

## 2020-06-07 NOTE — PROGRESS NOTES
Subjective:       Patient ID: Ravinder Sanz is a 75 y.o. male.    Chief Complaint: Hypertension    HPI  He returns for management of hypertension.  He has had hypertension for over a year.  Current treatment has included medications outlined in medication list.  He denies chest pain or shortness of breath.  No palpitations.  Denies left arm or neck pain.    Medications:  See med list    Social history:  Does not smoke, does not drink alcohol      Review of Systems   Constitutional: Negative for chills, fatigue, fever and unexpected weight change.   Respiratory: Negative for chest tightness and shortness of breath.    Cardiovascular: Negative for chest pain and palpitations.   Gastrointestinal: Negative for abdominal pain and blood in stool.   Neurological: Negative for dizziness, syncope, numbness and headaches.       Objective:      Physical Exam   HENT:   Right Ear: External ear normal.   Left Ear: External ear normal.   Nose: Nose normal.   Mouth/Throat: Oropharynx is clear and moist.   Eyes: Pupils are equal, round, and reactive to light.   Neck: Normal range of motion.   Cardiovascular: Normal rate and regular rhythm.   No murmur heard.  Pulmonary/Chest: Breath sounds normal.   Abdominal: He exhibits no distension. There is no hepatomegaly. There is no tenderness.   Lymphadenopathy:     He has no cervical adenopathy.     He has no axillary adenopathy.   Neurological: He has normal strength and normal reflexes. No cranial nerve deficit or sensory deficit.       Assessment/Plan       Assessment and plan:  Hypertension:  Check lipid panel and PSA.  Discussed colonoscopy, he declined.  Fit kit given

## 2020-06-09 ENCOUNTER — TELEPHONE (OUTPATIENT)
Dept: OPTOMETRY | Facility: CLINIC | Age: 75
End: 2020-06-09

## 2020-06-09 ENCOUNTER — LAB VISIT (OUTPATIENT)
Dept: LAB | Facility: HOSPITAL | Age: 75
End: 2020-06-09
Attending: INTERNAL MEDICINE
Payer: MEDICARE

## 2020-06-09 ENCOUNTER — OFFICE VISIT (OUTPATIENT)
Dept: OPTOMETRY | Facility: CLINIC | Age: 75
End: 2020-06-09
Payer: MEDICARE

## 2020-06-09 DIAGNOSIS — I10 HYPERTENSION, UNSPECIFIED TYPE: ICD-10-CM

## 2020-06-09 DIAGNOSIS — H02.831 DERMATOCHALASIS OF BOTH UPPER EYELIDS: Primary | ICD-10-CM

## 2020-06-09 DIAGNOSIS — H02.834 DERMATOCHALASIS OF BOTH UPPER EYELIDS: Primary | ICD-10-CM

## 2020-06-09 DIAGNOSIS — N40.0 BENIGN PROSTATIC HYPERPLASIA, UNSPECIFIED WHETHER LOWER URINARY TRACT SYMPTOMS PRESENT: ICD-10-CM

## 2020-06-09 DIAGNOSIS — Z96.1 PSEUDOPHAKIA: ICD-10-CM

## 2020-06-09 LAB
CHOLEST SERPL-MCNC: 176 MG/DL (ref 120–199)
CHOLEST/HDLC SERPL: 2.3 {RATIO} (ref 2–5)
COMPLEXED PSA SERPL-MCNC: 1.5 NG/ML (ref 0–4)
HDLC SERPL-MCNC: 76 MG/DL (ref 40–75)
HDLC SERPL: 43.2 % (ref 20–50)
LDLC SERPL CALC-MCNC: 89.8 MG/DL (ref 63–159)
NONHDLC SERPL-MCNC: 100 MG/DL
TRIGL SERPL-MCNC: 51 MG/DL (ref 30–150)

## 2020-06-09 PROCEDURE — 99999 PR PBB SHADOW E&M-EST. PATIENT-LVL II: CPT | Mod: PBBFAC,,, | Performed by: OPTOMETRIST

## 2020-06-09 PROCEDURE — 80061 LIPID PANEL: CPT

## 2020-06-09 PROCEDURE — 92014 PR EYE EXAM, EST PATIENT,COMPREHESV: ICD-10-PCS | Mod: S$PBB,,, | Performed by: OPTOMETRIST

## 2020-06-09 PROCEDURE — 84153 ASSAY OF PSA TOTAL: CPT

## 2020-06-09 PROCEDURE — 92014 COMPRE OPH EXAM EST PT 1/>: CPT | Mod: S$PBB,,, | Performed by: OPTOMETRIST

## 2020-06-09 PROCEDURE — 99212 OFFICE O/P EST SF 10 MIN: CPT | Mod: PBBFAC | Performed by: OPTOMETRIST

## 2020-06-09 PROCEDURE — 99999 PR PBB SHADOW E&M-EST. PATIENT-LVL II: ICD-10-PCS | Mod: PBBFAC,,, | Performed by: OPTOMETRIST

## 2020-06-09 PROCEDURE — 36415 COLL VENOUS BLD VENIPUNCTURE: CPT

## 2020-06-09 NOTE — PROGRESS NOTES
HPI     TONJA:Feb 2020  Glasses? Yes   Contacts? no  H/o eye surgery, injections or laser: Cataract sx OU   H/o eye injury: no  Known eye conditions? no  Family h/o eye conditions? no  Eye gtts?no     Pt states that he is seeing well with glasses    (-) Flashes (-) Floaters (-) Mucous   (-) Tearing (-) Itching (-) Burning   (-) Headaches (-) Eye Pain/discomfort (-) Irritation   (-) Redness (-) Double vision (-) Blurry vision    Diabetic? (-)  A1c?  (Hemoglobin A1C       Date                     Value               Ref Range             Status                06/03/2020               5.6                 4.0 - 5.6 %           Final              Comment:    ADA Screening Guidelines:  5.7-6.4%  Consistent with   prediabetes  >or=6.5%  Consistent with diabetes  High levels of fetal   hemoglobin interfere with the HbA1C  assay. Heterozygous hemoglobin   variants (HbS, HgC, etc)do  not significantly interfere with this assay.     However, presence of multiple variants may affect accuracy.    ----------)        Last edited by Peg Abraham on 6/9/2020 11:19 AM. (History)            Assessment /Plan     For exam results, see Encounter Report.    Dermatochalasis of both upper eyelids    Pseudophakia      1. STable. Monitor.   2. Good result. Cont w/SRx from [facility].   RTC 1 year Routine.   Normal ocular health today.

## 2020-06-09 NOTE — TELEPHONE ENCOUNTER
Let patient know that we are running behind and that I will call or text when its time to come up. Pt verbalized understanding by saying ok and thank you.

## 2020-06-10 ENCOUNTER — OFFICE VISIT (OUTPATIENT)
Dept: ENDOCRINOLOGY | Facility: CLINIC | Age: 75
End: 2020-06-10
Payer: MEDICARE

## 2020-06-10 VITALS
WEIGHT: 181.88 LBS | HEIGHT: 66 IN | SYSTOLIC BLOOD PRESSURE: 130 MMHG | BODY MASS INDEX: 29.23 KG/M2 | DIASTOLIC BLOOD PRESSURE: 84 MMHG | HEART RATE: 84 BPM

## 2020-06-10 DIAGNOSIS — R79.9 ABNORMAL FINDING OF BLOOD CHEMISTRY, UNSPECIFIED: ICD-10-CM

## 2020-06-10 DIAGNOSIS — E29.1 HYPOGONADISM IN MALE: ICD-10-CM

## 2020-06-10 DIAGNOSIS — M94.9 DISORDER OF CARTILAGE, UNSPECIFIED: ICD-10-CM

## 2020-06-10 DIAGNOSIS — E03.8 SECONDARY HYPOTHYROIDISM: ICD-10-CM

## 2020-06-10 DIAGNOSIS — E27.49 SECONDARY ADRENAL INSUFFICIENCY: ICD-10-CM

## 2020-06-10 DIAGNOSIS — E23.0 PANHYPOPITUITARISM: Primary | ICD-10-CM

## 2020-06-10 DIAGNOSIS — E27.40 ADRENAL INSUFFICIENCY: ICD-10-CM

## 2020-06-10 DIAGNOSIS — M85.80 OSTEOPENIA, UNSPECIFIED LOCATION: ICD-10-CM

## 2020-06-10 PROBLEM — H25.12 AGE-RELATED NUCLEAR CATARACT, LEFT: Status: RESOLVED | Noted: 2020-01-08 | Resolved: 2020-06-10

## 2020-06-10 PROBLEM — H25.10 AGE-RELATED NUCLEAR CATARACT: Status: RESOLVED | Noted: 2019-11-25 | Resolved: 2020-06-10

## 2020-06-10 PROCEDURE — 99213 OFFICE O/P EST LOW 20 MIN: CPT | Mod: PBBFAC | Performed by: INTERNAL MEDICINE

## 2020-06-10 PROCEDURE — 99214 OFFICE O/P EST MOD 30 MIN: CPT | Mod: S$PBB,,, | Performed by: INTERNAL MEDICINE

## 2020-06-10 PROCEDURE — 99999 PR PBB SHADOW E&M-EST. PATIENT-LVL III: ICD-10-PCS | Mod: PBBFAC,,, | Performed by: INTERNAL MEDICINE

## 2020-06-10 PROCEDURE — 99214 PR OFFICE/OUTPT VISIT, EST, LEVL IV, 30-39 MIN: ICD-10-PCS | Mod: S$PBB,,, | Performed by: INTERNAL MEDICINE

## 2020-06-10 PROCEDURE — 99999 PR PBB SHADOW E&M-EST. PATIENT-LVL III: CPT | Mod: PBBFAC,,, | Performed by: INTERNAL MEDICINE

## 2020-06-10 RX ORDER — LEVOTHYROXINE SODIUM 100 UG/1
100 TABLET ORAL DAILY
Qty: 90 TABLET | Refills: 3 | Status: SHIPPED | OUTPATIENT
Start: 2020-06-10 | End: 2021-09-03

## 2020-06-10 RX ORDER — HYDROCORTISONE 10 MG/1
10 TABLET ORAL 2 TIMES DAILY
Qty: 180 TABLET | Refills: 3 | Status: SHIPPED | OUTPATIENT
Start: 2020-06-10 | End: 2022-02-10 | Stop reason: SDUPTHER

## 2020-06-10 RX ORDER — TESTOSTERONE CYPIONATE 200 MG/ML
INJECTION, SOLUTION INTRAMUSCULAR
Qty: 10 ML | Refills: 5 | Status: SHIPPED | OUTPATIENT
Start: 2020-06-10 | End: 2021-03-08 | Stop reason: SDUPTHER

## 2020-06-10 RX ORDER — ACETAMINOPHEN 500 MG
1 TABLET ORAL DAILY
Start: 2020-06-10

## 2020-06-10 NOTE — ASSESSMENT & PLAN NOTE
discussed implications  Reassuring not fracturing.   rda calcium and vit D  Follow over time  discussed indications for offering therapy

## 2020-06-10 NOTE — ASSESSMENT & PLAN NOTE
Panhypopituitarism-   Secondary adrenal insufficiency-   Continue  Hydrocortisone   Reviewed times of increased dose - sick day precautions handout provided and reviewed  Needs medical alert tag  Follow symptoms and labs      Secondary hypothyroidism - clinically and biochemically euthyroid  Follow ft4 and adjust accordingly  Avoid exogenous hyperthyroidism as this can accelerate bone loss      Secondary hypogonadism- on Testosterone replacement therapy. At goal.  Follow   H&H, PSA, CMP         Secondary AGHD - reviewed risks and benefits of therapy   Currently will hold off  Discussed QOL issues

## 2020-06-10 NOTE — PROGRESS NOTES
Subjective:      Patient ID: Ravinder Sanz is a 75 y.o. male.     Chief Complaint:  panhypopit  History of Present Illness  Since his last visit he had a meningioma resection.      With regards to the panhypopit  Spinal fusion surgery at age 16; proceeded with pituitary adenoma - nonfunctional. Received radiation treatment at [facility].        With regards to the Secondary adrenal insufficiency-   current dose: Hydrocortisone 10 mg PO BID  Knows sick day precautions    Does not have medical alert tag   Never been hospitalized for AI      With regards to the Secondary hypothyroidism -    Current dose: Levothyroxine 100 mcg PO once daily    Takes medication properly    Symptoms:   - weight gain   - cp/palpitations/sob  - fatigue  - hair loss  - brittle nails  - skin changes  +anxiety      With regards to theSecondary hypogonadism-   Since he was 20 years old   on Testosterone replacement therapy - 150 mg q 2 weeks   Satisfied with Libido and sexual performance        With regards to theSecondary AGHD - was on GH (for 2 weeks in 2018 )  currently Off of it due to expenses    No dx of DI     Denies polyuria, polydipsia, nocturia     MRI 6/10/19    3.7 x 3.2 solidly enhancing extra-axial mass in the lateral aspect of left middle cranial fossa    Lobulated heterogeneous enhancement within the pituitary gland.  Patient has a reported history of prior pituitary adenoma and radiation therapy.  Mild suprasellar extension without significant mass effect on the optic apparatus      Last bmd 4/30/19    Impression     LOW BONE MASS.  THE ESTIMATED 10 YEAR PROBABILITY OF HIP FRACTURE IS 2.1% AND OF A MAJOR OSTEOPOROTIC FRACTURE 8.9% RESPECTIVELY USING FRAX(MODERATE RISK).  THE TBS T-SCORE L1-L4 IS -1.         Review of Systems   Constitutional: Negative for unexpected weight change.   Eyes: Negative for visual disturbance.   Respiratory: Negative for shortness of breath.    Cardiovascular: Negative for chest pain.    Gastrointestinal: Negative for abdominal pain.   Musculoskeletal: Negative for myalgias.   Skin: Negative for wound.   Neurological: Positive for tremors.   Hematological: Does not bruise/bleed easily.   Psychiatric/Behavioral: Negative for sleep disturbance.       Objective:   Physical Exam   Neck: No thyromegaly present.   Cardiovascular: Normal rate.   Pulmonary/Chest: Effort normal.   Abdominal: Soft.   Musculoskeletal: He exhibits no edema.   Vitals reviewed.  +resting tremor     Body mass index is 29.36 kg/m².    Lab Review:   Lab Results   Component Value Date    HGBA1C 5.6 06/03/2020     Lab Results   Component Value Date    CHOL 176 06/09/2020    HDL 76 (H) 06/09/2020    LDLCALC 89.8 06/09/2020    TRIG 51 06/09/2020    CHOLHDL 43.2 06/09/2020     Lab Results   Component Value Date     06/03/2020    K 4.1 06/03/2020     06/03/2020    CO2 28 06/03/2020    GLU 84 06/03/2020    BUN 16 06/03/2020    CREATININE 1.3 06/03/2020    CALCIUM 9.7 06/03/2020    PROT 7.8 06/03/2020    ALBUMIN 4.6 06/03/2020    BILITOT 0.6 06/03/2020    ALKPHOS 50 (L) 06/03/2020    AST 32 06/03/2020    ALT 31 06/03/2020    ANIONGAP 10 06/03/2020    ESTGFRAFRICA >60.0 06/03/2020    EGFRNONAA 54.1 (A) 06/03/2020    TSH 0.022 (L) 06/21/2019       Assessment and Plan       Panhypopituitarism  Panhypopituitarism-   Secondary adrenal insufficiency-   Continue  Hydrocortisone   Reviewed times of increased dose - sick day precautions handout provided and reviewed  Needs medical alert tag  Follow symptoms and labs      Secondary hypothyroidism - clinically and biochemically euthyroid  Follow ft4 and adjust accordingly  Avoid exogenous hyperthyroidism as this can accelerate bone loss      Secondary hypogonadism- on Testosterone replacement therapy. At goal.  Follow   H&H, PSA, CMP         Secondary AGHD - reviewed risks and benefits of therapy   Currently will hold off  Discussed QOL issues             Osteopenia   discussed  implications  Reassuring not fracturing.   rda calcium and vit D  Follow over time  discussed indications for offering therapy

## 2020-07-17 DIAGNOSIS — Z71.89 COMPLEX CARE COORDINATION: ICD-10-CM

## 2020-08-13 ENCOUNTER — TELEPHONE (OUTPATIENT)
Dept: INTERNAL MEDICINE | Facility: CLINIC | Age: 75
End: 2020-08-13

## 2020-08-13 DIAGNOSIS — H92.09 OTALGIA, UNSPECIFIED LATERALITY: Primary | ICD-10-CM

## 2020-08-13 NOTE — TELEPHONE ENCOUNTER
Spoke with Jong and and informed him of Dr. Dean message. Patient stated an verbal understanding. Will call and schedule.

## 2020-08-13 NOTE — TELEPHONE ENCOUNTER
----- Message from Sofie Schmitt sent at 8/13/2020 11:49 AM CDT -----  Regarding: referral  Contact: Jong@ [phone]  Patient Requesting Referral    Referral to What Specialty: ENT     Provider asking for Referral: Jong (friend)    Reason for Referral: right ear stopped up for 3 months/ no discharge but can hear out of the ear.    Communication Preference: Jong@ [phone]    Additional Information:     Please call pt to advise.

## 2020-08-17 ENCOUNTER — PATIENT OUTREACH (OUTPATIENT)
Dept: ADMINISTRATIVE | Facility: OTHER | Age: 75
End: 2020-08-17

## 2020-08-17 NOTE — PROGRESS NOTES
LINKS immunization registry updated  Care Everywhere updated  Health Maintenance updated  Chart reviewed for overdue Proactive [facility] Encounters (JESSICA) health maintenance testing (CRS, Breast Ca, Diabetic Eye Exam)   Orders entered:N/A

## 2020-08-18 ENCOUNTER — OFFICE VISIT (OUTPATIENT)
Dept: OTOLARYNGOLOGY | Facility: CLINIC | Age: 75
End: 2020-08-18
Payer: MEDICARE

## 2020-08-18 ENCOUNTER — PATIENT OUTREACH (OUTPATIENT)
Dept: ADMINISTRATIVE | Facility: HOSPITAL | Age: 75
End: 2020-08-18

## 2020-08-18 ENCOUNTER — CLINICAL SUPPORT (OUTPATIENT)
Dept: AUDIOLOGY | Facility: CLINIC | Age: 75
End: 2020-08-18
Payer: MEDICARE

## 2020-08-18 DIAGNOSIS — H83.3X3 NOISE-INDUCED HEARING LOSS OF BOTH EARS: ICD-10-CM

## 2020-08-18 DIAGNOSIS — H61.23 BILATERAL IMPACTED CERUMEN: Primary | ICD-10-CM

## 2020-08-18 DIAGNOSIS — H90.A22 SENSORINEURAL HEARING LOSS (SNHL) OF LEFT EAR WITH RESTRICTED HEARING OF RIGHT EAR: Primary | ICD-10-CM

## 2020-08-18 DIAGNOSIS — H92.09 OTALGIA, UNSPECIFIED LATERALITY: ICD-10-CM

## 2020-08-18 PROCEDURE — 99213 OFFICE O/P EST LOW 20 MIN: CPT | Mod: PBBFAC | Performed by: NURSE PRACTITIONER

## 2020-08-18 PROCEDURE — 99999 PR PBB SHADOW E&M-EST. PATIENT-LVL III: CPT | Mod: PBBFAC,,, | Performed by: NURSE PRACTITIONER

## 2020-08-18 PROCEDURE — 99213 OFFICE O/P EST LOW 20 MIN: CPT | Mod: 25,S$PBB,ICN, | Performed by: NURSE PRACTITIONER

## 2020-08-18 PROCEDURE — 99999 PR PBB SHADOW E&M-EST. PATIENT-LVL I: ICD-10-PCS | Mod: PBBFAC,,,

## 2020-08-18 PROCEDURE — 92567 TYMPANOMETRY: CPT | Mod: PBBFAC | Performed by: AUDIOLOGIST

## 2020-08-18 PROCEDURE — 92557 COMPREHENSIVE HEARING TEST: CPT | Mod: PBBFAC | Performed by: AUDIOLOGIST

## 2020-08-18 PROCEDURE — 69210 EAR CERUMEN REMOVAL: ICD-10-PCS | Mod: S$PBB,ICN,, | Performed by: NURSE PRACTITIONER

## 2020-08-18 PROCEDURE — 99999 PR PBB SHADOW E&M-EST. PATIENT-LVL I: CPT | Mod: PBBFAC,,,

## 2020-08-18 PROCEDURE — 69210 REMOVE IMPACTED EAR WAX UNI: CPT | Mod: S$PBB,ICN,, | Performed by: NURSE PRACTITIONER

## 2020-08-18 PROCEDURE — 99213 PR OFFICE/OUTPT VISIT, EST, LEVL III, 20-29 MIN: ICD-10-PCS | Mod: 25,S$PBB,ICN, | Performed by: NURSE PRACTITIONER

## 2020-08-18 PROCEDURE — 99211 OFF/OP EST MAY X REQ PHY/QHP: CPT | Mod: PBBFAC,27

## 2020-08-18 PROCEDURE — 69210 REMOVE IMPACTED EAR WAX UNI: CPT | Mod: 50,PBBFAC | Performed by: NURSE PRACTITIONER

## 2020-08-18 PROCEDURE — 99999 PR PBB SHADOW E&M-EST. PATIENT-LVL III: ICD-10-PCS | Mod: PBBFAC,,, | Performed by: NURSE PRACTITIONER

## 2020-08-18 NOTE — PROGRESS NOTES
Ravinder Sanz was seen today in the clinic for an audiologic evaluation.  Patients main complaint was hearing loss.  Mr. Sanz denied any aural fullness or pain.    Tympanometry revealed Type A in the right ear and Type As in the left ear.  Audiogram results revealed essentially normal hearing with a mild SNHL at 4000 Hz in the right ear and a mild to moderate SNHL 2685-4397 Hz in the left ear.  An asymmetry is noted at 3000 & 4000 Hz.  Speech reception thresholds were noted at 20 dB in the right ear and 20 dB in the left ear.  Speech discrimination scores were 100% in the right ear and 100% in the left ear.    Recommendations:  1. Otologic evaluation  2. Annual audiogram  3. Noise protection when in noise

## 2020-08-18 NOTE — PROCEDURES
Ear Cerumen Removal    Date/Time: 8/18/2020 10:30 AM  Performed by: Rakesh Smith NP  Authorized by: Rakesh Smith NP     Consent Done?:  Yes (Verbal)  Location details:  Both ears  Procedure type: curette    Cerumen  Removal Results:  Cerumen completely removed  Patient tolerance:  Patient tolerated the procedure well with no immediate complications     Procedure Note:    The patient was brought to the minor procedure room and placed under the operating microscope of the left ear canal which was cleaned of ceruminous debris. Using a combination of suction, curettes and cup forceps the patient's cerumen impaction was removed. The tympanic membrane was evaluated and was unremarkable. The patient tolerated the procedure well. There were no complications.  Procedure Note:    Patient was brought to the minor procedure room and using the operating microscope of the right ear canal which was cleaned of ceruminous debris. There was a significant cerumen impaction.  Using a combination of suction, curettes and cup forceps the patient's cerumen impaction was removed. Tympanic membrane intact. Pt tolerated well. There were no complications.

## 2020-08-18 NOTE — LETTER
August 18, 2020      Brooke Dean MD  [address]  [facility] [address]           [facility]  [address]  [facility] [address]  Phone: [phone]  Fax: [phone]          Patient: Ravinder Sanz   MR Number: [id number]   YOB: 1945   Date of Visit: 8/18/2020       Dear Dr. Brooke Dean:    Thank you for referring Ravinder Sanz to me for evaluation. Attached you will find relevant portions of my assessment and plan of care.    If you have questions, please do not hesitate to call me. I look forward to following Ravinder Sanz along with you.    Sincerely,    Rakesh Smith, NP    Enclosure  CC:  No Recipients    If you would like to receive this communication electronically, please contact [email or web address] or [phone] to request more information on [facility] access.    For providers and/or their staff who would like to refer a patient to [facility], please contact us through our one-stop-shop provider referral line, [facility], at [phone].    If you feel you have received this communication in error or would no longer like to receive these types of communications, please e-mail [email or web address]

## 2020-08-18 NOTE — PROGRESS NOTES
Subjective:      Ravinder Sanz is a 75 y.o. male who was referred to me by Dr. Brooke Dean in consultation for ear pain.    Mr. Sanz reports having ear pain and reduced hearing in both ears (R>L) for several years. He has intermittent tinnitus. He denies dizziness or ear drainage. He was noted to have wax in ears.  There is not a family history of hearing loss at a young age.  There is not a prior history of ear surgery.  There is not a prior history of ear infections .  He denies a history of significant noise exposure.  He does not wear hearing aids currently.  He has not had a hearing test recently. 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      Past Medical History  He has a past medical history of Anxiety, Eczema, Hyperlipidemia, Hypogonadism in male, Hypopituitarism, Hypothyroidism, and Meningioma.    Past Surgical History  He has a past surgical history that includes Pituitary surgery; Spinal fusion; Craniotomy using frameless stereotaxy (Left, 6/19/2019); Cataract extraction w/  intraocular lens implant (Right, 11/25/2019); and Cataract extraction w/  intraocular lens implant (Left, 1/8/2020).    Family History  His family history includes Diabetes in his sister; Heart disease in his father.    Social History  He reports that he has quit smoking. He has never used smokeless tobacco. He reports current alcohol use of about 14.0 standard drinks of alcohol per week. He reports that he does not use drugs.    Allergies  He is allergic to bupropion.    Medications  He has a current medication list which includes the following prescription(s): amlodipine, atorvastatin, cholecalciferol (vitamin d3), docusate sodium, hydrocortisone, levothyroxine, magnesium citrate, needle (disp) 21 g, senna, and testosterone cypionate.    Review of Systems   Constitutional: Negative for chills, fever and unexpected weight change.   HENT: Positive for  hearing loss and tinnitus. Negative for ear discharge, ear pain, sore throat and trouble swallowing.    Eyes: Negative for pain and visual disturbance.   Respiratory: Negative for apnea and shortness of breath.    Cardiovascular: Negative for chest pain and palpitations.   Gastrointestinal: Negative for abdominal pain and nausea.   Endocrine: Negative for cold intolerance and heat intolerance.   Musculoskeletal: Negative for joint swelling and neck stiffness.   Skin: Negative for color change and rash.   Neurological: Negative for dizziness, facial asymmetry and headaches.   Hematological: Negative for adenopathy. Does not bruise/bleed easily.   Psychiatric/Behavioral: Negative for agitation and sleep disturbance. The patient is not nervous/anxious.           Objective:     There were no vitals taken for this visit.     Constitutional:   Vital signs are normal. He appears well-developed and well-nourished.     Head:  Normocephalic and atraumatic.     Ears:    Right Ear: No lacerations. No drainage, swelling or tenderness. No foreign bodies. No mastoid tenderness. Tympanic membrane is not injected, not scarred, not perforated, not erythematous, not retracted and not bulging. Tympanic membrane mobility is normal. No middle ear effusion. No hemotympanum. Decreased hearing is noted.   Left Ear: No lacerations. No drainage, swelling or tenderness. No foreign bodies. No mastoid tenderness. Tympanic membrane is not injected, not scarred, not perforated, not erythematous, not retracted and not bulging. Tympanic membrane mobility is normal.  No middle ear effusion. No hemotympanum. Decreased hearing is noted.   Ears:      Nose:  Nose normal including turbinates, nasal mucosa, sinuses and nasal septum.     Mouth/Throat  Lips, teeth, and gums normal and oropharynx normal.     Neck:  Neck normal without thyromegaly masses, asymmetry, normal tracheal structure, crepitus, and tenderness and no adenopathy.     Psychiatric:   He  has a normal mood and affect.       Procedure    Cerumen removal performed.  See procedure note.        Data Reviewed    WBC (K/uL)   Date Value   06/03/2020 7.94     Platelets (K/uL)   Date Value   06/03/2020 195      Creatinine (mg/dL)   Date Value   06/03/2020 1.3     TSH (uIU/mL)   Date Value   06/21/2019 0.022 (L)     Glucose (mg/dL)   Date Value   06/03/2020 84     Hemoglobin A1C (%)   Date Value   06/03/2020 5.6       I independently reviewed the tracings of the complete audiometric evaluation performed today.  I reviewed the audiogram with the patient as well.  Pertinent findings include right borderline hearing with mild HF SNHL at 4000Hz. Left borderline hearing with mild to moderate HF SNHL at 3000-6000Hz. SRT 20 with 100% discrimination at 60db. Type A tympanogram in both ears..         Assessment:     1. Bilateral impacted cerumen    2. Otalgia, unspecified laterality    3. Noise-induced hearing loss of both ears         Plan:     I had a long discussion with the patient regarding his condition and the further workup and management options.    Cerumen impaction removed under microscope.  Audiogram Reviewed: Bilateral noise induced SNHL.  Noise and hearing Protection pamphlet provided.  Hearing conservation strongly recommended.  Trial of amplification bilaterally also recommended(patient is not interested at this time).  Re-check of hearing in 18-24 months or sooner if subjective change noted.      Follow up in about 18 months (around 2/18/2022).

## 2020-09-10 ENCOUNTER — IMMUNIZATION (OUTPATIENT)
Dept: PHARMACY | Facility: CLINIC | Age: 75
End: 2020-09-10
Payer: MEDICARE

## 2020-09-10 ENCOUNTER — OFFICE VISIT (OUTPATIENT)
Dept: PODIATRY | Facility: CLINIC | Age: 75
End: 2020-09-10
Payer: MEDICARE

## 2020-09-10 VITALS
BODY MASS INDEX: 30.32 KG/M2 | DIASTOLIC BLOOD PRESSURE: 121 MMHG | SYSTOLIC BLOOD PRESSURE: 152 MMHG | WEIGHT: 187.81 LBS | HEART RATE: 78 BPM

## 2020-09-10 DIAGNOSIS — M79.671 FOOT PAIN, RIGHT: ICD-10-CM

## 2020-09-10 DIAGNOSIS — Q82.8 POROKERATOSIS: Primary | ICD-10-CM

## 2020-09-10 PROCEDURE — 17110 DESTRUCTION B9 LES UP TO 14: CPT | Mod: PBBFAC | Performed by: PODIATRIST

## 2020-09-10 PROCEDURE — 99213 OFFICE O/P EST LOW 20 MIN: CPT | Mod: PBBFAC | Performed by: PODIATRIST

## 2020-09-10 PROCEDURE — 99999 PR PBB SHADOW E&M-EST. PATIENT-LVL III: ICD-10-PCS | Mod: PBBFAC,,, | Performed by: PODIATRIST

## 2020-09-10 PROCEDURE — 99213 PR OFFICE/OUTPT VISIT, EST, LEVL III, 20-29 MIN: ICD-10-PCS | Mod: 25,S$PBB,, | Performed by: PODIATRIST

## 2020-09-10 PROCEDURE — 99999 PR PBB SHADOW E&M-EST. PATIENT-LVL III: CPT | Mod: PBBFAC,,, | Performed by: PODIATRIST

## 2020-09-10 PROCEDURE — G0008 ADMIN INFLUENZA VIRUS VAC: HCPCS | Mod: PBBFAC

## 2020-09-10 PROCEDURE — 99213 OFFICE O/P EST LOW 20 MIN: CPT | Mod: 25,S$PBB,, | Performed by: PODIATRIST

## 2020-09-10 PROCEDURE — 17110 DESTRUCTION B9 LES UP TO 14: CPT | Mod: S$PBB,,, | Performed by: PODIATRIST

## 2020-09-10 PROCEDURE — 17110 PR DESTRUCTION BENIGN LESIONS UP TO 14: ICD-10-PCS | Mod: S$PBB,,, | Performed by: PODIATRIST

## 2020-09-15 NOTE — PROGRESS NOTES
Subjective:      Patient ID: Ravinder Sanz is a 75 y.o. male.    Chief Complaint: Nail Care and Callouses    Ravinder is a 75 y.o. male who presents to the podiatry clinic  with complaint of  right foot pain.  He has a recurrent lesion to the bottom of the right foot which can be quite uncomfortable with walking insert shoe gear.  With this history of Parkinson's and shuffling gait he tends to developed this recurring painful lesion is here for treatment options.      Review of Systems   Constitution: Negative for chills and fever.   HENT: Negative for congestion and tinnitus.    Eyes: Negative for double vision and visual disturbance.   Cardiovascular: Negative for chest pain and claudication.   Respiratory: Negative for hemoptysis and shortness of breath.    Endocrine: Negative for cold intolerance and heat intolerance.   Hematologic/Lymphatic: Negative for adenopathy and bleeding problem.   Skin: Positive for suspicious lesions. Negative for color change and nail changes.   Musculoskeletal: Negative for myalgias and stiffness.   Gastrointestinal: Negative for nausea and vomiting.   Genitourinary: Negative for dysuria and hematuria.   Neurological: Negative for numbness and paresthesias.   Psychiatric/Behavioral: Negative for altered mental status and suicidal ideas.   Allergic/Immunologic: Negative for environmental allergies and persistent infections.           Objective:      Physical Exam  Constitutional:       Appearance: He is well-developed.   Cardiovascular:      Pulses:           Dorsalis pedis pulses are 2+ on the right side and 2+ on the left side.        Posterior tibial pulses are 2+ on the right side and 2+ on the left side.   Musculoskeletal:      Right foot: Normal range of motion. No deformity.      Left foot: Normal range of motion. No deformity.      Comments: Inspection and palpation of the muscles joints and bones of both lower extremities reveal that muscle strength for the anterior,  lateral, and posterior muscle groups and intrinsic muscle groups of the foot are all 5 over 5 symmetrical. Ankle, subtalar, midtarsal, and digital joint range of motion  are within normal limits, nonpainful, without crepitus or effusion. Patient exhibits a normal angle and base of gait. Palpation of the tendons reveal no defects.     Feet:      Right foot:      Skin integrity: No skin breakdown or erythema.      Left foot:      Skin integrity: No skin breakdown or erythema.   Skin:     General: Skin is warm and dry.      Nails: There is no clubbing.        Comments: Skin turgor is normal bilaterally. Skin texture is well hydrated to both lower extremities. No lesions or rashes or wounds appreciated bilaterally. Nail plates 1 through 5 bilaterally are within normal limits for length and thickness. No nail clubbing or incurvation noted. Porokeratosis noted with central core and tenderness with direct palpation noted to the right sub 2nd metatarsophalangeal joint.   Neurological:      Mental Status: He is oriented to person, place, and time.      Sensory: No sensory deficit.      Deep Tendon Reflexes:      Reflex Scores:       Patellar reflexes are 2+ on the right side and 2+ on the left side.       Achilles reflexes are 2+ on the right side and 2+ on the left side.     Comments: Sharp, dull, light touch, vibratory, and proprioceptive sensation are intact bilaterally. Deep tendon reflexes to patellar and Achilles tendon are symmetrical, 2/4 bilaterally. No ankle clonus or Babinski reflexes noted bilaterally. Coordination is normal to both feet and lower extremities.               Assessment:       Encounter Diagnoses   Name Primary?    Porokeratosis Yes    Foot pain, right          Plan:       Ravinder was seen today for nail care and callouses.    Diagnoses and all orders for this visit:    Porokeratosis    Foot pain, right      I counseled the patient on his conditions, their implications and medical  management.      Lesion Excision     Performed by: Jorge Dietz DPM  Authorized by: Patient     Consent Done?:  Yes (Verbal)     Care Type:  Debride/Excise  Location(s):  right plantar foot sub 2nd metatarsophalangeal joint  Patient tolerance:  Patient tolerated the procedure well with no immediate complications      With patient's permission, the lesion(s) mentioned above were aggressively reduced and debrided using a 15 blade, tissue nipper, and curette to remove all lesion and associated debris. Topical trichloroacetic acid applied with a sterile cover. The patient will continue to monitor the areas daily, inspect the feet, wear protective shoe gear when ambulatory, and moisturizer to maintain skin integrity.      The nature of the condition, options for management, as well as potential risks and complications were discussed in detail with patient. Patient was amenable to my recommendations and left my office fully informed and will follow up as instructed or sooner if necessary.      Appropriate shoe gear and inserts were discussed, appropriate treatment for the lesion was discussed in detail.  Follow-up as needed.

## 2020-10-12 RX ORDER — ATORVASTATIN CALCIUM 10 MG/1
10 TABLET, FILM COATED ORAL DAILY
Qty: 90 TABLET | Refills: 1 | OUTPATIENT
Start: 2020-10-12 | End: 2021-10-12

## 2020-10-12 RX ORDER — AMLODIPINE BESYLATE 5 MG/1
5 TABLET ORAL DAILY
Qty: 90 TABLET | Refills: 1 | OUTPATIENT
Start: 2020-10-12 | End: 2021-10-12

## 2020-10-12 NOTE — TELEPHONE ENCOUNTER
Refused Prescriptions     amLODIPine (NORVASC) 5 MG tablet         Sig: Take 1 tablet (5 mg total) by mouth once daily.    Disp:  90 tablet    Refills:  1    Start: 10/12/2020 - 10/12/2021    Class: Normal    Refused by: Brooke Dean MD    Refusal reason: Request already responded to by other means (e.g. phone or fax)    To pharmacy: .         atorvastatin (LIPITOR) 10 MG tablet         Sig: Take 1 tablet (10 mg total) by mouth once daily.    Disp:  90 tablet    Refills:  1    Start: 10/12/2020 - 10/12/2021    Class: Normal    Refused by: Brooke Dean MD    Refusal reason: Request already responded to by other means (e.g. phone or fax)        Fill requested from: [facility]/pharmacy #[id number] - [place] - [address]

## 2020-10-17 ENCOUNTER — PATIENT MESSAGE (OUTPATIENT)
Dept: DERMATOLOGY | Facility: CLINIC | Age: 75
End: 2020-10-17

## 2020-10-17 DIAGNOSIS — L30.9 ECZEMA, UNSPECIFIED TYPE: ICD-10-CM

## 2020-10-21 ENCOUNTER — PATIENT MESSAGE (OUTPATIENT)
Dept: DERMATOLOGY | Facility: CLINIC | Age: 75
End: 2020-10-21

## 2020-10-21 RX ORDER — FLUOCINONIDE 0.5 MG/G
OINTMENT TOPICAL
Qty: 60 G | Refills: 1 | Status: SHIPPED | OUTPATIENT
Start: 2020-10-21 | End: 2023-08-11 | Stop reason: SDUPTHER

## 2020-11-23 ENCOUNTER — PATIENT OUTREACH (OUTPATIENT)
Dept: ADMINISTRATIVE | Facility: HOSPITAL | Age: 75
End: 2020-11-23

## 2020-11-30 RX ORDER — ATORVASTATIN CALCIUM 10 MG/1
10 TABLET, FILM COATED ORAL DAILY
Qty: 90 TABLET | Refills: 0 | Status: SHIPPED | OUTPATIENT
Start: 2020-11-30 | End: 2020-12-04 | Stop reason: SDUPTHER

## 2020-12-04 ENCOUNTER — OFFICE VISIT (OUTPATIENT)
Dept: INTERNAL MEDICINE | Facility: CLINIC | Age: 75
End: 2020-12-04
Payer: MEDICARE

## 2020-12-04 ENCOUNTER — RESEARCH ENCOUNTER (OUTPATIENT)
Dept: RESEARCH | Facility: HOSPITAL | Age: 75
End: 2020-12-04

## 2020-12-04 ENCOUNTER — LAB VISIT (OUTPATIENT)
Dept: LAB | Facility: HOSPITAL | Age: 75
End: 2020-12-04
Attending: INTERNAL MEDICINE
Payer: MEDICARE

## 2020-12-04 ENCOUNTER — TELEPHONE (OUTPATIENT)
Dept: NEUROLOGY | Facility: CLINIC | Age: 75
End: 2020-12-04

## 2020-12-04 DIAGNOSIS — G20.A1 PARKINSON DISEASE: ICD-10-CM

## 2020-12-04 DIAGNOSIS — I10 HYPERTENSION, UNSPECIFIED TYPE: Primary | ICD-10-CM

## 2020-12-04 DIAGNOSIS — D32.9 MENINGIOMA: ICD-10-CM

## 2020-12-04 DIAGNOSIS — I10 HYPERTENSION, UNSPECIFIED TYPE: ICD-10-CM

## 2020-12-04 LAB
ALBUMIN SERPL BCP-MCNC: 4.1 G/DL (ref 3.5–5.2)
ALP SERPL-CCNC: 50 U/L (ref 55–135)
ALT SERPL W/O P-5'-P-CCNC: 41 U/L (ref 10–44)
ANION GAP SERPL CALC-SCNC: 11 MMOL/L (ref 8–16)
AST SERPL-CCNC: 39 U/L (ref 10–40)
BILIRUB SERPL-MCNC: 0.7 MG/DL (ref 0.1–1)
BUN SERPL-MCNC: 16 MG/DL (ref 8–23)
CALCIUM SERPL-MCNC: 8.9 MG/DL (ref 8.7–10.5)
CHLORIDE SERPL-SCNC: 101 MMOL/L (ref 95–110)
CO2 SERPL-SCNC: 28 MMOL/L (ref 23–29)
CREAT SERPL-MCNC: 1.1 MG/DL (ref 0.5–1.4)
EST. GFR  (AFRICAN AMERICAN): >60 ML/MIN/1.73 M^2
EST. GFR  (NON AFRICAN AMERICAN): >60 ML/MIN/1.73 M^2
GLUCOSE SERPL-MCNC: 87 MG/DL (ref 70–110)
POTASSIUM SERPL-SCNC: 3.7 MMOL/L (ref 3.5–5.1)
PROT SERPL-MCNC: 7.3 G/DL (ref 6–8.4)
SODIUM SERPL-SCNC: 140 MMOL/L (ref 136–145)

## 2020-12-04 PROCEDURE — 36415 COLL VENOUS BLD VENIPUNCTURE: CPT

## 2020-12-04 PROCEDURE — 99214 PR OFFICE/OUTPT VISIT, EST, LEVL IV, 30-39 MIN: ICD-10-PCS | Mod: S$PBB,,, | Performed by: INTERNAL MEDICINE

## 2020-12-04 PROCEDURE — 99999 PR PBB SHADOW E&M-EST. PATIENT-LVL V: CPT | Mod: PBBFAC,,, | Performed by: INTERNAL MEDICINE

## 2020-12-04 PROCEDURE — 99215 OFFICE O/P EST HI 40 MIN: CPT | Mod: PBBFAC | Performed by: INTERNAL MEDICINE

## 2020-12-04 PROCEDURE — 99214 OFFICE O/P EST MOD 30 MIN: CPT | Mod: S$PBB,,, | Performed by: INTERNAL MEDICINE

## 2020-12-04 PROCEDURE — 80053 COMPREHEN METABOLIC PANEL: CPT

## 2020-12-04 PROCEDURE — 99999 PR PBB SHADOW E&M-EST. PATIENT-LVL V: ICD-10-PCS | Mod: PBBFAC,,, | Performed by: INTERNAL MEDICINE

## 2020-12-04 RX ORDER — ATORVASTATIN CALCIUM 10 MG/1
10 TABLET, FILM COATED ORAL DAILY
Qty: 90 TABLET | Refills: 1 | Status: SHIPPED | OUTPATIENT
Start: 2020-12-04 | End: 2021-06-07 | Stop reason: SDUPTHER

## 2020-12-04 RX ORDER — AMLODIPINE BESYLATE 5 MG/1
5 TABLET ORAL DAILY
Qty: 90 TABLET | Refills: 1 | Status: SHIPPED | OUTPATIENT
Start: 2020-12-04 | End: 2021-06-07 | Stop reason: SDUPTHER

## 2020-12-04 NOTE — PROGRESS NOTES
Patient identified for Guard AF study with Dr. Willie Shook as PI. When approached about participation patient declined.

## 2020-12-07 ENCOUNTER — TELEPHONE (OUTPATIENT)
Dept: NEUROLOGY | Facility: CLINIC | Age: 75
End: 2020-12-07

## 2020-12-07 NOTE — TELEPHONE ENCOUNTER
----- Message from Makenzie Tadeo sent at 12/7/2020 10:11 AM CST -----  Contact: Jong Roque (partner) @ [phone]  Calling to establish with a new physician.  Would like you to refer to the best person for this pt.  Pls call.

## 2020-12-07 NOTE — TELEPHONE ENCOUNTER
Called and spoke with pt. Declined appt with PA.Wanted to wait for appt with Dr. Matta. Appt scheduled and confirmed.

## 2020-12-07 NOTE — TELEPHONE ENCOUNTER
----- Message from Rafia Christianson PA-C sent at 12/4/2020 11:53 AM CST -----  Yes I can see him   ----- Message -----  From: Rubina Casillas RN  Sent: 12/4/2020  11:35 AM CST  To: Rafia Christianson PA-C    Good morning Rafia. This is a former Dr. Baker pt. Would you like to see him?

## 2020-12-08 ENCOUNTER — TELEPHONE (OUTPATIENT)
Dept: NEUROSURGERY | Facility: CLINIC | Age: 75
End: 2020-12-08

## 2020-12-08 VITALS
SYSTOLIC BLOOD PRESSURE: 134 MMHG | DIASTOLIC BLOOD PRESSURE: 78 MMHG | WEIGHT: 184.94 LBS | HEIGHT: 66 IN | OXYGEN SATURATION: 98 % | HEART RATE: 78 BPM | TEMPERATURE: 99 F | BODY MASS INDEX: 29.72 KG/M2

## 2020-12-08 DIAGNOSIS — D32.9 MENINGIOMA: Primary | ICD-10-CM

## 2020-12-09 NOTE — PROGRESS NOTES
Subjective:       Patient ID: Ravinder Sanz is a 75 y.o. male.    Chief Complaint: Hypertension    HPI  He returns for management of hypertension.  He has had hypertension for over a year.  Current treatment has included medications outlined in medication list.  He denies chest pain or shortness of breath.  No palpitations.  Denies left arm or neck pain.       Past medical history:  Pituitary adenoma, pan hypopituitarism, meningioma status post resection, Parkinson's, hypertension, hyperlipidemia     Medications:  Cortef, Synthroid, testosterone Norvasc 5 mg daily, Lipitor 10 mg daily     Allergies:  Bupropion    Review of Systems   Constitutional: Negative for chills, fatigue, fever and unexpected weight change.   Respiratory: Negative for chest tightness and shortness of breath.    Cardiovascular: Negative for chest pain and palpitations.   Gastrointestinal: Negative for abdominal pain and blood in stool.   Neurological: Negative for dizziness, syncope, numbness and headaches.       Objective:      Physical Exam  HENT:      Right Ear: External ear normal.      Left Ear: External ear normal.      Nose: Nose normal.      Mouth/Throat:      Mouth: Mucous membranes are moist.      Pharynx: Oropharynx is clear.   Eyes:      Pupils: Pupils are equal, round, and reactive to light.   Neck:      Musculoskeletal: Normal range of motion.   Cardiovascular:      Rate and Rhythm: Normal rate and regular rhythm.      Heart sounds: No murmur.   Pulmonary:      Breath sounds: Normal breath sounds.   Abdominal:      General: There is no distension.      Palpations: There is no hepatomegaly.      Tenderness: There is no abdominal tenderness.   Lymphadenopathy:      Cervical: No cervical adenopathy.      Upper Body:      Right upper body: No axillary adenopathy.      Left upper body: No axillary adenopathy.   Neurological:      Cranial Nerves: No cranial nerve deficit.      Sensory: No sensory deficit.      Motor: Motor  function is intact.      Deep Tendon Reflexes: Reflexes are normal and symmetric.         Assessment/Plan       Assessment and plan:  Hypertension:  Check CMP.  Follow-up with neurosurgery and Neurology

## 2020-12-12 ENCOUNTER — PATIENT MESSAGE (OUTPATIENT)
Dept: NEUROLOGY | Facility: CLINIC | Age: 75
End: 2020-12-12

## 2020-12-15 ENCOUNTER — PATIENT MESSAGE (OUTPATIENT)
Dept: NEUROLOGY | Facility: CLINIC | Age: 75
End: 2020-12-15

## 2020-12-16 ENCOUNTER — OFFICE VISIT (OUTPATIENT)
Dept: PODIATRY | Facility: CLINIC | Age: 75
End: 2020-12-16
Payer: MEDICARE

## 2020-12-16 VITALS
SYSTOLIC BLOOD PRESSURE: 169 MMHG | HEART RATE: 91 BPM | DIASTOLIC BLOOD PRESSURE: 101 MMHG | BODY MASS INDEX: 29.85 KG/M2 | WEIGHT: 184.94 LBS

## 2020-12-16 DIAGNOSIS — D32.0 MENINGIOMA, CEREBRAL: Primary | ICD-10-CM

## 2020-12-16 DIAGNOSIS — F41.9 ANXIETY: ICD-10-CM

## 2020-12-16 DIAGNOSIS — M21.40 PES PLANUS, UNSPECIFIED LATERALITY: ICD-10-CM

## 2020-12-16 DIAGNOSIS — G20.C PRIMARY PARKINSONISM: ICD-10-CM

## 2020-12-16 DIAGNOSIS — M79.671 PAIN IN RIGHT FOOT: ICD-10-CM

## 2020-12-16 DIAGNOSIS — Q82.8 POROKERATOSIS: Primary | ICD-10-CM

## 2020-12-16 PROCEDURE — 99213 OFFICE O/P EST LOW 20 MIN: CPT | Mod: 25,S$PBB,, | Performed by: PODIATRIST

## 2020-12-16 PROCEDURE — 99999 PR PBB SHADOW E&M-EST. PATIENT-LVL III: CPT | Mod: PBBFAC,,, | Performed by: PODIATRIST

## 2020-12-16 PROCEDURE — 99999 PR PBB SHADOW E&M-EST. PATIENT-LVL III: ICD-10-PCS | Mod: PBBFAC,,, | Performed by: PODIATRIST

## 2020-12-16 PROCEDURE — 17110 DESTRUCTION B9 LES UP TO 14: CPT | Mod: S$PBB,,, | Performed by: PODIATRIST

## 2020-12-16 PROCEDURE — 99213 OFFICE O/P EST LOW 20 MIN: CPT | Mod: PBBFAC,25 | Performed by: PODIATRIST

## 2020-12-16 PROCEDURE — 99213 PR OFFICE/OUTPT VISIT, EST, LEVL III, 20-29 MIN: ICD-10-PCS | Mod: 25,S$PBB,, | Performed by: PODIATRIST

## 2020-12-16 PROCEDURE — 17110 DESTRUCTION B9 LES UP TO 14: CPT | Mod: PBBFAC | Performed by: PODIATRIST

## 2020-12-16 PROCEDURE — 17110 PR DESTRUCTION BENIGN LESIONS UP TO 14: ICD-10-PCS | Mod: S$PBB,,, | Performed by: PODIATRIST

## 2020-12-16 RX ORDER — LORAZEPAM 1 MG/1
TABLET ORAL
Qty: 1 TABLET | Refills: 0 | Status: SHIPPED | OUTPATIENT
Start: 2020-12-16 | End: 2022-12-14

## 2020-12-20 NOTE — PROGRESS NOTES
Subjective:      Patient ID: Ravinder Sanz is a 75 y.o. male.    Chief Complaint: Nail Care and Nail Problem    Ravinder is a 75 y.o. male who presents to the podiatry clinic  with complaint of  right foot pain.  He has a recurrent lesion to the bottom of the right foot which can be quite uncomfortable with walking insert shoe gear.  With this history of Parkinson's and shuffling gait he tends to developed this recurring painful lesion is here for treatment options and routine foot care.      Review of Systems   Constitution: Negative for chills and fever.   HENT: Negative for congestion and tinnitus.    Eyes: Negative for double vision and visual disturbance.   Cardiovascular: Negative for chest pain and claudication.   Respiratory: Negative for hemoptysis and shortness of breath.    Endocrine: Negative for cold intolerance and heat intolerance.   Hematologic/Lymphatic: Negative for adenopathy and bleeding problem.   Skin: Positive for suspicious lesions. Negative for color change and nail changes.   Musculoskeletal: Negative for myalgias and stiffness.   Gastrointestinal: Negative for nausea and vomiting.   Genitourinary: Negative for dysuria and hematuria.   Neurological: Negative for numbness and paresthesias.   Psychiatric/Behavioral: Negative for altered mental status and suicidal ideas.   Allergic/Immunologic: Negative for environmental allergies and persistent infections.           Objective:      Physical Exam  Constitutional:       Appearance: He is well-developed.   Cardiovascular:      Pulses:           Dorsalis pedis pulses are 2+ on the right side and 2+ on the left side.        Posterior tibial pulses are 2+ on the right side and 2+ on the left side.   Musculoskeletal:      Right foot: Normal range of motion. No deformity.      Left foot: Normal range of motion. No deformity.      Comments: Inspection and palpation of the muscles joints and bones of both lower extremities reveal that muscle strength  for the anterior, lateral, and posterior muscle groups and intrinsic muscle groups of the foot are all 5 over 5 symmetrical. Ankle, subtalar, midtarsal, and digital joint range of motion  are within normal limits, nonpainful, without crepitus or effusion. Patient exhibits a normal angle and base of gait. Palpation of the tendons reveal no defects.     Feet:      Right foot:      Skin integrity: No skin breakdown or erythema.      Left foot:      Skin integrity: No skin breakdown or erythema.   Skin:     General: Skin is warm and dry.      Nails: There is no clubbing.        Comments: Skin turgor is normal bilaterally. Skin texture is well hydrated to both lower extremities. No lesions or rashes or wounds appreciated bilaterally. Nail plates 1 through 5 bilaterally are within normal limits for length and thickness. No nail clubbing or incurvation noted. Porokeratosis noted with central core and tenderness with direct palpation noted to the right sub 2nd metatarsophalangeal joint.   Neurological:      Mental Status: He is oriented to person, place, and time.      Sensory: No sensory deficit.      Deep Tendon Reflexes:      Reflex Scores:       Patellar reflexes are 2+ on the right side and 2+ on the left side.       Achilles reflexes are 2+ on the right side and 2+ on the left side.     Comments: Sharp, dull, light touch, vibratory, and proprioceptive sensation are intact bilaterally. Deep tendon reflexes to patellar and Achilles tendon are symmetrical, 2/4 bilaterally. No ankle clonus or Babinski reflexes noted bilaterally. Coordination is normal to both feet and lower extremities.               Assessment:       No diagnosis found.      Plan:       There are no diagnoses linked to this encounter.  I counseled the patient on his conditions, their implications and medical management.      Lesion Excision     Performed by: Jorge Dietz DPM  Authorized by: Patient     Consent Done?:  Yes (Verbal)     Care Type:   Debride/Excise  Location(s):  right plantar foot sub 2nd metatarsophalangeal joint  Patient tolerance:  Patient tolerated the procedure well with no immediate complications      With patient's permission, the lesion(s) mentioned above were aggressively reduced and debrided using a 15 blade, tissue nipper, and curette to remove all lesion and associated debris. Topical trichloroacetic acid applied with a sterile cover. The patient will continue to monitor the areas daily, inspect the feet, wear protective shoe gear when ambulatory, and moisturizer to maintain skin integrity.      The nature of the condition, options for management, as well as potential risks and complications were discussed in detail with patient. Patient was amenable to my recommendations and left my office fully informed and will follow up as instructed or sooner if necessary.      Appropriate shoe gear and inserts were discussed, appropriate treatment for the lesion was discussed in detail.  Follow-up as needed.

## 2020-12-31 ENCOUNTER — EXTERNAL CHRONIC CARE MANAGEMENT (OUTPATIENT)
Dept: PRIMARY CARE CLINIC | Facility: CLINIC | Age: 75
End: 2020-12-31
Payer: MEDICARE

## 2020-12-31 PROCEDURE — 99490 CHRNC CARE MGMT STAFF 1ST 20: CPT | Mod: S$PBB,,, | Performed by: INTERNAL MEDICINE

## 2020-12-31 PROCEDURE — 99490 CHRNC CARE MGMT STAFF 1ST 20: CPT | Mod: PBBFAC | Performed by: INTERNAL MEDICINE

## 2020-12-31 PROCEDURE — 99490 PR CHRONIC CARE MGMT, 1ST 20 MIN: ICD-10-PCS | Mod: S$PBB,,, | Performed by: INTERNAL MEDICINE

## 2021-01-08 ENCOUNTER — OFFICE VISIT (OUTPATIENT)
Dept: NEUROLOGY | Facility: CLINIC | Age: 76
End: 2021-01-08
Payer: MEDICARE

## 2021-01-08 VITALS
WEIGHT: 184.94 LBS | HEIGHT: 66 IN | DIASTOLIC BLOOD PRESSURE: 87 MMHG | BODY MASS INDEX: 29.72 KG/M2 | HEART RATE: 82 BPM | SYSTOLIC BLOOD PRESSURE: 139 MMHG

## 2021-01-08 DIAGNOSIS — Z98.890 S/P RESECTION OF MENINGIOMA: ICD-10-CM

## 2021-01-08 DIAGNOSIS — G20.A1 PARKINSON DISEASE: Primary | ICD-10-CM

## 2021-01-08 DIAGNOSIS — G20.C PRIMARY PARKINSONISM: ICD-10-CM

## 2021-01-08 DIAGNOSIS — Z86.018 S/P RESECTION OF MENINGIOMA: ICD-10-CM

## 2021-01-08 PROCEDURE — 99215 OFFICE O/P EST HI 40 MIN: CPT | Mod: S$PBB,,, | Performed by: PHYSICIAN ASSISTANT

## 2021-01-08 PROCEDURE — 99999 PR PBB SHADOW E&M-EST. PATIENT-LVL V: CPT | Mod: PBBFAC,,, | Performed by: PHYSICIAN ASSISTANT

## 2021-01-08 PROCEDURE — 99999 PR PBB SHADOW E&M-EST. PATIENT-LVL V: ICD-10-PCS | Mod: PBBFAC,,, | Performed by: PHYSICIAN ASSISTANT

## 2021-01-08 PROCEDURE — 99215 OFFICE O/P EST HI 40 MIN: CPT | Mod: PBBFAC | Performed by: PHYSICIAN ASSISTANT

## 2021-01-08 PROCEDURE — 99215 PR OFFICE/OUTPT VISIT, EST, LEVL V, 40-54 MIN: ICD-10-PCS | Mod: S$PBB,,, | Performed by: PHYSICIAN ASSISTANT

## 2021-01-08 RX ORDER — CARBIDOPA AND LEVODOPA 25; 100 MG/1; MG/1
1 TABLET ORAL 3 TIMES DAILY
Qty: 90 TABLET | Refills: 11 | Status: SHIPPED | OUTPATIENT
Start: 2021-01-08 | End: 2021-11-29

## 2021-01-11 ENCOUNTER — HOSPITAL ENCOUNTER (OUTPATIENT)
Dept: RADIOLOGY | Facility: HOSPITAL | Age: 76
Discharge: HOME | End: 2021-01-11
Attending: NEUROLOGICAL SURGERY
Payer: MEDICARE

## 2021-01-11 ENCOUNTER — OFFICE VISIT (OUTPATIENT)
Dept: NEUROSURGERY | Facility: CLINIC | Age: 76
End: 2021-01-11
Payer: MEDICARE

## 2021-01-11 VITALS
SYSTOLIC BLOOD PRESSURE: 119 MMHG | DIASTOLIC BLOOD PRESSURE: 79 MMHG | TEMPERATURE: 97 F | WEIGHT: 184 LBS | HEART RATE: 77 BPM | BODY MASS INDEX: 29.7 KG/M2

## 2021-01-11 DIAGNOSIS — D32.9 MENINGIOMA: ICD-10-CM

## 2021-01-11 DIAGNOSIS — D32.9 MENINGIOMA: Primary | ICD-10-CM

## 2021-01-11 DIAGNOSIS — G20.A1 PARKINSON'S DISEASE: ICD-10-CM

## 2021-01-11 PROCEDURE — 99999 PR PBB SHADOW E&M-EST. PATIENT-LVL IV: ICD-10-PCS | Mod: PBBFAC,,, | Performed by: NEUROLOGICAL SURGERY

## 2021-01-11 PROCEDURE — 25500020 PHARM REV CODE 255: Performed by: NEUROLOGICAL SURGERY

## 2021-01-11 PROCEDURE — 99213 PR OFFICE/OUTPT VISIT, EST, LEVL III, 20-29 MIN: ICD-10-PCS | Mod: S$PBB,,, | Performed by: NEUROLOGICAL SURGERY

## 2021-01-11 PROCEDURE — 70553 MRI BRAIN W WO CONTRAST: ICD-10-PCS | Mod: 26,,, | Performed by: RADIOLOGY

## 2021-01-11 PROCEDURE — 99214 OFFICE O/P EST MOD 30 MIN: CPT | Mod: PBBFAC,25 | Performed by: NEUROLOGICAL SURGERY

## 2021-01-11 PROCEDURE — 99999 PR PBB SHADOW E&M-EST. PATIENT-LVL IV: CPT | Mod: PBBFAC,,, | Performed by: NEUROLOGICAL SURGERY

## 2021-01-11 PROCEDURE — 99213 OFFICE O/P EST LOW 20 MIN: CPT | Mod: S$PBB,,, | Performed by: NEUROLOGICAL SURGERY

## 2021-01-11 PROCEDURE — 70553 MRI BRAIN STEM W/O & W/DYE: CPT | Mod: TC

## 2021-01-11 PROCEDURE — 70553 MRI BRAIN STEM W/O & W/DYE: CPT | Mod: 26,,, | Performed by: RADIOLOGY

## 2021-01-11 PROCEDURE — A9585 GADOBUTROL INJECTION: HCPCS | Performed by: NEUROLOGICAL SURGERY

## 2021-01-11 RX ORDER — GADOBUTROL 604.72 MG/ML
9 INJECTION INTRAVENOUS
Status: COMPLETED | OUTPATIENT
Start: 2021-01-11 | End: 2021-01-11

## 2021-01-11 RX ADMIN — GADOBUTROL 9 ML: 604.72 INJECTION INTRAVENOUS at 01:01

## 2021-01-30 ENCOUNTER — PATIENT MESSAGE (OUTPATIENT)
Dept: NEUROLOGY | Facility: CLINIC | Age: 76
End: 2021-01-30

## 2021-01-31 ENCOUNTER — EXTERNAL CHRONIC CARE MANAGEMENT (OUTPATIENT)
Dept: PRIMARY CARE CLINIC | Facility: CLINIC | Age: 76
End: 2021-01-31
Payer: MEDICARE

## 2021-01-31 PROCEDURE — 99490 CHRNC CARE MGMT STAFF 1ST 20: CPT | Mod: PBBFAC | Performed by: INTERNAL MEDICINE

## 2021-01-31 PROCEDURE — 99490 PR CHRONIC CARE MGMT, 1ST 20 MIN: ICD-10-PCS | Mod: S$PBB,,, | Performed by: INTERNAL MEDICINE

## 2021-01-31 PROCEDURE — 99490 CHRNC CARE MGMT STAFF 1ST 20: CPT | Mod: S$PBB,,, | Performed by: INTERNAL MEDICINE

## 2021-02-28 ENCOUNTER — EXTERNAL CHRONIC CARE MANAGEMENT (OUTPATIENT)
Dept: PRIMARY CARE CLINIC | Facility: CLINIC | Age: 76
End: 2021-02-28
Payer: MEDICARE

## 2021-02-28 PROCEDURE — 99490 PR CHRONIC CARE MGMT, 1ST 20 MIN: ICD-10-PCS | Mod: S$PBB,,, | Performed by: INTERNAL MEDICINE

## 2021-02-28 PROCEDURE — 99490 CHRNC CARE MGMT STAFF 1ST 20: CPT | Mod: PBBFAC | Performed by: INTERNAL MEDICINE

## 2021-02-28 PROCEDURE — 99490 CHRNC CARE MGMT STAFF 1ST 20: CPT | Mod: S$PBB,,, | Performed by: INTERNAL MEDICINE

## 2021-03-05 ENCOUNTER — PATIENT MESSAGE (OUTPATIENT)
Dept: ADMINISTRATIVE | Facility: HOSPITAL | Age: 76
End: 2021-03-05

## 2021-03-08 DIAGNOSIS — E29.1 HYPOGONADISM IN MALE: ICD-10-CM

## 2021-03-08 RX ORDER — TESTOSTERONE CYPIONATE 200 MG/ML
INJECTION, SOLUTION INTRAMUSCULAR
Qty: 10 ML | Refills: 5 | Status: SHIPPED | OUTPATIENT
Start: 2021-03-08 | End: 2022-01-24

## 2021-03-23 ENCOUNTER — IMMUNIZATION (OUTPATIENT)
Dept: OBSTETRICS AND GYNECOLOGY | Facility: CLINIC | Age: 76
End: 2021-03-23
Payer: MEDICARE

## 2021-03-23 DIAGNOSIS — Z23 NEED FOR VACCINATION: Primary | ICD-10-CM

## 2021-03-23 PROCEDURE — 0001A COVID-19, MRNA, LNP-S, PF, 30 MCG/0.3 ML DOSE VACCINE: CPT | Mod: CV19,S$GLB,, | Performed by: FAMILY MEDICINE

## 2021-03-23 PROCEDURE — 91300 COVID-19, MRNA, LNP-S, PF, 30 MCG/0.3 ML DOSE VACCINE: ICD-10-PCS | Mod: S$GLB,,, | Performed by: FAMILY MEDICINE

## 2021-03-23 PROCEDURE — 0001A COVID-19, MRNA, LNP-S, PF, 30 MCG/0.3 ML DOSE VACCINE: ICD-10-PCS | Mod: CV19,S$GLB,, | Performed by: FAMILY MEDICINE

## 2021-03-23 PROCEDURE — 91300 COVID-19, MRNA, LNP-S, PF, 30 MCG/0.3 ML DOSE VACCINE: CPT | Mod: S$GLB,,, | Performed by: FAMILY MEDICINE

## 2021-03-29 ENCOUNTER — CLINICAL SUPPORT (OUTPATIENT)
Dept: REHABILITATION | Facility: HOSPITAL | Age: 76
End: 2021-03-29
Payer: MEDICARE

## 2021-03-29 ENCOUNTER — PATIENT MESSAGE (OUTPATIENT)
Dept: REHABILITATION | Facility: HOSPITAL | Age: 76
End: 2021-03-29

## 2021-03-29 DIAGNOSIS — R53.1 DECREASED STRENGTH, ENDURANCE, AND MOBILITY: ICD-10-CM

## 2021-03-29 DIAGNOSIS — G20.A1 PARKINSON DISEASE: ICD-10-CM

## 2021-03-29 DIAGNOSIS — R68.89 DECREASED STRENGTH, ENDURANCE, AND MOBILITY: ICD-10-CM

## 2021-03-29 DIAGNOSIS — Z74.09 DECREASED STRENGTH, ENDURANCE, AND MOBILITY: ICD-10-CM

## 2021-03-29 PROCEDURE — 97161 PT EVAL LOW COMPLEX 20 MIN: CPT | Mod: PN

## 2021-03-30 ENCOUNTER — PES CALL (OUTPATIENT)
Dept: ADMINISTRATIVE | Facility: CLINIC | Age: 76
End: 2021-03-30

## 2021-03-31 ENCOUNTER — EXTERNAL CHRONIC CARE MANAGEMENT (OUTPATIENT)
Dept: PRIMARY CARE CLINIC | Facility: CLINIC | Age: 76
End: 2021-03-31
Payer: MEDICARE

## 2021-03-31 PROCEDURE — 99490 PR CHRONIC CARE MGMT, 1ST 20 MIN: ICD-10-PCS | Mod: S$PBB,,, | Performed by: INTERNAL MEDICINE

## 2021-03-31 PROCEDURE — 99490 CHRNC CARE MGMT STAFF 1ST 20: CPT | Mod: PBBFAC | Performed by: INTERNAL MEDICINE

## 2021-03-31 PROCEDURE — 99490 CHRNC CARE MGMT STAFF 1ST 20: CPT | Mod: S$PBB,,, | Performed by: INTERNAL MEDICINE

## 2021-04-01 PROBLEM — R68.89 DECREASED STRENGTH, ENDURANCE, AND MOBILITY: Status: ACTIVE | Noted: 2021-04-01

## 2021-04-01 PROBLEM — Z74.09 DECREASED STRENGTH, ENDURANCE, AND MOBILITY: Status: ACTIVE | Noted: 2021-04-01

## 2021-04-01 PROBLEM — R53.1 DECREASED STRENGTH, ENDURANCE, AND MOBILITY: Status: ACTIVE | Noted: 2021-04-01

## 2021-04-05 ENCOUNTER — PATIENT MESSAGE (OUTPATIENT)
Dept: ADMINISTRATIVE | Facility: HOSPITAL | Age: 76
End: 2021-04-05

## 2021-04-13 ENCOUNTER — IMMUNIZATION (OUTPATIENT)
Dept: OBSTETRICS AND GYNECOLOGY | Facility: CLINIC | Age: 76
End: 2021-04-13
Payer: MEDICARE

## 2021-04-13 DIAGNOSIS — Z23 NEED FOR VACCINATION: Primary | ICD-10-CM

## 2021-04-13 PROCEDURE — 91300 COVID-19, MRNA, LNP-S, PF, 30 MCG/0.3 ML DOSE VACCINE: CPT | Mod: S$GLB,,, | Performed by: FAMILY MEDICINE

## 2021-04-13 PROCEDURE — 0002A COVID-19, MRNA, LNP-S, PF, 30 MCG/0.3 ML DOSE VACCINE: ICD-10-PCS | Mod: CV19,S$GLB,, | Performed by: FAMILY MEDICINE

## 2021-04-13 PROCEDURE — 0002A COVID-19, MRNA, LNP-S, PF, 30 MCG/0.3 ML DOSE VACCINE: CPT | Mod: CV19,S$GLB,, | Performed by: FAMILY MEDICINE

## 2021-04-13 PROCEDURE — 91300 COVID-19, MRNA, LNP-S, PF, 30 MCG/0.3 ML DOSE VACCINE: ICD-10-PCS | Mod: S$GLB,,, | Performed by: FAMILY MEDICINE

## 2021-04-16 ENCOUNTER — OFFICE VISIT (OUTPATIENT)
Dept: NEUROLOGY | Facility: CLINIC | Age: 76
End: 2021-04-16
Payer: MEDICARE

## 2021-04-16 VITALS
HEIGHT: 66 IN | BODY MASS INDEX: 29.58 KG/M2 | SYSTOLIC BLOOD PRESSURE: 156 MMHG | WEIGHT: 184.06 LBS | DIASTOLIC BLOOD PRESSURE: 103 MMHG | HEART RATE: 83 BPM

## 2021-04-16 DIAGNOSIS — Z86.018 S/P RESECTION OF MENINGIOMA: ICD-10-CM

## 2021-04-16 DIAGNOSIS — I10 HYPERTENSION, UNSPECIFIED TYPE: ICD-10-CM

## 2021-04-16 DIAGNOSIS — Z98.890 S/P RESECTION OF MENINGIOMA: ICD-10-CM

## 2021-04-16 DIAGNOSIS — K59.00 CONSTIPATION, UNSPECIFIED CONSTIPATION TYPE: ICD-10-CM

## 2021-04-16 DIAGNOSIS — G20.C PRIMARY PARKINSONISM: Primary | ICD-10-CM

## 2021-04-16 PROCEDURE — 99213 OFFICE O/P EST LOW 20 MIN: CPT | Mod: PBBFAC | Performed by: PHYSICIAN ASSISTANT

## 2021-04-16 PROCEDURE — 99215 PR OFFICE/OUTPT VISIT, EST, LEVL V, 40-54 MIN: ICD-10-PCS | Mod: S$PBB,,, | Performed by: PHYSICIAN ASSISTANT

## 2021-04-16 PROCEDURE — 99999 PR PBB SHADOW E&M-EST. PATIENT-LVL III: CPT | Mod: PBBFAC,,, | Performed by: PHYSICIAN ASSISTANT

## 2021-04-16 PROCEDURE — 99215 OFFICE O/P EST HI 40 MIN: CPT | Mod: S$PBB,,, | Performed by: PHYSICIAN ASSISTANT

## 2021-04-16 PROCEDURE — 99999 PR PBB SHADOW E&M-EST. PATIENT-LVL III: ICD-10-PCS | Mod: PBBFAC,,, | Performed by: PHYSICIAN ASSISTANT

## 2021-04-26 ENCOUNTER — CLINICAL SUPPORT (OUTPATIENT)
Dept: REHABILITATION | Facility: HOSPITAL | Age: 76
End: 2021-04-26
Payer: MEDICARE

## 2021-04-26 ENCOUNTER — TELEPHONE (OUTPATIENT)
Dept: ENDOCRINOLOGY | Facility: CLINIC | Age: 76
End: 2021-04-26

## 2021-04-26 ENCOUNTER — TELEPHONE (OUTPATIENT)
Dept: INTERNAL MEDICINE | Facility: CLINIC | Age: 76
End: 2021-04-26

## 2021-04-26 DIAGNOSIS — R53.1 DECREASED STRENGTH, ENDURANCE, AND MOBILITY: ICD-10-CM

## 2021-04-26 DIAGNOSIS — Z12.11 ENCOUNTER FOR FIT (FECAL IMMUNOCHEMICAL TEST) SCREENING: Primary | ICD-10-CM

## 2021-04-26 DIAGNOSIS — R68.89 DECREASED STRENGTH, ENDURANCE, AND MOBILITY: ICD-10-CM

## 2021-04-26 DIAGNOSIS — Z74.09 DECREASED STRENGTH, ENDURANCE, AND MOBILITY: ICD-10-CM

## 2021-04-26 PROCEDURE — 97112 NEUROMUSCULAR REEDUCATION: CPT | Mod: PN

## 2021-04-27 ENCOUNTER — CLINICAL SUPPORT (OUTPATIENT)
Dept: REHABILITATION | Facility: HOSPITAL | Age: 76
End: 2021-04-27
Payer: MEDICARE

## 2021-04-27 DIAGNOSIS — R68.89 DECREASED STRENGTH, ENDURANCE, AND MOBILITY: ICD-10-CM

## 2021-04-27 DIAGNOSIS — Z74.09 DECREASED STRENGTH, ENDURANCE, AND MOBILITY: ICD-10-CM

## 2021-04-27 DIAGNOSIS — R53.1 DECREASED STRENGTH, ENDURANCE, AND MOBILITY: ICD-10-CM

## 2021-04-27 PROCEDURE — 97112 NEUROMUSCULAR REEDUCATION: CPT | Mod: PN

## 2021-04-28 ENCOUNTER — CLINICAL SUPPORT (OUTPATIENT)
Dept: REHABILITATION | Facility: HOSPITAL | Age: 76
End: 2021-04-28
Payer: MEDICARE

## 2021-04-28 DIAGNOSIS — R53.1 DECREASED STRENGTH, ENDURANCE, AND MOBILITY: ICD-10-CM

## 2021-04-28 DIAGNOSIS — Z74.09 DECREASED STRENGTH, ENDURANCE, AND MOBILITY: ICD-10-CM

## 2021-04-28 DIAGNOSIS — R68.89 DECREASED STRENGTH, ENDURANCE, AND MOBILITY: ICD-10-CM

## 2021-04-28 PROCEDURE — 97112 NEUROMUSCULAR REEDUCATION: CPT | Mod: PN

## 2021-04-30 ENCOUNTER — HOSPITAL ENCOUNTER (OUTPATIENT)
Dept: RADIOLOGY | Facility: CLINIC | Age: 76
Discharge: HOME | End: 2021-04-30
Attending: INTERNAL MEDICINE
Payer: MEDICARE

## 2021-04-30 ENCOUNTER — EXTERNAL CHRONIC CARE MANAGEMENT (OUTPATIENT)
Dept: PRIMARY CARE CLINIC | Facility: CLINIC | Age: 76
End: 2021-04-30
Payer: MEDICARE

## 2021-04-30 ENCOUNTER — CLINICAL SUPPORT (OUTPATIENT)
Dept: REHABILITATION | Facility: HOSPITAL | Age: 76
End: 2021-04-30
Payer: MEDICARE

## 2021-04-30 DIAGNOSIS — R53.1 DECREASED STRENGTH, ENDURANCE, AND MOBILITY: ICD-10-CM

## 2021-04-30 DIAGNOSIS — E27.40 ADRENAL INSUFFICIENCY: ICD-10-CM

## 2021-04-30 DIAGNOSIS — R68.89 DECREASED STRENGTH, ENDURANCE, AND MOBILITY: ICD-10-CM

## 2021-04-30 DIAGNOSIS — E03.8 SECONDARY HYPOTHYROIDISM: ICD-10-CM

## 2021-04-30 DIAGNOSIS — E23.0 PANHYPOPITUITARISM: ICD-10-CM

## 2021-04-30 DIAGNOSIS — Z74.09 DECREASED STRENGTH, ENDURANCE, AND MOBILITY: ICD-10-CM

## 2021-04-30 DIAGNOSIS — E29.1 HYPOGONADISM IN MALE: ICD-10-CM

## 2021-04-30 PROCEDURE — 99490 CHRNC CARE MGMT STAFF 1ST 20: CPT | Mod: PBBFAC | Performed by: INTERNAL MEDICINE

## 2021-04-30 PROCEDURE — 77080 DEXA BONE DENSITY SPINE HIP: ICD-10-PCS | Mod: 26,,, | Performed by: INTERNAL MEDICINE

## 2021-04-30 PROCEDURE — 97112 NEUROMUSCULAR REEDUCATION: CPT | Mod: PN

## 2021-04-30 PROCEDURE — 99490 CHRNC CARE MGMT STAFF 1ST 20: CPT | Mod: S$PBB,,, | Performed by: INTERNAL MEDICINE

## 2021-04-30 PROCEDURE — 99490 PR CHRONIC CARE MGMT, 1ST 20 MIN: ICD-10-PCS | Mod: S$PBB,,, | Performed by: INTERNAL MEDICINE

## 2021-04-30 PROCEDURE — 77080 DXA BONE DENSITY AXIAL: CPT | Mod: 26,,, | Performed by: INTERNAL MEDICINE

## 2021-04-30 PROCEDURE — 77080 DXA BONE DENSITY AXIAL: CPT | Mod: TC

## 2021-05-03 ENCOUNTER — CLINICAL SUPPORT (OUTPATIENT)
Dept: REHABILITATION | Facility: HOSPITAL | Age: 76
End: 2021-05-03
Payer: MEDICARE

## 2021-05-03 DIAGNOSIS — Z74.09 DECREASED STRENGTH, ENDURANCE, AND MOBILITY: ICD-10-CM

## 2021-05-03 DIAGNOSIS — R68.89 DECREASED STRENGTH, ENDURANCE, AND MOBILITY: ICD-10-CM

## 2021-05-03 DIAGNOSIS — R53.1 DECREASED STRENGTH, ENDURANCE, AND MOBILITY: ICD-10-CM

## 2021-05-03 PROCEDURE — 97112 NEUROMUSCULAR REEDUCATION: CPT | Mod: PN

## 2021-05-05 ENCOUNTER — CLINICAL SUPPORT (OUTPATIENT)
Dept: REHABILITATION | Facility: HOSPITAL | Age: 76
End: 2021-05-05
Payer: MEDICARE

## 2021-05-05 DIAGNOSIS — R68.89 DECREASED STRENGTH, ENDURANCE, AND MOBILITY: ICD-10-CM

## 2021-05-05 DIAGNOSIS — Z74.09 DECREASED STRENGTH, ENDURANCE, AND MOBILITY: ICD-10-CM

## 2021-05-05 DIAGNOSIS — R53.1 DECREASED STRENGTH, ENDURANCE, AND MOBILITY: ICD-10-CM

## 2021-05-05 PROCEDURE — 97112 NEUROMUSCULAR REEDUCATION: CPT | Mod: PN

## 2021-05-06 ENCOUNTER — CLINICAL SUPPORT (OUTPATIENT)
Dept: REHABILITATION | Facility: HOSPITAL | Age: 76
End: 2021-05-06
Payer: MEDICARE

## 2021-05-06 DIAGNOSIS — R53.1 DECREASED STRENGTH, ENDURANCE, AND MOBILITY: ICD-10-CM

## 2021-05-06 DIAGNOSIS — R68.89 DECREASED STRENGTH, ENDURANCE, AND MOBILITY: ICD-10-CM

## 2021-05-06 DIAGNOSIS — Z74.09 DECREASED STRENGTH, ENDURANCE, AND MOBILITY: ICD-10-CM

## 2021-05-06 PROCEDURE — 97112 NEUROMUSCULAR REEDUCATION: CPT | Mod: PN

## 2021-05-07 ENCOUNTER — PATIENT MESSAGE (OUTPATIENT)
Dept: NEUROLOGY | Facility: CLINIC | Age: 76
End: 2021-05-07

## 2021-05-07 ENCOUNTER — CLINICAL SUPPORT (OUTPATIENT)
Dept: REHABILITATION | Facility: HOSPITAL | Age: 76
End: 2021-05-07
Payer: MEDICARE

## 2021-05-07 DIAGNOSIS — R53.1 DECREASED STRENGTH, ENDURANCE, AND MOBILITY: ICD-10-CM

## 2021-05-07 DIAGNOSIS — Z74.09 DECREASED STRENGTH, ENDURANCE, AND MOBILITY: ICD-10-CM

## 2021-05-07 DIAGNOSIS — R68.89 DECREASED STRENGTH, ENDURANCE, AND MOBILITY: ICD-10-CM

## 2021-05-07 PROCEDURE — 97112 NEUROMUSCULAR REEDUCATION: CPT | Mod: PN

## 2021-05-10 ENCOUNTER — CLINICAL SUPPORT (OUTPATIENT)
Dept: REHABILITATION | Facility: HOSPITAL | Age: 76
End: 2021-05-10
Payer: MEDICARE

## 2021-05-10 ENCOUNTER — LAB VISIT (OUTPATIENT)
Dept: LAB | Facility: HOSPITAL | Age: 76
End: 2021-05-10
Attending: INTERNAL MEDICINE
Payer: MEDICARE

## 2021-05-10 DIAGNOSIS — R68.89 DECREASED STRENGTH, ENDURANCE, AND MOBILITY: ICD-10-CM

## 2021-05-10 DIAGNOSIS — R53.1 DECREASED STRENGTH, ENDURANCE, AND MOBILITY: ICD-10-CM

## 2021-05-10 DIAGNOSIS — Z12.11 ENCOUNTER FOR FIT (FECAL IMMUNOCHEMICAL TEST) SCREENING: ICD-10-CM

## 2021-05-10 DIAGNOSIS — Z74.09 DECREASED STRENGTH, ENDURANCE, AND MOBILITY: ICD-10-CM

## 2021-05-10 PROCEDURE — 97112 NEUROMUSCULAR REEDUCATION: CPT | Mod: PN

## 2021-05-10 PROCEDURE — 82274 ASSAY TEST FOR BLOOD FECAL: CPT | Performed by: INTERNAL MEDICINE

## 2021-05-11 ENCOUNTER — CLINICAL SUPPORT (OUTPATIENT)
Dept: REHABILITATION | Facility: HOSPITAL | Age: 76
End: 2021-05-11
Payer: MEDICARE

## 2021-05-11 DIAGNOSIS — Z74.09 DECREASED STRENGTH, ENDURANCE, AND MOBILITY: ICD-10-CM

## 2021-05-11 DIAGNOSIS — R53.1 DECREASED STRENGTH, ENDURANCE, AND MOBILITY: ICD-10-CM

## 2021-05-11 DIAGNOSIS — R68.89 DECREASED STRENGTH, ENDURANCE, AND MOBILITY: ICD-10-CM

## 2021-05-11 PROCEDURE — 97112 NEUROMUSCULAR REEDUCATION: CPT | Mod: PN

## 2021-05-12 ENCOUNTER — CLINICAL SUPPORT (OUTPATIENT)
Dept: REHABILITATION | Facility: HOSPITAL | Age: 76
End: 2021-05-12
Payer: MEDICARE

## 2021-05-12 DIAGNOSIS — Z74.09 DECREASED STRENGTH, ENDURANCE, AND MOBILITY: ICD-10-CM

## 2021-05-12 DIAGNOSIS — R68.89 DECREASED STRENGTH, ENDURANCE, AND MOBILITY: ICD-10-CM

## 2021-05-12 DIAGNOSIS — R53.1 DECREASED STRENGTH, ENDURANCE, AND MOBILITY: ICD-10-CM

## 2021-05-12 LAB — HEMOCCULT STL QL IA: NEGATIVE

## 2021-05-12 PROCEDURE — 97112 NEUROMUSCULAR REEDUCATION: CPT | Mod: PN

## 2021-05-14 ENCOUNTER — CLINICAL SUPPORT (OUTPATIENT)
Dept: REHABILITATION | Facility: HOSPITAL | Age: 76
End: 2021-05-14
Payer: MEDICARE

## 2021-05-14 DIAGNOSIS — R53.1 DECREASED STRENGTH, ENDURANCE, AND MOBILITY: ICD-10-CM

## 2021-05-14 DIAGNOSIS — Z74.09 DECREASED STRENGTH, ENDURANCE, AND MOBILITY: ICD-10-CM

## 2021-05-14 DIAGNOSIS — R68.89 DECREASED STRENGTH, ENDURANCE, AND MOBILITY: ICD-10-CM

## 2021-05-14 PROCEDURE — 97112 NEUROMUSCULAR REEDUCATION: CPT | Mod: PN

## 2021-05-17 ENCOUNTER — CLINICAL SUPPORT (OUTPATIENT)
Dept: REHABILITATION | Facility: HOSPITAL | Age: 76
End: 2021-05-17
Payer: MEDICARE

## 2021-05-17 DIAGNOSIS — R68.89 DECREASED STRENGTH, ENDURANCE, AND MOBILITY: ICD-10-CM

## 2021-05-17 DIAGNOSIS — Z74.09 DECREASED STRENGTH, ENDURANCE, AND MOBILITY: ICD-10-CM

## 2021-05-17 DIAGNOSIS — R53.1 DECREASED STRENGTH, ENDURANCE, AND MOBILITY: ICD-10-CM

## 2021-05-17 PROCEDURE — 97112 NEUROMUSCULAR REEDUCATION: CPT | Mod: PN

## 2021-05-18 ENCOUNTER — CLINICAL SUPPORT (OUTPATIENT)
Dept: REHABILITATION | Facility: HOSPITAL | Age: 76
End: 2021-05-18
Payer: MEDICARE

## 2021-05-18 DIAGNOSIS — R68.89 DECREASED STRENGTH, ENDURANCE, AND MOBILITY: ICD-10-CM

## 2021-05-18 DIAGNOSIS — R53.1 DECREASED STRENGTH, ENDURANCE, AND MOBILITY: ICD-10-CM

## 2021-05-18 DIAGNOSIS — Z74.09 DECREASED STRENGTH, ENDURANCE, AND MOBILITY: ICD-10-CM

## 2021-05-18 PROCEDURE — 97112 NEUROMUSCULAR REEDUCATION: CPT | Mod: PN

## 2021-05-19 ENCOUNTER — CLINICAL SUPPORT (OUTPATIENT)
Dept: REHABILITATION | Facility: HOSPITAL | Age: 76
End: 2021-05-19
Payer: MEDICARE

## 2021-05-19 DIAGNOSIS — Z74.09 DECREASED STRENGTH, ENDURANCE, AND MOBILITY: ICD-10-CM

## 2021-05-19 DIAGNOSIS — R68.89 DECREASED STRENGTH, ENDURANCE, AND MOBILITY: ICD-10-CM

## 2021-05-19 DIAGNOSIS — R53.1 DECREASED STRENGTH, ENDURANCE, AND MOBILITY: ICD-10-CM

## 2021-05-19 PROCEDURE — 97110 THERAPEUTIC EXERCISES: CPT | Mod: PN

## 2021-05-21 ENCOUNTER — CLINICAL SUPPORT (OUTPATIENT)
Dept: REHABILITATION | Facility: HOSPITAL | Age: 76
End: 2021-05-21
Payer: MEDICARE

## 2021-05-21 DIAGNOSIS — R68.89 DECREASED STRENGTH, ENDURANCE, AND MOBILITY: ICD-10-CM

## 2021-05-21 DIAGNOSIS — R53.1 DECREASED STRENGTH, ENDURANCE, AND MOBILITY: ICD-10-CM

## 2021-05-21 DIAGNOSIS — Z74.09 DECREASED STRENGTH, ENDURANCE, AND MOBILITY: ICD-10-CM

## 2021-05-21 PROCEDURE — 97112 NEUROMUSCULAR REEDUCATION: CPT | Mod: PN

## 2021-05-31 ENCOUNTER — EXTERNAL CHRONIC CARE MANAGEMENT (OUTPATIENT)
Dept: PRIMARY CARE CLINIC | Facility: CLINIC | Age: 76
End: 2021-05-31
Payer: MEDICARE

## 2021-05-31 PROCEDURE — 99490 CHRNC CARE MGMT STAFF 1ST 20: CPT | Mod: S$PBB,,, | Performed by: INTERNAL MEDICINE

## 2021-05-31 PROCEDURE — 99490 PR CHRONIC CARE MGMT, 1ST 20 MIN: ICD-10-PCS | Mod: S$PBB,,, | Performed by: INTERNAL MEDICINE

## 2021-05-31 PROCEDURE — 99490 CHRNC CARE MGMT STAFF 1ST 20: CPT | Mod: PBBFAC | Performed by: INTERNAL MEDICINE

## 2021-06-07 ENCOUNTER — OFFICE VISIT (OUTPATIENT)
Dept: INTERNAL MEDICINE | Facility: CLINIC | Age: 76
End: 2021-06-07
Payer: MEDICARE

## 2021-06-07 ENCOUNTER — LAB VISIT (OUTPATIENT)
Dept: LAB | Facility: HOSPITAL | Age: 76
End: 2021-06-07
Attending: INTERNAL MEDICINE
Payer: MEDICARE

## 2021-06-07 DIAGNOSIS — E78.5 HYPERLIPIDEMIA, UNSPECIFIED HYPERLIPIDEMIA TYPE: ICD-10-CM

## 2021-06-07 DIAGNOSIS — I10 HYPERTENSION, UNSPECIFIED TYPE: ICD-10-CM

## 2021-06-07 DIAGNOSIS — Z12.5 ENCOUNTER FOR SCREENING FOR MALIGNANT NEOPLASM OF PROSTATE: ICD-10-CM

## 2021-06-07 DIAGNOSIS — I10 HYPERTENSION, UNSPECIFIED TYPE: Primary | ICD-10-CM

## 2021-06-07 LAB
ALBUMIN SERPL BCP-MCNC: 4.3 G/DL (ref 3.5–5.2)
ALP SERPL-CCNC: 48 U/L (ref 55–135)
ALT SERPL W/O P-5'-P-CCNC: 15 U/L (ref 10–44)
ANION GAP SERPL CALC-SCNC: 8 MMOL/L (ref 8–16)
AST SERPL-CCNC: 33 U/L (ref 10–40)
BASOPHILS # BLD AUTO: 0.04 K/UL (ref 0–0.2)
BASOPHILS NFR BLD: 0.5 % (ref 0–1.9)
BILIRUB SERPL-MCNC: 0.8 MG/DL (ref 0.1–1)
BUN SERPL-MCNC: 16 MG/DL (ref 8–23)
CALCIUM SERPL-MCNC: 9.2 MG/DL (ref 8.7–10.5)
CHLORIDE SERPL-SCNC: 100 MMOL/L (ref 95–110)
CHOLEST SERPL-MCNC: 196 MG/DL (ref 120–199)
CHOLEST/HDLC SERPL: 3 {RATIO} (ref 2–5)
CO2 SERPL-SCNC: 29 MMOL/L (ref 23–29)
COMPLEXED PSA SERPL-MCNC: 1.5 NG/ML (ref 0–4)
CREAT SERPL-MCNC: 1.1 MG/DL (ref 0.5–1.4)
DIFFERENTIAL METHOD: ABNORMAL
EOSINOPHIL # BLD AUTO: 0.1 K/UL (ref 0–0.5)
EOSINOPHIL NFR BLD: 1.2 % (ref 0–8)
ERYTHROCYTE [DISTWIDTH] IN BLOOD BY AUTOMATED COUNT: 14.9 % (ref 11.5–14.5)
EST. GFR  (AFRICAN AMERICAN): >60 ML/MIN/1.73 M^2
EST. GFR  (NON AFRICAN AMERICAN): >60 ML/MIN/1.73 M^2
GLUCOSE SERPL-MCNC: 91 MG/DL (ref 70–110)
HCT VFR BLD AUTO: 47.3 % (ref 40–54)
HDLC SERPL-MCNC: 66 MG/DL (ref 40–75)
HDLC SERPL: 33.7 % (ref 20–50)
HGB BLD-MCNC: 15.5 G/DL (ref 14–18)
IMM GRANULOCYTES # BLD AUTO: 0.06 K/UL (ref 0–0.04)
IMM GRANULOCYTES NFR BLD AUTO: 0.8 % (ref 0–0.5)
LDLC SERPL CALC-MCNC: 106.8 MG/DL (ref 63–159)
LYMPHOCYTES # BLD AUTO: 1.9 K/UL (ref 1–4.8)
LYMPHOCYTES NFR BLD: 24.5 % (ref 18–48)
MCH RBC QN AUTO: 29.6 PG (ref 27–31)
MCHC RBC AUTO-ENTMCNC: 32.8 G/DL (ref 32–36)
MCV RBC AUTO: 90 FL (ref 82–98)
MONOCYTES # BLD AUTO: 0.7 K/UL (ref 0.3–1)
MONOCYTES NFR BLD: 9 % (ref 4–15)
NEUTROPHILS # BLD AUTO: 4.9 K/UL (ref 1.8–7.7)
NEUTROPHILS NFR BLD: 64 % (ref 38–73)
NONHDLC SERPL-MCNC: 130 MG/DL
NRBC BLD-RTO: 0 /100 WBC
PLATELET # BLD AUTO: 176 K/UL (ref 150–450)
PMV BLD AUTO: 12.1 FL (ref 9.2–12.9)
POTASSIUM SERPL-SCNC: 4.6 MMOL/L (ref 3.5–5.1)
PROT SERPL-MCNC: 7.6 G/DL (ref 6–8.4)
RBC # BLD AUTO: 5.23 M/UL (ref 4.6–6.2)
SODIUM SERPL-SCNC: 137 MMOL/L (ref 136–145)
TRIGL SERPL-MCNC: 116 MG/DL (ref 30–150)
WBC # BLD AUTO: 7.63 K/UL (ref 3.9–12.7)

## 2021-06-07 PROCEDURE — 99214 PR OFFICE/OUTPT VISIT, EST, LEVL IV, 30-39 MIN: ICD-10-PCS | Mod: S$PBB,,, | Performed by: INTERNAL MEDICINE

## 2021-06-07 PROCEDURE — 80061 LIPID PANEL: CPT | Performed by: INTERNAL MEDICINE

## 2021-06-07 PROCEDURE — 85025 COMPLETE CBC W/AUTO DIFF WBC: CPT | Performed by: INTERNAL MEDICINE

## 2021-06-07 PROCEDURE — 99999 PR PBB SHADOW E&M-EST. PATIENT-LVL IV: ICD-10-PCS | Mod: PBBFAC,,, | Performed by: INTERNAL MEDICINE

## 2021-06-07 PROCEDURE — 99999 PR PBB SHADOW E&M-EST. PATIENT-LVL IV: CPT | Mod: PBBFAC,,, | Performed by: INTERNAL MEDICINE

## 2021-06-07 PROCEDURE — 80053 COMPREHEN METABOLIC PANEL: CPT | Performed by: INTERNAL MEDICINE

## 2021-06-07 PROCEDURE — 99214 OFFICE O/P EST MOD 30 MIN: CPT | Mod: PBBFAC | Performed by: INTERNAL MEDICINE

## 2021-06-07 PROCEDURE — 99214 OFFICE O/P EST MOD 30 MIN: CPT | Mod: S$PBB,,, | Performed by: INTERNAL MEDICINE

## 2021-06-07 PROCEDURE — 36415 COLL VENOUS BLD VENIPUNCTURE: CPT | Performed by: INTERNAL MEDICINE

## 2021-06-07 PROCEDURE — 84153 ASSAY OF PSA TOTAL: CPT | Performed by: INTERNAL MEDICINE

## 2021-06-07 RX ORDER — ATORVASTATIN CALCIUM 10 MG/1
10 TABLET, FILM COATED ORAL DAILY
Qty: 90 TABLET | Refills: 1 | Status: SHIPPED | OUTPATIENT
Start: 2021-06-07 | End: 2021-11-29

## 2021-06-07 RX ORDER — AMLODIPINE BESYLATE 5 MG/1
5 TABLET ORAL DAILY
Qty: 90 TABLET | Refills: 1 | Status: SHIPPED | OUTPATIENT
Start: 2021-06-07 | End: 2021-09-02

## 2021-06-08 VITALS
BODY MASS INDEX: 29.97 KG/M2 | HEART RATE: 82 BPM | TEMPERATURE: 99 F | WEIGHT: 186.5 LBS | DIASTOLIC BLOOD PRESSURE: 88 MMHG | HEIGHT: 66 IN | SYSTOLIC BLOOD PRESSURE: 138 MMHG | OXYGEN SATURATION: 97 %

## 2021-06-30 ENCOUNTER — EXTERNAL CHRONIC CARE MANAGEMENT (OUTPATIENT)
Dept: PRIMARY CARE CLINIC | Facility: CLINIC | Age: 76
End: 2021-06-30
Payer: MEDICARE

## 2021-06-30 PROCEDURE — 99490 PR CHRONIC CARE MGMT, 1ST 20 MIN: ICD-10-PCS | Mod: S$PBB,,, | Performed by: INTERNAL MEDICINE

## 2021-06-30 PROCEDURE — 99490 CHRNC CARE MGMT STAFF 1ST 20: CPT | Mod: PBBFAC | Performed by: INTERNAL MEDICINE

## 2021-06-30 PROCEDURE — 99490 CHRNC CARE MGMT STAFF 1ST 20: CPT | Mod: S$PBB,,, | Performed by: INTERNAL MEDICINE

## 2021-07-08 ENCOUNTER — LAB VISIT (OUTPATIENT)
Dept: LAB | Facility: HOSPITAL | Age: 76
End: 2021-07-08
Attending: INTERNAL MEDICINE
Payer: MEDICARE

## 2021-07-08 ENCOUNTER — PATIENT MESSAGE (OUTPATIENT)
Dept: ENDOCRINOLOGY | Facility: CLINIC | Age: 76
End: 2021-07-08

## 2021-07-08 ENCOUNTER — OFFICE VISIT (OUTPATIENT)
Dept: ENDOCRINOLOGY | Facility: CLINIC | Age: 76
End: 2021-07-08
Payer: MEDICARE

## 2021-07-08 VITALS
HEART RATE: 80 BPM | BODY MASS INDEX: 29.94 KG/M2 | SYSTOLIC BLOOD PRESSURE: 138 MMHG | HEIGHT: 66 IN | WEIGHT: 186.31 LBS | OXYGEN SATURATION: 99 % | DIASTOLIC BLOOD PRESSURE: 84 MMHG

## 2021-07-08 DIAGNOSIS — E23.0 PANHYPOPITUITARISM: ICD-10-CM

## 2021-07-08 DIAGNOSIS — E23.0 PANHYPOPITUITARISM: Primary | ICD-10-CM

## 2021-07-08 DIAGNOSIS — M85.80 OSTEOPENIA, UNSPECIFIED LOCATION: ICD-10-CM

## 2021-07-08 LAB — T4 FREE SERPL-MCNC: 1.05 NG/DL (ref 0.71–1.51)

## 2021-07-08 PROCEDURE — 99215 OFFICE O/P EST HI 40 MIN: CPT | Mod: PBBFAC | Performed by: INTERNAL MEDICINE

## 2021-07-08 PROCEDURE — 99214 PR OFFICE/OUTPT VISIT, EST, LEVL IV, 30-39 MIN: ICD-10-PCS | Mod: S$PBB,,, | Performed by: INTERNAL MEDICINE

## 2021-07-08 PROCEDURE — 99999 PR PBB SHADOW E&M-EST. PATIENT-LVL V: CPT | Mod: PBBFAC,,, | Performed by: INTERNAL MEDICINE

## 2021-07-08 PROCEDURE — 99999 PR PBB SHADOW E&M-EST. PATIENT-LVL V: ICD-10-PCS | Mod: PBBFAC,,, | Performed by: INTERNAL MEDICINE

## 2021-07-08 PROCEDURE — 36415 COLL VENOUS BLD VENIPUNCTURE: CPT | Performed by: INTERNAL MEDICINE

## 2021-07-08 PROCEDURE — 84439 ASSAY OF FREE THYROXINE: CPT | Performed by: INTERNAL MEDICINE

## 2021-07-08 PROCEDURE — 99214 OFFICE O/P EST MOD 30 MIN: CPT | Mod: S$PBB,,, | Performed by: INTERNAL MEDICINE

## 2021-07-12 LAB
IGF-I SERPL-MCNC: 22 NG/ML (ref 32–200)
IGF-I Z-SCORE SERPL: -2.33 SD

## 2021-07-31 ENCOUNTER — EXTERNAL CHRONIC CARE MANAGEMENT (OUTPATIENT)
Dept: PRIMARY CARE CLINIC | Facility: CLINIC | Age: 76
End: 2021-07-31
Payer: MEDICARE

## 2021-07-31 PROCEDURE — 99490 CHRNC CARE MGMT STAFF 1ST 20: CPT | Mod: S$PBB,,, | Performed by: INTERNAL MEDICINE

## 2021-07-31 PROCEDURE — 99490 PR CHRONIC CARE MGMT, 1ST 20 MIN: ICD-10-PCS | Mod: S$PBB,,, | Performed by: INTERNAL MEDICINE

## 2021-07-31 PROCEDURE — 99490 CHRNC CARE MGMT STAFF 1ST 20: CPT | Mod: PBBFAC | Performed by: INTERNAL MEDICINE

## 2021-08-14 ENCOUNTER — PATIENT MESSAGE (OUTPATIENT)
Dept: ENDOCRINOLOGY | Facility: CLINIC | Age: 76
End: 2021-08-14

## 2021-08-23 ENCOUNTER — OFFICE VISIT (OUTPATIENT)
Dept: NEUROLOGY | Facility: CLINIC | Age: 76
End: 2021-08-23
Payer: MEDICARE

## 2021-08-23 VITALS
HEIGHT: 66 IN | SYSTOLIC BLOOD PRESSURE: 132 MMHG | HEART RATE: 83 BPM | WEIGHT: 186.31 LBS | BODY MASS INDEX: 29.94 KG/M2 | DIASTOLIC BLOOD PRESSURE: 86 MMHG

## 2021-08-23 DIAGNOSIS — Z71.89 COUNSELING REGARDING GOALS OF CARE: ICD-10-CM

## 2021-08-23 DIAGNOSIS — Z98.890 S/P RESECTION OF MENINGIOMA: ICD-10-CM

## 2021-08-23 DIAGNOSIS — K59.00 CONSTIPATION, UNSPECIFIED CONSTIPATION TYPE: ICD-10-CM

## 2021-08-23 DIAGNOSIS — G20.C PRIMARY PARKINSONISM: Primary | ICD-10-CM

## 2021-08-23 DIAGNOSIS — Z86.018 S/P RESECTION OF MENINGIOMA: ICD-10-CM

## 2021-08-23 PROCEDURE — 99214 OFFICE O/P EST MOD 30 MIN: CPT | Mod: S$PBB,,, | Performed by: PSYCHIATRY & NEUROLOGY

## 2021-08-23 PROCEDURE — 99214 OFFICE O/P EST MOD 30 MIN: CPT | Mod: PBBFAC | Performed by: PSYCHIATRY & NEUROLOGY

## 2021-08-23 PROCEDURE — 99999 PR PBB SHADOW E&M-EST. PATIENT-LVL IV: ICD-10-PCS | Mod: PBBFAC,,, | Performed by: PSYCHIATRY & NEUROLOGY

## 2021-08-23 PROCEDURE — 99999 PR PBB SHADOW E&M-EST. PATIENT-LVL IV: CPT | Mod: PBBFAC,,, | Performed by: PSYCHIATRY & NEUROLOGY

## 2021-08-23 PROCEDURE — 99214 PR OFFICE/OUTPT VISIT, EST, LEVL IV, 30-39 MIN: ICD-10-PCS | Mod: S$PBB,,, | Performed by: PSYCHIATRY & NEUROLOGY

## 2021-08-23 RX ORDER — RASAGILINE 1 MG/1
1 TABLET ORAL DAILY
Qty: 30 TABLET | Refills: 11 | Status: SHIPPED | OUTPATIENT
Start: 2021-08-23 | End: 2022-09-07

## 2021-08-24 ENCOUNTER — IMMUNIZATION (OUTPATIENT)
Dept: OBSTETRICS AND GYNECOLOGY | Facility: CLINIC | Age: 76
End: 2021-08-24
Payer: MEDICARE

## 2021-08-24 DIAGNOSIS — Z23 NEED FOR VACCINATION: Primary | ICD-10-CM

## 2021-08-24 PROCEDURE — 0003A COVID-19, MRNA, LNP-S, PF, 30 MCG/0.3 ML DOSE VACCINE: ICD-10-PCS | Mod: CV19,,, | Performed by: FAMILY MEDICINE

## 2021-08-24 PROCEDURE — 91300 COVID-19, MRNA, LNP-S, PF, 30 MCG/0.3 ML DOSE VACCINE: CPT | Mod: ,,, | Performed by: FAMILY MEDICINE

## 2021-08-24 PROCEDURE — 91300 COVID-19, MRNA, LNP-S, PF, 30 MCG/0.3 ML DOSE VACCINE: ICD-10-PCS | Mod: ,,, | Performed by: FAMILY MEDICINE

## 2021-08-24 PROCEDURE — 0003A COVID-19, MRNA, LNP-S, PF, 30 MCG/0.3 ML DOSE VACCINE: CPT | Mod: CV19,,, | Performed by: FAMILY MEDICINE

## 2021-08-31 ENCOUNTER — EXTERNAL CHRONIC CARE MANAGEMENT (OUTPATIENT)
Dept: PRIMARY CARE CLINIC | Facility: CLINIC | Age: 76
End: 2021-08-31
Payer: MEDICARE

## 2021-08-31 PROCEDURE — 99490 CHRNC CARE MGMT STAFF 1ST 20: CPT | Mod: S$PBB,,, | Performed by: INTERNAL MEDICINE

## 2021-08-31 PROCEDURE — 99490 PR CHRONIC CARE MGMT, 1ST 20 MIN: ICD-10-PCS | Mod: S$PBB,,, | Performed by: INTERNAL MEDICINE

## 2021-08-31 PROCEDURE — 99490 CHRNC CARE MGMT STAFF 1ST 20: CPT | Mod: PBBFAC | Performed by: INTERNAL MEDICINE

## 2021-09-03 ENCOUNTER — PATIENT MESSAGE (OUTPATIENT)
Dept: NEUROLOGY | Facility: CLINIC | Age: 76
End: 2021-09-03

## 2021-09-03 ENCOUNTER — PATIENT MESSAGE (OUTPATIENT)
Dept: NEUROSURGERY | Facility: CLINIC | Age: 76
End: 2021-09-03

## 2021-09-04 ENCOUNTER — PATIENT MESSAGE (OUTPATIENT)
Dept: NEUROLOGY | Facility: CLINIC | Age: 76
End: 2021-09-04

## 2021-09-10 ENCOUNTER — PATIENT MESSAGE (OUTPATIENT)
Dept: INTERNAL MEDICINE | Facility: CLINIC | Age: 76
End: 2021-09-10

## 2021-09-30 ENCOUNTER — EXTERNAL CHRONIC CARE MANAGEMENT (OUTPATIENT)
Dept: PRIMARY CARE CLINIC | Facility: CLINIC | Age: 76
End: 2021-09-30
Payer: MEDICARE

## 2021-09-30 PROCEDURE — 99490 PR CHRONIC CARE MGMT, 1ST 20 MIN: ICD-10-PCS | Mod: S$PBB,,, | Performed by: INTERNAL MEDICINE

## 2021-09-30 PROCEDURE — 99490 CHRNC CARE MGMT STAFF 1ST 20: CPT | Mod: PBBFAC | Performed by: INTERNAL MEDICINE

## 2021-09-30 PROCEDURE — 99490 CHRNC CARE MGMT STAFF 1ST 20: CPT | Mod: S$PBB,,, | Performed by: INTERNAL MEDICINE

## 2021-10-31 ENCOUNTER — EXTERNAL CHRONIC CARE MANAGEMENT (OUTPATIENT)
Dept: PRIMARY CARE CLINIC | Facility: CLINIC | Age: 76
End: 2021-10-31
Payer: MEDICARE

## 2021-10-31 PROCEDURE — 99490 PR CHRONIC CARE MGMT, 1ST 20 MIN: ICD-10-PCS | Mod: S$PBB,,, | Performed by: INTERNAL MEDICINE

## 2021-10-31 PROCEDURE — 99490 CHRNC CARE MGMT STAFF 1ST 20: CPT | Mod: S$PBB,,, | Performed by: INTERNAL MEDICINE

## 2021-10-31 PROCEDURE — 99490 CHRNC CARE MGMT STAFF 1ST 20: CPT | Mod: PBBFAC | Performed by: INTERNAL MEDICINE

## 2021-11-30 ENCOUNTER — EXTERNAL CHRONIC CARE MANAGEMENT (OUTPATIENT)
Dept: PRIMARY CARE CLINIC | Facility: CLINIC | Age: 76
End: 2021-11-30
Payer: MEDICARE

## 2021-11-30 PROCEDURE — 99490 CHRNC CARE MGMT STAFF 1ST 20: CPT | Mod: PBBFAC | Performed by: INTERNAL MEDICINE

## 2021-11-30 PROCEDURE — 99490 PR CHRONIC CARE MGMT, 1ST 20 MIN: ICD-10-PCS | Mod: S$PBB,,, | Performed by: INTERNAL MEDICINE

## 2021-11-30 PROCEDURE — 99490 CHRNC CARE MGMT STAFF 1ST 20: CPT | Mod: S$PBB,,, | Performed by: INTERNAL MEDICINE

## 2021-12-31 ENCOUNTER — EXTERNAL CHRONIC CARE MANAGEMENT (OUTPATIENT)
Dept: PRIMARY CARE CLINIC | Facility: CLINIC | Age: 76
End: 2021-12-31
Payer: MEDICARE

## 2021-12-31 PROCEDURE — 99439 PR CHRONIC CARE MGMT, EA ADDTL 20 MIN: ICD-10-PCS | Mod: S$PBB,,, | Performed by: INTERNAL MEDICINE

## 2021-12-31 PROCEDURE — 99439 CHRNC CARE MGMT STAF EA ADDL: CPT | Mod: PBBFAC,27 | Performed by: INTERNAL MEDICINE

## 2021-12-31 PROCEDURE — 99490 CHRNC CARE MGMT STAFF 1ST 20: CPT | Mod: PBBFAC | Performed by: INTERNAL MEDICINE

## 2021-12-31 PROCEDURE — 99490 CHRNC CARE MGMT STAFF 1ST 20: CPT | Mod: S$PBB,,, | Performed by: INTERNAL MEDICINE

## 2021-12-31 PROCEDURE — 99490 PR CHRONIC CARE MGMT, 1ST 20 MIN: ICD-10-PCS | Mod: S$PBB,,, | Performed by: INTERNAL MEDICINE

## 2021-12-31 PROCEDURE — 99439 CHRNC CARE MGMT STAF EA ADDL: CPT | Mod: S$PBB,,, | Performed by: INTERNAL MEDICINE

## 2022-01-03 ENCOUNTER — OFFICE VISIT (OUTPATIENT)
Dept: INTERNAL MEDICINE | Facility: CLINIC | Age: 77
End: 2022-01-03
Payer: MEDICARE

## 2022-01-03 ENCOUNTER — LAB VISIT (OUTPATIENT)
Dept: LAB | Facility: HOSPITAL | Age: 77
End: 2022-01-03
Attending: INTERNAL MEDICINE
Payer: MEDICARE

## 2022-01-03 DIAGNOSIS — I10 HYPERTENSION, UNSPECIFIED TYPE: Primary | ICD-10-CM

## 2022-01-03 DIAGNOSIS — E78.5 HYPERLIPIDEMIA, UNSPECIFIED HYPERLIPIDEMIA TYPE: ICD-10-CM

## 2022-01-03 DIAGNOSIS — E23.0 PANHYPOPITUITARISM: ICD-10-CM

## 2022-01-03 DIAGNOSIS — G20.C PRIMARY PARKINSONISM: ICD-10-CM

## 2022-01-03 DIAGNOSIS — I10 HYPERTENSION, UNSPECIFIED TYPE: ICD-10-CM

## 2022-01-03 DIAGNOSIS — D32.9 MENINGIOMA: ICD-10-CM

## 2022-01-03 LAB
ALBUMIN SERPL BCP-MCNC: 4 G/DL (ref 3.5–5.2)
ALP SERPL-CCNC: 45 U/L (ref 55–135)
ALT SERPL W/O P-5'-P-CCNC: 8 U/L (ref 10–44)
ANION GAP SERPL CALC-SCNC: 6 MMOL/L (ref 8–16)
AST SERPL-CCNC: 32 U/L (ref 10–40)
BILIRUB SERPL-MCNC: 0.8 MG/DL (ref 0.1–1)
BUN SERPL-MCNC: 15 MG/DL (ref 8–23)
CALCIUM SERPL-MCNC: 9.2 MG/DL (ref 8.7–10.5)
CHLORIDE SERPL-SCNC: 101 MMOL/L (ref 95–110)
CO2 SERPL-SCNC: 29 MMOL/L (ref 23–29)
CREAT SERPL-MCNC: 1.1 MG/DL (ref 0.5–1.4)
EST. GFR  (AFRICAN AMERICAN): >60 ML/MIN/1.73 M^2
EST. GFR  (NON AFRICAN AMERICAN): >60 ML/MIN/1.73 M^2
GLUCOSE SERPL-MCNC: 82 MG/DL (ref 70–110)
POTASSIUM SERPL-SCNC: 3.9 MMOL/L (ref 3.5–5.1)
PROT SERPL-MCNC: 6.9 G/DL (ref 6–8.4)
SODIUM SERPL-SCNC: 136 MMOL/L (ref 136–145)

## 2022-01-03 PROCEDURE — 36415 COLL VENOUS BLD VENIPUNCTURE: CPT | Performed by: INTERNAL MEDICINE

## 2022-01-03 PROCEDURE — 99214 OFFICE O/P EST MOD 30 MIN: CPT | Mod: PBBFAC | Performed by: INTERNAL MEDICINE

## 2022-01-03 PROCEDURE — 80053 COMPREHEN METABOLIC PANEL: CPT | Performed by: INTERNAL MEDICINE

## 2022-01-03 PROCEDURE — 99999 PR PBB SHADOW E&M-EST. PATIENT-LVL IV: CPT | Mod: PBBFAC,,, | Performed by: INTERNAL MEDICINE

## 2022-01-03 PROCEDURE — 99214 PR OFFICE/OUTPT VISIT, EST, LEVL IV, 30-39 MIN: ICD-10-PCS | Mod: S$PBB,,, | Performed by: INTERNAL MEDICINE

## 2022-01-03 PROCEDURE — 99214 OFFICE O/P EST MOD 30 MIN: CPT | Mod: S$PBB,,, | Performed by: INTERNAL MEDICINE

## 2022-01-03 PROCEDURE — 99999 PR PBB SHADOW E&M-EST. PATIENT-LVL IV: ICD-10-PCS | Mod: PBBFAC,,, | Performed by: INTERNAL MEDICINE

## 2022-01-03 RX ORDER — AMLODIPINE BESYLATE 5 MG/1
5 TABLET ORAL DAILY
Qty: 90 TABLET | Refills: 1 | Status: SHIPPED | OUTPATIENT
Start: 2022-01-03 | End: 2022-07-28 | Stop reason: SDUPTHER

## 2022-01-03 RX ORDER — ATORVASTATIN CALCIUM 10 MG/1
10 TABLET, FILM COATED ORAL DAILY
Qty: 90 TABLET | Refills: 1 | Status: SHIPPED | OUTPATIENT
Start: 2022-01-03 | End: 2022-07-28 | Stop reason: SDUPTHER

## 2022-01-04 ENCOUNTER — PATIENT MESSAGE (OUTPATIENT)
Dept: INTERNAL MEDICINE | Facility: CLINIC | Age: 77
End: 2022-01-04
Payer: MEDICARE

## 2022-01-06 VITALS
HEART RATE: 90 BPM | HEIGHT: 66 IN | OXYGEN SATURATION: 98 % | TEMPERATURE: 99 F | WEIGHT: 179 LBS | DIASTOLIC BLOOD PRESSURE: 60 MMHG | BODY MASS INDEX: 28.77 KG/M2 | SYSTOLIC BLOOD PRESSURE: 108 MMHG

## 2022-01-06 NOTE — PROGRESS NOTES
Subjective:       Patient ID: Ravinder Sanz is a 76 y.o. male.    Chief Complaint: Hypertension    HPI  He returns for management of hypertension.  He has had hypertension for over a year.  Current treatment has included medications outlined in medication list.  He denies chest pain or shortness of breath.  No palpitations.  Denies left arm or neck pain.  He has hyperlipidemia.  Currently on Lipitor    Medications:  See med list    Social history:  Does not smoke, does not drink alcohol      Review of Systems   Constitutional: Negative for chills, fatigue, fever and unexpected weight change.   Respiratory: Negative for chest tightness and shortness of breath.    Cardiovascular: Negative for chest pain and palpitations.   Gastrointestinal: Negative for abdominal pain and blood in stool.   Neurological: Negative for dizziness, syncope, numbness and headaches.       Objective:      Physical Exam  HENT:      Right Ear: External ear normal.      Left Ear: External ear normal.      Nose: Nose normal.      Mouth/Throat:      Mouth: Mucous membranes are moist.      Pharynx: Oropharynx is clear.   Eyes:      Pupils: Pupils are equal, round, and reactive to light.   Cardiovascular:      Rate and Rhythm: Normal rate and regular rhythm.      Heart sounds: No murmur heard.      Pulmonary:      Breath sounds: Normal breath sounds.   Chest:   Breasts:      Right: No axillary adenopathy.      Left: No axillary adenopathy.       Abdominal:      General: There is no distension.      Palpations: There is no hepatomegaly.      Tenderness: There is no abdominal tenderness.   Musculoskeletal:      Cervical back: Normal range of motion.   Lymphadenopathy:      Cervical: No cervical adenopathy.      Upper Body:      Right upper body: No axillary adenopathy.      Left upper body: No axillary adenopathy.   Neurological:      Cranial Nerves: No cranial nerve deficit.      Sensory: No sensory deficit.      Motor: Motor function is  intact.      Deep Tendon Reflexes: Reflexes are normal and symmetric.         Assessment/Plan       Assessment and plan:  1.  Hypertension:  Check CMP  2. Hyperlipidemia:  Continue Lipitor

## 2022-01-24 DIAGNOSIS — E29.1 HYPOGONADISM IN MALE: ICD-10-CM

## 2022-01-24 RX ORDER — TESTOSTERONE CYPIONATE 200 MG/ML
INJECTION, SOLUTION INTRAMUSCULAR
Qty: 10 ML | Refills: 5 | Status: SHIPPED | OUTPATIENT
Start: 2022-01-24 | End: 2022-01-24 | Stop reason: SDUPTHER

## 2022-01-24 RX ORDER — TESTOSTERONE CYPIONATE 200 MG/ML
150 INJECTION, SOLUTION INTRAMUSCULAR
Qty: 10 ML | Refills: 5 | Status: SHIPPED | OUTPATIENT
Start: 2022-01-24 | End: 2022-08-01 | Stop reason: SDUPTHER

## 2022-01-30 NOTE — PLAN OF CARE
Problem: Adult Inpatient Plan of Care  Goal: Plan of Care Review  Interventions implemented as appropriate   Ultrasound done on lower extremities    POC reviewed with pt at 0500. Pt verbalized understanding. Questions and concerns addressed. No acute events overnight. Pt progressing toward goals. Will continue to monitor. See flowsheets for full assessment and VS info          Pain - Patient was clinically upgraded due to pain.

## 2022-01-31 ENCOUNTER — EXTERNAL CHRONIC CARE MANAGEMENT (OUTPATIENT)
Dept: PRIMARY CARE CLINIC | Facility: CLINIC | Age: 77
End: 2022-01-31
Payer: MEDICARE

## 2022-01-31 PROCEDURE — 99490 CHRNC CARE MGMT STAFF 1ST 20: CPT | Mod: S$PBB,,, | Performed by: INTERNAL MEDICINE

## 2022-01-31 PROCEDURE — 99490 PR CHRONIC CARE MGMT, 1ST 20 MIN: ICD-10-PCS | Mod: S$PBB,,, | Performed by: INTERNAL MEDICINE

## 2022-01-31 PROCEDURE — 99490 CHRNC CARE MGMT STAFF 1ST 20: CPT | Mod: PBBFAC | Performed by: INTERNAL MEDICINE

## 2022-02-22 ENCOUNTER — IMMUNIZATION (OUTPATIENT)
Dept: OBSTETRICS AND GYNECOLOGY | Facility: CLINIC | Age: 77
End: 2022-02-22
Payer: MEDICARE

## 2022-02-22 DIAGNOSIS — Z23 NEED FOR VACCINATION: Primary | ICD-10-CM

## 2022-02-22 PROCEDURE — 91305 COVID-19, MRNA, LNP-S, PF, 30 MCG/0.3 ML DOSE VACCINE (PFIZER): CPT | Mod: PBBFAC

## 2022-02-28 ENCOUNTER — EXTERNAL CHRONIC CARE MANAGEMENT (OUTPATIENT)
Dept: PRIMARY CARE CLINIC | Facility: CLINIC | Age: 77
End: 2022-02-28
Payer: MEDICARE

## 2022-02-28 PROCEDURE — 99490 CHRNC CARE MGMT STAFF 1ST 20: CPT | Mod: PBBFAC | Performed by: INTERNAL MEDICINE

## 2022-02-28 PROCEDURE — 99490 PR CHRONIC CARE MGMT, 1ST 20 MIN: ICD-10-PCS | Mod: S$PBB,,, | Performed by: INTERNAL MEDICINE

## 2022-02-28 PROCEDURE — 99490 CHRNC CARE MGMT STAFF 1ST 20: CPT | Mod: S$PBB,,, | Performed by: INTERNAL MEDICINE

## 2022-03-14 ENCOUNTER — PES CALL (OUTPATIENT)
Dept: ADMINISTRATIVE | Facility: CLINIC | Age: 77
End: 2022-03-14
Payer: MEDICARE

## 2022-03-31 ENCOUNTER — EXTERNAL CHRONIC CARE MANAGEMENT (OUTPATIENT)
Dept: PRIMARY CARE CLINIC | Facility: CLINIC | Age: 77
End: 2022-03-31
Payer: MEDICARE

## 2022-03-31 PROCEDURE — 99490 CHRNC CARE MGMT STAFF 1ST 20: CPT | Mod: S$PBB,,, | Performed by: INTERNAL MEDICINE

## 2022-03-31 PROCEDURE — 99490 PR CHRONIC CARE MGMT, 1ST 20 MIN: ICD-10-PCS | Mod: S$PBB,,, | Performed by: INTERNAL MEDICINE

## 2022-03-31 PROCEDURE — 99490 CHRNC CARE MGMT STAFF 1ST 20: CPT | Mod: PBBFAC | Performed by: INTERNAL MEDICINE

## 2022-04-21 ENCOUNTER — PATIENT MESSAGE (OUTPATIENT)
Dept: ADMINISTRATIVE | Facility: HOSPITAL | Age: 77
End: 2022-04-21
Payer: MEDICARE

## 2022-04-30 ENCOUNTER — EXTERNAL CHRONIC CARE MANAGEMENT (OUTPATIENT)
Dept: PRIMARY CARE CLINIC | Facility: CLINIC | Age: 77
End: 2022-04-30
Payer: MEDICARE

## 2022-04-30 PROCEDURE — 99490 PR CHRONIC CARE MGMT, 1ST 20 MIN: ICD-10-PCS | Mod: S$PBB,,, | Performed by: INTERNAL MEDICINE

## 2022-04-30 PROCEDURE — 99490 CHRNC CARE MGMT STAFF 1ST 20: CPT | Mod: PBBFAC | Performed by: INTERNAL MEDICINE

## 2022-04-30 PROCEDURE — 99490 CHRNC CARE MGMT STAFF 1ST 20: CPT | Mod: S$PBB,,, | Performed by: INTERNAL MEDICINE

## 2022-05-05 ENCOUNTER — PATIENT MESSAGE (OUTPATIENT)
Dept: RESEARCH | Facility: HOSPITAL | Age: 77
End: 2022-05-05
Payer: MEDICARE

## 2022-05-19 ENCOUNTER — TELEPHONE (OUTPATIENT)
Dept: ENDOCRINOLOGY | Facility: CLINIC | Age: 77
End: 2022-05-19
Payer: MEDICARE

## 2022-05-19 NOTE — TELEPHONE ENCOUNTER
Spoke with patient scheduled appointment. Patient approved time and date aso will mailed letter as a reminder.

## 2022-05-31 ENCOUNTER — EXTERNAL CHRONIC CARE MANAGEMENT (OUTPATIENT)
Dept: PRIMARY CARE CLINIC | Facility: CLINIC | Age: 77
End: 2022-05-31
Payer: MEDICARE

## 2022-05-31 PROCEDURE — 99490 PR CHRONIC CARE MGMT, 1ST 20 MIN: ICD-10-PCS | Mod: S$PBB,,, | Performed by: INTERNAL MEDICINE

## 2022-05-31 PROCEDURE — 99490 CHRNC CARE MGMT STAFF 1ST 20: CPT | Mod: PBBFAC | Performed by: INTERNAL MEDICINE

## 2022-05-31 PROCEDURE — 99490 CHRNC CARE MGMT STAFF 1ST 20: CPT | Mod: S$PBB,,, | Performed by: INTERNAL MEDICINE

## 2022-06-29 ENCOUNTER — PES CALL (OUTPATIENT)
Dept: ADMINISTRATIVE | Facility: CLINIC | Age: 77
End: 2022-06-29
Payer: MEDICARE

## 2022-06-30 ENCOUNTER — EXTERNAL CHRONIC CARE MANAGEMENT (OUTPATIENT)
Dept: PRIMARY CARE CLINIC | Facility: CLINIC | Age: 77
End: 2022-06-30
Payer: MEDICARE

## 2022-06-30 PROCEDURE — 99490 PR CHRONIC CARE MGMT, 1ST 20 MIN: ICD-10-PCS | Mod: S$PBB,,, | Performed by: INTERNAL MEDICINE

## 2022-06-30 PROCEDURE — 99490 CHRNC CARE MGMT STAFF 1ST 20: CPT | Mod: PBBFAC | Performed by: INTERNAL MEDICINE

## 2022-06-30 PROCEDURE — 99490 CHRNC CARE MGMT STAFF 1ST 20: CPT | Mod: S$PBB,,, | Performed by: INTERNAL MEDICINE

## 2022-07-28 ENCOUNTER — PATIENT MESSAGE (OUTPATIENT)
Dept: INTERNAL MEDICINE | Facility: CLINIC | Age: 77
End: 2022-07-28
Payer: MEDICARE

## 2022-07-28 ENCOUNTER — PATIENT MESSAGE (OUTPATIENT)
Dept: NEUROLOGY | Facility: CLINIC | Age: 77
End: 2022-07-28
Payer: MEDICARE

## 2022-07-28 DIAGNOSIS — G20.A1 PARKINSON DISEASE: ICD-10-CM

## 2022-07-28 RX ORDER — CARBIDOPA AND LEVODOPA 25; 100 MG/1; MG/1
1 TABLET ORAL 3 TIMES DAILY
Qty: 270 TABLET | Refills: 3 | Status: SHIPPED | OUTPATIENT
Start: 2022-07-28 | End: 2022-12-14

## 2022-07-28 RX ORDER — AMLODIPINE BESYLATE 5 MG/1
5 TABLET ORAL DAILY
Qty: 90 TABLET | Refills: 1 | Status: SHIPPED | OUTPATIENT
Start: 2022-07-28 | End: 2022-08-01 | Stop reason: SDUPTHER

## 2022-07-28 RX ORDER — ATORVASTATIN CALCIUM 10 MG/1
10 TABLET, FILM COATED ORAL DAILY
Qty: 90 TABLET | Refills: 1 | Status: SHIPPED | OUTPATIENT
Start: 2022-07-28 | End: 2022-08-01 | Stop reason: SDUPTHER

## 2022-07-28 NOTE — TELEPHONE ENCOUNTER
Care Due:                  Date            Visit Type   Department     Provider  --------------------------------------------------------------------------------                                EP -                              PRIMARY      [facility] INTERNAL  Last Visit: 01-      CARE ([facility])   MEDICINE       Brooke Dean                              [facility]                              PRIMARY      [facility] INTERNAL  Next Visit: 09-      CARE ([facility])   MEDICINE       Brooke Dean                                                            Last  Test          Frequency    Reason                     Performed    Due Date  --------------------------------------------------------------------------------    Lipid Panel.  12 months..  atorvastatin.............  06- 06-    [employer] Embedded Care Gaps. Reference number: [id number]. 7/28/2022   11:47:41 AM CDT

## 2022-07-30 ENCOUNTER — PATIENT MESSAGE (OUTPATIENT)
Dept: INTERNAL MEDICINE | Facility: CLINIC | Age: 77
End: 2022-07-30
Payer: MEDICARE

## 2022-07-31 ENCOUNTER — EXTERNAL CHRONIC CARE MANAGEMENT (OUTPATIENT)
Dept: PRIMARY CARE CLINIC | Facility: CLINIC | Age: 77
End: 2022-07-31
Payer: MEDICARE

## 2022-07-31 PROCEDURE — 99490 CHRNC CARE MGMT STAFF 1ST 20: CPT | Mod: S$PBB,,, | Performed by: INTERNAL MEDICINE

## 2022-07-31 PROCEDURE — 99490 PR CHRONIC CARE MGMT, 1ST 20 MIN: ICD-10-PCS | Mod: S$PBB,,, | Performed by: INTERNAL MEDICINE

## 2022-07-31 PROCEDURE — 99490 CHRNC CARE MGMT STAFF 1ST 20: CPT | Mod: PBBFAC | Performed by: INTERNAL MEDICINE

## 2022-08-01 ENCOUNTER — OFFICE VISIT (OUTPATIENT)
Dept: ENDOCRINOLOGY | Facility: CLINIC | Age: 77
End: 2022-08-01
Payer: MEDICARE

## 2022-08-01 ENCOUNTER — LAB VISIT (OUTPATIENT)
Dept: LAB | Facility: HOSPITAL | Age: 77
End: 2022-08-01
Attending: INTERNAL MEDICINE
Payer: MEDICARE

## 2022-08-01 VITALS
HEART RATE: 83 BPM | OXYGEN SATURATION: 99 % | WEIGHT: 172.5 LBS | RESPIRATION RATE: 16 BRPM | SYSTOLIC BLOOD PRESSURE: 102 MMHG | DIASTOLIC BLOOD PRESSURE: 70 MMHG | BODY MASS INDEX: 27.84 KG/M2

## 2022-08-01 DIAGNOSIS — R79.9 ABNORMAL FINDING OF BLOOD CHEMISTRY, UNSPECIFIED: ICD-10-CM

## 2022-08-01 DIAGNOSIS — E03.8 SECONDARY HYPOTHYROIDISM: ICD-10-CM

## 2022-08-01 DIAGNOSIS — E29.1 HYPOGONADISM IN MALE: ICD-10-CM

## 2022-08-01 DIAGNOSIS — M85.80 OSTEOPENIA, UNSPECIFIED LOCATION: ICD-10-CM

## 2022-08-01 DIAGNOSIS — E23.0 PANHYPOPITUITARISM: ICD-10-CM

## 2022-08-01 DIAGNOSIS — E23.0 PANHYPOPITUITARISM: Primary | ICD-10-CM

## 2022-08-01 LAB
ALBUMIN SERPL BCP-MCNC: 4.3 G/DL (ref 3.5–5.2)
ALP SERPL-CCNC: 45 U/L (ref 55–135)
ALT SERPL W/O P-5'-P-CCNC: 11 U/L (ref 10–44)
ANION GAP SERPL CALC-SCNC: 11 MMOL/L (ref 8–16)
AST SERPL-CCNC: 33 U/L (ref 10–40)
BILIRUB SERPL-MCNC: 0.6 MG/DL (ref 0.1–1)
BUN SERPL-MCNC: 19 MG/DL (ref 8–23)
CALCIUM SERPL-MCNC: 9.4 MG/DL (ref 8.7–10.5)
CHLORIDE SERPL-SCNC: 103 MMOL/L (ref 95–110)
CO2 SERPL-SCNC: 27 MMOL/L (ref 23–29)
CREAT SERPL-MCNC: 1 MG/DL (ref 0.5–1.4)
EST. GFR  (NO RACE VARIABLE): >60 ML/MIN/1.73 M^2
ESTIMATED AVG GLUCOSE: 111 MG/DL (ref 68–131)
GLUCOSE SERPL-MCNC: 77 MG/DL (ref 70–110)
HBA1C MFR BLD: 5.5 % (ref 4–5.6)
POTASSIUM SERPL-SCNC: 4 MMOL/L (ref 3.5–5.1)
PROT SERPL-MCNC: 7.3 G/DL (ref 6–8.4)
SODIUM SERPL-SCNC: 141 MMOL/L (ref 136–145)
T4 FREE SERPL-MCNC: 1.33 NG/DL (ref 0.71–1.51)

## 2022-08-01 PROCEDURE — 99214 PR OFFICE/OUTPT VISIT, EST, LEVL IV, 30-39 MIN: ICD-10-PCS | Mod: S$PBB,,, | Performed by: INTERNAL MEDICINE

## 2022-08-01 PROCEDURE — 80053 COMPREHEN METABOLIC PANEL: CPT | Performed by: INTERNAL MEDICINE

## 2022-08-01 PROCEDURE — 84439 ASSAY OF FREE THYROXINE: CPT | Performed by: INTERNAL MEDICINE

## 2022-08-01 PROCEDURE — 36415 COLL VENOUS BLD VENIPUNCTURE: CPT | Performed by: INTERNAL MEDICINE

## 2022-08-01 PROCEDURE — 99213 OFFICE O/P EST LOW 20 MIN: CPT | Mod: PBBFAC | Performed by: INTERNAL MEDICINE

## 2022-08-01 PROCEDURE — 99999 PR PBB SHADOW E&M-EST. PATIENT-LVL III: CPT | Mod: PBBFAC,,, | Performed by: INTERNAL MEDICINE

## 2022-08-01 PROCEDURE — 83036 HEMOGLOBIN GLYCOSYLATED A1C: CPT | Performed by: INTERNAL MEDICINE

## 2022-08-01 PROCEDURE — 99214 OFFICE O/P EST MOD 30 MIN: CPT | Mod: S$PBB,,, | Performed by: INTERNAL MEDICINE

## 2022-08-01 PROCEDURE — 99999 PR PBB SHADOW E&M-EST. PATIENT-LVL III: ICD-10-PCS | Mod: PBBFAC,,, | Performed by: INTERNAL MEDICINE

## 2022-08-01 RX ORDER — TESTOSTERONE CYPIONATE 200 MG/ML
150 INJECTION, SOLUTION INTRAMUSCULAR
Qty: 10 ML | Refills: 5 | Status: SHIPPED | OUTPATIENT
Start: 2022-08-01 | End: 2023-03-06 | Stop reason: SDUPTHER

## 2022-08-01 RX ORDER — LEVOTHYROXINE SODIUM 100 UG/1
100 TABLET ORAL DAILY
Qty: 90 TABLET | Refills: 3 | Status: SHIPPED | OUTPATIENT
Start: 2022-08-01 | End: 2023-08-11 | Stop reason: SDUPTHER

## 2022-08-01 RX ORDER — HYDROCORTISONE 10 MG/1
10 TABLET ORAL 2 TIMES DAILY
Qty: 180 TABLET | Refills: 3 | Status: SHIPPED | OUTPATIENT
Start: 2022-08-01 | End: 2023-11-05 | Stop reason: SDUPTHER

## 2022-08-01 RX ORDER — AMLODIPINE BESYLATE 5 MG/1
5 TABLET ORAL DAILY
Qty: 90 TABLET | Refills: 0 | Status: SHIPPED | OUTPATIENT
Start: 2022-08-01 | End: 2022-09-13 | Stop reason: SDUPTHER

## 2022-08-01 RX ORDER — ATORVASTATIN CALCIUM 10 MG/1
10 TABLET, FILM COATED ORAL DAILY
Qty: 90 TABLET | Refills: 0 | Status: SHIPPED | OUTPATIENT
Start: 2022-08-01 | End: 2022-09-13 | Stop reason: SDUPTHER

## 2022-08-01 NOTE — ASSESSMENT & PLAN NOTE
Panhypopituitarism-   Secondary adrenal insufficiency-   Continue  Hydrocortisone - screen periodically for hyperglycemia  Reviewed times of increased dose - sick day precautions handout provided and reviewed  Needs medical alert tag  Follow symptoms and labs      Secondary hypothyroidism -  biochemically euthyroid- checked today  Follow ft4 and adjust accordingly  Avoid exogenous hyperthyroidism as this can accelerate bone loss      Secondary hypogonadism- on Testosterone replacement therapy. At goal.  Follow   H&H, PSA, CMP         Secondary AGHD - reviewed risks and benefits of therapy   Currently will hold off  Discussed QOL issues

## 2022-08-01 NOTE — TELEPHONE ENCOUNTER
No new care gaps identified.  [facility] [place] Embedded Care Gaps. Reference number: [id number]. 8/01/2022   8:26:58 AM QIANAT

## 2022-08-01 NOTE — PROGRESS NOTES
Subjective:      Patient ID: Ravinder Sanz is a 77 y.o. male.     Chief Complaint:  panhypopit  History of Present Illness  S/p meningioma resection 06/19/2019  Has been dx with Parkinson's   Patient is seeing neurology - Dr. Jay    With regards to the panhypopit  Spinal fusion surgery at age 16; proceeded with pituitary adenoma - nonfunctional. Received radiation treatment at [facility].        With regards to the Secondary adrenal insufficiency-   current dose: Hydrocortisone 10 mg PO BID  Knows sick day precautions    Still does not have medical alert tag   Never been hospitalized for AI  Patient denies N/V  Endorses increased fatigue which he associates which new Parkinson's diagnosis     With regards to the Secondary hypothyroidism -    Current dose: Levothyroxine 100 mcg PO once daily    Takes medication properly    Symptoms:   - weight gain   - cp/palpitations/sob  - fatigue  - hair loss  - brittle nails  - skin changes  - anxiety   +constipation - which she attributes to the Parkinson's medications     With regards to theSecondary hypogonadism-   Since he was 20 years old   on Testosterone replacement therapy - 150 mg q 2 weeks   Doing well       With regards to theSecondary AGHD - was on GH (for 2 weeks in 2018 )  - Was previously on GH but stopped due to expense and the fact that he did not feel better     No dx of DI     Denies polyuria, polydipsia, nocturia          Last bmd 4/30/21  Impression:     1. Osteopenia; FRAX calculations do not support treatment as osteoporosis.  2. Compared with previous DXA, BMD at the lumbar spine has declined by 4%, and the BMD at the total hip has remained stable.     No recent falls       Review of Systems  As above       Objective:   Physical Exam  Vitals reviewed.   Neck:      Thyroid: No thyromegaly.   Cardiovascular:      Rate and Rhythm: Normal rate.   Pulmonary:      Effort: Pulmonary effort is normal.   Abdominal:      Palpations: Abdomen is soft.      +resting tremor     Body mass index is 27.84 kg/m².    Lab Review:   Lab Results   Component Value Date    HGBA1C 5.6 06/03/2020     Lab Results   Component Value Date    CHOL 196 06/07/2021    HDL 66 06/07/2021    LDLCALC 106.8 06/07/2021    TRIG 116 06/07/2021    CHOLHDL 33.7 06/07/2021     Lab Results   Component Value Date     01/03/2022    K 3.9 01/03/2022     01/03/2022    CO2 29 01/03/2022    GLU 82 01/03/2022    BUN 15 01/03/2022    CREATININE 1.1 01/03/2022    CALCIUM 9.2 01/03/2022    PROT 6.9 01/03/2022    ALBUMIN 4.0 01/03/2022    BILITOT 0.8 01/03/2022    ALKPHOS 45 (L) 01/03/2022    AST 32 01/03/2022    ALT 8 (L) 01/03/2022    ANIONGAP 6 (L) 01/03/2022    ESTGFRAFRICA >60.0 01/03/2022    EGFRNONAA >60.0 01/03/2022    TSH 0.022 (L) 06/21/2019       Assessment and Plan       Panhypopituitarism  Panhypopituitarism-   Secondary adrenal insufficiency-   Continue  Hydrocortisone - screen periodically for hyperglycemia  Reviewed times of increased dose - sick day precautions handout provided and reviewed  Needs medical alert tag  Follow symptoms and labs      Secondary hypothyroidism -  biochemically euthyroid- checked today  Follow ft4 and adjust accordingly  Avoid exogenous hyperthyroidism as this can accelerate bone loss      Secondary hypogonadism- on Testosterone replacement therapy. At goal.  Follow   H&H, PSA, CMP         Secondary AGHD - reviewed risks and benefits of therapy   Currently will hold off  Discussed QOL issues             Osteopenia   discussed implications  Reassuring not fracturing.   rda calcium and vit D  Follow over time  discussed indications for offering therapy      labs today

## 2022-08-04 ENCOUNTER — PES CALL (OUTPATIENT)
Dept: ADMINISTRATIVE | Facility: CLINIC | Age: 77
End: 2022-08-04
Payer: MEDICARE

## 2022-08-08 ENCOUNTER — TELEPHONE (OUTPATIENT)
Dept: ADMINISTRATIVE | Facility: HOSPITAL | Age: 77
End: 2022-08-08
Payer: MEDICARE

## 2022-08-29 ENCOUNTER — TELEPHONE (OUTPATIENT)
Dept: ADMINISTRATIVE | Facility: CLINIC | Age: 77
End: 2022-08-29
Payer: MEDICARE

## 2022-08-31 ENCOUNTER — EXTERNAL CHRONIC CARE MANAGEMENT (OUTPATIENT)
Dept: PRIMARY CARE CLINIC | Facility: CLINIC | Age: 77
End: 2022-08-31
Payer: MEDICARE

## 2022-08-31 PROCEDURE — 99490 PR CHRONIC CARE MGMT, 1ST 20 MIN: ICD-10-PCS | Mod: S$PBB,,, | Performed by: INTERNAL MEDICINE

## 2022-08-31 PROCEDURE — 99490 CHRNC CARE MGMT STAFF 1ST 20: CPT | Mod: PBBFAC | Performed by: INTERNAL MEDICINE

## 2022-08-31 PROCEDURE — 99490 CHRNC CARE MGMT STAFF 1ST 20: CPT | Mod: S$PBB,,, | Performed by: INTERNAL MEDICINE

## 2022-09-02 ENCOUNTER — PATIENT MESSAGE (OUTPATIENT)
Dept: INTERNAL MEDICINE | Facility: CLINIC | Age: 77
End: 2022-09-02
Payer: MEDICARE

## 2022-09-06 ENCOUNTER — TELEPHONE (OUTPATIENT)
Dept: ADMINISTRATIVE | Facility: CLINIC | Age: 77
End: 2022-09-06
Payer: MEDICARE

## 2022-09-07 ENCOUNTER — OFFICE VISIT (OUTPATIENT)
Dept: INTERNAL MEDICINE | Facility: CLINIC | Age: 77
End: 2022-09-07
Payer: MEDICARE

## 2022-09-07 ENCOUNTER — TELEPHONE (OUTPATIENT)
Dept: ADMINISTRATIVE | Facility: CLINIC | Age: 77
End: 2022-09-07
Payer: MEDICARE

## 2022-09-07 ENCOUNTER — PATIENT MESSAGE (OUTPATIENT)
Dept: ADMINISTRATIVE | Facility: CLINIC | Age: 77
End: 2022-09-07
Payer: MEDICARE

## 2022-09-07 VITALS — WEIGHT: 172 LBS | HEIGHT: 65 IN | BODY MASS INDEX: 28.66 KG/M2

## 2022-09-07 DIAGNOSIS — E03.8 SECONDARY HYPOTHYROIDISM: ICD-10-CM

## 2022-09-07 DIAGNOSIS — Z00.00 ENCOUNTER FOR PREVENTIVE HEALTH EXAMINATION: Primary | ICD-10-CM

## 2022-09-07 DIAGNOSIS — E27.49 SECONDARY ADRENAL INSUFFICIENCY: ICD-10-CM

## 2022-09-07 DIAGNOSIS — E78.5 HYPERLIPIDEMIA, UNSPECIFIED HYPERLIPIDEMIA TYPE: ICD-10-CM

## 2022-09-07 DIAGNOSIS — E29.1 HYPOGONADISM IN MALE: ICD-10-CM

## 2022-09-07 DIAGNOSIS — E23.0 PANHYPOPITUITARISM: ICD-10-CM

## 2022-09-07 DIAGNOSIS — G20.C PRIMARY PARKINSONISM: ICD-10-CM

## 2022-09-07 DIAGNOSIS — I10 HYPERTENSION, UNSPECIFIED TYPE: ICD-10-CM

## 2022-09-07 PROCEDURE — G0439 PPPS, SUBSEQ VISIT: HCPCS | Mod: 95,,, | Performed by: NURSE PRACTITIONER

## 2022-09-07 PROCEDURE — G0439 PR MEDICARE ANNUAL WELLNESS SUBSEQUENT VISIT: ICD-10-PCS | Mod: 95,,, | Performed by: NURSE PRACTITIONER

## 2022-09-07 NOTE — PROGRESS NOTES
"The patient location is: [place]  The chief complaint leading to consultation is:  Medicare AWV    Visit type: audiovisual    Face to Face time with patient: 30 min  45  minutes of total time spent on the encounter, which includes face to face time and non-face to face time preparing to see the patient (eg, review of tests), Obtaining and/or reviewing separately obtained history, Documenting clinical information in the electronic or other health record, Independently interpreting results (not separately reported) and communicating results to the patient/family/caregiver, or Care coordination (not separately reported).         Each patient to whom he or she provides medical services by telemedicine is:  (1) informed of the relationship between the physician and patient and the respective role of any other health care provider with respect to management of the patient; and (2) notified that he or she may decline to receive medical services by telemedicine and may withdraw from such care at any time.    Notes:       Ravinder Sanz presented for a  Medicare AWV and comprehensive Health Risk Assessment today. The following components were reviewed and updated:    Medical history  Family History  Social history  Allergies and Current Medications  Health Risk Assessment  Health Maintenance  Care Team         ** See Completed Assessments for Annual Wellness Visit within the encounter summary.**         The following assessments were completed:  Living Situation  CAGE  Depression Screening  Fall Risk Assessment (MACH 10)  Hearing Assessment(HHI)  Cognitive Function Screening  Nutrition Screening  ADL Screening  PAQ Screening      Vitals:    09/07/22 1036   Weight: 78 kg (172 lb)   Height: 5' 5" (1.651 m)     Body mass index is 28.62 kg/m².  Physical Exam  Constitutional:       General: He is not in acute distress.     Appearance: Normal appearance. He is not ill-appearing, toxic-appearing or diaphoretic.   Pulmonary:      " Effort: Pulmonary effort is normal.   Neurological:      Mental Status: He is alert and oriented to person, place, and time.   Psychiatric:         Mood and Affect: Mood normal.         Behavior: Behavior normal.             Diagnoses and health risks identified today and associated recommendations/orders:    1. Encounter for preventive health examination  Screenings performed, as noted above.  Personal preventative testing needs reviewed.      2. Primary parkinsonism  Monitored/treated on meds, continue the same tx, stable, follow up with pcp and neruologist    3. Panhypopituitarism  Monitored/treated on meds, continue the same tx, stable, follow up with pcp    4. Hypertension, unspecified type  Monitored/treated on meds, continue the same tx, stable, follow up with pcp    5. Secondary hypothyroidism  Monitored/treated on meds, continue the same tx, stable, follow up with pcp and endocrinology    6. Hypogonadism in male  Monitored/treated on meds, continue the same tx, stable, follow up with pcp    7. Hyperlipidemia, unspecified hyperlipidemia type  Monitored/treated on meds, continue the same tx, stable, follow up with pcp    8. Secondary adrenal insufficiency  Monitored/treated on meds, continue the same tx, stable, follow up with pcp and endocrinology    We discussed need for a fit kit this year, he will discuss this with his pcp next visit scheduled      Provided Ravinder with a 5-10 year written screening schedule and personal prevention plan. Recommendations were developed using the USPSTF age appropriate recommendations. Education, counseling, and referrals were provided as needed. After Visit Summary printed and given to patient which includes a list of additional screenings\tests needed.    No follow-ups on file.    Danielito Molina NP  I offered to discuss advanced care planning, including how to pick a person who would make decisions for you if you were unable to make them for yourself, called a health care  power of , and what kind of decisions you might make such as use of life sustaining treatments such as ventilators and tube feeding when faced with a life limiting illness recorded on a living will that they will need to know. (How you want to be cared for as you near the end of your natural life)     X Patient is interested in learning more about how to make advanced directives.  I provided them paperwork and offered to discuss this with them.

## 2022-09-07 NOTE — TELEPHONE ENCOUNTER
Called pt; no answer; left message informing patient I was calling to confirm his virtual EAWV today at 10:00am and to see if he needed any help with setting up for virtual appt or e-pre check and to login 15 minutes prior to scheduled appt; left my name & number for pt to return my call if he has any questions or concerns; sent message through portal

## 2022-09-07 NOTE — PATIENT INSTRUCTIONS
Counseling and Referral of Other Preventative  (Italic type indicates deductible and co-insurance are waived)    Patient Name: Ravinder Sanz  Today's Date: 9/7/2022    Health Maintenance       Date Due Completion Date    Hepatitis C Screening Never done ---    Colorectal Cancer Screening 05/10/2022 5/10/2021    COVID-19 Vaccine (5 - Booster for Pfizer series) 06/22/2022 2/22/2022    DEXA Scan 04/30/2024 4/30/2021    Lipid Panel 06/07/2026 6/7/2021    TETANUS VACCINE 09/10/2030 9/10/2020        No orders of the defined types were placed in this encounter.      The following information is provided to all patients.  This information is to help you find resources for any of the problems found today that may be affecting your health:                Living healthy guide: www.[facility].[place].gov      Understanding Diabetes: www.diabetes.org      Eating healthy: www.cdc.gov/healthyweight      CDC home safety checklist: www.cdc.gov/steadi/patient.html      Agency on Aging: www.goea.[place].gov      Alcoholics anonymous (AA): www.aa.org      Physical Activity: www.rayo.nih.gov/lp6oaqs      Tobacco use: www.quitwithusla.org

## 2022-09-11 ENCOUNTER — PATIENT MESSAGE (OUTPATIENT)
Dept: INTERNAL MEDICINE | Facility: CLINIC | Age: 77
End: 2022-09-11
Payer: MEDICARE

## 2022-09-13 ENCOUNTER — OFFICE VISIT (OUTPATIENT)
Dept: INTERNAL MEDICINE | Facility: CLINIC | Age: 77
End: 2022-09-13
Payer: MEDICARE

## 2022-09-13 DIAGNOSIS — G20.C PARKINSONISM, UNSPECIFIED PARKINSONISM TYPE: Primary | ICD-10-CM

## 2022-09-13 DIAGNOSIS — Z12.5 ENCOUNTER FOR SCREENING FOR MALIGNANT NEOPLASM OF PROSTATE: ICD-10-CM

## 2022-09-13 DIAGNOSIS — I10 HYPERTENSION, UNSPECIFIED TYPE: ICD-10-CM

## 2022-09-13 DIAGNOSIS — Z12.11 SPECIAL SCREENING FOR MALIGNANT NEOPLASMS, COLON: ICD-10-CM

## 2022-09-13 PROCEDURE — 99999 PR PBB SHADOW E&M-EST. PATIENT-LVL IV: ICD-10-PCS | Mod: PBBFAC,,, | Performed by: INTERNAL MEDICINE

## 2022-09-13 PROCEDURE — 99214 PR OFFICE/OUTPT VISIT, EST, LEVL IV, 30-39 MIN: ICD-10-PCS | Mod: S$PBB,,, | Performed by: INTERNAL MEDICINE

## 2022-09-13 PROCEDURE — 99214 OFFICE O/P EST MOD 30 MIN: CPT | Mod: PBBFAC | Performed by: INTERNAL MEDICINE

## 2022-09-13 PROCEDURE — 99999 PR PBB SHADOW E&M-EST. PATIENT-LVL IV: CPT | Mod: PBBFAC,,, | Performed by: INTERNAL MEDICINE

## 2022-09-13 PROCEDURE — 99214 OFFICE O/P EST MOD 30 MIN: CPT | Mod: S$PBB,,, | Performed by: INTERNAL MEDICINE

## 2022-09-13 RX ORDER — AMLODIPINE BESYLATE 5 MG/1
5 TABLET ORAL DAILY
Qty: 90 TABLET | Refills: 1 | Status: SHIPPED | OUTPATIENT
Start: 2022-09-13 | End: 2023-06-06 | Stop reason: SDUPTHER

## 2022-09-13 RX ORDER — ATORVASTATIN CALCIUM 10 MG/1
10 TABLET, FILM COATED ORAL DAILY
Qty: 90 TABLET | Refills: 1 | Status: SHIPPED | OUTPATIENT
Start: 2022-09-13 | End: 2023-10-26

## 2022-09-14 ENCOUNTER — TELEPHONE (OUTPATIENT)
Dept: NEUROLOGY | Facility: CLINIC | Age: 77
End: 2022-09-14

## 2022-09-14 NOTE — TELEPHONE ENCOUNTER
----- Message from Lizzette Oneal sent at 9/13/2022  1:42 PM CDT -----  Regarding: Scheduling  Please assist with scheduling appointment

## 2022-09-18 VITALS
HEIGHT: 65 IN | SYSTOLIC BLOOD PRESSURE: 130 MMHG | TEMPERATURE: 99 F | OXYGEN SATURATION: 98 % | DIASTOLIC BLOOD PRESSURE: 78 MMHG | WEIGHT: 176.81 LBS | HEART RATE: 85 BPM | BODY MASS INDEX: 29.46 KG/M2

## 2022-09-18 NOTE — PROGRESS NOTES
Subjective:       Patient ID: Ravinder Sanz is a 77 y.o. male.    Chief Complaint: Hypertension    HPI  He returns for management of hypertension.  He has had hypertension for over a year.  Current treatment has included medications outlined in medication list.  He denies chest pain or shortness of breath.  No palpitations.  Denies left arm or neck pain.  He has Parkinson's.  Currently on Sinemet        Past medical history:  Pituitary adenoma, pan hypopituitarism, meningioma status post resection, Parkinson's, hypertension, hyperlipidemia     Medications:  Cortef, Synthroid, testosterone Norvasc 5 mg daily, Lipitor 10 mg daily, sinemet     Allergies:  Bupropion       Review of Systems   Constitutional:  Negative for chills, fatigue, fever and unexpected weight change.   Respiratory:  Negative for chest tightness and shortness of breath.    Cardiovascular:  Negative for chest pain and palpitations.   Gastrointestinal:  Negative for abdominal pain and blood in stool.   Neurological:  Negative for dizziness, syncope, numbness and headaches.     Objective:      Physical Exam  HENT:      Right Ear: External ear normal.      Left Ear: External ear normal.      Nose: Nose normal.      Mouth/Throat:      Mouth: Mucous membranes are moist.      Pharynx: Oropharynx is clear.   Eyes:      Pupils: Pupils are equal, round, and reactive to light.   Cardiovascular:      Rate and Rhythm: Normal rate and regular rhythm.      Heart sounds: No murmur heard.  Pulmonary:      Breath sounds: Normal breath sounds.   Abdominal:      General: There is no distension.      Palpations: There is no hepatomegaly.      Tenderness: There is no abdominal tenderness.   Musculoskeletal:      Cervical back: Normal range of motion.   Lymphadenopathy:      Cervical: No cervical adenopathy.      Upper Body:      Right upper body: No axillary adenopathy.      Left upper body: No axillary adenopathy.   Neurological:      Cranial Nerves: No cranial  nerve deficit.      Sensory: No sensory deficit.      Motor: Motor function is intact.      Deep Tendon Reflexes: Reflexes are normal and symmetric.       Assessment/Plan       Assessment and plan:  1.  Hypertension:  Check lipid panel and CBC  2.  Parkinson's:  Follow up with Neurology   3. Check PSA.  Fit kit given

## 2022-09-20 ENCOUNTER — LAB VISIT (OUTPATIENT)
Dept: LAB | Facility: HOSPITAL | Age: 77
End: 2022-09-20
Attending: INTERNAL MEDICINE
Payer: MEDICARE

## 2022-09-20 DIAGNOSIS — I10 HYPERTENSION, UNSPECIFIED TYPE: ICD-10-CM

## 2022-09-20 DIAGNOSIS — Z12.5 ENCOUNTER FOR SCREENING FOR MALIGNANT NEOPLASM OF PROSTATE: ICD-10-CM

## 2022-09-20 LAB
BASOPHILS # BLD AUTO: 0.05 K/UL (ref 0–0.2)
BASOPHILS NFR BLD: 0.6 % (ref 0–1.9)
CHOLEST SERPL-MCNC: 184 MG/DL (ref 120–199)
CHOLEST/HDLC SERPL: 3.1 {RATIO} (ref 2–5)
COMPLEXED PSA SERPL-MCNC: 2.5 NG/ML (ref 0–4)
DIFFERENTIAL METHOD: NORMAL
EOSINOPHIL # BLD AUTO: 0 K/UL (ref 0–0.5)
EOSINOPHIL NFR BLD: 0.5 % (ref 0–8)
ERYTHROCYTE [DISTWIDTH] IN BLOOD BY AUTOMATED COUNT: 14.1 % (ref 11.5–14.5)
HCT VFR BLD AUTO: 44.2 % (ref 40–54)
HDLC SERPL-MCNC: 60 MG/DL (ref 40–75)
HDLC SERPL: 32.6 % (ref 20–50)
HGB BLD-MCNC: 14.6 G/DL (ref 14–18)
IMM GRANULOCYTES # BLD AUTO: 0.02 K/UL (ref 0–0.04)
IMM GRANULOCYTES NFR BLD AUTO: 0.2 % (ref 0–0.5)
LDLC SERPL CALC-MCNC: 106.6 MG/DL (ref 63–159)
LYMPHOCYTES # BLD AUTO: 1.9 K/UL (ref 1–4.8)
LYMPHOCYTES NFR BLD: 21.7 % (ref 18–48)
MCH RBC QN AUTO: 30.4 PG (ref 27–31)
MCHC RBC AUTO-ENTMCNC: 33 G/DL (ref 32–36)
MCV RBC AUTO: 92 FL (ref 82–98)
MONOCYTES # BLD AUTO: 0.6 K/UL (ref 0.3–1)
MONOCYTES NFR BLD: 7.1 % (ref 4–15)
NEUTROPHILS # BLD AUTO: 6 K/UL (ref 1.8–7.7)
NEUTROPHILS NFR BLD: 69.9 % (ref 38–73)
NONHDLC SERPL-MCNC: 124 MG/DL
NRBC BLD-RTO: 0 /100 WBC
PLATELET # BLD AUTO: 219 K/UL (ref 150–450)
PMV BLD AUTO: 11.8 FL (ref 9.2–12.9)
RBC # BLD AUTO: 4.8 M/UL (ref 4.6–6.2)
TRIGL SERPL-MCNC: 87 MG/DL (ref 30–150)
WBC # BLD AUTO: 8.58 K/UL (ref 3.9–12.7)

## 2022-09-20 PROCEDURE — 84153 ASSAY OF PSA TOTAL: CPT | Performed by: INTERNAL MEDICINE

## 2022-09-20 PROCEDURE — 36415 COLL VENOUS BLD VENIPUNCTURE: CPT | Performed by: INTERNAL MEDICINE

## 2022-09-20 PROCEDURE — 85025 COMPLETE CBC W/AUTO DIFF WBC: CPT | Performed by: INTERNAL MEDICINE

## 2022-09-20 PROCEDURE — 80061 LIPID PANEL: CPT | Performed by: INTERNAL MEDICINE

## 2022-09-30 ENCOUNTER — EXTERNAL CHRONIC CARE MANAGEMENT (OUTPATIENT)
Dept: PRIMARY CARE CLINIC | Facility: CLINIC | Age: 77
End: 2022-09-30
Payer: MEDICARE

## 2022-09-30 PROCEDURE — 99490 CHRNC CARE MGMT STAFF 1ST 20: CPT | Mod: PBBFAC | Performed by: INTERNAL MEDICINE

## 2022-09-30 PROCEDURE — 99490 CHRNC CARE MGMT STAFF 1ST 20: CPT | Mod: S$PBB,,, | Performed by: INTERNAL MEDICINE

## 2022-09-30 PROCEDURE — 99490 PR CHRONIC CARE MGMT, 1ST 20 MIN: ICD-10-PCS | Mod: S$PBB,,, | Performed by: INTERNAL MEDICINE

## 2022-10-31 ENCOUNTER — EXTERNAL CHRONIC CARE MANAGEMENT (OUTPATIENT)
Dept: PRIMARY CARE CLINIC | Facility: CLINIC | Age: 77
End: 2022-10-31
Payer: MEDICARE

## 2022-10-31 PROCEDURE — 99490 PR CHRONIC CARE MGMT, 1ST 20 MIN: ICD-10-PCS | Mod: S$PBB,,, | Performed by: INTERNAL MEDICINE

## 2022-10-31 PROCEDURE — 99490 CHRNC CARE MGMT STAFF 1ST 20: CPT | Mod: PBBFAC | Performed by: INTERNAL MEDICINE

## 2022-10-31 PROCEDURE — 99490 CHRNC CARE MGMT STAFF 1ST 20: CPT | Mod: S$PBB,,, | Performed by: INTERNAL MEDICINE

## 2022-11-15 ENCOUNTER — TELEPHONE (OUTPATIENT)
Dept: NEUROLOGY | Facility: CLINIC | Age: 77
End: 2022-11-15
Payer: MEDICARE

## 2022-11-15 NOTE — TELEPHONE ENCOUNTER
Left a message for the patient to return call to help schedule an appointment. Patient was offered 12/14 at 10:00.

## 2022-11-15 NOTE — TELEPHONE ENCOUNTER
----- Message from Ángela Brady sent at 11/15/2022 12:17 PM CST -----  Contact: pt  Type:  Sooner Appointment Request    Caller is requesting a sooner appointment.  Caller declined first available appointment listed below.  Caller will not accept being placed on the waitlist and is requesting a message be sent to doctor.    Name of Caller:  Dawna dorys Ruffin   When is the first available appointment?  1/26  Symptoms:  f/u   Best Call Back Number:  [phone] (home)     Additional Information:  Please call the patient back to make an appt he is asking to be seen between 10 and 1p.m

## 2022-11-21 ENCOUNTER — TELEPHONE (OUTPATIENT)
Dept: NEUROLOGY | Facility: CLINIC | Age: 77
End: 2022-11-21
Payer: MEDICARE

## 2022-11-21 NOTE — TELEPHONE ENCOUNTER
----- Message from Brayan Amaral MA sent at 11/21/2022 12:08 PM CST -----  Regarding: FW: appt on 12/14/22 @ 10am  Contact: Jong (friend) @ [phone]    ----- Message -----  From: Shivam Ramirez  Sent: 11/21/2022  10:24 AM CST  To: [facility] Staff  Subject: appt on 12/14/22 @ 10am                          Caller, Jong (friend) is calling to speak to Lori or someone in the office to confirm appt that was offered for 12/14/22 @ 10am. Caller states that they have sent several messages to the office w/o a response back. Caller is asking for a call back to confirm this appt date and time. Thanks.

## 2022-11-30 ENCOUNTER — EXTERNAL CHRONIC CARE MANAGEMENT (OUTPATIENT)
Dept: PRIMARY CARE CLINIC | Facility: CLINIC | Age: 77
End: 2022-11-30
Payer: MEDICARE

## 2022-11-30 PROCEDURE — 99490 CHRNC CARE MGMT STAFF 1ST 20: CPT | Mod: PBBFAC | Performed by: INTERNAL MEDICINE

## 2022-11-30 PROCEDURE — 99439 PR CHRONIC CARE MGMT, EA ADDTL 20 MIN: ICD-10-PCS | Mod: S$PBB,,, | Performed by: INTERNAL MEDICINE

## 2022-11-30 PROCEDURE — 99490 CHRNC CARE MGMT STAFF 1ST 20: CPT | Mod: S$PBB,,, | Performed by: INTERNAL MEDICINE

## 2022-11-30 PROCEDURE — 99490 PR CHRONIC CARE MGMT, 1ST 20 MIN: ICD-10-PCS | Mod: S$PBB,,, | Performed by: INTERNAL MEDICINE

## 2022-11-30 PROCEDURE — 99439 CHRNC CARE MGMT STAF EA ADDL: CPT | Mod: PBBFAC | Performed by: INTERNAL MEDICINE

## 2022-11-30 PROCEDURE — 99439 CHRNC CARE MGMT STAF EA ADDL: CPT | Mod: S$PBB,,, | Performed by: INTERNAL MEDICINE

## 2022-12-14 ENCOUNTER — OFFICE VISIT (OUTPATIENT)
Dept: NEUROLOGY | Facility: CLINIC | Age: 77
End: 2022-12-14
Payer: MEDICARE

## 2022-12-14 VITALS
DIASTOLIC BLOOD PRESSURE: 82 MMHG | BODY MASS INDEX: 28.45 KG/M2 | WEIGHT: 170.75 LBS | HEART RATE: 80 BPM | HEIGHT: 65 IN | SYSTOLIC BLOOD PRESSURE: 125 MMHG

## 2022-12-14 DIAGNOSIS — Z71.89 COUNSELING REGARDING GOALS OF CARE: ICD-10-CM

## 2022-12-14 DIAGNOSIS — G20.C PRIMARY PARKINSONISM: Primary | ICD-10-CM

## 2022-12-14 PROCEDURE — 99999 PR PBB SHADOW E&M-EST. PATIENT-LVL III: CPT | Mod: PBBFAC,,, | Performed by: PHYSICIAN ASSISTANT

## 2022-12-14 PROCEDURE — 99214 OFFICE O/P EST MOD 30 MIN: CPT | Mod: S$PBB,,, | Performed by: PHYSICIAN ASSISTANT

## 2022-12-14 PROCEDURE — 99214 PR OFFICE/OUTPT VISIT, EST, LEVL IV, 30-39 MIN: ICD-10-PCS | Mod: S$PBB,,, | Performed by: PHYSICIAN ASSISTANT

## 2022-12-14 PROCEDURE — 99999 PR PBB SHADOW E&M-EST. PATIENT-LVL III: ICD-10-PCS | Mod: PBBFAC,,, | Performed by: PHYSICIAN ASSISTANT

## 2022-12-14 PROCEDURE — 99213 OFFICE O/P EST LOW 20 MIN: CPT | Mod: PBBFAC | Performed by: PHYSICIAN ASSISTANT

## 2022-12-14 RX ORDER — LEVODOPA AND CARBIDOPA 95; 23.75 MG/1; MG/1
CAPSULE, EXTENDED RELEASE ORAL
Qty: 270 CAPSULE | Refills: 11 | Status: SHIPPED | OUTPATIENT
Start: 2022-12-14 | End: 2023-07-19

## 2022-12-14 NOTE — PROGRESS NOTES
Name: Ravinder Sanz  MRN: [id number]   CSN: [id number]      Date: 12/14/2022    Referring physician:  No referring provider defined for this encounter.    Chief Complaint: PD      Interval History:  - accompanied by spouse   - currently taking cd/ld 1 tab TID   - rasagiline caused nausea   - takes at midnight, 5 am, and 3 pm   - goes to sleep at midnight, finds that it helps him to go to sleep   - wakes up at 5 am, goes back to sleep until 9 am   - irregular sleep pattern   - worse whenever he is nervous   - a lot of the times tremors are not present unless nervous or anxious   - he had a period where he had hypersalivation and then that disappeared   - he has been eating more hard candies, lemon drops -- may explain why hypersalivation has improved   - most bothersome symptom is waking up at 5 am (off time), does not endorse waking up to go to the bathroom         From August 2021  - on cd/ld 25/100 1 tab TID   - doing better, starting taking last dose a little earlier in the evening   - exercising more   - live most of the time in [place]   - feels that each dose last until the next   - driving still, feels like this helps as well  - art seems to help as well   - taking BP medicine now   - feels really good about medications   - last dose was at 9 am   - anxiety and stress/emotion worsen tremor   - back on a normal sleep cycle  - constipation still an issues, takes a stool softener       From April 2021  - accompanied by partner   - trial of cd/ld last visit   - sleeping much better, sleep cycle back to normal   - has more energy  - tremor improved on cd/ld  - feeling back to himself   - BP a little high today, didn't take BP meds today -- hasnt taken it for a couple of weeks   - had both vaccines   - last dose was at noon   - takes them 7 am, noon, right before he goes to bed   - no falls   - constipation, takes OTC meds and flax seed oil   - no hallucinations   - very seldomly walking   - starts PT next  week  - does a lot of cycling        From 1/2021: Ravinder Sanz is a R HANDED 76 y.o. male with a medical issues significant for panhypopituitarism, hypothyroidism, PDism, s/p resection of meningioma, who presents for follow up of PDism. Previously followed by Dr. Baker, however this patient is new to me. Accompanied by friend, Jong. Tremor since 2018, active dreaming x 8 years. Symptoms improved (drooling, claustrophobia, anxiety much improved) tremendously after he had the resection of the meningioma. Some improvement of tremor as well. Now gait has changed. Fatigues really easily. Also having trouble with sleeping. A lot sleeping throughout the day and therefore not able to sleep at night. Has trouble falling asleep and staying asleep. Gait has changed, tends to walk slightly forward. Shuffling feet. Some shorter steps, tries to make himself walk normal. Feels like he wants to speed up when he walks, taking a lot of quick little steps. Stiffness in both arms, feels like they both equally as stiff.  Has never had hallucinations in the past.     Retired  now. Tremor doesn't interfere with his tremor[place]   Going back to [place] for 3 months.       Medication history:  - not currently on medication   - had a bad reaction to wellbutrin, had a bad reaction in the past       Neuroleptic exposure:  - none    Family History: father had parkinson's     Past medical, family, and social history reviewed as documented in chart with pertinent positive medical, family, and social history detailed in HPI.      From 9/9/2019  seen in f/u for tremor and meningioma.  Accompanied by Jong.  Since resection of the meningioma, he no longer drools.  Tremor is less and energy is better.    Personality, and clarity much better.  Anxiety has been better, no delusions.  No perseverative thought.  Still has some double.        5/17/19  male seen in consultation at the request of  Dr. Eng for  "evaluation of tremor.  Accompanied by partner of 9 years, Jong.  Main concerns:  Claustrophobia, insomnia (fear of bedtime), bilateral tremors (x1 year), drool  Balance is unchanged.  Cognition is unchanged.  Had had active dreaming up until about a year ago.  Anxiety, claustrophobia, can no long camp!  Clonazepam made this worse.  Diplopia, [id number], intermittent.  He wonders if his anxiety is due to allowing fantasies, particularly worried about being put in FDC.  Jong says he is not as social, less motivated as in past.  Jong says he had "twiddle" in fingers when driving up to 9 years ago.  Active artist, fine art (oil), but tremor does not effect this.     PD Review of Symptoms:  Anosmia: +hyposmia   Dysarthria/Hypophonia: mild hypophonia   Dysphagia/Sialorrhea: only with some medications, very little drooling now   Depression: none   Cognitive slowing: improving   Hallucinations: none   Impulsivity: none   Obsessions/Compulsions: none   Urinary changes: frequency, no episodes of incontinence   Constipation: used to have a lot of constipation, has since resolved. Eating more yogurt and fibrous food.   Orthostasis: none   Erectile Dysfunction: none   Falls: none   Freezing: none   Micrographia: yes   Sleep issues:  -BRITTANY: none   -RBD: yes, for 8+ years, not as often anymore since the resection of the meningioma       Review of Systems:   Review of Systems   Constitutional:  Negative for chills, fever and malaise/fatigue.   HENT:  Negative for hearing loss.    Eyes:  Negative for blurred vision and double vision.   Respiratory:  Negative for cough, shortness of breath and stridor.    Cardiovascular:  Negative for chest pain and leg swelling.   Gastrointestinal:  Negative for constipation, diarrhea and nausea.   Genitourinary:  Negative for frequency and urgency.   Musculoskeletal:  Negative for falls.   Skin:  Negative for itching and rash.   Neurological:  Positive for tremors. Negative for dizziness, loss " "of consciousness and weakness.   Psychiatric/Behavioral:  Negative for hallucinations and memory loss.          Past Medical History: The patient  has a past medical history of Anxiety, Eczema, Hyperlipidemia, Hypogonadism in male, Hypopituitarism, Hypothyroidism, Meningioma (6/10/2019), and Parkinson's disease.    Social History: The patient  reports that he has quit smoking. He has never used smokeless tobacco. He reports current alcohol use of about 14.0 standard drinks per week. He reports that he does not use drugs.    Family History: Their family history includes Diabetes in his sister; Heart disease in his father.    Allergies: Bupropion     Meds:   Current Outpatient Medications on File Prior to Visit   Medication Sig Dispense Refill    amLODIPine (NORVASC) 5 MG tablet Take 1 tablet (5 mg total) by mouth once daily. 90 tablet 1    atorvastatin (LIPITOR) 10 MG tablet Take 1 tablet (10 mg total) by mouth once daily. 90 tablet 1    cholecalciferol, vitamin D3, (VITAMIN D3) 50 mcg (2,000 unit) Cap Take 1 capsule (2,000 Units total) by mouth once daily.      docusate sodium (COLACE) 100 MG capsule Take 100 mg by mouth daily as needed for Constipation.       fluocinonide (LIDEX) 0.05 % ointment AAA BID x 1-2 wks then prn flares only 60 g 1    hydrocortisone (CORTEF) 10 MG Tab Take 1 tablet (10 mg total) by mouth 2 (two) times daily. 180 tablet 3    levothyroxine (SYNTHROID) 100 MCG tablet Take 1 tablet (100 mcg total) by mouth once daily. 90 tablet 3    MAGNESIUM CITRATE ORAL Take 750 mg by mouth once daily.      needle, disp, 21 G 21 gauge x 1" Ndle To use once weekly with testosterone injections 4 each 11    senna (SENOKOT) 8.6 mg tablet Take 1 tablet by mouth daily as needed for Constipation.       testosterone cypionate (DEPOTESTOTERONE CYPIONATE) 200 mg/mL injection Inject 0.75 mLs (150 mg total) into the muscle every 14 (fourteen) days. 10 mL 5    [DISCONTINUED] carbidopa-levodopa  mg (SINEMET)  " "mg per tablet Take 1 tablet by mouth 3 (three) times daily. 270 tablet 3    LORazepam (ATIVAN) 1 MG tablet Take one tablet 45 minutes prior to MRI for anxiety/Claustrophobia. (Patient not taking: Reported on 12/14/2022) 1 tablet 0     No current facility-administered medications on file prior to visit.       Exam:  /82 (BP Location: Left arm, Patient Position: Sitting, BP Method: Medium (Automatic))   Pulse 80   Ht 5' 5" (1.651 m)   Wt 77.4 kg (170 lb 11.9 oz)   BMI 28.41 kg/m²     Constitutional  Well-developed, well-nourished, appears stated age   Ophthalmoscopic  No papilledema with no hemorrhages or exudates bilaterally   Cardiovascular  Radial pulses 2+ and symmetric, no LE edema bilaterally   Neurological    * Mental status  MOCA =      - Orientation  Oriented to person, place, time, and situation     - Memory   Intact recent and remote     - Attention/concentration  Attentive, vigilant during exam     - Language  Naming & repetition intact, +2-step commands     - Fund of knowledge  Aware of current events     - Executive  Well-organized thoughts     - Other     * Cranial nerves       - CN II  PERRL, visual fields full to confrontation     - CN III, IV, VI  Extraocular movements full, normal pursuits and saccades     - CN V  Sensation V1 - V3 intact     - CN VII  Face strong and symmetric bilaterally     - CN VIII  Hearing intact bilaterally     - CN IX, X  Palate raises midline and symmetric     - CN XI  SCM and trapezius 5/5 bilaterally     - CN XII  Tongue midline   * Motor  Muscle bulk normal, strength 5/5 throughout   * Sensory   Intact to temperature and vibration throughout   * Coordination  No dysmetria with finger-to-nose or heel-to-shin   * Gait  See below.   * Deep tendon reflexes  2+ and symmetric throughout   Babinski downgoing bilaterally   * Specialized movement exam  mild hypophonic speech.    mild facial masking.   L > R cogwheel rigidity.     L > Rbradykinesia.   Bilateral resting " tremor, R > L   No other dystonia, chorea, athetosis, myoclonus, or tics.   No motor impersistence.   Mild anterior stoop     Decreased arm swing bilaterally    Narrow-based gait    R tremor re-emerges during gait eval      Laboratory/Radiological:  - Results:  Lab Visit on 09/20/2022   Component Date Value Ref Range Status    Cholesterol 09/20/2022 184  120 - 199 mg/dL Final    Triglycerides 09/20/2022 87  30 - 150 mg/dL Final    HDL 09/20/2022 60  40 - 75 mg/dL Final    LDL Cholesterol 09/20/2022 106.6  63.0 - 159.0 mg/dL Final    HDL/Cholesterol Ratio 09/20/2022 32.6  20.0 - 50.0 % Final    Total Cholesterol/HDL Ratio 09/20/2022 3.1  2.0 - 5.0 Final    Non-HDL Cholesterol 09/20/2022 124  mg/dL Final    WBC 09/20/2022 8.58  3.90 - 12.70 K/uL Final    RBC 09/20/2022 4.80  4.60 - 6.20 M/uL Final    Hemoglobin 09/20/2022 14.6  14.0 - 18.0 g/dL Final    Hematocrit 09/20/2022 44.2  40.0 - 54.0 % Final    MCV 09/20/2022 92  82 - 98 fL Final    MCH 09/20/2022 30.4  27.0 - 31.0 pg Final    MCHC 09/20/2022 33.0  32.0 - 36.0 g/dL Final    RDW 09/20/2022 14.1  11.5 - 14.5 % Final    Platelets 09/20/2022 219  150 - 450 K/uL Final    MPV 09/20/2022 11.8  9.2 - 12.9 fL Final    Immature Granulocytes 09/20/2022 0.2  0.0 - 0.5 % Final    Gran # (ANC) 09/20/2022 6.0  1.8 - 7.7 K/uL Final    Immature Grans (Abs) 09/20/2022 0.02  0.00 - 0.04 K/uL Final    Lymph # 09/20/2022 1.9  1.0 - 4.8 K/uL Final    Mono # 09/20/2022 0.6  0.3 - 1.0 K/uL Final    Eos # 09/20/2022 0.0  0.0 - 0.5 K/uL Final    Baso # 09/20/2022 0.05  0.00 - 0.20 K/uL Final    nRBC 09/20/2022 0  0 /100 WBC Final    Gran % 09/20/2022 69.9  38.0 - 73.0 % Final    Lymph % 09/20/2022 21.7  18.0 - 48.0 % Final    Mono % 09/20/2022 7.1  4.0 - 15.0 % Final    Eosinophil % 09/20/2022 0.5  0.0 - 8.0 % Final    Basophil % 09/20/2022 0.6  0.0 - 1.9 % Final    Differential Method 09/20/2022 Automated   Final    PSA, Screen 09/20/2022 2.5  0.00 - 4.00 ng/mL Final       -  Independent review of images:      - Independent review of consultant's notes:     ASSESSMENT/PLAN:  Parkinson's   Anxiety, active dreaming x 5-9 years, tremor since 2018  ldopa responsive   Currently taking cd/ld 1 tab TID at odd times -- midnight, 5 am and 3 pm   Most bothersome symptom is waking up at 5 am (I suspect due to off time)  Converting to rytary 2 caps TID for one week (I still think he is underdosed) after one week, increasing to 3 caps TID   - counseled on increased physical activity     Orders Placed This Encounter    carbidopa-levodopa (RYTARY) 23.75-95 mg CpSR           Follow up: in 1 year with [facility]     Collaborating Physician, Dr. Matta, was available during today's encounter. Any change to plan along with cosign to appear in the EMR.       Total time spent with the patient: 36 minutes.  28 minutes of face-to-face consultation and 8 minutes of chart review and coordination of care, on the day of the visit. This includes face to face time and non-face to face time preparing to see the patient (eg, review of tests), obtaining and/or reviewing separately obtained history, documenting clinical information in the electronic or other health record, independently interpreting resultsand communicating results to the patient/family/caregiver, or care coordination.         Rafia Christianson PA-C   [facility]  Department of Neurology  Movement Disorders

## 2022-12-16 ENCOUNTER — PATIENT MESSAGE (OUTPATIENT)
Dept: NEUROLOGY | Facility: CLINIC | Age: 77
End: 2022-12-16
Payer: MEDICARE

## 2022-12-17 ENCOUNTER — PATIENT MESSAGE (OUTPATIENT)
Dept: NEUROLOGY | Facility: CLINIC | Age: 77
End: 2022-12-17
Payer: MEDICARE

## 2022-12-19 ENCOUNTER — PATIENT MESSAGE (OUTPATIENT)
Dept: NEUROLOGY | Facility: CLINIC | Age: 77
End: 2022-12-19
Payer: MEDICARE

## 2022-12-31 ENCOUNTER — EXTERNAL CHRONIC CARE MANAGEMENT (OUTPATIENT)
Dept: PRIMARY CARE CLINIC | Facility: CLINIC | Age: 77
End: 2022-12-31
Payer: MEDICARE

## 2022-12-31 PROCEDURE — 99490 CHRNC CARE MGMT STAFF 1ST 20: CPT | Mod: PBBFAC | Performed by: INTERNAL MEDICINE

## 2022-12-31 PROCEDURE — 99490 CHRNC CARE MGMT STAFF 1ST 20: CPT | Mod: S$PBB,,, | Performed by: INTERNAL MEDICINE

## 2022-12-31 PROCEDURE — 99490 PR CHRONIC CARE MGMT, 1ST 20 MIN: ICD-10-PCS | Mod: S$PBB,,, | Performed by: INTERNAL MEDICINE

## 2023-01-31 ENCOUNTER — EXTERNAL CHRONIC CARE MANAGEMENT (OUTPATIENT)
Dept: PRIMARY CARE CLINIC | Facility: CLINIC | Age: 78
End: 2023-01-31
Payer: MEDICARE

## 2023-01-31 PROCEDURE — 99439 CHRNC CARE MGMT STAF EA ADDL: CPT | Mod: PBBFAC,27 | Performed by: INTERNAL MEDICINE

## 2023-01-31 PROCEDURE — 99439 PR CHRONIC CARE MGMT, EA ADDTL 20 MIN: ICD-10-PCS | Mod: S$PBB,,, | Performed by: INTERNAL MEDICINE

## 2023-01-31 PROCEDURE — 99490 CHRNC CARE MGMT STAFF 1ST 20: CPT | Mod: PBBFAC,25 | Performed by: INTERNAL MEDICINE

## 2023-01-31 PROCEDURE — 99490 CHRNC CARE MGMT STAFF 1ST 20: CPT | Mod: S$PBB,,, | Performed by: INTERNAL MEDICINE

## 2023-01-31 PROCEDURE — 99490 PR CHRONIC CARE MGMT, 1ST 20 MIN: ICD-10-PCS | Mod: S$PBB,,, | Performed by: INTERNAL MEDICINE

## 2023-01-31 PROCEDURE — 99439 CHRNC CARE MGMT STAF EA ADDL: CPT | Mod: S$PBB,,, | Performed by: INTERNAL MEDICINE

## 2023-02-08 ENCOUNTER — PATIENT MESSAGE (OUTPATIENT)
Dept: NEUROLOGY | Facility: CLINIC | Age: 78
End: 2023-02-08
Payer: MEDICARE

## 2023-02-16 ENCOUNTER — PATIENT MESSAGE (OUTPATIENT)
Dept: NEUROLOGY | Facility: CLINIC | Age: 78
End: 2023-02-16
Payer: MEDICARE

## 2023-02-28 ENCOUNTER — EXTERNAL CHRONIC CARE MANAGEMENT (OUTPATIENT)
Dept: PRIMARY CARE CLINIC | Facility: CLINIC | Age: 78
End: 2023-02-28
Payer: MEDICARE

## 2023-02-28 PROCEDURE — 99490 CHRNC CARE MGMT STAFF 1ST 20: CPT | Mod: S$PBB,,, | Performed by: INTERNAL MEDICINE

## 2023-02-28 PROCEDURE — 99490 PR CHRONIC CARE MGMT, 1ST 20 MIN: ICD-10-PCS | Mod: S$PBB,,, | Performed by: INTERNAL MEDICINE

## 2023-02-28 PROCEDURE — 99490 CHRNC CARE MGMT STAFF 1ST 20: CPT | Mod: PBBFAC | Performed by: INTERNAL MEDICINE

## 2023-03-05 ENCOUNTER — PATIENT MESSAGE (OUTPATIENT)
Dept: ENDOCRINOLOGY | Facility: CLINIC | Age: 78
End: 2023-03-05
Payer: MEDICARE

## 2023-03-05 DIAGNOSIS — E29.1 HYPOGONADISM IN MALE: ICD-10-CM

## 2023-03-06 ENCOUNTER — PATIENT MESSAGE (OUTPATIENT)
Dept: NEUROLOGY | Facility: CLINIC | Age: 78
End: 2023-03-06
Payer: MEDICARE

## 2023-03-06 RX ORDER — TESTOSTERONE CYPIONATE 200 MG/ML
150 INJECTION, SOLUTION INTRAMUSCULAR
Qty: 10 ML | Refills: 5 | Status: SHIPPED | OUTPATIENT
Start: 2023-03-06 | End: 2023-10-05 | Stop reason: SDUPTHER

## 2023-03-14 ENCOUNTER — PATIENT MESSAGE (OUTPATIENT)
Dept: NEUROLOGY | Facility: CLINIC | Age: 78
End: 2023-03-14
Payer: MEDICARE

## 2023-03-22 ENCOUNTER — TELEPHONE (OUTPATIENT)
Dept: NEUROLOGY | Facility: CLINIC | Age: 78
End: 2023-03-22
Payer: MEDICARE

## 2023-03-22 NOTE — TELEPHONE ENCOUNTER
----- Message from Estrella Rogers sent at 3/22/2023 11:09 AM CDT -----  Regarding: pt advice  Contact: Jong(friend) [phone]  Pt friend Jong calling in regards to pt new medication carbidopa-levodopa (RYTARY) 23.75-95 mg CpSR. Pt not having a good respond with meds. Needing some directions. Pls call

## 2023-03-22 NOTE — TELEPHONE ENCOUNTER
Started Rytary yesterday. C/o Bad nausea. Exaggerated tremors, bad fear, episode of vomiting this AM.   4 dose in ALL.   Would like to d/c Rytary.   Would like to resume cd/ld. 1 po TID.   Calmer after not taking mid day dose.

## 2023-03-31 ENCOUNTER — EXTERNAL CHRONIC CARE MANAGEMENT (OUTPATIENT)
Dept: PRIMARY CARE CLINIC | Facility: CLINIC | Age: 78
End: 2023-03-31
Payer: MEDICARE

## 2023-03-31 PROCEDURE — 99490 PR CHRONIC CARE MGMT, 1ST 20 MIN: ICD-10-PCS | Mod: S$PBB,,, | Performed by: INTERNAL MEDICINE

## 2023-03-31 PROCEDURE — 99490 CHRNC CARE MGMT STAFF 1ST 20: CPT | Mod: PBBFAC | Performed by: INTERNAL MEDICINE

## 2023-03-31 PROCEDURE — 99490 CHRNC CARE MGMT STAFF 1ST 20: CPT | Mod: S$PBB,,, | Performed by: INTERNAL MEDICINE

## 2023-04-20 ENCOUNTER — PES CALL (OUTPATIENT)
Dept: ADMINISTRATIVE | Facility: CLINIC | Age: 78
End: 2023-04-20
Payer: MEDICARE

## 2023-04-25 ENCOUNTER — PES CALL (OUTPATIENT)
Dept: ADMINISTRATIVE | Facility: CLINIC | Age: 78
End: 2023-04-25
Payer: MEDICARE

## 2023-04-30 ENCOUNTER — EXTERNAL CHRONIC CARE MANAGEMENT (OUTPATIENT)
Dept: PRIMARY CARE CLINIC | Facility: CLINIC | Age: 78
End: 2023-04-30
Payer: MEDICARE

## 2023-04-30 PROCEDURE — 99490 PR CHRONIC CARE MGMT, 1ST 20 MIN: ICD-10-PCS | Mod: S$PBB,,, | Performed by: INTERNAL MEDICINE

## 2023-04-30 PROCEDURE — 99490 CHRNC CARE MGMT STAFF 1ST 20: CPT | Mod: S$PBB,,, | Performed by: INTERNAL MEDICINE

## 2023-04-30 PROCEDURE — 99490 CHRNC CARE MGMT STAFF 1ST 20: CPT | Mod: PBBFAC | Performed by: INTERNAL MEDICINE

## 2023-05-31 ENCOUNTER — EXTERNAL CHRONIC CARE MANAGEMENT (OUTPATIENT)
Dept: PRIMARY CARE CLINIC | Facility: CLINIC | Age: 78
End: 2023-05-31
Payer: MEDICARE

## 2023-05-31 PROCEDURE — 99490 CHRNC CARE MGMT STAFF 1ST 20: CPT | Mod: PBBFAC | Performed by: INTERNAL MEDICINE

## 2023-05-31 PROCEDURE — 99490 PR CHRONIC CARE MGMT, 1ST 20 MIN: ICD-10-PCS | Mod: S$PBB,,, | Performed by: INTERNAL MEDICINE

## 2023-05-31 PROCEDURE — 99490 CHRNC CARE MGMT STAFF 1ST 20: CPT | Mod: S$PBB,,, | Performed by: INTERNAL MEDICINE

## 2023-06-09 ENCOUNTER — TELEPHONE (OUTPATIENT)
Dept: INTERNAL MEDICINE | Facility: CLINIC | Age: 78
End: 2023-06-09
Payer: MEDICARE

## 2023-06-16 ENCOUNTER — TELEPHONE (OUTPATIENT)
Dept: NEUROLOGY | Facility: CLINIC | Age: 78
End: 2023-06-16
Payer: MEDICARE

## 2023-06-16 NOTE — TELEPHONE ENCOUNTER
----- Message from Savannah Gonzalez sent at 6/16/2023  1:03 PM CDT -----  Regarding: Appt Req  Contact: pt  Type: Appointment Request    Caller is requesting an appointment     Name of Caller: Pt   Reason for appointment: Follow Up  Would the patient rather a call back or a response via [facility]? Call back  Best Call Back Number:  [phone]  Additional Information: Pt would like to be Scheduled in the Month  of July or August Between The  Hours Of 10a -1pm,  If Possible, Please call to advise ,  Thank You

## 2023-06-30 ENCOUNTER — EXTERNAL CHRONIC CARE MANAGEMENT (OUTPATIENT)
Dept: PRIMARY CARE CLINIC | Facility: CLINIC | Age: 78
End: 2023-06-30
Payer: MEDICARE

## 2023-06-30 PROCEDURE — 99489 CPLX CHRNC CARE EA ADDL 30: CPT | Mod: S$PBB,,, | Performed by: INTERNAL MEDICINE

## 2023-06-30 PROCEDURE — 99487 CPLX CHRNC CARE 1ST 60 MIN: CPT | Mod: S$PBB,,, | Performed by: INTERNAL MEDICINE

## 2023-06-30 PROCEDURE — 99487 CPLX CHRNC CARE 1ST 60 MIN: CPT | Mod: PBBFAC,25 | Performed by: INTERNAL MEDICINE

## 2023-06-30 PROCEDURE — 99487 PR COMPLX CHRON CARE MGMT, 1ST HR, PER MONTH: ICD-10-PCS | Mod: S$PBB,,, | Performed by: INTERNAL MEDICINE

## 2023-06-30 PROCEDURE — 99489 PR COMPLX CHRON CARE MGMT, EA ADDTL 30 MIN, PER MONTH: ICD-10-PCS | Mod: S$PBB,,, | Performed by: INTERNAL MEDICINE

## 2023-06-30 PROCEDURE — 99489 CPLX CHRNC CARE EA ADDL 30: CPT | Mod: PBBFAC | Performed by: INTERNAL MEDICINE

## 2023-07-12 ENCOUNTER — TELEPHONE (OUTPATIENT)
Dept: ENDOCRINOLOGY | Facility: CLINIC | Age: 78
End: 2023-07-12
Payer: MEDICARE

## 2023-07-13 ENCOUNTER — TELEPHONE (OUTPATIENT)
Dept: ENDOCRINOLOGY | Facility: CLINIC | Age: 78
End: 2023-07-13
Payer: MEDICARE

## 2023-07-13 NOTE — TELEPHONE ENCOUNTER
----- Message from Adri Butler MA sent at 7/12/2023  1:46 PM CDT -----  Regarding: FW: Missed Call  Contact: [phone]    ----- Message -----  From: Travon Goodson  Sent: 7/12/2023   9:30 AM CDT  To: [facility] Staff  Subject: Missed Call                                      Pt returning callback from missed call. Requesting to speak with somebody in Dr. Eng  office. Please call.

## 2023-07-13 NOTE — TELEPHONE ENCOUNTER
Called to inform patient that the next available appt with Dr. Eng is in November. Pt requested appt in December. Informed pt to give us a call on August 1st so that we can get him scheduled for December.

## 2023-07-19 ENCOUNTER — OFFICE VISIT (OUTPATIENT)
Dept: NEUROLOGY | Facility: CLINIC | Age: 78
End: 2023-07-19
Payer: MEDICARE

## 2023-07-19 VITALS
SYSTOLIC BLOOD PRESSURE: 134 MMHG | HEART RATE: 78 BPM | DIASTOLIC BLOOD PRESSURE: 87 MMHG | HEIGHT: 67 IN | WEIGHT: 167 LBS | BODY MASS INDEX: 26.21 KG/M2

## 2023-07-19 DIAGNOSIS — G20.C PRIMARY PARKINSONISM: Primary | ICD-10-CM

## 2023-07-19 DIAGNOSIS — Z86.018 S/P RESECTION OF MENINGIOMA: ICD-10-CM

## 2023-07-19 DIAGNOSIS — K59.00 CONSTIPATION, UNSPECIFIED CONSTIPATION TYPE: ICD-10-CM

## 2023-07-19 DIAGNOSIS — Z71.89 COUNSELING REGARDING GOALS OF CARE: ICD-10-CM

## 2023-07-19 DIAGNOSIS — Z98.890 S/P RESECTION OF MENINGIOMA: ICD-10-CM

## 2023-07-19 PROBLEM — D32.9 MENINGIOMA: Status: RESOLVED | Noted: 2019-06-10 | Resolved: 2023-07-19

## 2023-07-19 PROCEDURE — 99999 PR PBB SHADOW E&M-EST. PATIENT-LVL III: ICD-10-PCS | Mod: PBBFAC,,, | Performed by: PHYSICIAN ASSISTANT

## 2023-07-19 PROCEDURE — 99215 OFFICE O/P EST HI 40 MIN: CPT | Mod: S$PBB,,, | Performed by: PHYSICIAN ASSISTANT

## 2023-07-19 PROCEDURE — 99215 PR OFFICE/OUTPT VISIT, EST, LEVL V, 40-54 MIN: ICD-10-PCS | Mod: S$PBB,,, | Performed by: PHYSICIAN ASSISTANT

## 2023-07-19 PROCEDURE — 99999 PR PBB SHADOW E&M-EST. PATIENT-LVL III: CPT | Mod: PBBFAC,,, | Performed by: PHYSICIAN ASSISTANT

## 2023-07-19 PROCEDURE — 99213 OFFICE O/P EST LOW 20 MIN: CPT | Mod: PBBFAC | Performed by: PHYSICIAN ASSISTANT

## 2023-07-19 RX ORDER — CARBIDOPA AND LEVODOPA 25; 100 MG/1; MG/1
1 TABLET ORAL 4 TIMES DAILY
Qty: 360 TABLET | Refills: 3 | Status: SHIPPED | OUTPATIENT
Start: 2023-07-19

## 2023-07-19 RX ORDER — ATROPINE SULFATE 10 MG/ML
1 SOLUTION/ DROPS OPHTHALMIC 4 TIMES DAILY PRN
Qty: 2 ML | Refills: 2 | Status: SHIPPED | OUTPATIENT
Start: 2023-07-19

## 2023-07-19 RX ORDER — CARBIDOPA AND LEVODOPA 25; 100 MG/1; MG/1
1 TABLET ORAL 3 TIMES DAILY
COMMUNITY
Start: 2023-02-14 | End: 2023-07-19 | Stop reason: SDUPTHER

## 2023-07-19 NOTE — PROGRESS NOTES
Name: Ravinder Sanz  MRN: [id number]   CSN: [id number]      Date: 07/19/2023    Referring physician:  No referring provider defined for this encounter.    Chief Complaint: PD      Interval History:  - accompanied by spouse   - cd/ld 1 tab TID   - 6 am, noon and evening   - takes evening dose before he goes to bed and finds that it relaxes him   - sometimes will take a dose at 3 am   - a little more drooling   - no falls   - exercising, 3-4 times a week --- walking -- wakes up early to walk   - did not tolerate rytary or rasagiline due to nausea   - memory is great   - constipation is much better   - corrected with diet and OTC meds         From Dec 2022  - accompanied by spouse   - currently taking cd/ld 1 tab TID   - rasagiline caused nausea   - takes at midnight, 5 am, and 3 pm   - goes to sleep at midnight, finds that it helps him to go to sleep   - wakes up at 5 am, goes back to sleep until 9 am   - irregular sleep pattern   - worse whenever he is nervous   - a lot of the times tremors are not present unless nervous or anxious   - he had a period where he had hypersalivation and then that disappeared   - he has been eating more hard candies, lemon drops -- may explain why hypersalivation has improved   - most bothersome symptom is waking up at 5 am (off time), does not endorse waking up to go to the bathroom         From August 2021  - on cd/ld 25/100 1 tab TID   - doing better, starting taking last dose a little earlier in the evening   - exercising more   - live most of the time in [place]   - feels that each dose last until the next   - driving still, feels like this helps as well  - art seems to help as well   - taking BP medicine now   - feels really good about medications   - last dose was at 9 am   - anxiety and stress/emotion worsen tremor   - back on a normal sleep cycle  - constipation still an issues, takes a stool softener       From April 2021  - accompanied by partner   - trial of cd/ld last  visit   - sleeping much better, sleep cycle back to normal   - has more energy  - tremor improved on cd/ld  - feeling back to himself   - BP a little high today, didn't take BP meds today -- hasnt taken it for a couple of weeks   - had both vaccines   - last dose was at noon   - takes them 7 am, noon, right before he goes to bed   - no falls   - constipation, takes OTC meds and flax seed oil   - no hallucinations   - very seldomly walking   - starts PT next week  - does a lot of cycling        From 1/2021: Ravinder Sanz is a R HANDED 76 y.o. male with a medical issues significant for panhypopituitarism, hypothyroidism, PDism, s/p resection of meningioma, who presents for follow up of PDism. Previously followed by Dr. Baker, however this patient is new to me. Accompanied by friend, Jong. Tremor since 2018, active dreaming x 8 years. Symptoms improved (drooling, claustrophobia, anxiety much improved) tremendously after he had the resection of the meningioma. Some improvement of tremor as well. Now gait has changed. Fatigues really easily. Also having trouble with sleeping. A lot sleeping throughout the day and therefore not able to sleep at night. Has trouble falling asleep and staying asleep. Gait has changed, tends to walk slightly forward. Shuffling feet. Some shorter steps, tries to make himself walk normal. Feels like he wants to speed up when he walks, taking a lot of quick little steps. Stiffness in both arms, feels like they both equally as stiff.  Has never had hallucinations in the past.     Retired  now. Tremor doesn't interfere with his tremor[place]   Going back to [place] for 3 months.       Medication history:  - not currently on medication   - had a bad reaction to wellbutrin, had a bad reaction in the past       Neuroleptic exposure:  - none    Family History: father had parkinson's     Past medical, family, and social history reviewed as documented in chart with  "pertinent positive medical, family, and social history detailed in HPI.      From 9/9/2019  seen in f/u for tremor and meningioma.  Accompanied by Jong.  Since resection of the meningioma, he no longer drools.  Tremor is less and energy is better.    Personality, and clarity much better.  Anxiety has been better, no delusions.  No perseverative thought.  Still has some double.        5/17/19  male seen in consultation at the request of  Dr. Eng for evaluation of tremor.  Accompanied by partner of 9 years, Jong.  Main concerns:  Claustrophobia, insomnia (fear of bedtime), bilateral tremors (x1 year), drool  Balance is unchanged.  Cognition is unchanged.  Had had active dreaming up until about a year ago.  Anxiety, claustrophobia, can no long camp!  Clonazepam made this worse.  Diplopia, [id number], intermittent.  He wonders if his anxiety is due to allowing fantasies, particularly worried about being put in senior living.  Jong says he is not as social, less motivated as in past.  Jong says he had "twiddle" in fingers when driving up to 9 years ago.  Active artist, fine art (oil), but tremor does not effect this.     PD Review of Symptoms:  Anosmia: +hyposmia   Dysarthria/Hypophonia: mild hypophonia   Dysphagia/Sialorrhea: only with some medications, very little drooling now   Depression: none   Cognitive slowing: improving   Hallucinations: none   Impulsivity: none   Obsessions/Compulsions: none   Urinary changes: frequency, no episodes of incontinence   Constipation: used to have a lot of constipation, has since resolved. Eating more yogurt and fibrous food.   Orthostasis: none   Erectile Dysfunction: none   Falls: none   Freezing: none   Micrographia: yes   Sleep issues:  -BRITTANY: none   -RBD: yes, for 8+ years, not as often anymore since the resection of the meningioma       Review of Systems:   Review of Systems   Constitutional:  Negative for chills, fever and malaise/fatigue.   HENT:  Negative for hearing loss.  "   Eyes:  Negative for blurred vision and double vision.   Respiratory:  Negative for cough, shortness of breath and stridor.    Cardiovascular:  Negative for chest pain and leg swelling.   Gastrointestinal:  Negative for constipation, diarrhea and nausea.   Genitourinary:  Negative for frequency and urgency.   Musculoskeletal:  Negative for falls.   Skin:  Negative for itching and rash.   Neurological:  Positive for tremors. Negative for dizziness, loss of consciousness and weakness.   Psychiatric/Behavioral:  Negative for hallucinations and memory loss.          Past Medical History: The patient  has a past medical history of Anxiety, Eczema, Hyperlipidemia, Hypogonadism in male, Hypopituitarism, Hypothyroidism, Meningioma (6/10/2019), and Parkinson's disease.    Social History: The patient  reports that he has quit smoking. He has never used smokeless tobacco. He reports current alcohol use of about 14.0 standard drinks per week. He reports that he does not use drugs.    Family History: Their family history includes Diabetes in his sister; Heart disease in his father.    Allergies: Bupropion     Meds:   Current Outpatient Medications on File Prior to Visit   Medication Sig Dispense Refill    amLODIPine (NORVASC) 5 MG tablet Take 1 tablet (5 mg total) by mouth once daily. 90 tablet 0    atorvastatin (LIPITOR) 10 MG tablet Take 1 tablet (10 mg total) by mouth once daily. 90 tablet 1    cholecalciferol, vitamin D3, (VITAMIN D3) 50 mcg (2,000 unit) Cap Take 1 capsule (2,000 Units total) by mouth once daily.      docusate sodium (COLACE) 100 MG capsule Take 100 mg by mouth daily as needed for Constipation.       fluocinonide (LIDEX) 0.05 % ointment AAA BID x 1-2 wks then prn flares only 60 g 1    hydrocortisone (CORTEF) 10 MG Tab Take 1 tablet (10 mg total) by mouth 2 (two) times daily. 180 tablet 3    levothyroxine (SYNTHROID) 100 MCG tablet Take 1 tablet (100 mcg total) by mouth once daily. 90 tablet 3    MAGNESIUM  "CITRATE ORAL Take 750 mg by mouth once daily.      needle, disp, 21 G 21 gauge x 1" Ndle To use once weekly with testosterone injections 4 each 11    senna (SENOKOT) 8.6 mg tablet Take 1 tablet by mouth daily as needed for Constipation.       testosterone cypionate (DEPOTESTOTERONE CYPIONATE) 200 mg/mL injection Inject 0.75 mLs (150 mg total) into the muscle every 14 (fourteen) days. 10 mL 5    [DISCONTINUED] carbidopa-levodopa (RYTARY) 23.75-95 mg CpSR Take 2 capsules by mouth 3 (three) times daily for 7 days, THEN 3 capsules 3 (three) times daily. (3 capsules at a time, three times per day). 270 capsule 11    [DISCONTINUED] carbidopa-levodopa  mg (SINEMET)  mg per tablet Take 1 tablet by mouth 3 (three) times daily.       No current facility-administered medications on file prior to visit.       Exam:  /87   Pulse 78   Ht 5' 7" (1.702 m)   Wt 75.8 kg (167 lb)   BMI 26.16 kg/m²     Constitutional  Well-developed, well-nourished, appears stated age   Ophthalmoscopic  No papilledema with no hemorrhages or exudates bilaterally   Cardiovascular  Radial pulses 2+ and symmetric, no LE edema bilaterally   Neurological    * Mental status  MOCA =      - Orientation  Oriented to person, place, time, and situation     - Memory   Intact recent and remote     - Attention/concentration  Attentive, vigilant during exam     - Language  Naming & repetition intact, +2-step commands     - Fund of knowledge  Aware of current events     - Executive  Well-organized thoughts     - Other     * Cranial nerves       - CN II  PERRL, visual fields full to confrontation     - CN III, IV, VI  Extraocular movements full, normal pursuits and saccades     - CN V  Sensation V1 - V3 intact     - CN VII  Face strong and symmetric bilaterally     - CN VIII  Hearing intact bilaterally     - CN IX, X  Palate raises midline and symmetric     - CN XI  SCM and trapezius 5/5 bilaterally     - CN XII  Tongue midline   * Motor  Muscle " bulk normal, strength 5/5 throughout   * Sensory   Intact to temperature and vibration throughout   * Coordination  No dysmetria with finger-to-nose or heel-to-shin   * Gait  See below.   * Deep tendon reflexes  2+ and symmetric throughout   Babinski downgoing bilaterally   * Specialized movement exam  mild hypophonic speech.    mild facial masking.   L > R cogwheel rigidity.     L > Rbradykinesia.   Bilateral resting tremor, R > L   No other dystonia, chorea, athetosis, myoclonus, or tics.   No motor impersistence.   Mild anterior stoop     Decreased arm swing bilaterally    Narrow-based gait    R tremor re-emerges during gait eval      Laboratory/Radiological:  - Results:  No visits with results within 3 Month(s) from this visit.   Latest known visit with results is:   Lab Visit on 09/20/2022   Component Date Value Ref Range Status    Cholesterol 09/20/2022 184  120 - 199 mg/dL Final    Triglycerides 09/20/2022 87  30 - 150 mg/dL Final    HDL 09/20/2022 60  40 - 75 mg/dL Final    LDL Cholesterol 09/20/2022 106.6  63.0 - 159.0 mg/dL Final    HDL/Cholesterol Ratio 09/20/2022 32.6  20.0 - 50.0 % Final    Total Cholesterol/HDL Ratio 09/20/2022 3.1  2.0 - 5.0 Final    Non-HDL Cholesterol 09/20/2022 124  mg/dL Final    WBC 09/20/2022 8.58  3.90 - 12.70 K/uL Final    RBC 09/20/2022 4.80  4.60 - 6.20 M/uL Final    Hemoglobin 09/20/2022 14.6  14.0 - 18.0 g/dL Final    Hematocrit 09/20/2022 44.2  40.0 - 54.0 % Final    MCV 09/20/2022 92  82 - 98 fL Final    MCH 09/20/2022 30.4  27.0 - 31.0 pg Final    MCHC 09/20/2022 33.0  32.0 - 36.0 g/dL Final    RDW 09/20/2022 14.1  11.5 - 14.5 % Final    Platelets 09/20/2022 219  150 - 450 K/uL Final    MPV 09/20/2022 11.8  9.2 - 12.9 fL Final    Immature Granulocytes 09/20/2022 0.2  0.0 - 0.5 % Final    Gran # (ANC) 09/20/2022 6.0  1.8 - 7.7 K/uL Final    Immature Grans (Abs) 09/20/2022 0.02  0.00 - 0.04 K/uL Final    Lymph # 09/20/2022 1.9  1.0 - 4.8 K/uL Final    Mono # 09/20/2022  0.6  0.3 - 1.0 K/uL Final    Eos # 09/20/2022 0.0  0.0 - 0.5 K/uL Final    Baso # 09/20/2022 0.05  0.00 - 0.20 K/uL Final    nRBC 09/20/2022 0  0 /100 WBC Final    Gran % 09/20/2022 69.9  38.0 - 73.0 % Final    Lymph % 09/20/2022 21.7  18.0 - 48.0 % Final    Mono % 09/20/2022 7.1  4.0 - 15.0 % Final    Eosinophil % 09/20/2022 0.5  0.0 - 8.0 % Final    Basophil % 09/20/2022 0.6  0.0 - 1.9 % Final    Differential Method 09/20/2022 Automated   Final    PSA, Screen 09/20/2022 2.5  0.00 - 4.00 ng/mL Final       - Independent review of images:      - Independent review of consultant's notes:     ASSESSMENT/PLAN:  Parkinson's   Anxiety, active dreaming x 5-9 years, tremor since 2018  ldopa responsive   Currently taking cd/ld 1 tab TID   Most bothersome symptom is waking up at 3 am due to off time -- will take a dose, sleep fragmentation due to off time   - atropine drops for sialorrhea   - history of meningioma s/p resection, stable continue to monitor       2. Constipation  - stable on miralax      3. Sialorrhea   - atropine drops     Orders Placed This Encounter    atropine 1% (ISOPTO ATROPINE) 1 % Drop    carbidopa-levodopa  mg (SINEMET)  mg per tablet             Follow up: in 6 months with [facility]     Collaborating Physician, Dr. Matta, was available during today's encounter. Any change to plan along with cosign to appear in the EMR.       Total time spent with the patient: 42 minutes.  35 minutes of face-to-face consultation and 7 minutes of chart review and coordination of care, on the day of the visit. This includes face to face time and non-face to face time preparing to see the patient (eg, review of tests), obtaining and/or reviewing separately obtained history, documenting clinical information in the electronic or other health record, independently interpreting resultsand communicating results to the patient/family/caregiver, or care coordination.         Rachel Rhinehart, PA-C [facility]  Department of Neurology  Movement Disorders

## 2023-07-20 ENCOUNTER — PATIENT MESSAGE (OUTPATIENT)
Dept: ADMINISTRATIVE | Facility: HOSPITAL | Age: 78
End: 2023-07-20
Payer: MEDICARE

## 2023-07-20 ENCOUNTER — PATIENT OUTREACH (OUTPATIENT)
Dept: ADMINISTRATIVE | Facility: HOSPITAL | Age: 78
End: 2023-07-20
Payer: MEDICARE

## 2023-07-31 ENCOUNTER — EXTERNAL CHRONIC CARE MANAGEMENT (OUTPATIENT)
Dept: PRIMARY CARE CLINIC | Facility: CLINIC | Age: 78
End: 2023-07-31
Payer: MEDICARE

## 2023-07-31 PROCEDURE — 99490 PR CHRONIC CARE MGMT, 1ST 20 MIN: ICD-10-PCS | Mod: S$PBB,,, | Performed by: INTERNAL MEDICINE

## 2023-07-31 PROCEDURE — 99490 CHRNC CARE MGMT STAFF 1ST 20: CPT | Mod: S$PBB,,, | Performed by: INTERNAL MEDICINE

## 2023-07-31 PROCEDURE — 99490 CHRNC CARE MGMT STAFF 1ST 20: CPT | Mod: PBBFAC | Performed by: INTERNAL MEDICINE

## 2023-08-11 DIAGNOSIS — L30.9 ECZEMA, UNSPECIFIED TYPE: ICD-10-CM

## 2023-08-11 RX ORDER — LEVOTHYROXINE SODIUM 100 UG/1
100 TABLET ORAL DAILY
Qty: 90 TABLET | Refills: 3 | Status: SHIPPED | OUTPATIENT
Start: 2023-08-11 | End: 2023-12-06 | Stop reason: SDUPTHER

## 2023-08-13 RX ORDER — FLUOCINONIDE 0.5 MG/G
OINTMENT TOPICAL
Qty: 30 G | Refills: 0 | Status: SHIPPED | OUTPATIENT
Start: 2023-08-13

## 2023-08-31 ENCOUNTER — EXTERNAL CHRONIC CARE MANAGEMENT (OUTPATIENT)
Dept: PRIMARY CARE CLINIC | Facility: CLINIC | Age: 78
End: 2023-08-31
Payer: MEDICARE

## 2023-08-31 PROCEDURE — 99490 CHRNC CARE MGMT STAFF 1ST 20: CPT | Mod: PBBFAC | Performed by: INTERNAL MEDICINE

## 2023-08-31 PROCEDURE — 99490 CHRNC CARE MGMT STAFF 1ST 20: CPT | Mod: S$PBB,,, | Performed by: INTERNAL MEDICINE

## 2023-08-31 PROCEDURE — 99490 PR CHRONIC CARE MGMT, 1ST 20 MIN: ICD-10-PCS | Mod: S$PBB,,, | Performed by: INTERNAL MEDICINE

## 2023-09-03 ENCOUNTER — PATIENT MESSAGE (OUTPATIENT)
Dept: INTERNAL MEDICINE | Facility: CLINIC | Age: 78
End: 2023-09-03
Payer: MEDICARE

## 2023-09-03 NOTE — TELEPHONE ENCOUNTER
Care Due:                  Date            Visit Type   Department     Provider  --------------------------------------------------------------------------------                                EP -                              PRIMARY      [facility] INTERNAL  Last Visit: 09-      CARE ([facility])   MEDICINE       Brooke Dean                               -                              PRIMARY      [facility] INTERNAL  Next Visit: 12-      CARE ([facility])   MEDICINE       Brooke Dean                                                            Last  Test          Frequency    Reason                     Performed    Due Date  --------------------------------------------------------------------------------    Office Visit  12 months..  amLODIPine, atorvastatin.  09- 09-    CMP.........  12 months..  atorvastatin.............  08- 07-    Lipid Panel.  12 months..  atorvastatin.............  09- 09-    [facility] Embedded Care Due Messages. Reference number: [id number].   9/03/2023 7:29:51 AM CDT

## 2023-09-04 RX ORDER — AMLODIPINE BESYLATE 5 MG/1
5 TABLET ORAL
Qty: 90 TABLET | Refills: 0 | Status: SHIPPED | OUTPATIENT
Start: 2023-09-04 | End: 2023-12-01

## 2023-09-04 NOTE — TELEPHONE ENCOUNTER
Provider Staff:  Action required for this patient     Please see care gap opportunities below in Care Due Message.    Thanks!  [facility]     Appointments      Date Provider   Last Visit   9/13/2022 Brooke Dean MD   Next Visit   12/7/2023 Brooke Dean MD      Refill Decision Note   Ravinder Sanz  is requesting a refill authorization.  Brief Assessment and Rationale for Refill:  Approve     Medication Therapy Plan:         Comments:     Note composed:4:41 AM 09/04/2023

## 2023-09-05 ENCOUNTER — PATIENT MESSAGE (OUTPATIENT)
Dept: INTERNAL MEDICINE | Facility: CLINIC | Age: 78
End: 2023-09-05
Payer: MEDICARE

## 2023-09-30 ENCOUNTER — EXTERNAL CHRONIC CARE MANAGEMENT (OUTPATIENT)
Dept: PRIMARY CARE CLINIC | Facility: CLINIC | Age: 78
End: 2023-09-30
Payer: MEDICARE

## 2023-09-30 PROCEDURE — 99490 CHRNC CARE MGMT STAFF 1ST 20: CPT | Mod: PBBFAC | Performed by: INTERNAL MEDICINE

## 2023-09-30 PROCEDURE — 99490 CHRNC CARE MGMT STAFF 1ST 20: CPT | Mod: S$PBB,,, | Performed by: INTERNAL MEDICINE

## 2023-09-30 PROCEDURE — 99490 PR CHRONIC CARE MGMT, 1ST 20 MIN: ICD-10-PCS | Mod: S$PBB,,, | Performed by: INTERNAL MEDICINE

## 2023-10-05 DIAGNOSIS — E29.1 HYPOGONADISM IN MALE: ICD-10-CM

## 2023-10-05 RX ORDER — TESTOSTERONE CYPIONATE 200 MG/ML
150 INJECTION, SOLUTION INTRAMUSCULAR
Qty: 10 ML | Refills: 5 | Status: SHIPPED | OUTPATIENT
Start: 2023-10-05 | End: 2023-12-06 | Stop reason: SDUPTHER

## 2023-10-26 RX ORDER — ATORVASTATIN CALCIUM 10 MG/1
10 TABLET, FILM COATED ORAL
Qty: 90 TABLET | Refills: 0 | Status: SHIPPED | OUTPATIENT
Start: 2023-10-26 | End: 2023-12-07 | Stop reason: SDUPTHER

## 2023-10-26 NOTE — TELEPHONE ENCOUNTER
No care due was identified.  [facility] Embedded Care Due Messages. Reference number: [id number].   10/26/2023 1:05:23 AM CDT

## 2023-10-26 NOTE — TELEPHONE ENCOUNTER
Refill Routing Note     Refill Routing Note   Medication(s) are not appropriate for processing by [facility] for the following reason(s):      Required labs outdated    [facility] action(s):  Defer Care Due:  None identified            Appointments  past 12m or future 3m with PCP    Date Provider   Last Visit   9/13/2022 Brooke Dean MD   Next Visit   12/7/2023 Brooke Dean MD   ED visits in past 90 days: 0        Note composed:4:31 AM 10/26/2023

## 2023-10-31 ENCOUNTER — EXTERNAL CHRONIC CARE MANAGEMENT (OUTPATIENT)
Dept: PRIMARY CARE CLINIC | Facility: CLINIC | Age: 78
End: 2023-10-31
Payer: MEDICARE

## 2023-10-31 PROCEDURE — 99490 PR CHRONIC CARE MGMT, 1ST 20 MIN: ICD-10-PCS | Mod: S$PBB,,, | Performed by: INTERNAL MEDICINE

## 2023-10-31 PROCEDURE — 99490 CHRNC CARE MGMT STAFF 1ST 20: CPT | Mod: PBBFAC | Performed by: INTERNAL MEDICINE

## 2023-10-31 PROCEDURE — 99490 CHRNC CARE MGMT STAFF 1ST 20: CPT | Mod: S$PBB,,, | Performed by: INTERNAL MEDICINE

## 2023-11-06 RX ORDER — HYDROCORTISONE 10 MG/1
10 TABLET ORAL 2 TIMES DAILY
Qty: 180 TABLET | Refills: 3 | Status: SHIPPED | OUTPATIENT
Start: 2023-11-06 | End: 2023-12-06 | Stop reason: SDUPTHER

## 2023-11-22 ENCOUNTER — TELEPHONE (OUTPATIENT)
Dept: NEUROLOGY | Facility: CLINIC | Age: 78
End: 2023-11-22
Payer: MEDICARE

## 2023-11-22 NOTE — TELEPHONE ENCOUNTER
----- Message from Jake Nunn sent at 11/22/2023 10:31 AM CST -----  Regarding: appt needed  Contact: Mr. Roque @ [phone]  Mr. Roque is calling for pt to report pt is in town until 12/16. He is trying to be seen for his recall before then if possible. Please call to further advise, thank you for all you are doing.

## 2023-11-22 NOTE — TELEPHONE ENCOUNTER
Called and left a message.   So far there are no available appointment before Dec 12 .  Patient can be added to the wait list.  Office number provided for a return call.

## 2023-11-24 ENCOUNTER — TELEPHONE (OUTPATIENT)
Dept: NEUROLOGY | Facility: CLINIC | Age: 78
End: 2023-11-24
Payer: MEDICARE

## 2023-11-24 NOTE — TELEPHONE ENCOUNTER
Follow up: in 6 months with [facility]       Called patient and his friend Mr Roque answered.  No available appointments on the dates  Umu requested.    I was asked by Mr Sanz  to just schedule him In March around 1;00.      Appt scheduled first available.

## 2023-11-24 NOTE — TELEPHONE ENCOUNTER
----- Message from Trip TRIPATHI Route sent at 11/24/2023 11:36 AM CST -----  Regarding: Missed Call  Contact: Jong Roque  [phone]  Pt is calling in ref to a missed call from Shannon to schedule an appt before. 12/12 . Pt says if he could be seen 12-15 that would be good leaving [facility] on 16th. Patient Requesting Call Back @Jong Roque  [phone]

## 2023-11-30 ENCOUNTER — EXTERNAL CHRONIC CARE MANAGEMENT (OUTPATIENT)
Dept: PRIMARY CARE CLINIC | Facility: CLINIC | Age: 78
End: 2023-11-30
Payer: MEDICARE

## 2023-11-30 PROCEDURE — 99490 CHRNC CARE MGMT STAFF 1ST 20: CPT | Mod: S$PBB,,, | Performed by: INTERNAL MEDICINE

## 2023-11-30 PROCEDURE — 99490 PR CHRONIC CARE MGMT, 1ST 20 MIN: ICD-10-PCS | Mod: S$PBB,,, | Performed by: INTERNAL MEDICINE

## 2023-11-30 PROCEDURE — 99490 CHRNC CARE MGMT STAFF 1ST 20: CPT | Mod: PBBFAC | Performed by: INTERNAL MEDICINE

## 2023-12-01 RX ORDER — AMLODIPINE BESYLATE 5 MG/1
5 TABLET ORAL
Qty: 90 TABLET | Refills: 0 | Status: SHIPPED | OUTPATIENT
Start: 2023-12-01 | End: 2024-03-04 | Stop reason: SDUPTHER

## 2023-12-01 NOTE — TELEPHONE ENCOUNTER
Care Due:                  Date            Visit Type   Department     Provider  --------------------------------------------------------------------------------                                EP -                              PRIMARY      [facility] INTERNAL  Last Visit: 09-      CARE ([facility])   MEDICINE       Brooke Dean                              EP -                              PRIMARY      [facility] INTERNAL  Next Visit: 12-      CARE ([facility])   MEDICINE       Brooke Dean                                                            Last  Test          Frequency    Reason                     Performed    Due Date  --------------------------------------------------------------------------------    CMP.........  12 months..  atorvastatin.............  08- 07-    Lipid Panel.  12 months..  atorvastatin.............  09- 09-    [facility] Embedded Care Due Messages. Reference number: [id number].   12/01/2023 12:21:03 AM CST

## 2023-12-01 NOTE — TELEPHONE ENCOUNTER
Provider Staff:  Action required for this patient    Requires labs      Please see care gap opportunities below in Care Due Message.    Thanks!  [facility]     Appointments      Date Provider   Last Visit   9/13/2022 Brooke Dean MD   Next Visit   12/7/2023 Brooke Dean MD     Refill Decision Note   Ravinder Sanz  is requesting a refill authorization.  Brief Assessment and Rationale for Refill:  Approve     Medication Therapy Plan:         Comments:     Note composed:10:13 AM 12/01/2023

## 2023-12-06 ENCOUNTER — OFFICE VISIT (OUTPATIENT)
Dept: ENDOCRINOLOGY | Facility: CLINIC | Age: 78
End: 2023-12-06
Payer: MEDICARE

## 2023-12-06 VITALS
HEIGHT: 67 IN | BODY MASS INDEX: 26.92 KG/M2 | SYSTOLIC BLOOD PRESSURE: 110 MMHG | DIASTOLIC BLOOD PRESSURE: 80 MMHG | WEIGHT: 171.5 LBS

## 2023-12-06 DIAGNOSIS — E29.1 HYPOGONADISM IN MALE: ICD-10-CM

## 2023-12-06 DIAGNOSIS — E78.5 HYPERLIPIDEMIA, UNSPECIFIED HYPERLIPIDEMIA TYPE: ICD-10-CM

## 2023-12-06 DIAGNOSIS — E03.8 SECONDARY HYPOTHYROIDISM: Primary | ICD-10-CM

## 2023-12-06 DIAGNOSIS — R79.9 ABNORMAL FINDING OF BLOOD CHEMISTRY, UNSPECIFIED: ICD-10-CM

## 2023-12-06 DIAGNOSIS — E23.0 PANHYPOPITUITARISM: ICD-10-CM

## 2023-12-06 DIAGNOSIS — E23.0 HYPOPITUITARISM: ICD-10-CM

## 2023-12-06 PROCEDURE — 99214 OFFICE O/P EST MOD 30 MIN: CPT | Mod: S$PBB,,, | Performed by: INTERNAL MEDICINE

## 2023-12-06 PROCEDURE — 99213 OFFICE O/P EST LOW 20 MIN: CPT | Mod: PBBFAC | Performed by: INTERNAL MEDICINE

## 2023-12-06 PROCEDURE — 99999 PR PBB SHADOW E&M-EST. PATIENT-LVL III: CPT | Mod: PBBFAC,,, | Performed by: INTERNAL MEDICINE

## 2023-12-06 PROCEDURE — 99214 PR OFFICE/OUTPT VISIT, EST, LEVL IV, 30-39 MIN: ICD-10-PCS | Mod: S$PBB,,, | Performed by: INTERNAL MEDICINE

## 2023-12-06 PROCEDURE — 99999 PR PBB SHADOW E&M-EST. PATIENT-LVL III: ICD-10-PCS | Mod: PBBFAC,,, | Performed by: INTERNAL MEDICINE

## 2023-12-06 RX ORDER — TESTOSTERONE CYPIONATE 200 MG/ML
150 INJECTION, SOLUTION INTRAMUSCULAR
Qty: 10 ML | Refills: 5 | Status: SHIPPED | OUTPATIENT
Start: 2023-12-06 | End: 2024-03-05

## 2023-12-06 RX ORDER — HYDROCORTISONE 10 MG/1
10 TABLET ORAL 2 TIMES DAILY
Qty: 180 TABLET | Refills: 3 | Status: SHIPPED | OUTPATIENT
Start: 2023-12-06

## 2023-12-06 RX ORDER — LEVOTHYROXINE SODIUM 100 UG/1
100 TABLET ORAL DAILY
Qty: 90 TABLET | Refills: 3 | Status: SHIPPED | OUTPATIENT
Start: 2023-12-06

## 2023-12-06 NOTE — PATIENT INSTRUCTIONS
Adrenal Insufficiency Management           What do I do when I am sick?   Coughs, colds, or other minor illness with oral temperature less than 100 degrees F   ? You do not need to increase your oral steroid dose.       Serious illnesses with fever 100-101.9 degrees F (such as the flu)   ? Double your normal steroid dose and contact your doctor.   ? If your fever is 102 degrees F or higher, triple your normal dose and contact your doctor.     Diarrhea lasting more than a day   ? Double your steroid dose.     Nausea and vomiting   ? Take anti-nausea medication.   ? Take an extra dose of steroid to see if you can keep it down for more than 30 minutes.   ? If you are unable to keep the steroid dose down for more than 30 minutes, give yourself an intramuscular (IM) steroid injection and report to the emergency room.   ? Be sure someone is around the house in case your condition worsens.   Surgery     ? Dental surgery or minor surgery requiring only local anesthesia: You do not need to increase your steroid dose.     ? Moderately stressful surgery (barium enema, endoscopy, cataract surgery, major oral surgery): You should receive a hydrocortisone 100 mg IV before the procedure.     ? Major surgery (, heart operation): You should receive a hydrocortisone 100 mg IV before the procedure and then every 8 hours for 48-72 hours.

## 2023-12-06 NOTE — PROGRESS NOTES
Subjective:      Patient ID: Ravinder Sanz is a 78 y.o. male.     Chief Complaint:  panhypopit  History of Present Illness  S/p meningioma resection 06/19/2019  Has been dx with Parkinson's   Patient is seeing neurology - Dr. Jay    With regards to the panhypopit  Spinal fusion surgery at age 16; proceeded with pituitary adenoma - nonfunctional. Received radiation treatment at [facility].        With regards to the Secondary adrenal insufficiency-   current dose: Hydrocortisone 10 mg PO BID  Knows sick day precautions    Still does not have medical alert tag   Never been hospitalized for AI  Patient denies N/V        With regards to the Secondary hypothyroidism -    Current dose: Levothyroxine 100 mcg PO once daily    Takes medication properly    Symptoms:      +constipation - better      With regards to theSecondary hypogonadism-   Since he was 20 years old   on Testosterone replacement therapy - 150 mg q 2 weeks   Doing well  No LUTS      With regards to theSecondary AGHD - was on GH (for 2 weeks in 2018 )  - Was previously on GH but stopped due to expense and the fact that he did not feel better     No dx of DI     Denies polyuria, polydipsia, nocturia          Last bmd 4/30/21  Impression:     1. Osteopenia; FRAX calculations do not support treatment as osteoporosis.  2. Compared with previous DXA, BMD at the lumbar spine has declined by 4%, and the BMD at the total hip has remained stable.     No recent falls   Has Parkinsons          Review of Systems  As above       Objective:   Physical Exam  Vitals reviewed.   Neck:      Thyroid: No thyromegaly.   Cardiovascular:      Rate and Rhythm: Normal rate.   Pulmonary:      Effort: Pulmonary effort is normal.   Abdominal:      Palpations: Abdomen is soft.     +resting tremor     Body mass index is 26.86 kg/m².    Lab Review:   Lab Results   Component Value Date    HGBA1C 5.5 08/01/2022     Lab Results   Component Value Date    CHOL 184 09/20/2022    HDL  60 09/20/2022    LDLCALC 106.6 09/20/2022    TRIG 87 09/20/2022    CHOLHDL 32.6 09/20/2022     Lab Results   Component Value Date     08/01/2022    K 4.0 08/01/2022     08/01/2022    CO2 27 08/01/2022    GLU 77 08/01/2022    BUN 19 08/01/2022    CREATININE 1.0 08/01/2022    CALCIUM 9.4 08/01/2022    PROT 7.3 08/01/2022    ALBUMIN 4.3 08/01/2022    BILITOT 0.6 08/01/2022    ALKPHOS 45 (L) 08/01/2022    AST 33 08/01/2022    ALT 11 08/01/2022    ANIONGAP 11 08/01/2022    ESTGFRAFRICA >60.0 01/03/2022    EGFRNONAA >60.0 01/03/2022    TSH 0.022 (L) 06/21/2019     Lab Results   Component Value Date    WBC 8.58 09/20/2022    HGB 14.6 09/20/2022    HCT 44.2 09/20/2022    MCV 92 09/20/2022     09/20/2022           Assessment and Plan       Panhypopituitarism  Panhypopituitarism-   Secondary adrenal insufficiency-   Continue  Hydrocortisone - screen periodically for hyperglycemia  Reviewed times of increased dose - sick day precautions handout provided and reviewed  Needs medical alert tag  Follow symptoms and labs      Secondary hypothyroidism -  biochemically euthyroid- checked today  Follow ft4 and adjust accordingly  Avoid exogenous hyperthyroidism as this can accelerate bone loss      Secondary hypogonadism- on Testosterone replacement therapy. At goal.  Follow   H&H, PSA, CMP         Secondary AGHD - reviewed risks and benefits of therapy   Currently will hold off  Discussed QOL issues             Hypogonadism in male  Check bmd        Bmd  Rtc years

## 2023-12-07 ENCOUNTER — LAB VISIT (OUTPATIENT)
Dept: LAB | Facility: HOSPITAL | Age: 78
End: 2023-12-07
Attending: INTERNAL MEDICINE
Payer: MEDICARE

## 2023-12-07 ENCOUNTER — OFFICE VISIT (OUTPATIENT)
Dept: INTERNAL MEDICINE | Facility: CLINIC | Age: 78
End: 2023-12-07
Payer: MEDICARE

## 2023-12-07 DIAGNOSIS — I10 HYPERTENSION, UNSPECIFIED TYPE: ICD-10-CM

## 2023-12-07 DIAGNOSIS — E78.5 HYPERLIPIDEMIA, UNSPECIFIED HYPERLIPIDEMIA TYPE: ICD-10-CM

## 2023-12-07 DIAGNOSIS — Z12.5 ENCOUNTER FOR SCREENING FOR MALIGNANT NEOPLASM OF PROSTATE: ICD-10-CM

## 2023-12-07 DIAGNOSIS — I10 HYPERTENSION, UNSPECIFIED TYPE: Primary | ICD-10-CM

## 2023-12-07 LAB
ALBUMIN SERPL BCP-MCNC: 4 G/DL (ref 3.5–5.2)
ALP SERPL-CCNC: 50 U/L (ref 55–135)
ALT SERPL W/O P-5'-P-CCNC: 10 U/L (ref 10–44)
ANION GAP SERPL CALC-SCNC: 9 MMOL/L (ref 8–16)
AST SERPL-CCNC: 19 U/L (ref 10–40)
BASOPHILS # BLD AUTO: 0.06 K/UL (ref 0–0.2)
BASOPHILS NFR BLD: 0.9 % (ref 0–1.9)
BILIRUB SERPL-MCNC: 1 MG/DL (ref 0.1–1)
BUN SERPL-MCNC: 12 MG/DL (ref 8–23)
CALCIUM SERPL-MCNC: 8.8 MG/DL (ref 8.7–10.5)
CHLORIDE SERPL-SCNC: 104 MMOL/L (ref 95–110)
CHOLEST SERPL-MCNC: 189 MG/DL (ref 120–199)
CHOLEST/HDLC SERPL: 2.4 {RATIO} (ref 2–5)
CO2 SERPL-SCNC: 29 MMOL/L (ref 23–29)
COMPLEXED PSA SERPL-MCNC: 2.8 NG/ML (ref 0–4)
CREAT SERPL-MCNC: 0.9 MG/DL (ref 0.5–1.4)
DIFFERENTIAL METHOD: ABNORMAL
EOSINOPHIL # BLD AUTO: 0.1 K/UL (ref 0–0.5)
EOSINOPHIL NFR BLD: 1.1 % (ref 0–8)
ERYTHROCYTE [DISTWIDTH] IN BLOOD BY AUTOMATED COUNT: 16.1 % (ref 11.5–14.5)
EST. GFR  (NO RACE VARIABLE): >60 ML/MIN/1.73 M^2
GLUCOSE SERPL-MCNC: 79 MG/DL (ref 70–110)
HCT VFR BLD AUTO: 41.4 % (ref 40–54)
HDLC SERPL-MCNC: 80 MG/DL (ref 40–75)
HDLC SERPL: 42.3 % (ref 20–50)
HGB BLD-MCNC: 13.8 G/DL (ref 14–18)
IMM GRANULOCYTES # BLD AUTO: 0.02 K/UL (ref 0–0.04)
IMM GRANULOCYTES NFR BLD AUTO: 0.3 % (ref 0–0.5)
LDLC SERPL CALC-MCNC: 94.6 MG/DL (ref 63–159)
LYMPHOCYTES # BLD AUTO: 2.1 K/UL (ref 1–4.8)
LYMPHOCYTES NFR BLD: 33.6 % (ref 18–48)
MCH RBC QN AUTO: 30.4 PG (ref 27–31)
MCHC RBC AUTO-ENTMCNC: 33.3 G/DL (ref 32–36)
MCV RBC AUTO: 91 FL (ref 82–98)
MONOCYTES # BLD AUTO: 0.6 K/UL (ref 0.3–1)
MONOCYTES NFR BLD: 9.9 % (ref 4–15)
NEUTROPHILS # BLD AUTO: 3.4 K/UL (ref 1.8–7.7)
NEUTROPHILS NFR BLD: 54.2 % (ref 38–73)
NONHDLC SERPL-MCNC: 109 MG/DL
NRBC BLD-RTO: 0 /100 WBC
PLATELET # BLD AUTO: 239 K/UL (ref 150–450)
PMV BLD AUTO: 11.5 FL (ref 9.2–12.9)
POTASSIUM SERPL-SCNC: 3.9 MMOL/L (ref 3.5–5.1)
PROT SERPL-MCNC: 6.9 G/DL (ref 6–8.4)
RBC # BLD AUTO: 4.54 M/UL (ref 4.6–6.2)
SODIUM SERPL-SCNC: 142 MMOL/L (ref 136–145)
TRIGL SERPL-MCNC: 72 MG/DL (ref 30–150)
WBC # BLD AUTO: 6.34 K/UL (ref 3.9–12.7)

## 2023-12-07 PROCEDURE — 80053 COMPREHEN METABOLIC PANEL: CPT | Performed by: INTERNAL MEDICINE

## 2023-12-07 PROCEDURE — 99214 OFFICE O/P EST MOD 30 MIN: CPT | Mod: PBBFAC | Performed by: INTERNAL MEDICINE

## 2023-12-07 PROCEDURE — 84153 ASSAY OF PSA TOTAL: CPT | Performed by: INTERNAL MEDICINE

## 2023-12-07 PROCEDURE — 99999 PR PBB SHADOW E&M-EST. PATIENT-LVL IV: CPT | Mod: PBBFAC,,, | Performed by: INTERNAL MEDICINE

## 2023-12-07 PROCEDURE — 80061 LIPID PANEL: CPT | Performed by: INTERNAL MEDICINE

## 2023-12-07 PROCEDURE — 99214 OFFICE O/P EST MOD 30 MIN: CPT | Mod: S$PBB,,, | Performed by: INTERNAL MEDICINE

## 2023-12-07 PROCEDURE — 99999 PR PBB SHADOW E&M-EST. PATIENT-LVL IV: ICD-10-PCS | Mod: PBBFAC,,, | Performed by: INTERNAL MEDICINE

## 2023-12-07 PROCEDURE — 36415 COLL VENOUS BLD VENIPUNCTURE: CPT | Performed by: INTERNAL MEDICINE

## 2023-12-07 PROCEDURE — 99214 PR OFFICE/OUTPT VISIT, EST, LEVL IV, 30-39 MIN: ICD-10-PCS | Mod: S$PBB,,, | Performed by: INTERNAL MEDICINE

## 2023-12-07 PROCEDURE — 85025 COMPLETE CBC W/AUTO DIFF WBC: CPT | Performed by: INTERNAL MEDICINE

## 2023-12-07 RX ORDER — ATORVASTATIN CALCIUM 10 MG/1
10 TABLET, FILM COATED ORAL DAILY
Qty: 90 TABLET | Refills: 1 | Status: SHIPPED | OUTPATIENT
Start: 2023-12-07

## 2023-12-10 VITALS
HEART RATE: 83 BPM | DIASTOLIC BLOOD PRESSURE: 76 MMHG | SYSTOLIC BLOOD PRESSURE: 112 MMHG | BODY MASS INDEX: 27.46 KG/M2 | WEIGHT: 170.88 LBS | HEIGHT: 66 IN | OXYGEN SATURATION: 98 % | TEMPERATURE: 99 F

## 2023-12-11 NOTE — PROGRESS NOTES
Subjective:       Patient ID: Ravinder Sanz is a 78 y.o. male.    Chief Complaint: Hypertension    HPI  He returns for management of hypertension.  He has had hypertension for over a year.  Current treatment has included medications outlined in medication list.  He denies chest pain or shortness of breath.  No palpitations.  Denies left arm or neck pain.  He has hyperlipidemia.  Currently on Lipitor     Past medical history:  Pituitary adenoma, pan hypopituitarism, meningioma status post resection, Parkinson's, hypertension, hyperlipidemia     Medications:  Cortef, Synthroid, testosterone Norvasc 5 mg daily, Lipitor 10 mg daily, sinemet     Allergies:  Bupropion      Review of Systems   Constitutional:  Negative for chills, fatigue, fever and unexpected weight change.   Respiratory:  Negative for chest tightness and shortness of breath.    Cardiovascular:  Negative for chest pain and palpitations.   Gastrointestinal:  Negative for abdominal pain and blood in stool.   Neurological:  Negative for dizziness, syncope, numbness and headaches.       Objective:      Physical Exam  HENT:      Right Ear: External ear normal.      Left Ear: External ear normal.      Nose: Nose normal.      Mouth/Throat:      Mouth: Mucous membranes are moist.      Pharynx: Oropharynx is clear.   Eyes:      Pupils: Pupils are equal, round, and reactive to light.   Cardiovascular:      Rate and Rhythm: Normal rate and regular rhythm.      Heart sounds: No murmur heard.  Pulmonary:      Breath sounds: Normal breath sounds.   Abdominal:      General: There is no distension.      Palpations: There is no hepatomegaly.      Tenderness: There is no abdominal tenderness.   Musculoskeletal:      Cervical back: Normal range of motion.   Lymphadenopathy:      Cervical: No cervical adenopathy.      Upper Body:      Right upper body: No axillary adenopathy.      Left upper body: No axillary adenopathy.   Neurological:      Cranial Nerves: No cranial  nerve deficit.      Sensory: No sensory deficit.      Motor: Motor function is intact.      Deep Tendon Reflexes: Reflexes are normal and symmetric.         Assessment/Plan       Assessment and plan:  1.  Hypertension:  Controlled.  Continue Norvasc.  Check CMP, CBC, PSA  2.  Hyperlipidemia:  Check lipid panel

## 2023-12-31 ENCOUNTER — EXTERNAL CHRONIC CARE MANAGEMENT (OUTPATIENT)
Dept: PRIMARY CARE CLINIC | Facility: CLINIC | Age: 78
End: 2023-12-31
Payer: MEDICARE

## 2023-12-31 PROCEDURE — 99490 CHRNC CARE MGMT STAFF 1ST 20: CPT | Mod: S$PBB,,, | Performed by: INTERNAL MEDICINE

## 2023-12-31 PROCEDURE — 99490 CHRNC CARE MGMT STAFF 1ST 20: CPT | Mod: PBBFAC | Performed by: INTERNAL MEDICINE

## 2024-01-31 ENCOUNTER — EXTERNAL CHRONIC CARE MANAGEMENT (OUTPATIENT)
Dept: PRIMARY CARE CLINIC | Facility: CLINIC | Age: 79
End: 2024-01-31
Payer: MEDICARE

## 2024-01-31 PROCEDURE — 99490 CHRNC CARE MGMT STAFF 1ST 20: CPT | Mod: PBBFAC | Performed by: INTERNAL MEDICINE

## 2024-01-31 PROCEDURE — 99490 CHRNC CARE MGMT STAFF 1ST 20: CPT | Mod: S$PBB,,, | Performed by: INTERNAL MEDICINE

## 2024-02-07 ENCOUNTER — PATIENT MESSAGE (OUTPATIENT)
Dept: RESEARCH | Facility: HOSPITAL | Age: 79
End: 2024-02-07
Payer: MEDICARE

## 2024-02-21 ENCOUNTER — PATIENT MESSAGE (OUTPATIENT)
Dept: NEUROLOGY | Facility: CLINIC | Age: 79
End: 2024-02-21
Payer: MEDICARE

## 2024-02-27 ENCOUNTER — HOSPITAL ENCOUNTER (OUTPATIENT)
Dept: RADIOLOGY | Facility: CLINIC | Age: 79
Discharge: HOME | End: 2024-02-27
Attending: INTERNAL MEDICINE
Payer: MEDICARE

## 2024-02-27 DIAGNOSIS — E78.5 HYPERLIPIDEMIA, UNSPECIFIED HYPERLIPIDEMIA TYPE: ICD-10-CM

## 2024-02-27 DIAGNOSIS — E23.0 PANHYPOPITUITARISM: ICD-10-CM

## 2024-02-27 DIAGNOSIS — E23.0 HYPOPITUITARISM: ICD-10-CM

## 2024-02-27 DIAGNOSIS — E03.8 SECONDARY HYPOTHYROIDISM: ICD-10-CM

## 2024-02-27 DIAGNOSIS — E29.1 HYPOGONADISM IN MALE: ICD-10-CM

## 2024-02-27 PROCEDURE — 77080 DXA BONE DENSITY AXIAL: CPT | Mod: TC

## 2024-02-27 PROCEDURE — 77080 DXA BONE DENSITY AXIAL: CPT | Mod: 26,,, | Performed by: INTERNAL MEDICINE

## 2024-02-29 ENCOUNTER — EXTERNAL CHRONIC CARE MANAGEMENT (OUTPATIENT)
Dept: PRIMARY CARE CLINIC | Facility: CLINIC | Age: 79
End: 2024-02-29
Payer: MEDICARE

## 2024-02-29 PROCEDURE — 99490 CHRNC CARE MGMT STAFF 1ST 20: CPT | Mod: PBBFAC | Performed by: INTERNAL MEDICINE

## 2024-02-29 PROCEDURE — 99490 CHRNC CARE MGMT STAFF 1ST 20: CPT | Mod: S$PBB,,, | Performed by: INTERNAL MEDICINE

## 2024-03-04 ENCOUNTER — PATIENT MESSAGE (OUTPATIENT)
Dept: ENDOCRINOLOGY | Facility: CLINIC | Age: 79
End: 2024-03-04
Payer: MEDICARE

## 2024-03-05 RX ORDER — AMLODIPINE BESYLATE 5 MG/1
5 TABLET ORAL
Qty: 90 TABLET | Refills: 0 | OUTPATIENT
Start: 2024-03-05

## 2024-03-05 RX ORDER — AMLODIPINE BESYLATE 5 MG/1
5 TABLET ORAL DAILY
Qty: 90 TABLET | Refills: 3 | Status: SHIPPED | OUTPATIENT
Start: 2024-03-05

## 2024-03-05 RX ORDER — TESTOSTERONE ENANTHATE 200 MG/ML
150 VIAL (ML) INTRAMUSCULAR
Qty: 5 ML | Refills: 5 | Status: SHIPPED | OUTPATIENT
Start: 2024-03-05 | End: 2024-09-03

## 2024-03-05 NOTE — TELEPHONE ENCOUNTER
Refill Decision Note   Ravinder Umu  is requesting a refill authorization.  Brief Assessment and Rationale for Refill:  Quick Discontinue     Medication Therapy Plan:         Comments:     Note composed:5:52 AM 03/05/2024

## 2024-03-05 NOTE — TELEPHONE ENCOUNTER
Refill Decision Note   Ravinder Umu  is requesting a refill authorization.  Brief Assessment and Rationale for Refill:  Approve     Medication Therapy Plan:        Comments:     Note composed:5:51 AM 03/05/2024

## 2024-03-05 NOTE — TELEPHONE ENCOUNTER
No care due was identified.  [facility] Embedded Care Due Messages. Reference number: [id number].   3/05/2024 12:19:31 AM CST

## 2024-03-05 NOTE — TELEPHONE ENCOUNTER
No care due was identified.  [facility] Embedded Care Due Messages. Reference number: [id number].   3/04/2024 9:28:55 PM CST

## 2024-03-06 ENCOUNTER — PATIENT MESSAGE (OUTPATIENT)
Dept: ENDOCRINOLOGY | Facility: CLINIC | Age: 79
End: 2024-03-06
Payer: MEDICARE

## 2024-03-06 DIAGNOSIS — M81.0 AGE-RELATED OSTEOPOROSIS WITHOUT CURRENT PATHOLOGICAL FRACTURE: Primary | ICD-10-CM

## 2024-03-07 PROBLEM — M81.0 AGE-RELATED OSTEOPOROSIS WITHOUT CURRENT PATHOLOGICAL FRACTURE: Status: ACTIVE | Noted: 2024-03-07

## 2024-03-20 ENCOUNTER — PATIENT MESSAGE (OUTPATIENT)
Dept: ENDOCRINOLOGY | Facility: CLINIC | Age: 79
End: 2024-03-20
Payer: MEDICARE

## 2024-05-22 ENCOUNTER — PATIENT MESSAGE (OUTPATIENT)
Dept: NEUROLOGY | Facility: CLINIC | Age: 79
End: 2024-05-22
Payer: MEDICARE

## 2024-06-10 ENCOUNTER — PATIENT MESSAGE (OUTPATIENT)
Dept: INTERNAL MEDICINE | Facility: CLINIC | Age: 79
End: 2024-06-10
Payer: MEDICARE

## 2024-07-03 RX ORDER — CARBIDOPA AND LEVODOPA 25; 100 MG/1; MG/1
1 TABLET ORAL 4 TIMES DAILY
Qty: 360 TABLET | Refills: 3 | Status: SHIPPED | OUTPATIENT
Start: 2024-07-03

## 2024-07-16 NOTE — ASSESSMENT & PLAN NOTE
"[facility]      Chief Complaint   Patient presents with    Chest Pain       Patient ID: Yulia Peralta is a 81 y.o. female with PAD, HTN, Rheumatoid arthritis, lumbar degenerative disc disease, obesity, who presents for a follow up appointment.  Pertinent history/events are as follows:     -Pt presents for evaluation of pain, numbness/tingling in both legs and feet.      -At our initial clinic visit on 8/3/2021, Mrs. Peralta reports pain, numbness/tingling in both legs and feet.  States symptoms are "constant, but improve with walking".  She has no tissue loss.  Plan:   Pain/ with numbness/tingling in both legs and feet- Etiology likely multifactorial with contributions from degenerative joint disease, PAD, neuropathic pain, and RA.  MRI Lumbar Spine 4/28/2021 revealed severe degenerative disc disease at all levels of the lumbar spine.  Severe canal stenosis at L4-5 and moderate to severe canal stenosis at L5-S1.  Check exercise ONESIMO and CTA abd/pelvis with iliofemoral runoff.  Pt to elevate legs when resting.  Refer to Neurosurgery for evaluation.    Obesity- Encourage diet, exercise and weight loss.     -At follow up clinic visit on 11/5/2021, Mrs. Peralta reported continued pain, numbness/tingling in both legs and feet as described at clinic visit on 8/3/2021.  ONESIMO Study on 8/13/2021 revealed normal rest and exercise ONESIMO bilaterally.  Normal PVR waveforms bilaterally.CTA Abd/Pelvis with Iliofemoral Runoff on 8/13/2021 revealed mild scattered atherosclerotic plaque.  Probable stenosis of the proximal left posterior tibial artery estimated 50%.  Otherwise arteries of the abdomen and lower extremities demonstrate no significant stenosis.  Plan:   Pain/ with numbness/tingling in both legs and feet- ONESIMO Study on 8/13/2021 revealed normal rest and exercise ONESIMO bilaterally.  CTA Abd/Pelvis with Iliofemoral Runoff on 8/13/2021 revealed mild scattered atherosclerotic plaque.  Probable stenosis of the " Continue - Levothyroxine 100mcg daily   proximal left posterior tibial artery estimated 50%.  Otherwise arteries of the abdomen and lower extremities demonstrate no significant stenosis.  MRI Lumbar Spine 4/28/2021 revealed severe degenerative disc disease at all levels of the lumbar spine.  Severe canal stenosis at L4-5 and moderate to severe canal stenosis at L5-S1.  Hence, symptoms are likely due to severe lumbar disc disease, and not flow limiting PAD.  Continue management of lumbar disc disease per Neurosurgery.    PAD- Pt has no claudication or tissue loss.  CTA abd/pelvis as above.  Check echo.  Start ASA 81 mg daily and atorvastatin 40 mg daily.    Obesity- Encourage diet, exercise and weight loss.   Carotid Artery Disease- Carotid Ultrasound on 10/8/2021 20-39% right internal carotid artery stenosis.  There is 40-49% left internal carotid artery stenosis.  Start ASA 81 mg daily and atorvastatin 40 mg daily.     -2/10/2022 clinic visit: Mrs. Peralta reports no chest pain, SOB, LE edema, TIA symptoms or syncope.  Her main complaint today is severe pain in left foot when bearing weight on it.  She has no claudication or tissue loss.  Echo from 2/4/2022 revealed EF 65%; grade I left ventricular diastolic dysfunction; severe left atrial enlargement; moderate right atrial enlargement; mild mitral regurgitation; normal central venous pressure (3 mmHg); estimated PA systolic pressure is 43 mmHg.  Plan:   Pain/ with numbness/tingling in both legs and feet- ONESIMO Study on 8/13/2021 revealed normal rest and exercise ONESIMO bilaterally.  CTA Abd/Pelvis with Iliofemoral Runoff on 8/13/2021 revealed mild scattered atherosclerotic plaque.  Probable stenosis of the proximal left posterior tibial artery estimated 50%.  Otherwise arteries of the abdomen and lower extremities demonstrate no significant stenosis.  MRI Lumbar Spine 4/28/2021 revealed severe degenerative disc disease at all levels of the lumbar spine.  Severe canal stenosis at L4-5 and moderate to severe  canal stenosis at L5-S1.  Hence, symptoms are likely due to severe lumbar disc disease, and not flow limiting PAD.  Continue management of lumbar disc disease per Neurosurgery.    PAD- Pt has no claudication or tissue loss.  CTA abd/pelvis as above.  Echo from 2/4/2022 revealed EF 65%; grade I left ventricular diastolic dysfunction; severe left atrial enlargement; moderate right atrial enlargement; mild mitral regurgitation; normal central venous pressure (3 mmHg); estimated PA systolic pressure is 43 mmHg.  Continue ASA 81 mg daily and atorvastatin 40 mg daily.    Obesity- Encourage diet, exercise and weight loss.   Carotid Artery Disease- Carotid Ultrasound on 10/8/2021 20-39% right internal carotid artery stenosis.  There is 40-49% left internal carotid artery stenosis.  Continue ASA 81 mg daily and atorvastatin 40 mg daily.   Left Foot Pain- Likely due to degenerative/structural abnormalities.  Check MRI left foot/ankle.  Refer to Podiatry for evaluation.  Gait Instability- Refer to physical therapy.    HLD- LDL at goal of <70.  Continue ASA 81 mg daily and atorvastatin 40 mg daily.  HTN- Stable.  Continue current medications.  Pulmonary HTN- Pt is asymptomatic.  Continue current medications.    -3/11/2024 clinic visit: Mrs. Peralta reports chest pain which started 1 month ago.  Chest pain usually occurs at rest, left chest area, non-radiating, 3/10 in intensity, lasting 10 minutes with no n/v/d.  She reports numbness in both shins.  No classic claudication symptoms.  Plan:   Chest Pain- Pt with risk factors for CAD.  Check echo and nuclear stress test.    Pain/ with numbness/tingling in both legs and feet- ONESIMO Study on 8/13/2021 revealed normal rest and exercise ONESIMO bilaterally.  CTA Abd/Pelvis with Iliofemoral Runoff on 8/13/2021 revealed mild scattered atherosclerotic plaque. Probable stenosis of the proximal left posterior tibial artery estimated 50%.  Otherwise arteries of the abdomen and lower extremities  demonstrate no significant stenosis.  MRI Lumbar Spine 4/28/2021 revealed severe degenerative disc disease at all levels of the lumbar spine.  Severe canal stenosis at L4-5 and moderate to severe canal stenosis at L5-S1.  Hence, symptoms are likely due to severe lumbar disc disease, and not flow limiting PAD.  Continue management of lumbar disc disease per Neurosurgery.    PAD- Pt has no claudication or tissue loss.  CTA abd/pelvis as above.  Echo from 2/4/2022 revealed EF 65%; grade I left ventricular diastolic dysfunction; severe left atrial enlargement; moderate right atrial enlargement; mild mitral regurgitation; normal central venous pressure (3 mmHg); estimated PA systolic pressure is 43 mmHg.  Continue ASA 81 mg daily and atorvastatin 40 mg daily.    Obesity- Encourage diet, exercise and weight loss.   Carotid Artery Disease- Carotid Ultrasound on 10/8/2021 20-39% right internal carotid artery stenosis.  There is 40-49% left internal carotid artery stenosis.  Continue ASA 81 mg daily and atorvastatin 40 mg daily.   Left Foot Pain- Likely due to degenerative/structural abnormalities.  Check MRI left foot/ankle.  Refer to Podiatry for evaluation.  Gait Instability- Refer to physical therapy.    HLD- LDL at goal of <70.  Continue ASA 81 mg daily and atorvastatin 40 mg daily.  HTN- Stable.  Continue current medications.  Pulmonary HTN- Pt is asymptomatic.  Continue current medications.     HPI:  Mrs. Peralta reports no further chest pain. Nuclear Stress Test on 4/17/2024 revealed normal myocardial perfusion with no evidence of myocardial ischemia or infarction.  Carotid Ultrasound on 4/17/2024 demonstrated 20-39% bilateral Internal Carotid Stenosis. Echo on 4/9/2024 showed EF 65%;  normal diastolic function; .estimated pulmonary artery systolic pressure is 35 mmHg; normal venous pressure at 3 mmHg.    Past Medical History:   Diagnosis Date    Abnormal colonoscopy 07/24/2020    colon polyps nad repeat in 3 years.      Acid reflux     Anemia     Anxiety     Arthritis     Blood transfusion     Cataract     Depression     Dry eyes     H/O colonoscopy 07/22/2020    dr. nicholas. repeat in 3 years.     Heart murmur     Hypertension     Left lumbar radiculitis 5/19/2015    Mixed hyperlipidemia 11/5/2021    Multiple thyroid nodules 12/18/2012    Osteoporosis     Rheumatoid arthritis     Thoracic or lumbosacral neuritis or radiculitis, unspecified 10/30/2013     Past Surgical History:   Procedure Laterality Date    CATARACT EXTRACTION W/  INTRAOCULAR LENS IMPLANT Left 1/26/2021    Procedure: EXTRACTION, CATARACT, WITH IOL INSERTION;  Surgeon: Elvis Pete MD;  Location: [facility];  Service: Ophthalmology;  Laterality: Left;    CATARACT EXTRACTION W/  INTRAOCULAR LENS IMPLANT Right 2/23/2021    Procedure: EXTRACTION, CATARACT, WITH IOL INSERTION;  Surgeon: Elvis Pete MD;  Location: [facility];  Service: Ophthalmology;  Laterality: Right;    CEREBRAL ANGIOGRAM N/A 1/23/2020    Procedure: ANGIOGRAM-CEREBRAL;  Surgeon: [facility];  Location: [address];  Service: Radiology;  Laterality: N/A;  /[place]    gallstones  [id number]    HYSTERECTOMY      JOINT REPLACEMENT  [id number]     bilateral knee    OOPHORECTOMY      KY REMOVAL OF OVARY/TUBE(S)      TONSILLECTOMY       Social History     Socioeconomic History    Marital status:      Spouse name: Ric     Number of children: 2    Highest education level: Some college, no degree   Occupational History    Occupation: retired    Tobacco Use    Smoking status: Former     Current packs/day: 0.25     Average packs/day: 0.3 packs/day for 1 year (0.3 ttl pk-yrs)     Types: Cigarettes    Smokeless tobacco: Never   Substance and Sexual Activity    Alcohol use: Yes     Alcohol/week: 2.0 standard drinks of alcohol     Types: 1 Glasses of wine, 1 Cans of beer per week     Comment: very seldom    Drug use: No    Sexual activity: Not Currently     Partners:  Male   Social History Narrative    Adult Screenings updated and reviewed  6/12/14    Mammogram( for females) ordered for DIS    Pap ( for females) Dr Zepeda  Due for f/u   For  2014    Colonoscopy  Done once    Flu shot yearly done  2013    Td 2005    Pneumovax  Updated  12/3/13    Zostavax done 2012    Eye exam recommended yearly done 2013    Bone density  12/9/13    Thyroid ultrasound  11/6/2012 Normal sized thyroid containing several subcentimeter nodules, similar to the 5/12/11 exam.  No concerning nodules identified..          Social Determinants of Health     Financial Resource Strain: Low Risk  (3/27/2023)    Overall Financial Resource Strain (CARDIA)     Difficulty of Paying Living Expenses: Not hard at all   Food Insecurity: No Food Insecurity (3/27/2023)    Hunger Vital Sign     Worried About Running Out of Food in the Last Year: Never true     Ran Out of Food in the Last Year: Never true   Transportation Needs: No Transportation Needs (3/27/2023)    PRAPARE - Transportation     Lack of Transportation (Medical): No     Lack of Transportation (Non-Medical): No   Physical Activity: Inactive (3/27/2023)    Exercise Vital Sign     Days of Exercise per Week: 0 days     Minutes of Exercise per Session: 0 min   Stress: No Stress Concern Present (3/27/2023)    [employer] [employer] - Occupational Stress Questionnaire     Feeling of Stress : Only a little   Housing Stability: Unknown (3/27/2023)    Housing Stability Vital Sign     Unable to Pay for Housing in the Last Year: No     Unstable Housing in the Last Year: No     Family History   Problem Relation Name Age of Onset    Heart failure Mother      Cataracts Mother      Hypertension Mother      Colon cancer Mother      Colon cancer Father      Hypertension Sister 1     Liver cancer Sister 1     Glaucoma Brother 1     Lung cancer Brother 1     Colon cancer Brother twin     No Known Problems Maternal Grandmother      No Known Problems Maternal  Grandfather      No Known Problems Paternal Grandmother      No Known Problems Paternal Grandfather      No Known Problems Daughter 1     No Known Problems Son 1     Breast cancer Maternal Aunt      No Known Problems Maternal Uncle      No Known Problems Paternal Aunt      No Known Problems Paternal Uncle      No Known Problems Other      Lupus Neg Hx      Rheum arthritis Neg Hx      Psoriasis Neg Hx      Amblyopia Neg Hx      Blindness Neg Hx      Cancer Neg Hx      Diabetes Neg Hx      Macular degeneration Neg Hx      Retinal detachment Neg Hx      Strabismus Neg Hx      Stroke Neg Hx      Thyroid disease Neg Hx         Review of patient's allergies indicates:   Allergen Reactions    No known drug allergies        Medication List with Changes/Refills   Current Medications    AMLODIPINE (NORVASC) 5 MG TABLET    Take 1 tablet (5 mg total) by mouth once daily.    ASPIRIN (ECOTRIN) 81 MG EC TABLET    Take 81 mg by mouth once daily.    ATORVASTATIN (LIPITOR) 40 MG TABLET    TAKE ONE TABLET BY MOUTH EVERY DAY    AZELASTINE (ASTELIN) 137 MCG (0.1 %) NASAL SPRAY    2 sprays as needed.    CLOBETASOL (TEMOVATE) 0.05 % EXTERNAL SOLUTION    Apply topically 2 (two) times daily.    FAMOTIDINE (PEPCID) 40 MG TABLET    Take 0.5 tablets (20 mg total) by mouth 2 (two) times daily as needed for Heartburn.    FLUTICASONE (FLONASE) 50 MCG/ACTUATION NASAL SPRAY    1 spray (50 mcg total) by Each Nare route once daily.    FOLIC ACID (FOLVITE) 1 MG TABLET    Take 2 tablets (2,000 mcg total) by mouth once daily.    FUROSEMIDE (LASIX) 40 MG TABLET    Take 1 tablet (40 mg total) by mouth once daily.    HYDROCORTISONE 2.5 % CREAM    as needed.     IRBESARTAN (AVAPRO) 300 MG TABLET    TAKE ONE TABLET BY MOUTH EVERY DAY IN THE EVENING    KGCL KETAMINE 10%- GABAPENTIN 6%- CYCLOBENZAPRINE 2%- LIDOCAINE 4% IN TRANSDERMAL CREAM    as needed.    METHOTREXATE 2.5 MG TAB    TAKE FIVE TABLETS BY MOUTH TWICE A DAY ONCE A WEEK ON MONDAY IN THE MORNING  "AND IN THE EVENING    MISOPROSTOL (CYTOTEC) 100 MCG TAB    TAKE ONE TABLET BY MOUTH TWICE A DAY    NABUMETONE (RELAFEN) 500 MG TABLET    Take 1 tablet (500 mg total) by mouth 2 (two) times daily.    NYSTATIN (MYCOSTATIN) POWDER    Apply topically 2 (two) times daily.    NYSTATIN-TRIAMCINOLONE (MYCOLOG II) CREAM    Apply to affected area 2 times daily    PANTOPRAZOLE (PROTONIX) 40 MG TABLET    Take 1 tablet (40 mg total) by mouth once daily.    TRIAMCINOLONE ACETONIDE 0.1% (KENALOG) 0.1 % CREAM    Apply topically 3 (three) times daily.    VALACYCLOVIR (VALTREX) 500 MG TABLET    1 Tablet DAILY for suppression, increase to BID for outbreak   Discontinued Medications    MOTEGRITY 2 MG TAB    Take 1 tablet by mouth.       Review of Systems  Constitution: Denies chills, fever, and sweats.  HENT: Denies headaches or blurry vision.  Cardiovascular: Denies chest pain or irregular heart beat.  Respiratory: Denies cough or shortness of breath.  Gastrointestinal: Denies abdominal pain, nausea, or vomiting.  Musculoskeletal: Positive for left foot pain.  Neurological: Denies dizziness or focal weakness.  Psychiatric/Behavioral: Normal mental status.  Hematologic/Lymphatic: Denies bleeding problem or easy bruising/bleeding.  Skin: Denies rash or suspicious lesions    Physical Examination  BP (!) 169/73   Pulse 62   Ht 5' 2" (1.575 m)   Wt 82.4 kg (181 lb 10.5 oz)   BMI 33.23 kg/m²     Constitutional: No acute distress, conversant  HEENT: Sclera anicteric, Pupils equal, round and reactive to light, extraocular motions intact, Oropharynx clear  Neck: No JVD, no carotid bruits  Cardiovascular: regular rate and rhythm, no murmur, rubs or gallops, normal S1/S2  Pulmonary: Clear to auscultation bilaterally  Abdominal: Abdomen soft, nontender, nondistended, positive bowel sounds  Extremities: No lower extremity edema,   Pulses:  Carotid pulses are 2+ on the right side, and 2+ on the left side.  Radial pulses are 2+ on the right " side, and 2+ on the left side.   Femoral pulses are 2+ on the right side, and 2+ on the left side.  Popliteal pulses are 2+ on the right side, and 2+ on the left side.   Dorsalis pedis pulses are 2+ on the right side, and 2+ on the left side.   Posterior tibial pulses are 2+ on the right side, and 2+ on the left side.    Skin: No ecchymosis, erythema, or ulcers  Psych: Alert and oriented x 3, appropriate affect  Neuro: CNII-XII intact, no focal deficits    Labs:  Most Recent Data  CBC:   Lab Results   Component Value Date    WBC 4.82 06/21/2024    WBC 4.82 06/21/2024    HGB 11.2 (L) 06/21/2024    HGB 11.2 (L) 06/21/2024    HCT 33.6 (L) 06/21/2024    HCT 33.6 (L) 06/21/2024     06/21/2024     06/21/2024    MCV 99 (H) 06/21/2024    MCV 99 (H) 06/21/2024    RDW 13.7 06/21/2024    RDW 13.7 06/21/2024     BMP:   Lab Results   Component Value Date     06/21/2024     06/21/2024    K 4.8 06/21/2024    K 4.8 06/21/2024     06/21/2024     06/21/2024    CO2 22 (L) 06/21/2024    CO2 22 (L) 06/21/2024    BUN 10 06/21/2024    BUN 10 06/21/2024    CREATININE 0.7 06/21/2024    CREATININE 0.7 06/21/2024     06/21/2024     06/21/2024    CALCIUM 9.8 06/21/2024    CALCIUM 9.8 06/21/2024     LFTS;   Lab Results   Component Value Date    PROT 7.5 06/21/2024    PROT 7.5 06/21/2024    ALBUMIN 3.9 06/21/2024    ALBUMIN 3.9 06/21/2024    BILITOT 0.7 06/21/2024    BILITOT 0.7 06/21/2024    AST 19 06/21/2024    AST 19 06/21/2024    ALKPHOS 92 06/21/2024    ALKPHOS 92 06/21/2024    ALT 18 06/21/2024    ALT 18 06/21/2024     COAGS:   Lab Results   Component Value Date    INR 1.0 01/23/2020     FLP:   Lab Results   Component Value Date    CHOL 113 (L) 06/21/2024    CHOL 113 (L) 06/21/2024    HDL 53 06/21/2024    HDL 53 06/21/2024    LDLCALC 53.0 (L) 06/21/2024    LDLCALC 53.0 (L) 06/21/2024    TRIG 35 06/21/2024    TRIG 35 06/21/2024    CHOLHDL 46.9 06/21/2024    CHOLHDL 46.9 06/21/2024      CARDIAC:   Lab Results   Component Value Date    TROPONINI <0.006 06/08/2020    BNP 27 06/12/2014       Nuclear Stress Test 4/17/2024:    Normal myocardial perfusion scan. There is no evidence of myocardial ischemia or infarction.    The visually estimated ejection fraction is normal at rest and normal during stress.    There is normal wall motion at rest and post stress.    LV cavity size is normal at rest and normal at stress.    The ECG portion of the study is negative for ischemia.    The patient reported no chest pain during the stress test.    There were no arrhythmias during stress.    There are no prior studies for comparison.     Carotid Ultrasound 4/17/2024:    There is 20-39% right Internal Carotid Stenosis.    There is 20-39% left Internal Carotid Stenosis.    Echo 4/9/2024:    Left Ventricle: The left ventricle is normal in size. Normal wall thickness. There is normal systolic function. Ejection fraction by visual approximation is 65%. There is normal diastolic function.    Right Ventricle: Normal right ventricular cavity size. Wall thickness is normal. Right ventricle wall motion  is normal. Systolic function is normal.    Pulmonary Artery: The estimated pulmonary artery systolic pressure is 35 mmHg.    IVC/SVC: Normal venous pressure at 3 mmHg.    Echo 2/4/2022:  The left ventricle is normal in size with eccentric hypertrophy and normal systolic function.  The estimated ejection fraction is 65%.  Grade I left ventricular diastolic dysfunction.  Severe left atrial enlargement.  Normal right ventricular size with normal right ventricular systolic function.  Moderate right atrial enlargement.  Mild mitral regurgitation.  Normal central venous pressure (3 mmHg).  The estimated PA systolic pressure is 43 mmHg.  There is pulmonary hypertension.  LVOT mean grad 6.6 mm Hg, max PG 11.7 mm Hg    Carotid Ultrasound 10/8/2021:  There is 20-39% right internal carotid artery stenosis.  There is 40-49% left  internal carotid artery stenosis.    CTA Abd/Pelvis with Iliofemoral Runoff 8/13/2021:   1. Mild scattered atherosclerotic plaque.  Probable stenosis of the proximal left posterior tibial artery estimated 50%.  Otherwise arteries of the abdomen and lower extremities demonstrate no significant stenosis, noting that the popliteal artery and trifurcation are partially obscured by extensive beam hardening artifact related to knee arthroplasty.  2. Small hiatal hernia  3. Mild hepatomegaly  4. Post cholecystectomy and hysterectomy  5. Colonic diverticula without evidence of diverticulitis  6. Osteoarthritis involving the spine, hips, and feet.    ONESIMO Study 8/13/2021:  Normal rest and exercise ONESIMO bilaterally.  Normal PVR waveforms bilaterally.    MRI Lumbar Spine 4/28/2021:  Severe degenerative disc disease at all levels of the lumbar spine.   Severe canal stenosis at L4-5 and moderate to severe canal stenosis at L5-S1, please see details of each levels above.    Echo 8/1/2016:  CONCLUSIONS     1 - Normal left ventricular systolic function (EF 60-65%).  Normal wall motion.     2 - Eccentric hypertrophy.     3 - Left ventricular diastolic dysfunction.     4 - Trivial mitral regurgitation.     5 - Trivial tricuspid regurgitation.     6 - Pulmonary hypertension. The estimated PA systolic pressure is 42 mmHg.     Assessment/Plan:  Yulia Peralta is a 81 y.o. female with PAD, HTN, HLD, Rheumatoid arthritis, lumbar degenerative disc disease, severe obesity, who presents for a follow up appointment.     1. Chest Pain- Mrs. Peralta reports no further chest pain. Nuclear Stress Test on 4/17/2024 revealed normal myocardial perfusion with no evidence of myocardial ischemia or infarction.  Echo on 4/9/2024 showed EF 65%;  normal diastolic function; .estimated pulmonary artery systolic pressure is 35 mmHg; normal venous pressure at 3 mmHg. Continue aggressive risk factor modification, including weight loss.     2. Pain/ with  numbness/tingling in both legs and feet- ONESIMO Study on 8/13/2021 revealed normal rest and exercise ONESIMO bilaterally.  CTA Abd/Pelvis with Iliofemoral Runoff on 8/13/2021 revealed mild scattered atherosclerotic plaque.  Probable stenosis of the proximal left posterior tibial artery estimated 50%.  Otherwise arteries of the abdomen and lower extremities demonstrate no significant stenosis.  MRI Lumbar Spine 4/28/2021 revealed severe degenerative disc disease at all levels of the lumbar spine.  Severe canal stenosis at L4-5 and moderate to severe canal stenosis at L5-S1.  Hence, symptoms are likely due to severe lumbar disc disease, and not flow limiting PAD.  Continue management of lumbar disc disease per Neurosurgery.      3. PAD- Pt has no claudication or tissue loss.  CTA abd/pelvis as above.  Continue ASA 81 mg daily and atorvastatin 40 mg daily.      4. Obesity- Encourage diet, exercise and weight loss.     5. Carotid Artery Disease- Carotid Ultrasound on 4/17/2024 demonstrated 20-39% bilateral Internal Carotid Stenosis. Continue ASA 81 mg daily and atorvastatin 40 mg daily.     6. Left Foot Pain- Likely due to degenerative/structural abnormalities. Continue management per Podiatry.    7. Gait Instability- Pt referred to physical therapy.      8. HLD- LDL at goal of <70.  Continue ASA 81 mg daily and atorvastatin 40 mg daily.    9. HTN- Stable.  Continue current medications.    10. Pulmonary HTN- Pt is asymptomatic.  Continue current medications.     Follow up in 4 months     Total duration of face to face visit time 30 minutes.  Total time spent counseling greater than fifty percent of total visit time.  Counseling included discussion regarding imaging findings, diagnosis, possibilities, treatment options, risks and benefits.  The patient had many questions regarding the options and long-term effects.    Alfie Simeon MD, PhD  Interventional Cardiology

## 2024-07-17 ENCOUNTER — INFUSION (OUTPATIENT)
Dept: INFECTIOUS DISEASES | Facility: HOSPITAL | Age: 79
End: 2024-07-17
Payer: MEDICARE

## 2024-07-17 VITALS
BODY MASS INDEX: 28.51 KG/M2 | HEART RATE: 85 BPM | HEIGHT: 66 IN | RESPIRATION RATE: 19 BRPM | DIASTOLIC BLOOD PRESSURE: 87 MMHG | OXYGEN SATURATION: 97 % | SYSTOLIC BLOOD PRESSURE: 144 MMHG | WEIGHT: 177.38 LBS | TEMPERATURE: 98 F

## 2024-07-17 DIAGNOSIS — G20.C PRIMARY PARKINSONISM: ICD-10-CM

## 2024-07-17 DIAGNOSIS — M81.0 AGE-RELATED OSTEOPOROSIS WITHOUT CURRENT PATHOLOGICAL FRACTURE: Primary | ICD-10-CM

## 2024-07-17 DIAGNOSIS — E23.0 PANHYPOPITUITARISM: ICD-10-CM

## 2024-07-17 DIAGNOSIS — E29.1 HYPOGONADISM IN MALE: ICD-10-CM

## 2024-07-17 PROCEDURE — 63600175 PHARM REV CODE 636 W HCPCS: Mod: JZ,JG | Performed by: INTERNAL MEDICINE

## 2024-07-17 PROCEDURE — 96372 THER/PROPH/DIAG INJ SC/IM: CPT

## 2024-07-17 RX ADMIN — DENOSUMAB 60 MG: 60 INJECTION SUBCUTANEOUS at 11:07

## 2024-07-17 NOTE — PROGRESS NOTES
Patient arrives for Prolia injection - confirms use of calcium and vitamin D supplements and denies dental procedures over past 3 months - administered per guidelines    Limited head-to-toe assessment due to privacy issues and visit reason though the opportunity was given for patient to express any concerns

## 2024-07-21 ENCOUNTER — PATIENT MESSAGE (OUTPATIENT)
Dept: ENDOCRINOLOGY | Facility: CLINIC | Age: 79
End: 2024-07-21
Payer: MEDICARE

## 2024-07-22 RX ORDER — TESTOSTERONE ENANTHATE 200 MG/ML
150 VIAL (ML) INTRAMUSCULAR
Qty: 5 ML | Refills: 4 | Status: SHIPPED | OUTPATIENT
Start: 2024-07-22 | End: 2024-07-25 | Stop reason: SDUPTHER

## 2024-07-25 RX ORDER — TESTOSTERONE ENANTHATE 200 MG/ML
150 VIAL (ML) INTRAMUSCULAR
Qty: 5 ML | Refills: 4 | Status: SHIPPED | OUTPATIENT
Start: 2024-07-25 | End: 2025-01-23

## 2024-07-25 NOTE — TELEPHONE ENCOUNTER
"----- Message from Estrella Nevestorie sent at 7/25/2024  8:07 AM CDT -----  Regarding: pt adivce  Contact: [phone]  .Name Of Caller:Jong / friend      Contact Preference?: [phone]     What is the nature of the call?: needing to speak to someone in the office,regarding medications sent to wrong pharmacy  pls call      Additional Notes:  "Thank you for all that you do for our patients"  "

## 2024-08-03 ENCOUNTER — PATIENT MESSAGE (OUTPATIENT)
Dept: INTERNAL MEDICINE | Facility: CLINIC | Age: 79
End: 2024-08-03
Payer: MEDICARE

## 2024-08-13 ENCOUNTER — PATIENT MESSAGE (OUTPATIENT)
Dept: INTERNAL MEDICINE | Facility: CLINIC | Age: 79
End: 2024-08-13
Payer: MEDICARE

## 2024-09-04 ENCOUNTER — TELEPHONE (OUTPATIENT)
Dept: OPHTHALMOLOGY | Facility: CLINIC | Age: 79
End: 2024-09-04
Payer: MEDICARE

## 2024-09-16 ENCOUNTER — OFFICE VISIT (OUTPATIENT)
Dept: OPTOMETRY | Facility: CLINIC | Age: 79
End: 2024-09-16
Payer: MEDICARE

## 2024-09-16 DIAGNOSIS — Z96.1 PSEUDOPHAKIA OF BOTH EYES: Primary | ICD-10-CM

## 2024-09-16 DIAGNOSIS — H26.499 POSTERIOR CAPSULAR OPACIFICATION, UNSPECIFIED LATERALITY: ICD-10-CM

## 2024-09-16 PROCEDURE — 99204 OFFICE O/P NEW MOD 45 MIN: CPT | Mod: S$PBB,,, | Performed by: OPTOMETRIST

## 2024-09-16 PROCEDURE — 99999 PR PBB SHADOW E&M-EST. PATIENT-LVL III: CPT | Mod: PBBFAC,,, | Performed by: OPTOMETRIST

## 2024-09-16 PROCEDURE — 99213 OFFICE O/P EST LOW 20 MIN: CPT | Mod: PBBFAC | Performed by: OPTOMETRIST

## 2024-09-16 NOTE — PROGRESS NOTES
HPI    Pt is here today for routine eye exam. Denies pain/discomfort.  DLS: 6/9/2020 Dr. Chavez  (-)Flashes   (-)Floaters   (+)Diplopia   (-)Headaches   (-)Itching   (-)Tearing  (-)Burning  (-)Dryness   (-)Photophobia  (-)Glare   (-)Blurred VA  Past Eye Sx: Cataract OU   Eye Meds: (-)   Last edited by Kalyani Esteban, OD on 9/16/2024  1:46 PM.            Assessment /Plan     For exam results, see Encounter Report.    Pseudophakia of both eyes    Posterior capsular opacification, unspecified laterality      1.   Eyeglass Final Rx       Eyeglass Final Rx         Sphere Cylinder Axis    Right -3.50 +2.25 180    Left -2.50 +2.25 165      Type: DVO    Expiration Date: 9/16/2025                   2. Referred pt to Dr. Steinberg for YAG CAP OU    RTC in 1 year for annual eye exam unless changes noted sooner.

## 2024-09-20 ENCOUNTER — TELEPHONE (OUTPATIENT)
Dept: INTERNAL MEDICINE | Facility: CLINIC | Age: 79
End: 2024-09-20

## 2024-10-01 ENCOUNTER — OFFICE VISIT (OUTPATIENT)
Dept: OPHTHALMOLOGY | Facility: CLINIC | Age: 79
End: 2024-10-01
Payer: MEDICARE

## 2024-10-01 DIAGNOSIS — H26.492 POSTERIOR CAPSULAR OPACIFICATION VISUALLY SIGNIFICANT, LEFT EYE: ICD-10-CM

## 2024-10-01 DIAGNOSIS — H26.491 POSTERIOR CAPSULAR OPACIFICATION VISUALLY SIGNIFICANT, RIGHT EYE: Primary | ICD-10-CM

## 2024-10-01 PROCEDURE — 99999 PR PBB SHADOW E&M-EST. PATIENT-LVL III: CPT | Mod: PBBFAC,,, | Performed by: OPHTHALMOLOGY

## 2024-10-01 PROCEDURE — 66821 AFTER CATARACT LASER SURGERY: CPT | Mod: PBBFAC,LT | Performed by: OPHTHALMOLOGY

## 2024-10-01 PROCEDURE — 99213 OFFICE O/P EST LOW 20 MIN: CPT | Mod: PBBFAC | Performed by: OPHTHALMOLOGY

## 2024-10-01 PROCEDURE — 66821 AFTER CATARACT LASER SURGERY: CPT | Mod: PBBFAC,RT | Performed by: OPHTHALMOLOGY

## 2024-10-01 NOTE — PROGRESS NOTES
HPI    Ref by Dr. Chavez     S/p phaco w/IOL OS 01/08/2020 - NEAR   S/p phaco w/IOL OD 11/25/2019 - NEAR   Posterior capsular opacification, unspecified laterality  Vitreous Floaters OD       Gtt's: None    Pt. States vision is blurry at distance.  Pt. Denies pain or discomfort.   Last edited by Sadia Mohr on 10/1/2024  1:45 PM.            Assessment /Plan     For exam results, see Encounter Report.    Posterior capsular opacification visually significant, right eye  -     Yag Capsulotomy - OS - Left Eye  -     Yag Capsulotomy - OD - Right Eye    Posterior capsular opacification visually significant, left eye  -     Yag Capsulotomy - OS - Left Eye  -     Yag Capsulotomy - OD - Right Eye      Visually significant posterior capsular opacity present.  ou  - discussed risks, benefits, and alternatives to laser surgery - pt wishes to proceed with yag laser  - Informed consent obtained and correct eye(s) verified with patient.  - Intraocular Pressure to be taken post procedure.   - PF QID x 4 days then d/c  - f/up as scheduled    DIAGNOSIS: Visually significant posterior capsular opacity    PROCEDURE: YAG Laser Capsulotomy ou    COMPLICATIONS: none     DESCRIPTION OF PROCEDURE IN DETAIL:  1 drop of topical Proparacaine and Iopidine instilled, and eye(s) dilated with 1% Tropicamide 2.5% Phenylephrine. YAG laser applied to posterior capsule in cruciate pattern.      DISPOSITION:  Patient tolerated procedure well.      Patient to call or message us if there are any concerns following the procedure.    Increase in floaters expected for the first few weeks after the procedure, and I anticipate these to subside.    F/up optom REE

## 2024-10-03 ENCOUNTER — PATIENT MESSAGE (OUTPATIENT)
Dept: INTERNAL MEDICINE | Facility: CLINIC | Age: 79
End: 2024-10-03
Payer: MEDICARE

## 2024-10-29 DIAGNOSIS — Z00.00 ENCOUNTER FOR MEDICARE ANNUAL WELLNESS EXAM: ICD-10-CM

## 2024-11-21 NOTE — LETTER
April 30, 2019      Sarah Morales, NP  [address]  [facility] [address]           [facility]  [address]  [facility] [address]  Phone: [phone]  Fax: [phone]          Patient: Ravinder Sanz   MR Number: [id number]   YOB: 1945   Date of Visit: 4/30/2019       Dear Sarah Morales:    Thank you for referring Ravinder Sanz to me for evaluation. Attached you will find relevant portions of my assessment and plan of care.    If you have questions, please do not hesitate to call me. I look forward to following Ravinder Sanz along with you.    Sincerely,    Sedrick Chavez, OD    Enclosure  CC:  No Recipients    If you would like to receive this communication electronically, please contact [email or web address] or [phone] to request more information on [employer] Link access.    For providers and/or their staff who would like to refer a patient to [facility], please contact us through our one-stop-shop provider referral line, [facility], at [phone].    If you feel you have received this communication in error or would no longer like to receive these types of communications, please e-mail [email or web address]          None

## 2024-12-17 RX ORDER — ATORVASTATIN CALCIUM 10 MG/1
10 TABLET, FILM COATED ORAL
Qty: 90 TABLET | Refills: 0 | Status: SHIPPED | OUTPATIENT
Start: 2024-12-17

## 2024-12-17 NOTE — TELEPHONE ENCOUNTER
Refill Routing Note   Medication(s) are not appropriate for processing by [facility] for the following reason(s):        Required labs outdated    [facility] action(s):  Defer     Requires labs : Yes             Appointments  past 12m or future 3m with PCP    Date Provider   Last Visit   12/7/2023 Brooke Dean MD   Next Visit   12/23/2024 Brooke Dean MD   ED visits in past 90 days: 0        Note composed:11:30 AM 12/17/2024

## 2024-12-17 NOTE — TELEPHONE ENCOUNTER
Care Due:                  Date            Visit Type   Department     Provider  --------------------------------------------------------------------------------                                EP -                              PRIMARY      [facility] INTERNAL  Last Visit: 12-      CARE ([facility])   MEDICINE       Brooke Dean                              EP -                              PRIMARY      [facility] INTERNAL  Next Visit: 12-      CARE ([facility])   MEDICINE       Brooke Dean                                                            Last  Test          Frequency    Reason                     Performed    Due Date  --------------------------------------------------------------------------------    CMP.........  12 months..  atorvastatin.............  12- 12-    Lipid Panel.  12 months..  atorvastatin.............  12- 12-    [facility] Embedded Care Due Messages. Reference number: [id number].   12/17/2024 12:16:24 AM CST

## 2024-12-18 ENCOUNTER — PATIENT MESSAGE (OUTPATIENT)
Dept: ENDOCRINOLOGY | Facility: CLINIC | Age: 79
End: 2024-12-18
Payer: MEDICARE

## 2024-12-18 DIAGNOSIS — E78.5 HYPERLIPIDEMIA, UNSPECIFIED HYPERLIPIDEMIA TYPE: ICD-10-CM

## 2024-12-18 DIAGNOSIS — E03.8 SECONDARY HYPOTHYROIDISM: ICD-10-CM

## 2024-12-18 DIAGNOSIS — E23.0 HYPOPITUITARISM: ICD-10-CM

## 2024-12-18 DIAGNOSIS — E23.0 PANHYPOPITUITARISM: ICD-10-CM

## 2024-12-18 DIAGNOSIS — E29.1 HYPOGONADISM IN MALE: ICD-10-CM

## 2024-12-18 NOTE — TELEPHONE ENCOUNTER
----- Message from Amira sent at 12/18/2024  3:38 PM CST -----  Regarding: Appt Advise / Rx Refill  Contact: [phone]  REGINA HEWSON calling regarding Patient Advice (message) for # pt partner is calling to speak with Dedi regarding appt and needs an appt before new years they wont be in town and pt needs his refill medication pls advise

## 2024-12-20 RX ORDER — HYDROCORTISONE 10 MG/1
10 TABLET ORAL 2 TIMES DAILY
Qty: 180 TABLET | Refills: 3 | Status: SHIPPED | OUTPATIENT
Start: 2024-12-20

## 2024-12-20 RX ORDER — LEVOTHYROXINE SODIUM 100 UG/1
100 TABLET ORAL DAILY
Qty: 90 TABLET | Refills: 3 | Status: SHIPPED | OUTPATIENT
Start: 2024-12-20

## 2024-12-23 ENCOUNTER — OFFICE VISIT (OUTPATIENT)
Dept: INTERNAL MEDICINE | Facility: CLINIC | Age: 79
End: 2024-12-23
Payer: MEDICARE

## 2024-12-23 ENCOUNTER — PATIENT MESSAGE (OUTPATIENT)
Dept: INTERNAL MEDICINE | Facility: CLINIC | Age: 79
End: 2024-12-23

## 2024-12-23 DIAGNOSIS — I10 HYPERTENSION, UNSPECIFIED TYPE: Primary | ICD-10-CM

## 2024-12-23 DIAGNOSIS — Z12.5 ENCOUNTER FOR SCREENING FOR MALIGNANT NEOPLASM OF PROSTATE: ICD-10-CM

## 2024-12-23 DIAGNOSIS — G20.A1 PARKINSON'S DISEASE, UNSPECIFIED WHETHER DYSKINESIA PRESENT, UNSPECIFIED WHETHER MANIFESTATIONS FLUCTUATE: ICD-10-CM

## 2024-12-23 DIAGNOSIS — E23.0 PANHYPOPITUITARISM: ICD-10-CM

## 2024-12-23 PROCEDURE — 99215 OFFICE O/P EST HI 40 MIN: CPT | Mod: PBBFAC | Performed by: INTERNAL MEDICINE

## 2024-12-23 PROCEDURE — 99214 OFFICE O/P EST MOD 30 MIN: CPT | Mod: S$PBB,,, | Performed by: INTERNAL MEDICINE

## 2024-12-23 PROCEDURE — G2211 COMPLEX E/M VISIT ADD ON: HCPCS | Mod: S$PBB,,, | Performed by: INTERNAL MEDICINE

## 2024-12-23 PROCEDURE — 99999 PR PBB SHADOW E&M-EST. PATIENT-LVL V: CPT | Mod: PBBFAC,,, | Performed by: INTERNAL MEDICINE

## 2024-12-26 VITALS
WEIGHT: 173.31 LBS | TEMPERATURE: 99 F | OXYGEN SATURATION: 95 % | HEART RATE: 82 BPM | BODY MASS INDEX: 27.85 KG/M2 | SYSTOLIC BLOOD PRESSURE: 132 MMHG | HEIGHT: 66 IN | DIASTOLIC BLOOD PRESSURE: 82 MMHG

## 2024-12-26 NOTE — ASSESSMENT & PLAN NOTE
Secondary adrenal insufficiency    * best time to take medication is 3-4 am to mimic normal diurnal patterns, if not feasible please take before bed. If it causes insomnia- take this medication first thing in the morning     Continue Hydrocortisone regimen  If you have surgery please notify your MD of you adrenal condition.  Will get medical alert tag    Referral to optometry for routine eye exam  Schedule bone density

## 2024-12-26 NOTE — PROGRESS NOTES
Subjective:       Patient ID: Ravinder Sanz is a 79 y.o. male.    Chief Complaint: Hypertension    HPI  He returns for management of hypertension.  He has had hypertension for over a year.  Current treatment has included medications outlined in medication list.  He denies chest pain or shortness of breath.  No palpitations.  Denies left arm or neck pain.  He has Parkinson's.  Currently on Sinemet     Past medical history:  Pituitary adenoma, pan hypopituitarism, meningioma status post resection, Parkinson's, hypertension, hyperlipidemia     Medications:  Cortef, Synthroid, testosterone Norvasc 5 mg daily, Lipitor 10 mg daily, sinemet, prolia     Allergies:  Bupropion        Review of Systems   Constitutional:  Negative for chills, fatigue, fever and unexpected weight change.   Respiratory:  Negative for chest tightness and shortness of breath.    Cardiovascular:  Negative for chest pain and palpitations.   Gastrointestinal:  Negative for abdominal pain and blood in stool.   Neurological:  Negative for dizziness, syncope, numbness and headaches.       Objective:      Physical Exam  HENT:      Right Ear: External ear normal.      Left Ear: External ear normal.      Nose: Nose normal.      Mouth/Throat:      Mouth: Mucous membranes are moist.      Pharynx: Oropharynx is clear.   Eyes:      Pupils: Pupils are equal, round, and reactive to light.   Cardiovascular:      Rate and Rhythm: Normal rate and regular rhythm.      Heart sounds: No murmur heard.  Pulmonary:      Breath sounds: Normal breath sounds.   Abdominal:      General: There is no distension.      Palpations: There is no hepatomegaly.      Tenderness: There is no abdominal tenderness.   Musculoskeletal:      Cervical back: Normal range of motion.   Lymphadenopathy:      Cervical: No cervical adenopathy.      Upper Body:      Right upper body: No axillary adenopathy.      Left upper body: No axillary adenopathy.   Neurological:      Cranial Nerves: No  cranial nerve deficit.      Sensory: No sensory deficit.      Motor: Motor function is intact.      Deep Tendon Reflexes: Reflexes are normal and symmetric.         Assessment/Plan       Assessment and plan: 1.  Hypertension: Controlled.  Continue Norvasc.  Check CMP, lipid panel, CBC, PSA  2.  Parkinson's: Follow up with Neurology    Visit today included increased complexity associated with the care of the episodic problem hypertension addressed and managing the longitudinal care of the patient due to the serious and/or complex managed problem(s) hypertension, Parkinson's.

## 2024-12-26 NOTE — PROGRESS NOTES
Subjective:      Patient ID: Ravinder Sanz is a 79 y.o. male.     Chief Complaint:  panhypopit  History of Present Illness  S/p meningioma resection 06/19/2019  Has been dx with Parkinson's   Patient is seeing neurology - Dr. Jay    With regards to the panhypopit  Spinal fusion surgery at age 16; proceeded with pituitary adenoma - nonfunctional. Received radiation treatment at [facility].        With regards to the Secondary adrenal insufficiency-   current dose: Hydrocortisone 10 mg PO BID  Knows sick day precautions    Still does not have medical alert tag   Never been hospitalized for AI  Patient denies N/V        With regards to the Secondary hypothyroidism -    Current dose: Levothyroxine 100 mcg PO once daily    Takes medication properly    Symptoms:      +constipation - better      With regards to theSecondary hypogonadism-   Since he was 20 years old   on Testosterone replacement therapy - 150 mg q 2 weeks   Doing well  No LUTS      With regards to theSecondary AGHD - was on GH (for 2 weeks in 2018 )  - Was previously on GH but stopped due to expense and the fact that he did not feel better     No dx of DI     Denies polyuria, polydipsia, nocturia        Last BMD 2/27/2024  COMPARISON:  Comparison study done on 04/30/2021.  Lumbar spine:     BMD 1.053 g/cm2 and T-score -0.3.  Total Hip:           BMD 0.901 g/cm2 and T-score -0.9.  Distal 1/3 radius: Not applicable     FINDINGS:  Lumbar spine (L1-L4):               BMD is 1.060 g/cm2, T-score is -0.3, and Z-score is 0.8.  Total hip:                                BMD is 0.890 g/cm2, T-score is -0.9, and Z-score is 0.0.  Femoral neck:                          BMD is 0.733 g/cm2, T-score is -1.4, and Z-score is 0.0.  Distal 1/3 radius:                      Not applicable     FRAX:  9.8% risk of a major osteoporotic fracture in the next 10 years.  3.3% risk of hip fracture in the next 10 years.     Impression:  *Osteoporosis based on T-score between  -1.0 and -2.5 and elevated risk based on FRAX  *Fracture risk is high  *Compared with previous DXA, BMD at the lumbar spine has remained stable, and BMD at the total hip has remained stable.     No recent falls   Has Parkinsons          Review of Systems  As above       Objective:   Physical Exam  Vitals reviewed.   Neck:      Thyroid: No thyromegaly.   Cardiovascular:      Rate and Rhythm: Normal rate.   Pulmonary:      Effort: Pulmonary effort is normal.   Abdominal:      Palpations: Abdomen is soft.     +resting tremor     There is no height or weight on file to calculate BMI.    Lab Review:   Lab Results   Component Value Date    HGBA1C 5.5 08/01/2022     Lab Results   Component Value Date    CHOL 189 12/07/2023    HDL 80 (H) 12/07/2023    LDLCALC 94.6 12/07/2023    TRIG 72 12/07/2023    CHOLHDL 42.3 12/07/2023     Lab Results   Component Value Date     12/07/2023    K 3.9 12/07/2023     12/07/2023    CO2 29 12/07/2023    GLU 79 12/07/2023    BUN 12 12/07/2023    CREATININE 0.9 12/07/2023    CALCIUM 8.8 12/07/2023    PROT 6.9 12/07/2023    ALBUMIN 4.0 12/07/2023    BILITOT 1.0 12/07/2023    ALKPHOS 50 (L) 12/07/2023    AST 19 12/07/2023    ALT 10 12/07/2023    ANIONGAP 9 12/07/2023    ESTGFRAFRICA >60.0 01/03/2022    EGFRNONAA >60.0 01/03/2022    TSH 0.022 (L) 06/21/2019     Lab Results   Component Value Date    WBC 6.34 12/07/2023    HGB 13.8 (L) 12/07/2023    HCT 41.4 12/07/2023    MCV 91 12/07/2023     12/07/2023           Assessment and Plan                 Bmd  Rtc years

## 2024-12-30 ENCOUNTER — LAB VISIT (OUTPATIENT)
Dept: LAB | Facility: HOSPITAL | Age: 79
End: 2024-12-30
Attending: INTERNAL MEDICINE
Payer: MEDICARE

## 2024-12-30 ENCOUNTER — OFFICE VISIT (OUTPATIENT)
Dept: ENDOCRINOLOGY | Facility: CLINIC | Age: 79
End: 2024-12-30
Payer: MEDICARE

## 2024-12-30 VITALS
WEIGHT: 173.31 LBS | HEIGHT: 66 IN | BODY MASS INDEX: 27.85 KG/M2 | DIASTOLIC BLOOD PRESSURE: 66 MMHG | SYSTOLIC BLOOD PRESSURE: 100 MMHG

## 2024-12-30 DIAGNOSIS — E29.1 HYPOGONADISM IN MALE: ICD-10-CM

## 2024-12-30 DIAGNOSIS — E23.0 PANHYPOPITUITARISM: Primary | ICD-10-CM

## 2024-12-30 DIAGNOSIS — E23.0 HYPOPITUITARISM: ICD-10-CM

## 2024-12-30 DIAGNOSIS — R79.9 ABNORMAL FINDING OF BLOOD CHEMISTRY, UNSPECIFIED: ICD-10-CM

## 2024-12-30 DIAGNOSIS — E03.8 SECONDARY HYPOTHYROIDISM: ICD-10-CM

## 2024-12-30 DIAGNOSIS — E27.49 SECONDARY ADRENAL INSUFFICIENCY: ICD-10-CM

## 2024-12-30 DIAGNOSIS — Z12.5 ENCOUNTER FOR SCREENING FOR MALIGNANT NEOPLASM OF PROSTATE: ICD-10-CM

## 2024-12-30 DIAGNOSIS — E78.5 HYPERLIPIDEMIA, UNSPECIFIED HYPERLIPIDEMIA TYPE: ICD-10-CM

## 2024-12-30 DIAGNOSIS — M94.9 DISORDER OF CARTILAGE, UNSPECIFIED: ICD-10-CM

## 2024-12-30 DIAGNOSIS — I10 HYPERTENSION, UNSPECIFIED TYPE: ICD-10-CM

## 2024-12-30 DIAGNOSIS — M81.0 AGE-RELATED OSTEOPOROSIS WITHOUT CURRENT PATHOLOGICAL FRACTURE: ICD-10-CM

## 2024-12-30 PROBLEM — M85.80 OSTEOPENIA: Status: RESOLVED | Noted: 2020-06-10 | Resolved: 2024-12-30

## 2024-12-30 LAB
ALBUMIN SERPL BCP-MCNC: 4 G/DL (ref 3.5–5.2)
ALP SERPL-CCNC: 48 U/L (ref 40–150)
ALT SERPL W/O P-5'-P-CCNC: 8 U/L (ref 10–44)
ANION GAP SERPL CALC-SCNC: 8 MMOL/L (ref 8–16)
AST SERPL-CCNC: 20 U/L (ref 10–40)
BASOPHILS # BLD AUTO: 0.04 K/UL (ref 0–0.2)
BASOPHILS NFR BLD: 0.5 % (ref 0–1.9)
BILIRUB SERPL-MCNC: 0.7 MG/DL (ref 0.1–1)
BUN SERPL-MCNC: 13 MG/DL (ref 8–23)
CALCIUM SERPL-MCNC: 8.8 MG/DL (ref 8.7–10.5)
CHLORIDE SERPL-SCNC: 102 MMOL/L (ref 95–110)
CHOLEST SERPL-MCNC: 194 MG/DL (ref 120–199)
CHOLEST/HDLC SERPL: 2.8 {RATIO} (ref 2–5)
CO2 SERPL-SCNC: 29 MMOL/L (ref 23–29)
COMPLEXED PSA SERPL-MCNC: 4.2 NG/ML (ref 0–4)
CREAT SERPL-MCNC: 1 MG/DL (ref 0.5–1.4)
DIFFERENTIAL METHOD BLD: ABNORMAL
EOSINOPHIL # BLD AUTO: 0.1 K/UL (ref 0–0.5)
EOSINOPHIL NFR BLD: 1.4 % (ref 0–8)
ERYTHROCYTE [DISTWIDTH] IN BLOOD BY AUTOMATED COUNT: 15.1 % (ref 11.5–14.5)
EST. GFR  (NO RACE VARIABLE): >60 ML/MIN/1.73 M^2
GLUCOSE SERPL-MCNC: 82 MG/DL (ref 70–110)
HCT VFR BLD AUTO: 44.7 % (ref 40–54)
HDLC SERPL-MCNC: 70 MG/DL (ref 40–75)
HDLC SERPL: 36.1 % (ref 20–50)
HGB BLD-MCNC: 14.1 G/DL (ref 14–18)
IMM GRANULOCYTES # BLD AUTO: 0.03 K/UL (ref 0–0.04)
IMM GRANULOCYTES NFR BLD AUTO: 0.4 % (ref 0–0.5)
LDLC SERPL CALC-MCNC: 110.2 MG/DL (ref 63–159)
LYMPHOCYTES # BLD AUTO: 2.1 K/UL (ref 1–4.8)
LYMPHOCYTES NFR BLD: 26.3 % (ref 18–48)
MCH RBC QN AUTO: 29.9 PG (ref 27–31)
MCHC RBC AUTO-ENTMCNC: 31.5 G/DL (ref 32–36)
MCV RBC AUTO: 95 FL (ref 82–98)
MONOCYTES # BLD AUTO: 0.8 K/UL (ref 0.3–1)
MONOCYTES NFR BLD: 10.4 % (ref 4–15)
NEUTROPHILS # BLD AUTO: 4.9 K/UL (ref 1.8–7.7)
NEUTROPHILS NFR BLD: 61 % (ref 38–73)
NONHDLC SERPL-MCNC: 124 MG/DL
NRBC BLD-RTO: 0 /100 WBC
PLATELET # BLD AUTO: 233 K/UL (ref 150–450)
PMV BLD AUTO: 12 FL (ref 9.2–12.9)
POTASSIUM SERPL-SCNC: 3.8 MMOL/L (ref 3.5–5.1)
PROT SERPL-MCNC: 7.5 G/DL (ref 6–8.4)
RBC # BLD AUTO: 4.72 M/UL (ref 4.6–6.2)
SODIUM SERPL-SCNC: 139 MMOL/L (ref 136–145)
TRIGL SERPL-MCNC: 69 MG/DL (ref 30–150)
WBC # BLD AUTO: 7.97 K/UL (ref 3.9–12.7)

## 2024-12-30 PROCEDURE — 80053 COMPREHEN METABOLIC PANEL: CPT | Performed by: INTERNAL MEDICINE

## 2024-12-30 PROCEDURE — 85025 COMPLETE CBC W/AUTO DIFF WBC: CPT | Performed by: INTERNAL MEDICINE

## 2024-12-30 PROCEDURE — 99214 OFFICE O/P EST MOD 30 MIN: CPT | Mod: PBBFAC | Performed by: INTERNAL MEDICINE

## 2024-12-30 PROCEDURE — 99999 PR PBB SHADOW E&M-EST. PATIENT-LVL IV: CPT | Mod: PBBFAC,,, | Performed by: INTERNAL MEDICINE

## 2024-12-30 PROCEDURE — 99214 OFFICE O/P EST MOD 30 MIN: CPT | Mod: S$PBB,,, | Performed by: INTERNAL MEDICINE

## 2024-12-30 PROCEDURE — 36415 COLL VENOUS BLD VENIPUNCTURE: CPT | Performed by: INTERNAL MEDICINE

## 2024-12-30 PROCEDURE — 84153 ASSAY OF PSA TOTAL: CPT | Performed by: INTERNAL MEDICINE

## 2024-12-30 PROCEDURE — 80061 LIPID PANEL: CPT | Performed by: INTERNAL MEDICINE

## 2024-12-30 PROCEDURE — G2211 COMPLEX E/M VISIT ADD ON: HCPCS | Mod: S$PBB,,, | Performed by: INTERNAL MEDICINE

## 2024-12-30 RX ORDER — TESTOSTERONE ENANTHATE 200 MG/ML
150 VIAL (ML) INTRAMUSCULAR
Qty: 5 ML | Refills: 5 | Status: CANCELLED | OUTPATIENT
Start: 2024-12-30 | End: 2025-06-30

## 2024-12-30 RX ORDER — HYDROCORTISONE 10 MG/1
10 TABLET ORAL 2 TIMES DAILY
Qty: 180 TABLET | Refills: 3 | Status: CANCELLED | OUTPATIENT
Start: 2024-12-30

## 2024-12-30 RX ORDER — DEXAMETHASONE SODIUM PHOSPHATE 4 MG/ML
4 INJECTION, SOLUTION INTRA-ARTICULAR; INTRALESIONAL; INTRAMUSCULAR; INTRAVENOUS; SOFT TISSUE DAILY PRN
Qty: 1 ML | Refills: 1 | Status: SHIPPED | OUTPATIENT
Start: 2024-12-30

## 2024-12-30 RX ORDER — LEVOTHYROXINE SODIUM 100 UG/1
100 TABLET ORAL DAILY
Qty: 90 TABLET | Refills: 3 | Status: CANCELLED | OUTPATIENT
Start: 2024-12-30

## 2024-12-30 NOTE — PROGRESS NOTES
Subjective:      Patient ID: Ravinder Sanz is a 79 y.o. male.     Chief Complaint:  panhypopit And osteoporosis      History of Present Illness  S/p meningioma resection 06/19/2019  Has been dx with Parkinson's   Patient is seeing neurology - Dr. Jay    With regards to the panhypopit  Spinal fusion surgery at age 16; proceeded with pituitary adenoma - nonfunctional. Received radiation treatment at [facility].        With regards to the Secondary adrenal insufficiency-   current dose: Hydrocortisone 10 mg PO BID  Knows sick day precautions    Still does not have medical alert tag -   He understands the importance  Never been hospitalized for AI  Patient denies N/V   Is requesting a new dexamethasone emergency vial      With regards to the Secondary hypothyroidism -    Current dose: Levothyroxine 100 mcg PO once daily    Takes medication properly    Symptoms:      +constipation - attributed to parkinsons       With regards to theSecondary hypogonadism-   Since he was 20 years old   on Testosterone replacement therapy - 150 mg q 2 weeks   Doing well  No LUTS      With regards to theSecondary AGHD - was on GH (for 2 weeks in 2018 )  - Was previously on GH but stopped due to expense and the fact that he did not feel better     No dx of DI     Denies polyuria, polydipsia, nocturia       With regards to his diagnosis of osteoporosis.  This was found on BMD 2/27/24.  We reviewed options and based on his comorbidities we opted to start Prolia.   1St dose was 07/17/2024          No recent falls   Has Parkinsons          Review of Systems  As above       Objective:   Physical Exam  Vitals reviewed.   Neck:      Thyroid: No thyromegaly.   Cardiovascular:      Rate and Rhythm: Normal rate.   Pulmonary:      Effort: Pulmonary effort is normal.   Abdominal:      Palpations: Abdomen is soft.     +resting tremor     Body mass index is 27.97 kg/m².    Lab Review:   Lab Results   Component Value Date    HGBA1C 5.5  08/01/2022     Lab Results   Component Value Date    CHOL 189 12/07/2023    HDL 80 (H) 12/07/2023    LDLCALC 94.6 12/07/2023    TRIG 72 12/07/2023    CHOLHDL 42.3 12/07/2023     Lab Results   Component Value Date     12/07/2023    K 3.9 12/07/2023     12/07/2023    CO2 29 12/07/2023    GLU 79 12/07/2023    BUN 12 12/07/2023    CREATININE 0.9 12/07/2023    CALCIUM 8.8 12/07/2023    PROT 6.9 12/07/2023    ALBUMIN 4.0 12/07/2023    BILITOT 1.0 12/07/2023    ALKPHOS 50 (L) 12/07/2023    AST 19 12/07/2023    ALT 10 12/07/2023    ANIONGAP 9 12/07/2023    ESTGFRAFRICA >60.0 01/03/2022    EGFRNONAA >60.0 01/03/2022    TSH 0.022 (L) 06/21/2019     Lab Results   Component Value Date    WBC 6.34 12/07/2023    HGB 13.8 (L) 12/07/2023    HCT 41.4 12/07/2023    MCV 91 12/07/2023     12/07/2023           Assessment and Plan       Secondary hypothyroidism    As above    Panhypopituitarism  Panhypopituitarism-   Secondary adrenal insufficiency-   Continue  Hydrocortisone - screen periodically for hyperglycemia  Reviewed times of increased dose - sick day precautions handout provided and reviewed  Needs medical alert tag  Follow symptoms and labs      Secondary hypothyroidism -  biochemically euthyroid- checked today  Follow ft4 and adjust accordingly  Avoid exogenous hyperthyroidism as this can accelerate bone loss      Secondary hypogonadism- on Testosterone replacement therapy. At goal.  Follow   H&H, PSA, CMP         Secondary AGHD - reviewed risks and benefits of therapy   Currently will hold off  Discussed QOL issues             Hypogonadism in male   As above    Secondary adrenal insufficiency   As above    Age-related osteoporosis without current pathological fracture   Based on FRAX.  Reviewed fall precautions.  Is doing well with Prolia.     Labs today  Rtc 1 year

## 2025-01-03 ENCOUNTER — PATIENT MESSAGE (OUTPATIENT)
Dept: ENDOCRINOLOGY | Facility: CLINIC | Age: 80
End: 2025-01-03
Payer: MEDICARE

## 2025-01-08 RX ORDER — DEXAMETHASONE SODIUM PHOSPHATE 4 MG/ML
INJECTION, SOLUTION INTRA-ARTICULAR; INTRALESIONAL; INTRAMUSCULAR; INTRAVENOUS; SOFT TISSUE
Qty: 1 ML | Refills: 1 | Status: SHIPPED | OUTPATIENT
Start: 2025-01-08

## 2025-01-17 ENCOUNTER — TELEPHONE (OUTPATIENT)
Dept: INFECTIOUS DISEASES | Facility: HOSPITAL | Age: 80
End: 2025-01-17
Payer: MEDICARE

## 2025-01-17 NOTE — TELEPHONE ENCOUNTER
Returning patient call from pt access message to reschedule Prolia appt for 1/21 due to weather threat.  No answer, left  with return call number of [phone];

## 2025-01-26 DIAGNOSIS — E29.1 HYPOGONADISM IN MALE: Primary | ICD-10-CM

## 2025-01-29 NOTE — PROGRESS NOTES
Name: Ravinder Sanz  MRN: [id number]   CSN: [id number]      Date: 01/30/2025    Referring physician:  Brooke Dean MD  [address] [place] [address]    Chief Complaint: PD      Interval History:  - here with spouse   - cd/ld 1 tab QID -- wakes up shaky in the middle of the night   - 6 am- noon- and 5:30 pm and midnight -- and sometimes takes one in the middle of the night   - still finds it to be helpful   - able to go back to sleep after he takes it   - drooling is about the same   - atropine drops were not very helpful   - tremor is worse with anxiety   - no falls   - still struggles with constipation, every 3-5 days   - he was taking daily dose of miralax which was helpful but stopped because he didn't want to become dependent   - not exercising   - intermittent anxiety, may be whenever he is due for a dose   - not fatigued throughout the day  - memory seems to be stable   - denies hallucinations, maybe seeing grout lines in between tile differently but no clear delusions or hallucinations   - no apathy       Review of behavioral systems:    Behavior:   Personality change: yno  symptoms of disinhibition and social inappropriateness: no  symptoms to suggest a loss of manners or decorum: no  apathetic or has decreased motivation: no  emotional expression has changed: no  emotional blunting or lability: no  symptoms of irritability and mood lability: no  symptoms of agitation, aggression, or violent outbursts: no  insight into disease and situation is impaired: no  personal hygiene is intact: yes  exhibiting a diminished response to other people's needs and feelings: no  exhibiting a diminished social interest, interrelatedness, or personal warmth: no  new and/or worsening simple repetitive behaviors: no  Speech has become simplified and/or repetitive/stereotyped: no  new/worsening complex repetitive/ritualistic compulsions and behaviors: no  symptoms of hyper-religiosity or dogmatism:  no  interests/pleasures have not become restrictive, simplified, interrupting, or repetitive: no  changes in eating behavior: no  increased consumption of food or substances: no  oral exploration or consumption of inedible objects: no  Psychiatric:   feels depressed: no  exhibiting symptoms of social withdrawal/indifference: no  anxiety: yes  hyperactive behavior: no  symptoms of paranoia:  no  delusions: no  hallucinations: no  history of sensitivity to neuroleptic/psychotropic medications: no        July 2023  - accompanied by spouse   - cd/ld 1 tab TID   - 6 am, noon and evening   - takes evening dose before he goes to bed and finds that it relaxes him   - sometimes will take a dose at 3 am   - a little more drooling   - no falls   - exercising, 3-4 times a week --- walking -- wakes up early to walk   - did not tolerate rytary or rasagiline due to nausea   - memory is great   - constipation is much better   - corrected with diet and OTC meds         From Dec 2022  - accompanied by spouse   - currently taking cd/ld 1 tab TID   - rasagiline caused nausea   - takes at midnight, 5 am, and 3 pm   - goes to sleep at midnight, finds that it helps him to go to sleep   - wakes up at 5 am, goes back to sleep until 9 am   - irregular sleep pattern   - worse whenever he is nervous   - a lot of the times tremors are not present unless nervous or anxious   - he had a period where he had hypersalivation and then that disappeared   - he has been eating more hard candies, lemon drops -- may explain why hypersalivation has improved   - most bothersome symptom is waking up at 5 am (off time), does not endorse waking up to go to the bathroom         From August 2021  - on cd/ld 25/100 1 tab TID   - doing better, starting taking last dose a little earlier in the evening   - exercising more   - live most of the time in [place]   - feels that each dose last until the next   - driving still, feels like this helps as well  - art seems  to help as well   - taking BP medicine now   - feels really good about medications   - last dose was at 9 am   - anxiety and stress/emotion worsen tremor   - back on a normal sleep cycle  - constipation still an issues, takes a stool softener       From April 2021  - accompanied by partner   - trial of cd/ld last visit   - sleeping much better, sleep cycle back to normal   - has more energy  - tremor improved on cd/ld  - feeling back to himself   - BP a little high today, didn't take BP meds today -- hasnt taken it for a couple of weeks   - had both vaccines   - last dose was at noon   - takes them 7 am, noon, right before he goes to bed   - no falls   - constipation, takes OTC meds and flax seed oil   - no hallucinations   - very seldomly walking   - starts PT next week  - does a lot of cycling        From 1/2021: Ravinder Sanz is a R HANDED 76 y.o. male with a medical issues significant for panhypopituitarism, hypothyroidism, PDism, s/p resection of meningioma, who presents for follow up of PDism. Previously followed by Dr. Baker, however this patient is new to me. Accompanied by friend, Jong. Tremor since 2018, active dreaming x 8 years. Symptoms improved (drooling, claustrophobia, anxiety much improved) tremendously after he had the resection of the meningioma. Some improvement of tremor as well. Now gait has changed. Fatigues really easily. Also having trouble with sleeping. A lot sleeping throughout the day and therefore not able to sleep at night. Has trouble falling asleep and staying asleep. Gait has changed, tends to walk slightly forward. Shuffling feet. Some shorter steps, tries to make himself walk normal. Feels like he wants to speed up when he walks, taking a lot of quick little steps. Stiffness in both arms, feels like they both equally as stiff.  Has never had hallucinations in the past.     Retired  now. Tremor doesn't interfere with his tremor[place]  "  Going back to [place] for 3 months.       Medication history:  - not currently on medication   - had a bad reaction to wellbutrin, had a bad reaction in the past       Neuroleptic exposure:  - none    Family History: father had parkinson's     Past medical, family, and social history reviewed as documented in chart with pertinent positive medical, family, and social history detailed in HPI.      From 9/9/2019  seen in f/u for tremor and meningioma.  Accompanied by Jong.  Since resection of the meningioma, he no longer drools.  Tremor is less and energy is better.    Personality, and clarity much better.  Anxiety has been better, no delusions.  No perseverative thought.  Still has some double.        5/17/19  male seen in consultation at the request of  Dr. Eng for evaluation of tremor.  Accompanied by partner of 9 years, Jong.  Main concerns:  Claustrophobia, insomnia (fear of bedtime), bilateral tremors (x1 year), drool  Balance is unchanged.  Cognition is unchanged.  Had had active dreaming up until about a year ago.  Anxiety, claustrophobia, can no long camp!  Clonazepam made this worse.  Diplopia, [id number], intermittent.  He wonders if his anxiety is due to allowing fantasies, particularly worried about being put in [facility].  Jong says he is not as social, less motivated as in past.  Jong says he had "twiddle" in fingers when driving up to 9 years ago.  Active artist, fine art (oil), but tremor does not effect this.     PD Review of Symptoms:  Anosmia: +hyposmia   Dysarthria/Hypophonia: mild hypophonia   Dysphagia/Sialorrhea: only with some medications, very little drooling now   Depression: none   Cognitive slowing: improving   Hallucinations: none   Impulsivity: none   Obsessions/Compulsions: none   Urinary changes: frequency, no episodes of incontinence   Constipation: used to have a lot of constipation, has since resolved. Eating more yogurt and fibrous food.   Orthostasis: none   Erectile " Dysfunction: none   Falls: none   Freezing: none   Micrographia: yes   Sleep issues:  -BRITTANY: none   -RBD: yes, for 8+ years, not as often anymore since the resection of the meningioma       Review of Systems:   Review of Systems   Constitutional:  Negative for chills, fever and malaise/fatigue.   HENT:  Negative for hearing loss.    Eyes:  Negative for blurred vision and double vision.   Respiratory:  Negative for cough, shortness of breath and stridor.    Cardiovascular:  Negative for chest pain and leg swelling.   Gastrointestinal:  Negative for constipation, diarrhea and nausea.   Genitourinary:  Negative for frequency and urgency.   Musculoskeletal:  Negative for falls.   Skin:  Negative for itching and rash.   Neurological:  Positive for tremors. Negative for dizziness, loss of consciousness and weakness.   Psychiatric/Behavioral:  Negative for hallucinations and memory loss.            Past Medical History: The patient  has a past medical history of Anxiety, Eczema, Hyperlipidemia, Hypogonadism in male, Hypopituitarism, Hypothyroidism, Meningioma (6/10/2019), and Parkinson's disease.    Social History: The patient  reports that he has quit smoking. He has never used smokeless tobacco. He reports current alcohol use of about 14.0 standard drinks of alcohol per week. He reports that he does not use drugs.    Family History: Their family history includes Diabetes in his sister; Heart disease in his father.    Allergies: Bupropion     Meds:   Current Outpatient Medications on File Prior to Visit   Medication Sig Dispense Refill    amLODIPine (NORVASC) 5 MG tablet Take 1 tablet (5 mg total) by mouth once daily. 90 tablet 3    atorvastatin (LIPITOR) 10 MG tablet TAKE 1 TABLET BY MOUTH EVERY DAY 90 tablet 0    atropine 1% (ISOPTO ATROPINE) 1 % Drop Place 1 drop under the tongue 4 (four) times daily as needed (drooling). (Patient not taking: Reported on 12/30/2024) 2 mL 2    cholecalciferol, vitamin D3, (VITAMIN D3) 50  "mcg (2,000 unit) Cap Take 1 capsule (2,000 Units total) by mouth once daily.      dexAMETHasone (DECADRON) 4 mg/mL injection INJECT 1ML(4MG TOTAL) INTO THE MUSCLE DAILY AS NEEDED(SIGNS AND SYMPTOMS OF SEVERE ADRENAL INSUFFICIENCY 1 mL 1    docusate sodium (COLACE) 100 MG capsule Take 100 mg by mouth daily as needed for Constipation.       fluocinonide (LIDEX) 0.05 % ointment AAA BID x 1-2 wks then prn flares only. Avoid face, armpits, groin (Patient not taking: Reported on 12/30/2024) 30 g 0    hydrocortisone (CORTEF) 10 MG Tab Take 1 tablet (10 mg total) by mouth 2 (two) times daily. 180 tablet 3    levothyroxine (SYNTHROID) 100 MCG tablet Take 1 tablet (100 mcg total) by mouth once daily. 90 tablet 3    MAGNESIUM CITRATE ORAL Take 750 mg by mouth once daily. (Patient not taking: Reported on 12/30/2024)      needle, disp, 21 G 21 gauge x 1" Ndle To use once weekly with testosterone injections 4 each 11    senna (SENOKOT) 8.6 mg tablet Take 1 tablet by mouth daily as needed for Constipation.       testosterone enanthate (DELATESTRYL) 200 mg/mL injection Inject 0.75 mLs (150 mg total) into the muscle every 14 (fourteen) days. Dispose of vial after 28 days 5 mL 4    [DISCONTINUED] carbidopa-levodopa  mg (SINEMET)  mg per tablet Take 1 tablet by mouth 4 (four) times daily. 360 tablet 3     No current facility-administered medications on file prior to visit.       Exam:  /82 (Patient Position: Sitting)   Ht 5' 6" (1.676 m)   Wt 78.9 kg (174 lb)   BMI 28.08 kg/m²     Constitutional  Well-developed, well-nourished, appears stated age   Ophthalmoscopic  No papilledema with no hemorrhages or exudates bilaterally   Cardiovascular  Radial pulses 2+ and symmetric, no LE edema bilaterally   Neurological    * Mental status  MOCA =      - Orientation  Oriented to person, place, time, and situation     - Memory   Intact recent and remote     - Attention/concentration  Attentive, vigilant during exam     - " Language  Naming & repetition intact, +2-step commands     - Fund of knowledge  Aware of current events     - Executive  Well-organized thoughts     - Other     * Cranial nerves       - CN II  PERRL, visual fields full to confrontation     - CN III, IV, VI  Extraocular movements full, normal pursuits and saccades     - CN V  Sensation V1 - V3 intact     - CN VII  Face strong and symmetric bilaterally     - CN VIII  Hearing intact bilaterally     - CN IX, X  Palate raises midline and symmetric     - CN XI  SCM and trapezius 5/5 bilaterally     - CN XII  Tongue midline   * Motor  Muscle bulk normal, strength 5/5 throughout   * Sensory   Intact to temperature and vibration throughout   * Coordination  No dysmetria with finger-to-nose or heel-to-shin   * Gait  See below.   * Deep tendon reflexes  2+ and symmetric throughout   Babinski downgoing bilaterally   * Specialized movement exam  mild hypophonic speech.    mild facial masking.   L > R cogwheel rigidity.     L > Rbradykinesia.   Bilateral resting tremor, R > L   Bilateral leg tremor    No other dystonia, chorea, athetosis, myoclonus, or tics.   No motor impersistence.   Mild anterior stoop     Decreased arm swing bilaterally    Narrow-based gait    tremor re-emerges during gait eval      Laboratory/Radiological:  - Results:  Lab Visit on 12/30/2024   Component Date Value Ref Range Status    Hemoglobin A1C 12/30/2024 5.4  4.0 - 5.6 % Final    Estimated Avg Glucose 12/30/2024 108  68 - 131 mg/dL Final    TSH 12/30/2024 0.012 (L)  0.400 - 4.000 uIU/mL Final    Vit D, 25-Hydroxy 12/30/2024 44  30 - 96 ng/mL Final    Free T4 12/30/2024 1.37  0.71 - 1.51 ng/dL Final    Prolactin 12/30/2024 1.3 (L)  3.5 - 19.4 ng/mL Final    Magnesium 12/30/2024 2.1  1.6 - 2.6 mg/dL Final   Lab Visit on 12/30/2024   Component Date Value Ref Range Status    Sodium 12/30/2024 139  136 - 145 mmol/L Final    Potassium 12/30/2024 3.8  3.5 - 5.1 mmol/L Final    Chloride 12/30/2024 102  95 -  110 mmol/L Final    CO2 12/30/2024 29  23 - 29 mmol/L Final    Glucose 12/30/2024 82  70 - 110 mg/dL Final    BUN 12/30/2024 13  8 - 23 mg/dL Final    Creatinine 12/30/2024 1.0  0.5 - 1.4 mg/dL Final    Calcium 12/30/2024 8.8  8.7 - 10.5 mg/dL Final    Total Protein 12/30/2024 7.5  6.0 - 8.4 g/dL Final    Albumin 12/30/2024 4.0  3.5 - 5.2 g/dL Final    Total Bilirubin 12/30/2024 0.7  0.1 - 1.0 mg/dL Final    Alkaline Phosphatase 12/30/2024 48  40 - 150 U/L Final    AST 12/30/2024 20  10 - 40 U/L Final    ALT 12/30/2024 8 (L)  10 - 44 U/L Final    eGFR 12/30/2024 >60.0  >60 mL/min/1.73 m^2 Final    Anion Gap 12/30/2024 8  8 - 16 mmol/L Final    Cholesterol 12/30/2024 194  120 - 199 mg/dL Final    Triglycerides 12/30/2024 69  30 - 150 mg/dL Final    HDL 12/30/2024 70  40 - 75 mg/dL Final    LDL Cholesterol 12/30/2024 110.2  63.0 - 159.0 mg/dL Final    HDL/Cholesterol Ratio 12/30/2024 36.1  20.0 - 50.0 % Final    Total Cholesterol/HDL Ratio 12/30/2024 2.8  2.0 - 5.0 Final    Non-HDL Cholesterol 12/30/2024 124  mg/dL Final    WBC 12/30/2024 7.97  3.90 - 12.70 K/uL Final    RBC 12/30/2024 4.72  4.60 - 6.20 M/uL Final    Hemoglobin 12/30/2024 14.1  14.0 - 18.0 g/dL Final    Hematocrit 12/30/2024 44.7  40.0 - 54.0 % Final    MCV 12/30/2024 95  82 - 98 fL Final    MCH 12/30/2024 29.9  27.0 - 31.0 pg Final    MCHC 12/30/2024 31.5 (L)  32.0 - 36.0 g/dL Final    RDW 12/30/2024 15.1 (H)  11.5 - 14.5 % Final    Platelets 12/30/2024 233  150 - 450 K/uL Final    MPV 12/30/2024 12.0  9.2 - 12.9 fL Final    Immature Granulocytes 12/30/2024 0.4  0.0 - 0.5 % Final    Gran # (ANC) 12/30/2024 4.9  1.8 - 7.7 K/uL Final    Immature Grans (Abs) 12/30/2024 0.03  0.00 - 0.04 K/uL Final    Lymph # 12/30/2024 2.1  1.0 - 4.8 K/uL Final    Mono # 12/30/2024 0.8  0.3 - 1.0 K/uL Final    Eos # 12/30/2024 0.1  0.0 - 0.5 K/uL Final    Baso # 12/30/2024 0.04  0.00 - 0.20 K/uL Final    nRBC 12/30/2024 0  0 /100 WBC Final    Gran % 12/30/2024 61.0   38.0 - 73.0 % Final    Lymph % 12/30/2024 26.3  18.0 - 48.0 % Final    Mono % 12/30/2024 10.4  4.0 - 15.0 % Final    Eosinophil % 12/30/2024 1.4  0.0 - 8.0 % Final    Basophil % 12/30/2024 0.5  0.0 - 1.9 % Final    Differential Method 12/30/2024 Automated   Final    PSA, Screen 12/30/2024 4.2 (H)  0.00 - 4.00 ng/mL Final       - Independent review of images:      - Independent review of consultant's notes:     ASSESSMENT/PLAN:  Parkinson's   Anxiety, active dreaming x 5-9 years, tremor since 2018  ldopa responsive   Currently taking cd/ld 1 tab QID, adding dose in the middle of the night for sleep fragmentation   - history of meningioma s/p resection, stable continue to monitor   - failed rytary and rasagiline   maybe seeing grout lines in between tile differently but no clear delusions or hallucinations -- monitoring closely         2. Constipation  - add 1/4th dose of miralax daily       3. Sialorrhea   - failed atropine drops     Orders Placed This Encounter    carbidopa-levodopa  mg (SINEMET)  mg per tablet         Follow up: in 1 year           I performed a neurobehavioral status examination that included a clinical assessment of thinking, reasoning, and judgment. Please see above HPI and ROS for full details. This exam was performed on 1/30/2025 and included 17 minutes spent on direct face-to-face clinical observation and interview with the patient and 16 minutes spent interpreting test results and preparing the report. The total time of 33 minutes spent on the neurobehavioral status examination is not included in the time spent on evaluation and management coding.      Collaborating Physician, Dr. Matta, was available during today's encounter. Any change to plan along with cosign to appear in the EMR.       Total time spent with the patient: 31 minutes. 22 minutes of face-to-face consultation and 9 minutes of chart review and coordination of care, on the day of the visit. This includes face to  face time and non-face to face time preparing to see the patient (eg, review of tests), obtaining and/or reviewing separately obtained history, documenting clinical information in the electronic or other health record, independently interpreting resultsand communicating results to the patient/family/caregiver, or care coordination.         Rafia Christianson PA-C   [facility]  Department of Neurology  Movement Disorders

## 2025-01-30 ENCOUNTER — OFFICE VISIT (OUTPATIENT)
Facility: CLINIC | Age: 80
End: 2025-01-30
Payer: MEDICARE

## 2025-01-30 ENCOUNTER — INFUSION (OUTPATIENT)
Dept: INFECTIOUS DISEASES | Facility: HOSPITAL | Age: 80
End: 2025-01-30
Payer: MEDICARE

## 2025-01-30 VITALS
OXYGEN SATURATION: 96 % | DIASTOLIC BLOOD PRESSURE: 69 MMHG | TEMPERATURE: 99 F | HEIGHT: 66 IN | SYSTOLIC BLOOD PRESSURE: 126 MMHG | BODY MASS INDEX: 28.06 KG/M2 | HEART RATE: 105 BPM | RESPIRATION RATE: 21 BRPM | WEIGHT: 174.63 LBS

## 2025-01-30 VITALS
BODY MASS INDEX: 27.97 KG/M2 | HEIGHT: 66 IN | DIASTOLIC BLOOD PRESSURE: 82 MMHG | WEIGHT: 174 LBS | SYSTOLIC BLOOD PRESSURE: 124 MMHG

## 2025-01-30 DIAGNOSIS — E23.0 PANHYPOPITUITARISM: Primary | ICD-10-CM

## 2025-01-30 DIAGNOSIS — G20.A1 PARKINSON'S DISEASE, UNSPECIFIED WHETHER DYSKINESIA PRESENT, UNSPECIFIED WHETHER MANIFESTATIONS FLUCTUATE: Primary | ICD-10-CM

## 2025-01-30 DIAGNOSIS — Z00.00 ENCOUNTER FOR MEDICARE ANNUAL WELLNESS EXAM: ICD-10-CM

## 2025-01-30 DIAGNOSIS — K59.00 CONSTIPATION, UNSPECIFIED CONSTIPATION TYPE: ICD-10-CM

## 2025-01-30 DIAGNOSIS — Z86.018 S/P RESECTION OF MENINGIOMA: ICD-10-CM

## 2025-01-30 DIAGNOSIS — M81.0 AGE-RELATED OSTEOPOROSIS WITHOUT CURRENT PATHOLOGICAL FRACTURE: ICD-10-CM

## 2025-01-30 DIAGNOSIS — Z98.890 S/P RESECTION OF MENINGIOMA: ICD-10-CM

## 2025-01-30 DIAGNOSIS — Z71.89 COUNSELING REGARDING GOALS OF CARE: ICD-10-CM

## 2025-01-30 DIAGNOSIS — E29.1 HYPOGONADISM IN MALE: ICD-10-CM

## 2025-01-30 PROCEDURE — 99213 OFFICE O/P EST LOW 20 MIN: CPT | Mod: PBBFAC,25 | Performed by: PHYSICIAN ASSISTANT

## 2025-01-30 PROCEDURE — 63600175 PHARM REV CODE 636 W HCPCS: Mod: JZ,TB | Performed by: INTERNAL MEDICINE

## 2025-01-30 PROCEDURE — 99999 PR PBB SHADOW E&M-EST. PATIENT-LVL III: CPT | Mod: PBBFAC,,, | Performed by: PHYSICIAN ASSISTANT

## 2025-01-30 PROCEDURE — 96116 NUBHVL XM PHYS/QHP 1ST HR: CPT | Mod: 25,PBBFAC | Performed by: PHYSICIAN ASSISTANT

## 2025-01-30 PROCEDURE — 96372 THER/PROPH/DIAG INJ SC/IM: CPT

## 2025-01-30 RX ORDER — TESTOSTERONE ENANTHATE 200 MG/ML
VIAL (ML) INTRAMUSCULAR
Qty: 5 ML | Refills: 5 | Status: SHIPPED | OUTPATIENT
Start: 2025-01-30

## 2025-01-30 RX ORDER — CARBIDOPA AND LEVODOPA 25; 100 MG/1; MG/1
1 TABLET ORAL 4 TIMES DAILY
Qty: 450 TABLET | Refills: 3 | Status: SHIPPED | OUTPATIENT
Start: 2025-01-30

## 2025-01-30 RX ADMIN — DENOSUMAB 60 MG: 60 INJECTION SUBCUTANEOUS at 11:01

## 2025-01-30 NOTE — PROGRESS NOTES
Patient arrives for Prolia injection - confirms use of calcium and vitamin D supplements and denies dental procedures over past 3 months - administered per guidelines. Limited head-to-toe assessment due to privacy issues and visit reason though the opportunity was given for patient to express any concerns.    Patient arrives for prolia as ordered by Dr. Eng. All benefits, risks, requirements, and alternatives should have been discussed with patient by ordering provider at the time the order was placed.

## 2025-02-21 ENCOUNTER — PATIENT MESSAGE (OUTPATIENT)
Dept: INTERNAL MEDICINE | Facility: CLINIC | Age: 80
End: 2025-02-21
Payer: MEDICARE

## 2025-03-01 RX ORDER — AMLODIPINE BESYLATE 5 MG/1
5 TABLET ORAL
Qty: 90 TABLET | Refills: 3 | Status: SHIPPED | OUTPATIENT
Start: 2025-03-01

## 2025-03-01 NOTE — TELEPHONE ENCOUNTER
Refill Decision Note   Ravinder Umu  is requesting a refill authorization.  Brief Assessment and Rationale for Refill:  Approve     Medication Therapy Plan:        Comments:     Note composed:10:41 AM 03/01/2025

## 2025-03-01 NOTE — TELEPHONE ENCOUNTER
No care due was identified.  [facility] Embedded Care Due Messages. Reference number: [id number].   3/01/2025 7:20:34 AM CST

## 2025-03-15 NOTE — TELEPHONE ENCOUNTER
No care due was identified.  [facility] Embedded Care Due Messages. Reference number: [id number].   3/15/2025 8:27:05 AM CDT

## 2025-03-16 RX ORDER — ATORVASTATIN CALCIUM 10 MG/1
10 TABLET, FILM COATED ORAL
Qty: 90 TABLET | Refills: 3 | Status: SHIPPED | OUTPATIENT
Start: 2025-03-16

## 2025-03-16 NOTE — TELEPHONE ENCOUNTER
Refill Decision Note   Ravinder Sanz  is requesting a refill authorization.  Brief Assessment and Rationale for Refill:  Approve     Medication Therapy Plan:         Comments:     Note composed:10:49 AM 03/16/2025             Appointments     Last Visit   12/23/2024 Brooke Dean MD   Next Visit   Visit date not found Brooke Dean MD

## 2025-05-16 ENCOUNTER — PATIENT MESSAGE (OUTPATIENT)
Facility: CLINIC | Age: 80
End: 2025-05-16
Payer: MEDICARE

## 2025-05-20 ENCOUNTER — PATIENT MESSAGE (OUTPATIENT)
Dept: INTERNAL MEDICINE | Facility: CLINIC | Age: 80
End: 2025-05-20
Payer: MEDICARE

## 2025-07-26 DIAGNOSIS — E29.1 HYPOGONADISM IN MALE: ICD-10-CM

## 2025-07-28 RX ORDER — TESTOSTERONE ENANTHATE 200 MG/ML
150 VIAL (ML) INTRAMUSCULAR
Qty: 5 ML | Refills: 3 | Status: SHIPPED | OUTPATIENT
Start: 2025-07-28

## 2025-08-05 RX ORDER — CARBIDOPA AND LEVODOPA 25; 100 MG/1; MG/1
1 TABLET ORAL 4 TIMES DAILY
Qty: 360 TABLET | Refills: 3 | Status: SHIPPED | OUTPATIENT
Start: 2025-08-05

## 2025-08-14 ENCOUNTER — PATIENT MESSAGE (OUTPATIENT)
Dept: INTERNAL MEDICINE | Facility: CLINIC | Age: 80
End: 2025-08-14
Payer: MEDICARE

## (undated) DEVICE — DRESSING TELFA N ADH 3X8

## (undated) DEVICE — CONTAINER SPECIMEN STRL 4OZ

## (undated) DEVICE — SPONGE NEURO 1/4X1/4

## (undated) DEVICE — TUBE FRAZIER 5MM 2FT SOFT TIP

## (undated) DEVICE — DRAPE OPHTHALMIC 48X62 FEN

## (undated) DEVICE — SYR SLIP TIP 1CC

## (undated) DEVICE — HEMOSTAT SURGICEL 4X8IN

## (undated) DEVICE — WARMER DRAPE STERILE LF

## (undated) DEVICE — SET DISP INTRO MINOP 19FR

## (undated) DEVICE — CARTRIDGE OIL

## (undated) DEVICE — DIFFUSER

## (undated) DEVICE — CASSETTE INFINITI

## (undated) DEVICE — ROUTER TAPERED 2.3MM

## (undated) DEVICE — Device

## (undated) DEVICE — BUR BONE CUT MICRO TPS 3X3.8MM

## (undated) DEVICE — DRAPE THYROID WITH ARMBOARD

## (undated) DEVICE — GAUZE SPONGE 4X4 12PLY

## (undated) DEVICE — DRESSING SURGICAL 1X1

## (undated) DEVICE — TAPE SURG MEDIPORE 6X72IN

## (undated) DEVICE — GLOVE BIOGEL ECLIPSE SZ 6.5

## (undated) DEVICE — SEE MEDLINE ITEM 152622

## (undated) DEVICE — DRAPE SURGICAL STERI IRRG PCH

## (undated) DEVICE — HOOK STAY ELAS 5MM 8EA/PK

## (undated) DEVICE — SEALER AQUAMANTYS 2.3 BIPOLAR

## (undated) DEVICE — SPRAY MASTISOL

## (undated) DEVICE — ELECTRODE REM PLYHSV RETURN 9

## (undated) DEVICE — DRESSING SURGICAL 1X3

## (undated) DEVICE — RUBBERBAND STERILE 3X1/8IN

## (undated) DEVICE — DRAPE OPMI STERILE

## (undated) DEVICE — DRESSING SURGICAL 1/2X1/2

## (undated) DEVICE — CORD BIPOLAR 12 FOOT

## (undated) DEVICE — STAPLER SKIN PROXIMATE WIDE

## (undated) DEVICE — SUT 4/0 18IN NUROLON BLK B

## (undated) DEVICE — TIP ASPIRATOR STRAIGHT MICRO D

## (undated) DEVICE — SUT VICRYL PLUS 3-0 SH 18IN

## (undated) DEVICE — KIT EVACUATOR 3-SPRING 1/8 DRN

## (undated) DEVICE — FORCEP SPETZLER MALIS 8IN 1MM

## (undated) DEVICE — SOL BETADINE 5%

## (undated) DEVICE — SEE MEDLINE ITEM 156905

## (undated) DEVICE — MARKERS SPHERZ PASSIVE

## (undated) DEVICE — SEE MEDLINE ITEM 154981

## (undated) DEVICE — MARKER SKIN STND TIP BLUE BARR

## (undated) DEVICE — BLADE SURG CARBON STEEL SZ11